# Patient Record
Sex: MALE | Race: WHITE | Employment: FULL TIME | URBAN - NONMETROPOLITAN AREA
[De-identification: names, ages, dates, MRNs, and addresses within clinical notes are randomized per-mention and may not be internally consistent; named-entity substitution may affect disease eponyms.]

---

## 2017-02-05 ENCOUNTER — OFFICE VISIT (OUTPATIENT)
Dept: URGENT CARE | Age: 57
End: 2017-02-05
Payer: COMMERCIAL

## 2017-02-05 VITALS
HEIGHT: 72 IN | TEMPERATURE: 98.5 F | BODY MASS INDEX: 30.61 KG/M2 | WEIGHT: 226 LBS | OXYGEN SATURATION: 95 % | HEART RATE: 72 BPM | RESPIRATION RATE: 20 BRPM | SYSTOLIC BLOOD PRESSURE: 143 MMHG | DIASTOLIC BLOOD PRESSURE: 88 MMHG

## 2017-02-05 DIAGNOSIS — J01.90 ACUTE SINUSITIS, RECURRENCE NOT SPECIFIED, UNSPECIFIED LOCATION: Primary | ICD-10-CM

## 2017-02-05 PROCEDURE — 96372 THER/PROPH/DIAG INJ SC/IM: CPT | Performed by: NURSE PRACTITIONER

## 2017-02-05 PROCEDURE — 99213 OFFICE O/P EST LOW 20 MIN: CPT | Performed by: NURSE PRACTITIONER

## 2017-02-05 RX ORDER — AMOXICILLIN AND CLAVULANATE POTASSIUM 500; 125 MG/1; MG/1
1 TABLET, FILM COATED ORAL 3 TIMES DAILY
Qty: 30 TABLET | Refills: 0 | Status: SHIPPED | OUTPATIENT
Start: 2017-02-05 | End: 2017-02-15

## 2017-02-05 RX ORDER — BENZONATATE 100 MG/1
100 CAPSULE ORAL 3 TIMES DAILY PRN
Qty: 21 CAPSULE | Refills: 0 | Status: SHIPPED | OUTPATIENT
Start: 2017-02-05 | End: 2017-02-12

## 2017-02-05 RX ORDER — DEXAMETHASONE SODIUM PHOSPHATE 10 MG/ML
10 INJECTION INTRAMUSCULAR; INTRAVENOUS ONCE
Status: COMPLETED | OUTPATIENT
Start: 2017-02-05 | End: 2017-02-05

## 2017-02-05 RX ADMIN — DEXAMETHASONE SODIUM PHOSPHATE 10 MG: 10 INJECTION INTRAMUSCULAR; INTRAVENOUS at 12:13

## 2017-02-05 ASSESSMENT — ENCOUNTER SYMPTOMS
WHEEZING: 0
ALLERGIC/IMMUNOLOGIC NEGATIVE: 1
EYES NEGATIVE: 1
COUGH: 1
GASTROINTESTINAL NEGATIVE: 1

## 2017-08-17 ENCOUNTER — HOSPITAL ENCOUNTER (OUTPATIENT)
Dept: NON INVASIVE DIAGNOSTICS | Age: 57
Discharge: HOME | End: 2017-08-17
Payer: COMMERCIAL

## 2017-08-17 LAB
LV EF: 50 %
LVEF MODALITY: NORMAL

## 2017-08-17 PROCEDURE — 93350 STRESS TTE ONLY: CPT

## 2019-07-11 ENCOUNTER — HOSPITAL ENCOUNTER (OUTPATIENT)
Dept: GENERAL RADIOLOGY | Age: 59
Discharge: HOME | End: 2019-07-11
Payer: COMMERCIAL

## 2019-07-11 DIAGNOSIS — R20.0 NUMBNESS AND TINGLING: ICD-10-CM

## 2019-07-11 DIAGNOSIS — R20.2 NUMBNESS AND TINGLING: ICD-10-CM

## 2019-07-11 DIAGNOSIS — M25.511 PAIN OF BOTH SHOULDER JOINTS: ICD-10-CM

## 2019-07-11 DIAGNOSIS — M25.512 PAIN OF BOTH SHOULDER JOINTS: ICD-10-CM

## 2019-07-11 PROCEDURE — 72050 X-RAY EXAM NECK SPINE 4/5VWS: CPT

## 2019-07-11 PROCEDURE — 72040 X-RAY EXAM NECK SPINE 2-3 VW: CPT

## 2019-07-11 PROCEDURE — 73030 X-RAY EXAM OF SHOULDER: CPT

## 2020-07-30 ENCOUNTER — OFFICE VISIT (OUTPATIENT)
Dept: GASTROENTEROLOGY | Facility: CLINIC | Age: 60
End: 2020-07-30

## 2020-07-30 VITALS
OXYGEN SATURATION: 98 % | HEIGHT: 72 IN | SYSTOLIC BLOOD PRESSURE: 124 MMHG | BODY MASS INDEX: 29.8 KG/M2 | TEMPERATURE: 97.8 F | DIASTOLIC BLOOD PRESSURE: 90 MMHG | HEART RATE: 64 BPM | WEIGHT: 220 LBS

## 2020-07-30 DIAGNOSIS — E11.9 TYPE 2 DIABETES MELLITUS WITHOUT COMPLICATION, WITHOUT LONG-TERM CURRENT USE OF INSULIN (HCC): ICD-10-CM

## 2020-07-30 DIAGNOSIS — I10 ESSENTIAL HYPERTENSION: ICD-10-CM

## 2020-07-30 DIAGNOSIS — Z86.010 HISTORY OF ADENOMATOUS POLYP OF COLON: Primary | ICD-10-CM

## 2020-07-30 PROBLEM — Z86.0101 HISTORY OF ADENOMATOUS POLYP OF COLON: Status: ACTIVE | Noted: 2020-07-30

## 2020-07-30 PROCEDURE — S0285 CNSLT BEFORE SCREEN COLONOSC: HCPCS | Performed by: NURSE PRACTITIONER

## 2020-07-30 NOTE — PROGRESS NOTES
[facility] Gastroenterology    Primary Physician Aayush Ewing MD    7/30/2020    Max Oliveira   1960      Chief Complaint   Patient presents with   • Colonoscopy       Subjective     HPI    Max Oliveira is a 60 y.o. male who presents as a referral for preventative maintenance. He has no complaints of nausea or vomiting. No change in bowels. No wt loss. No BRBPR. No melena. No abdominal pain.        Last colonoscopy was 5/2009 noting 2 polyps ( path tubular adenomatous).   The patient does have history of colon polyps. The patient does not have history of colon cancer.   There is not a family history of colon polyps. There is not a family history of colon cancer.     Past Medical History:   Diagnosis Date   • Cervical radiculopathy    • Diabetic neuropathy (CMS/HCC)    • DJD (degenerative joint disease)    • Epidermal cyst    • GERD (gastroesophageal reflux disease)    • History of adenomatous polyp of colon    • Hyperlipidemia    • Hypertension    • Paresthesia    • Vitamin D deficiency        Past Surgical History:   Procedure Laterality Date   • COLONOSCOPY  05/22/2009    2 polyps, adenomatous   • KNEE SURGERY     • MANDIBLE SURGERY      x 2       Outpatient Medications Marked as Taking for the 7/30/20 encounter (Office Visit) with Manuela Abreu APRN   Medication Sig Dispense Refill   • aspirin 81 MG EC tablet Take 81 mg by mouth Daily.     • lisinopril (PRINIVIL,ZESTRIL) 10 MG tablet Take 10 mg by mouth Daily.     • MAGNESIUM PO Take  by mouth.     • meloxicam (MOBIC) 15 MG tablet Take 15 mg by mouth Daily.     • metFORMIN (GLUCOPHAGE) 500 MG tablet Take 500 mg by mouth 2 (Two) Times a Day With Meals.     • metoprolol succinate XL (TOPROL-XL) 50 MG 24 hr tablet Take 50 mg by mouth Daily.     • Misc Natural Products (OSTEO BI-FLEX JOINT SHIELD PO) Take  by mouth.     • Nutritional Supplements (WELLNESS ESSENTIALS PO) Take  by mouth.     • pantoprazole (PROTONIX) 40 MG EC tablet Take 40 mg by  mouth Daily.     • simvastatin (ZOCOR) 40 MG tablet Take 40 mg by mouth Every Night.     • VITAMIN D PO Take  by mouth.         No Known Allergies    Social History     Socioeconomic History   • Marital status:      Spouse name: Not on file   • Number of children: Not on file   • Years of education: Not on file   • Highest education level: Not on file   Tobacco Use   • Smoking status: Former Smoker   • Smokeless tobacco: Never Used   Substance and Sexual Activity   • Alcohol use: Yes     Comment: daily   • Drug use: Not Currently   • Sexual activity: Defer       Family History   Problem Relation Age of Onset   • Colon cancer Neg Hx    • Colon polyps Neg Hx        Review of Systems   Constitutional: Negative for appetite change, chills, fatigue, fever and unexpected weight change.   HENT: Negative for sore throat and trouble swallowing.    Eyes: Negative for visual disturbance.   Respiratory: Negative for shortness of breath and wheezing.    Cardiovascular: Negative for chest pain and palpitations.   Gastrointestinal: Negative for abdominal distention, abdominal pain, anal bleeding, blood in stool, constipation, diarrhea, nausea and vomiting.        As mentioned in hpi   Genitourinary: Negative for difficulty urinating and hematuria.   Musculoskeletal: Negative for arthralgias and back pain.   Skin: Negative for color change and rash.   Neurological: Negative for dizziness, seizures, syncope, light-headedness and headaches.   Hematological: Negative for adenopathy.   Psychiatric/Behavioral: Negative for confusion. The patient is not nervous/anxious.        Objective     Vitals:    07/30/20 0831   BP: 124/90   Pulse: 64   Temp: 97.8 °F (36.6 °C)   SpO2: 98%         07/30/20  0831   Weight: 99.8 kg (220 lb)     Body mass index is 29.84 kg/m².    Physical Exam   Constitutional: He appears well-developed and well-nourished. No distress.   HENT:   Head: Normocephalic and atraumatic.   Eyes: EOM are normal. No  scleral icterus.   Neck: Neck supple. No JVD present.   Cardiovascular: Normal rate, regular rhythm and normal heart sounds.   Pulmonary/Chest: Effort normal and breath sounds normal.   Abdominal: Soft. Bowel sounds are normal. He exhibits no distension. There is no tenderness.   Musculoskeletal: Normal range of motion. He exhibits no deformity.   Neurological: He is alert.   Skin: Skin is warm and dry. No rash noted.   Psychiatric: He has a normal mood and affect. His behavior is normal.   Vitals reviewed.      Imaging Results (Most Recent)     None          Assessment/Plan     Max was seen today for colonoscopy.    Diagnoses and all orders for this visit:    History of adenomatous polyp of colon  -     Case Request; Standing  -     Case Request    Essential hypertension    Type 2 diabetes mellitus without complication, without long-term current use of insulin (CMS/[facility])    Other orders  -     Follow Anesthesia Guidelines / Protocol; Future  -     Obtain Informed Consent; Future  -     polyethylene glycol (Golytely) 236 g solution; Take 4,000 mL by mouth 1 (One) Time for 1 dose. Take as directed per instruction sheet.      Plan for colonoscopy. The patient was advised to take any blood pressure or heart  medications the morning of  procedure if that is when he/she normally takes. The patient was instructed how to adjust diabetes medications the am of procedure.   Hold mobic 5 days prior to procedure.            Body mass index is 29.84 kg/m².    Patient's Body mass index is 29.84 kg/m². BMI is above normal parameters. Recommendations include: recommend weight loss, no f/u .      COLONOSCOPY WITH ANESTHESIA (N/A)  All risks, benefits, alternatives, and indications of colonoscopy procedure have been discussed with the patient. Risks to include perforation of the colon requiring possible surgery or colostomy, risk of bleeding from biopsies or removal of colon tissue, possibility of missing a colon polyp or cancer, or  adverse drug reaction.  Benefits to include the diagnosis and management of disease of the colon and rectum. Alternatives to include barium enema, radiographic evaluation, lab testing or no intervention. Pt verbalizes understanding and agrees.         JESUS Nathan      EMR Dragon/transcription disclaimer:  Much of this encounter note is electronic transcription/translation of spoken language to printed text.  The electronic translation of spoken language may be erroneous, or at times, nonsensical words or phrases may be inadvertently transcribed.  Although I have reviewed the note for such errors, some may still exist.

## 2020-08-19 ENCOUNTER — TRANSCRIBE ORDERS (OUTPATIENT)
Dept: ADMINISTRATIVE | Facility: HOSPITAL | Age: 60
End: 2020-08-19

## 2020-08-19 DIAGNOSIS — Z01.818 PRE-OP TESTING: Primary | ICD-10-CM

## 2020-08-24 ENCOUNTER — LAB (OUTPATIENT)
Dept: LAB | Facility: HOSPITAL | Age: 60
End: 2020-08-24

## 2020-08-24 PROCEDURE — C9803 HOPD COVID-19 SPEC COLLECT: HCPCS | Performed by: INTERNAL MEDICINE

## 2020-08-24 PROCEDURE — U0003 INFECTIOUS AGENT DETECTION BY NUCLEIC ACID (DNA OR RNA); SEVERE ACUTE RESPIRATORY SYNDROME CORONAVIRUS 2 (SARS-COV-2) (CORONAVIRUS DISEASE [COVID-19]), AMPLIFIED PROBE TECHNIQUE, MAKING USE OF HIGH THROUGHPUT TECHNOLOGIES AS DESCRIBED BY CMS-2020-01-R: HCPCS | Performed by: INTERNAL MEDICINE

## 2020-08-25 LAB
COVID LABCORP PRIORITY: NORMAL
SARS-COV-2 RNA RESP QL NAA+PROBE: NOT DETECTED

## 2020-08-27 ENCOUNTER — ANESTHESIA (OUTPATIENT)
Dept: GASTROENTEROLOGY | Facility: HOSPITAL | Age: 60
End: 2020-08-27

## 2020-08-27 ENCOUNTER — ANESTHESIA EVENT (OUTPATIENT)
Dept: GASTROENTEROLOGY | Facility: HOSPITAL | Age: 60
End: 2020-08-27

## 2020-08-27 ENCOUNTER — TELEPHONE (OUTPATIENT)
Dept: GASTROENTEROLOGY | Facility: CLINIC | Age: 60
End: 2020-08-27

## 2020-08-27 ENCOUNTER — HOSPITAL ENCOUNTER (OUTPATIENT)
Facility: HOSPITAL | Age: 60
Setting detail: HOSPITAL OUTPATIENT SURGERY
Discharge: HOME | End: 2020-08-27
Attending: INTERNAL MEDICINE | Admitting: INTERNAL MEDICINE

## 2020-08-27 VITALS
WEIGHT: 216 LBS | TEMPERATURE: 97.3 F | RESPIRATION RATE: 18 BRPM | BODY MASS INDEX: 29.26 KG/M2 | DIASTOLIC BLOOD PRESSURE: 103 MMHG | SYSTOLIC BLOOD PRESSURE: 125 MMHG | OXYGEN SATURATION: 94 % | HEIGHT: 72 IN | HEART RATE: 66 BPM

## 2020-08-27 LAB — GLUCOSE BLDC GLUCOMTR-MCNC: 116 MG/DL (ref 70–130)

## 2020-08-27 PROCEDURE — 82962 GLUCOSE BLOOD TEST: CPT

## 2020-08-27 PROCEDURE — 45385 COLONOSCOPY W/LESION REMOVAL: CPT | Performed by: INTERNAL MEDICINE

## 2020-08-27 PROCEDURE — 25010000002 PROPOFOL 10 MG/ML EMULSION: Performed by: NURSE ANESTHETIST, CERTIFIED REGISTERED

## 2020-08-27 RX ORDER — PROPOFOL 10 MG/ML
VIAL (ML) INTRAVENOUS AS NEEDED
Status: DISCONTINUED | OUTPATIENT
Start: 2020-08-27 | End: 2020-08-27 | Stop reason: SURG

## 2020-08-27 RX ORDER — SODIUM CHLORIDE 9 MG/ML
500 INJECTION, SOLUTION INTRAVENOUS CONTINUOUS PRN
Status: DISCONTINUED | OUTPATIENT
Start: 2020-08-27 | End: 2020-08-27 | Stop reason: HOSPADM

## 2020-08-27 RX ORDER — LIDOCAINE HYDROCHLORIDE 10 MG/ML
0.5 INJECTION, SOLUTION EPIDURAL; INFILTRATION; INTRACAUDAL; PERINEURAL ONCE AS NEEDED
Status: CANCELLED | OUTPATIENT
Start: 2020-08-27

## 2020-08-27 RX ORDER — SODIUM CHLORIDE 0.9 % (FLUSH) 0.9 %
10 SYRINGE (ML) INJECTION AS NEEDED
Status: DISCONTINUED | OUTPATIENT
Start: 2020-08-27 | End: 2020-08-27 | Stop reason: HOSPADM

## 2020-08-27 RX ORDER — ONDANSETRON 2 MG/ML
4 INJECTION INTRAMUSCULAR; INTRAVENOUS ONCE AS NEEDED
Status: DISCONTINUED | OUTPATIENT
Start: 2020-08-27 | End: 2020-08-27 | Stop reason: HOSPADM

## 2020-08-27 RX ADMIN — LIDOCAINE HYDROCHLORIDE 100 MG: 20 INJECTION, SOLUTION INTRAVENOUS at 09:23

## 2020-08-27 RX ADMIN — PROPOFOL 30 MG: 10 INJECTION, EMULSION INTRAVENOUS at 09:27

## 2020-08-27 RX ADMIN — PROPOFOL 30 MG: 10 INJECTION, EMULSION INTRAVENOUS at 09:35

## 2020-08-27 RX ADMIN — PROPOFOL 30 MG: 10 INJECTION, EMULSION INTRAVENOUS at 09:34

## 2020-08-27 RX ADMIN — PROPOFOL 100 MG: 10 INJECTION, EMULSION INTRAVENOUS at 09:23

## 2020-08-27 RX ADMIN — PROPOFOL 30 MG: 10 INJECTION, EMULSION INTRAVENOUS at 09:28

## 2020-08-27 RX ADMIN — PROPOFOL 50 MG: 10 INJECTION, EMULSION INTRAVENOUS at 09:24

## 2020-08-27 RX ADMIN — SODIUM CHLORIDE 500 ML: 9 INJECTION, SOLUTION INTRAVENOUS at 08:00

## 2020-08-27 RX ADMIN — PROPOFOL 30 MG: 10 INJECTION, EMULSION INTRAVENOUS at 09:37

## 2020-08-27 RX ADMIN — PROPOFOL 30 MG: 10 INJECTION, EMULSION INTRAVENOUS at 09:29

## 2020-08-27 RX ADMIN — PROPOFOL 30 MG: 10 INJECTION, EMULSION INTRAVENOUS at 09:32

## 2020-08-27 NOTE — ANESTHESIA PREPROCEDURE EVALUATION
Anesthesia Evaluation     Patient summary reviewed   no history of anesthetic complications:  NPO Solid Status: > 8 hours  NPO Liquid Status: > 2 hours           Airway   Mallampati: I  TM distance: >3 FB  Neck ROM: full  Dental    (+) partials    Pulmonary - negative pulmonary ROS   Cardiovascular   Exercise tolerance: good (4-7 METS)    (+) hypertension, hyperlipidemia,       Neuro/Psych- negative ROS  GI/Hepatic/Renal/Endo    (+)  GERD,  diabetes mellitus,     Musculoskeletal     Abdominal    Substance History      OB/GYN          Other                        Anesthesia Plan    ASA 2     MAC     intravenous induction     Anesthetic plan, all risks, benefits, and alternatives have been provided, discussed and informed consent has been obtained with: patient.

## 2020-08-27 NOTE — ANESTHESIA POSTPROCEDURE EVALUATION
"Patient: Max Oliveira    Procedure Summary     Date:  08/27/20 Room / Location:  [facility] ENDOSCOPY 6 / [facility] END[facility]COPY    Anesthesia Start:  0920 Anesthesia Stop:  0941    Procedure:  COLONOSCOPY WITH ANESTHESIA (N/A ) Diagnosis:       History of adenomatous polyp of colon      (History of adenomatous polyp of colon [Z86.010])    Surgeon:  Mundo Rodrigues MD Provider:  Angie Rm CRNA    Anesthesia Type:  MAC ASA Status:  2          Anesthesia Type: MAC    Vitals  No vitals data found for the desired time range.          Post Anesthesia Care and Evaluation    Patient location during evaluation: PHASE II  Patient participation: complete - patient participated  Level of consciousness: awake and alert  Pain management: adequate  Airway patency: patent  Anesthetic complications: No anesthetic complications    Cardiovascular status: acceptable  Respiratory status: acceptable  Hydration status: acceptable    Comments: Blood pressure 131/87, pulse 77, temperature 97.3 °F (36.3 °C), temperature source Temporal, resp. rate 18, height 182.9 cm (72\"), weight 98 kg (216 lb), SpO2 96 %.    Pt discharged from PACU based on august score >8      "

## 2021-06-22 ENCOUNTER — TRANSCRIBE ORDERS (OUTPATIENT)
Dept: ADMINISTRATIVE | Facility: HOSPITAL | Age: 61
End: 2021-06-22

## 2021-06-22 DIAGNOSIS — Z01.818 PREOPERATIVE TESTING: Primary | ICD-10-CM

## 2021-06-23 ENCOUNTER — APPOINTMENT (OUTPATIENT)
Dept: PREADMISSION TESTING | Facility: HOSPITAL | Age: 61
End: 2021-06-23

## 2021-06-24 ENCOUNTER — PRE-ADMISSION TESTING (OUTPATIENT)
Dept: PREADMISSION TESTING | Facility: HOSPITAL | Age: 61
End: 2021-06-24

## 2021-06-24 VITALS
DIASTOLIC BLOOD PRESSURE: 88 MMHG | OXYGEN SATURATION: 98 % | SYSTOLIC BLOOD PRESSURE: 144 MMHG | RESPIRATION RATE: 16 BRPM | BODY MASS INDEX: 30.99 KG/M2 | WEIGHT: 221.34 LBS | HEIGHT: 71 IN | HEART RATE: 63 BPM

## 2021-06-24 LAB
ANION GAP SERPL CALCULATED.3IONS-SCNC: 10 MMOL/L (ref 5–15)
BUN SERPL-MCNC: 22 MG/DL (ref 6–20)
BUN/CREAT SERPL: 32.4 (ref 7–25)
CALCIUM SPEC-SCNC: 9.8 MG/DL (ref 8.6–10.5)
CHLORIDE SERPL-SCNC: 102 MMOL/L (ref 98–107)
CO2 SERPL-SCNC: 27 MMOL/L (ref 22–29)
CREAT SERPL-MCNC: 0.68 MG/DL (ref 0.76–1.27)
DEPRECATED RDW RBC AUTO: 40.2 FL (ref 37–54)
ERYTHROCYTE [DISTWIDTH] IN BLOOD BY AUTOMATED COUNT: 11.9 % (ref 12.3–15.4)
GFR SERPL CREATININE-BSD FRML MDRD: 119 ML/MIN/1.73
GLUCOSE SERPL-MCNC: 162 MG/DL (ref 65–99)
HCT VFR BLD AUTO: 43.5 % (ref 37.5–51)
HGB BLD-MCNC: 15.6 G/DL (ref 13–17.7)
MCH RBC QN AUTO: 33.3 PG (ref 26.6–33)
MCHC RBC AUTO-ENTMCNC: 35.9 G/DL (ref 31.5–35.7)
MCV RBC AUTO: 92.8 FL (ref 79–97)
PLATELET # BLD AUTO: 221 10*3/MM3 (ref 140–450)
PMV BLD AUTO: 9.9 FL (ref 6–12)
POTASSIUM SERPL-SCNC: 4 MMOL/L (ref 3.5–5.2)
RBC # BLD AUTO: 4.69 10*6/MM3 (ref 4.14–5.8)
SODIUM SERPL-SCNC: 139 MMOL/L (ref 136–145)
WBC # BLD AUTO: 4.74 10*3/MM3 (ref 3.4–10.8)

## 2021-06-24 PROCEDURE — 93010 ELECTROCARDIOGRAM REPORT: CPT | Performed by: INTERNAL MEDICINE

## 2021-06-24 PROCEDURE — 93005 ELECTROCARDIOGRAM TRACING: CPT

## 2021-06-24 PROCEDURE — 80048 BASIC METABOLIC PNL TOTAL CA: CPT

## 2021-06-24 PROCEDURE — 36415 COLL VENOUS BLD VENIPUNCTURE: CPT

## 2021-06-24 PROCEDURE — 85027 COMPLETE CBC AUTOMATED: CPT

## 2021-06-24 RX ORDER — MULTIPLE VITAMINS W/ MINERALS TAB 9MG-400MCG
1 TAB ORAL DAILY
COMMUNITY

## 2021-06-24 NOTE — DISCHARGE INSTRUCTIONS
DAY OF SURGERY INSTRUCTIONS        YOUR SURGEON: ***Dr. Farrell    PROCEDURE: ***left shoulder rotator cuff repair, subacromial decompression, distal clavicle excision, biceps tenodesis/tenotomy     DATE OF SURGERY: ***06/29/2021    ARRIVAL TIME: AS DIRECTED BY OFFICE    YOU MAY TAKE THE FOLLOWING MEDICATION(S) THE MORNING OF SURGERY WITH A SIP OF WATER: ***metoprolol       ALL OTHER HOME MEDICATION CHECK WITH YOUR PHYSICIAN      DO NOT TAKE ANY ERECTILE DYSFUNCTION MEDICATIONS (EX: CIALIS, VIAGRA) 24 HOURS PRIOR TO SURGERY                      MANAGING PAIN AFTER SURGERY    We know you are probably wondering what your pain will be like after surgery.  Following surgery it is unrealistic to expect you will not have pain.   Pain is how our bodies let us know that something is wrong or cautions us to be careful.  That said, our goal is to make your pain tolerable.    Methods we may use to treat your pain include (oral or IV medications, PCAs, epidurals, nerve blocks, etc.)   While some procedures require IV pain medications for a short time after surgery, transitioning to pain medications by mouth allows for better management of pain.   Your nurse will encourage you to take oral pain medications whenever possible.  IV medications work almost immediately, but only last a short while.  Taking medications by mouth allows for a more constant level of medication in your blood stream for a longer period of time.      Once your pain is out of control it is harder to get back under control.  It is important you are aware when your next dose of pain medication is due.  If you are admitted, your nurse may write the time of your next dose on the white board in your room to help you remember.      We are interested in your pain and encourage you to inform us about aggravating factors during your visit.   Many times a simple repositioning every few hours can make a big difference.    If your physician says it is okay, do not let  your pain prevent you from getting out of bed. Be sure to call your nurse for assistance prior to getting up so you do not fall.      Before surgery, please decide your tolerable pain goal.  These faces help describe the pain ratings we use on a 0-10 scale.   Be prepared to tell us your goal and whether or not you take pain or anxiety medications at home.          BEFORE YOU COME TO THE HOSPITAL  (Pre-op instructions)  • Do not eat, drink, smoke or chew gum after midnight the night before surgery.  This also includes no mints.  • Morning of surgery take only the medicines you have been instructed with a sip of water unless otherwise instructed  by your physician.  • Do not shave, wear makeup or dark nail polish.  • Remove all jewelry including rings.  • Leave anything you consider valuable at home.  • Leave your suitcase in the car until after your surgery.  • Bring the following with you if applicable:  o Picture ID and insurance, Medicare or Medicaid cards  o Co-pay/deductible required by insurance (cash, check, credit card)  o Copy of advance directive, living will or power-of- documents if not brought to PAT  o CPAP or BIPAP mask and tubing  o Relaxation aids ( book, magazine), etc.  o Hearing aids                        ON THE DAY OF SURGERY  · On the day of surgery check in at registration located at the main entrance of the hospital.   ? You will be registered and given a beeper with instructions where to wait in the main lobby.  ? When your beeper lights up and vibrates a member of the Outpatient Surgery staff will meet you at the double doors under the stair steps and escort you to your preoperative room.   · You may have cloth compression devices placed on your legs. These help to prevent blood clots and reduce swelling in your legs.  · An IV may be inserted into one of your veins.  · In the operating room, you may be given one or more of the following:  ? A medicine to help you relax  "(sedative).  ? A medicine to numb the area (local anesthetic).  ? A medicine to make you fall asleep (general anesthetic).  ? A medicine that is injected into an area of your body to numb everything below the injection site (regional anesthetic).  · Your surgical site will be marked or identified.  · You may be given an antibiotic through your IV to help prevent infection.  Contact a health care provider if you:  · Develop a fever of more than 100.4°F (38°C) or other feelings of illness during the 48 hours before your surgery.  · Have symptoms that get worse.  Have questions or concerns about your surgery    General Anesthesia/Surgery, Adult  General anesthesia is the use of medicines to make a person \"go to sleep\" (unconscious) for a medical procedure. General anesthesia must be used for certain procedures, and is often recommended for procedures that:  · Last a long time.  · Require you to be still or in an unusual position.  · Are major and can cause blood loss.  The medicines used for general anesthesia are called general anesthetics. As well as making you unconscious for a certain amount of time, these medicines:  · Prevent pain.  · Control your blood pressure.  · Relax your muscles.  Tell a health care provider about:  · Any allergies you have.  · All medicines you are taking, including vitamins, herbs, eye drops, creams, and over-the-counter medicines.  · Any problems you or family members have had with anesthetic medicines.  · Types of anesthetics you have had in the past.  · Any blood disorders you have.  · Any surgeries you have had.  · Any medical conditions you have.  · Any recent upper respiratory, chest, or ear infections.  · Any history of:  ? Heart or lung conditions, such as heart failure, sleep apnea, asthma, or chronic obstructive pulmonary disease (COPD).  ?  service.  ? Depression or anxiety.  · Any tobacco or drug use, including marijuana or alcohol use.  · Whether you are pregnant or " may be pregnant.  What are the risks?  Generally, this is a safe procedure. However, problems may occur, including:  · Allergic reaction.  · Lung and heart problems.  · Inhaling food or liquid from the stomach into the lungs (aspiration).  · Nerve injury.  · Air in the bloodstream, which can lead to stroke.  · Extreme agitation or confusion (delirium) when you wake up from the anesthetic.  · Waking up during your procedure and being unable to move. This is rare.  These problems are more likely to develop if you are having a major surgery or if you have an advanced or serious medical condition. You can prevent some of these complications by answering all of your health care provider's questions thoroughly and by following all instructions before your procedure.  General anesthesia can cause side effects, including:  · Nausea or vomiting.  · A sore throat from the breathing tube.  · Hoarseness.  · Wheezing or coughing.  · Shaking chills.  · Tiredness.  · Body aches.  · Anxiety.  · Sleepiness or drowsiness.  · Confusion or agitation.  RISKS AND COMPLICATIONS OF SURGERY  Your health care provider will discuss possible risks and complications with you before surgery. Common risks and complications include:    · Problems due to the use of anesthetics.  · Blood loss and replacement (does not apply to minor surgical procedures).  · Temporary increase in pain due to surgery.  · Uncorrected pain or problems that the surgery was meant to correct.  · Infection.  · New damage.    What happens before the procedure?    Medicines  Ask your health care provider about:  · Changing or stopping your regular medicines. This is especially important if you are taking diabetes medicines or blood thinners.  · Taking medicines such as aspirin and ibuprofen. These medicines can thin your blood. Do not take these medicines unless your health care provider tells you to take them.  · Taking over-the-counter medicines, vitamins, herbs, and  supplements. Do not take these during the week before your procedure unless your health care provider approves them.  General instructions  · Starting 3-6 weeks before the procedure, do not use any products that contain nicotine or tobacco, such as cigarettes and e-cigarettes. If you need help quitting, ask your health care provider.  · If you brush your teeth on the morning of the procedure, make sure to spit out all of the toothpaste.  · Tell your health care provider if you become ill or develop a cold, cough, or fever.  · If instructed by your health care provider, bring your sleep apnea device with you on the day of your surgery (if applicable).  · Ask your health care provider if you will be going home the same day, the following day, or after a longer hospital stay.  ? Plan to have someone take you home from the hospital or clinic.  ? Plan to have a responsible adult care for you for at least 24 hours after you leave the hospital or clinic. This is important.  What happens during the procedure?  · You will be given anesthetics through both of the following:  ? A mask placed over your nose and mouth.  ? An IV in one of your veins.  · You may receive a medicine to help you relax (sedative).  · After you are unconscious, a breathing tube may be inserted down your throat to help you breathe. This will be removed before you wake up.  · An anesthesia specialist will stay with you throughout your procedure. He or she will:  ? Keep you comfortable and safe by continuing to give you medicines and adjusting the amount of medicine that you get.  ? Monitor your blood pressure, pulse, and oxygen levels to make sure that the anesthetics do not cause any problems.  The procedure may vary among health care providers and hospitals.  What happens after the procedure?  · Your blood pressure, temperature, heart rate, breathing rate, and blood oxygen level will be monitored until the medicines you were given have worn off.  · You  will wake up in a recovery area. You may wake up slowly.  · If you feel anxious or agitated, you may be given medicine to help you calm down.  · If you will be going home the same day, your health care provider may check to make sure you can walk, drink, and urinate.  · Your health care provider will treat any pain or side effects you have before you go home.  · Do not drive for 24 hours if you were given a sedative.  Summary  · General anesthesia is used to keep you still and prevent pain during a procedure.  · It is important to tell your healthcare provider about your medical history and any surgeries you have had, and previous experience with anesthesia.  · Follow your healthcare provider’s instructions about when to stop eating, drinking, or taking certain medicines before your procedure.  · Plan to have someone take you home from the hospital or clinic.  This information is not intended to replace advice given to you by your health care provider. Make sure you discuss any questions you have with your health care provider.  Document Released: 03/26/2009 Document Revised: 08/03/2018 Document Reviewed: 08/03/2018  [employer] Interactive Patient Education © 2019 [employer]       Fall Prevention in Hospitals, Adult  As a hospital patient, your condition and the treatments you receive can increase your risk for falls. Some additional risk factors for falls in a hospital include:  · Being in an unfamiliar environment.  · Being on bed rest.  · Your surgery.  · Taking certain medicines.  · Your tubing requirements, such as intravenous (IV) therapy or catheters.  It is important that you learn how to decrease fall risks while at the hospital. Below are important tips that can help prevent falls.  SAFETY TIPS FOR PREVENTING FALLS  Talk about your risk of falling.  · Ask your health care provider why you are at risk for falling. Is it your medicine, illness, tubing placement, or something else?  · Make a plan with your  health care provider to keep you safe from falls.  · Ask your health care provider or pharmacist about side effects of your medicines. Some medicines can make you dizzy or affect your coordination.  Ask for help.  · Ask for help before getting out of bed. You may need to press your call button.  · Ask for assistance in getting safely to the toilet.  · Ask for a walker or cane to be put at your bedside. Ask that most of the side rails on your bed be placed up before your health care provider leaves the room.  · Ask family or friends to sit with you.  · Ask for things that are out of your reach, such as your glasses, hearing aids, telephone, bedside table, or call button.  Follow these tips to avoid falling:  · Stay lying or seated, rather than standing, while waiting for help.  · Wear rubber-soled slippers or shoes whenever you walk in the hospital.  · Avoid quick, sudden movements.  ¨ Change positions slowly.  ¨ Sit on the side of your bed before standing.  ¨ Stand up slowly and wait before you start to walk.  · Let your health care provider know if there is a spill on the floor.  · Pay careful attention to the medical equipment, electrical cords, and tubes around you.  · When you need help, use your call button by your bed or in the bathroom. Wait for one of your health care providers to help you.  · If you feel dizzy or unsure of your footing, return to bed and wait for assistance.  · Avoid being distracted by the TV, telephone, or another person in your room.  · Do not lean or support yourself on rolling objects, such as IV poles or bedside tables.     This information is not intended to replace advice given to you by your health care provider. Make sure you discuss any questions you have with your health care provider.     Document Released: 12/15/2001 Document Revised: 01/08/2016 Document Reviewed: 08/25/2013  [employer] Interactive Patient Education ©2016 [employer]       [facility] 4%  Patient Instruction Sheet    Chlorhexidine Before Surgery  Chlorhexidine gluconate (CHG) is a germ-killing (antiseptic) solution that is used to clean the skin. It gets rid of the bacteria that normally live on the skin. Cleaning your skin with CHG before surgery helps lower the risk for infection after surgery.    How to use CHG solution  · You will take 2 showers, one shower the night before surgery, the second shower the morning of surgery before coming to the hospital.  · Use CHG only as told by your health care provider, and follow the instructions on the label.  · Use CHG solution while taking a shower. Follow these steps when using CHG solution (unless your health care provider gives you different instructions):  1. Start the shower.  2. Use your normal soap and shampoo to wash your face and hair.  3. Turn off the shower or move out of the shower stream.  4. Pour the CHG onto a clean washcloth. Do not use any type of brush or rough-edged sponge.  5. Starting at your neck, lather your body down to your toes. Make sure you:  6. Pay special attention to the part of your body where you will be having surgery. Scrub this area for at least 1 minute.  7. Use the full amount of CHG as directed. Usually, this is one half bottle for each shower.  8. Do not use CHG on your head or face. If the solution gets into your ears or eyes, rinse them well with water.  9. Avoid your genital area.  10. Avoid any areas of skin that have broken skin, cuts, or scrapes.  11. Scrub your back and under your arms. Make sure to wash skin folds.  12. Let the lather sit on your skin for 1-2 minutes or as long as told by your health care  provider.  13. Thoroughly rinse your entire body in the shower. Make sure that all body creases and crevices are rinsed well.  14. Dry off with a clean towel. Do not put any substances on your body afterward, such as powder, lotion, or perfume.  15. Put on clean clothes or pajamas.  16. If it is the night  before your surgery, sleep in clean sheets.    What are the risks?  Risks of using CHG include:  · A skin reaction.  · Hearing loss, if CHG gets in your ears.  · Eye injury, if CHG gets in your eyes and is not rinsed out.  · The CHG product catching fire.  Make sure that you avoid smoking and flames after applying CHG to your skin.  Do not use CHG:  · If you have a chlorhexidine allergy or have previously reacted to chlorhexidine.  · On babies younger than 2 months of age.      On the day of surgery, when you are taken to your room in Outpatient Surgery you will be given a CHG prepackaged cloth to wipe the site for your surgery.  How to use CHG prepackaged cloths  · Follow the instructions on the label.  · Use the CHG cloth on clean, dry skin. Follow these steps when using a CHG cloth (unless your health care provider gives you different instructions):  1. Using the CHG cloth, vigorously scrub the part of your body where you will be having surgery. Scrub using a back-and-forth motion for 3 minutes. The area on your body should be completely wet with CHG when you are finished scrubbing.  2. Do not rinse. Discard the cloth and let the area air-dry for 1 minute. Do not put any substances on your body afterward, such as powder, lotion, or perfume.  Contact a health care provider if:  · Your skin gets irritated after scrubbing.  · You have questions about using your solution or cloth.  Get help right away if:  · Your eyes become very red or swollen.  · Your eyes itch badly.  · Your skin itches badly and is red or swollen.  · Your hearing changes.  · You have trouble seeing.  · You have swelling or tingling in your mouth or throat.  · You have trouble breathing.  · You swallow any chlorhexidine.  Summary  · Chlorhexidine gluconate (CHG) is a germ-killing (antiseptic) solution that is used to clean the skin. Cleaning your skin with CHG before surgery helps lower the risk for infection after surgery.  · You may be given CHG  to use at home. It may be in a bottle or in a prepackaged cloth to use on your skin. Carefully follow your health care provider's instructions and the instructions on the product label.  · Do not use CHG if you have a chlorhexidine allergy.  · Contact your health care provider if your skin gets irritated after scrubbing.  This information is not intended to replace advice given to you by your health care provider. Make sure you discuss any questions you have with your health care provider.  Document Released: 09/11/2013 Document Revised: 11/15/2018 Document Reviewed: 11/15/2018  [employer] Interactive Patient Education © 2019 [employer]          PATIENT/FAMILY/RESPONSIBLE PARTY VERBALIZES UNDERSTANDING OF ABOVE EDUCATION.  COPY OF PAIN SCALE GIVEN AND REVIEWED WITH VERBALIZED UNDERSTANDING.

## 2021-06-26 LAB
QT INTERVAL: 378 MS
QTC INTERVAL: 396 MS

## 2021-06-28 ENCOUNTER — LAB (OUTPATIENT)
Dept: LAB | Facility: HOSPITAL | Age: 61
End: 2021-06-28

## 2021-06-28 PROBLEM — M75.122 NONTRAUMATIC COMPLETE TEAR OF LEFT ROTATOR CUFF: Status: ACTIVE | Noted: 2021-06-28

## 2021-06-28 LAB — SARS-COV-2 ORF1AB RESP QL NAA+PROBE: NOT DETECTED

## 2021-06-28 PROCEDURE — C9803 HOPD COVID-19 SPEC COLLECT: HCPCS | Performed by: ORTHOPAEDIC SURGERY

## 2021-06-28 PROCEDURE — U0004 COV-19 TEST NON-CDC HGH THRU: HCPCS | Performed by: ORTHOPAEDIC SURGERY

## 2021-06-29 ENCOUNTER — ANESTHESIA EVENT (OUTPATIENT)
Dept: PERIOP | Facility: HOSPITAL | Age: 61
End: 2021-06-29

## 2021-06-29 ENCOUNTER — ANESTHESIA (OUTPATIENT)
Dept: PERIOP | Facility: HOSPITAL | Age: 61
End: 2021-06-29

## 2021-06-29 ENCOUNTER — HOSPITAL ENCOUNTER (OUTPATIENT)
Facility: HOSPITAL | Age: 61
Setting detail: HOSPITAL OUTPATIENT SURGERY
Discharge: HOME | End: 2021-06-29
Attending: ORTHOPAEDIC SURGERY | Admitting: ORTHOPAEDIC SURGERY

## 2021-06-29 VITALS
OXYGEN SATURATION: 98 % | SYSTOLIC BLOOD PRESSURE: 138 MMHG | TEMPERATURE: 97.4 F | DIASTOLIC BLOOD PRESSURE: 91 MMHG | RESPIRATION RATE: 16 BRPM | HEART RATE: 52 BPM

## 2021-06-29 DIAGNOSIS — M75.122 NONTRAUMATIC COMPLETE TEAR OF LEFT ROTATOR CUFF: Primary | ICD-10-CM

## 2021-06-29 LAB
GLUCOSE BLDC GLUCOMTR-MCNC: 117 MG/DL (ref 70–130)
GLUCOSE BLDC GLUCOMTR-MCNC: 131 MG/DL (ref 70–130)

## 2021-06-29 PROCEDURE — 25010000002 ONDANSETRON PER 1 MG: Performed by: NURSE ANESTHETIST, CERTIFIED REGISTERED

## 2021-06-29 PROCEDURE — 82962 GLUCOSE BLOOD TEST: CPT

## 2021-06-29 PROCEDURE — 25010000002 EPINEPHRINE PER 0.1 MG: Performed by: ORTHOPAEDIC SURGERY

## 2021-06-29 PROCEDURE — 25010000002 ROPIVACAINE PER 1 MG: Performed by: ANESTHESIOLOGY

## 2021-06-29 PROCEDURE — C1713 ANCHOR/SCREW BN/BN,TIS/BN: HCPCS | Performed by: ORTHOPAEDIC SURGERY

## 2021-06-29 PROCEDURE — 25010000002 FENTANYL CITRATE (PF) 50 MCG/ML SOLUTION: Performed by: ANESTHESIOLOGY

## 2021-06-29 PROCEDURE — 25010000002 PROPOFOL 10 MG/ML EMULSION: Performed by: NURSE ANESTHETIST, CERTIFIED REGISTERED

## 2021-06-29 PROCEDURE — 25010000002 MIDAZOLAM PER 1 MG: Performed by: ANESTHESIOLOGY

## 2021-06-29 PROCEDURE — C1889 IMPLANT/INSERT DEVICE, NOC: HCPCS | Performed by: ORTHOPAEDIC SURGERY

## 2021-06-29 PROCEDURE — 76942 ECHO GUIDE FOR BIOPSY: CPT | Performed by: ORTHOPAEDIC SURGERY

## 2021-06-29 PROCEDURE — 25010000002 CEFAZOLIN PER 500 MG: Performed by: ORTHOPAEDIC SURGERY

## 2021-06-29 DEVICE — SYS SUT/ANCH BIOCOMP SPEEDBRIDGE SWIVELK 4.75X19.1: Type: IMPLANTABLE DEVICE | Site: SHOULDER | Status: FUNCTIONAL

## 2021-06-29 DEVICE — SCRW BIOCOMP TENODESIS 8X12MM: Type: IMPLANTABLE DEVICE | Site: SHOULDER | Status: FUNCTIONAL

## 2021-06-29 DEVICE — KT BIOTENODESIS W/FW4PK: Type: IMPLANTABLE DEVICE | Site: SHOULDER | Status: FUNCTIONAL

## 2021-06-29 RX ORDER — FLUMAZENIL 0.1 MG/ML
0.2 INJECTION INTRAVENOUS AS NEEDED
Status: DISCONTINUED | OUTPATIENT
Start: 2021-06-29 | End: 2021-06-29 | Stop reason: HOSPADM

## 2021-06-29 RX ORDER — IBUPROFEN 600 MG/1
600 TABLET ORAL ONCE AS NEEDED
Status: DISCONTINUED | OUTPATIENT
Start: 2021-06-29 | End: 2021-06-29 | Stop reason: HOSPADM

## 2021-06-29 RX ORDER — LIDOCAINE HYDROCHLORIDE 10 MG/ML
0.5 INJECTION, SOLUTION EPIDURAL; INFILTRATION; INTRACAUDAL; PERINEURAL ONCE AS NEEDED
Status: DISCONTINUED | OUTPATIENT
Start: 2021-06-29 | End: 2021-06-29 | Stop reason: SDUPTHER

## 2021-06-29 RX ORDER — LIDOCAINE HYDROCHLORIDE 20 MG/ML
INJECTION, SOLUTION EPIDURAL; INFILTRATION; INTRACAUDAL; PERINEURAL AS NEEDED
Status: DISCONTINUED | OUTPATIENT
Start: 2021-06-29 | End: 2021-06-29 | Stop reason: SURG

## 2021-06-29 RX ORDER — MIDAZOLAM HYDROCHLORIDE 1 MG/ML
1 INJECTION INTRAMUSCULAR; INTRAVENOUS
Status: DISCONTINUED | OUTPATIENT
Start: 2021-06-29 | End: 2021-06-29 | Stop reason: HOSPADM

## 2021-06-29 RX ORDER — SODIUM CHLORIDE 0.9 % (FLUSH) 0.9 %
10 SYRINGE (ML) INJECTION AS NEEDED
Status: DISCONTINUED | OUTPATIENT
Start: 2021-06-29 | End: 2021-06-29 | Stop reason: HOSPADM

## 2021-06-29 RX ORDER — BUPIVACAINE HCL/0.9 % NACL/PF 0.1 %
2 PLASTIC BAG, INJECTION (ML) EPIDURAL ONCE
Status: COMPLETED | OUTPATIENT
Start: 2021-06-29 | End: 2021-06-29

## 2021-06-29 RX ORDER — LABETALOL HYDROCHLORIDE 5 MG/ML
5 INJECTION, SOLUTION INTRAVENOUS
Status: DISCONTINUED | OUTPATIENT
Start: 2021-06-29 | End: 2021-06-29 | Stop reason: HOSPADM

## 2021-06-29 RX ORDER — ACETAMINOPHEN 500 MG
1000 TABLET ORAL ONCE
Status: COMPLETED | OUTPATIENT
Start: 2021-06-29 | End: 2021-06-29

## 2021-06-29 RX ORDER — NALOXONE HCL 0.4 MG/ML
0.04 VIAL (ML) INJECTION AS NEEDED
Status: DISCONTINUED | OUTPATIENT
Start: 2021-06-29 | End: 2021-06-29 | Stop reason: HOSPADM

## 2021-06-29 RX ORDER — FENTANYL CITRATE 50 UG/ML
50 INJECTION, SOLUTION INTRAMUSCULAR; INTRAVENOUS ONCE
Status: COMPLETED | OUTPATIENT
Start: 2021-06-29 | End: 2021-06-29

## 2021-06-29 RX ORDER — ONDANSETRON 4 MG/1
4 TABLET, FILM COATED ORAL EVERY 8 HOURS PRN
Qty: 10 TABLET | Refills: 0 | Status: SHIPPED | OUTPATIENT
Start: 2021-06-29 | End: 2021-09-04

## 2021-06-29 RX ORDER — MIDAZOLAM HYDROCHLORIDE 1 MG/ML
2 INJECTION INTRAMUSCULAR; INTRAVENOUS ONCE
Status: COMPLETED | OUTPATIENT
Start: 2021-06-29 | End: 2021-06-29

## 2021-06-29 RX ORDER — ONDANSETRON 2 MG/ML
4 INJECTION INTRAMUSCULAR; INTRAVENOUS AS NEEDED
Status: DISCONTINUED | OUTPATIENT
Start: 2021-06-29 | End: 2021-06-29 | Stop reason: HOSPADM

## 2021-06-29 RX ORDER — SODIUM CHLORIDE 0.9 % (FLUSH) 0.9 %
3-10 SYRINGE (ML) INJECTION AS NEEDED
Status: DISCONTINUED | OUTPATIENT
Start: 2021-06-29 | End: 2021-06-29 | Stop reason: HOSPADM

## 2021-06-29 RX ORDER — ROCURONIUM BROMIDE 10 MG/ML
INJECTION, SOLUTION INTRAVENOUS AS NEEDED
Status: DISCONTINUED | OUTPATIENT
Start: 2021-06-29 | End: 2021-06-29 | Stop reason: SURG

## 2021-06-29 RX ORDER — OXYCODONE AND ACETAMINOPHEN 10; 325 MG/1; MG/1
TABLET ORAL
Qty: 20 TABLET | Refills: 0 | Status: SHIPPED | OUTPATIENT
Start: 2021-06-29 | End: 2021-11-12

## 2021-06-29 RX ORDER — SODIUM CHLORIDE, SODIUM LACTATE, POTASSIUM CHLORIDE, CALCIUM CHLORIDE 600; 310; 30; 20 MG/100ML; MG/100ML; MG/100ML; MG/100ML
100 INJECTION, SOLUTION INTRAVENOUS CONTINUOUS PRN
Status: DISCONTINUED | OUTPATIENT
Start: 2021-06-29 | End: 2021-06-29 | Stop reason: HOSPADM

## 2021-06-29 RX ORDER — HYDROMORPHONE HYDROCHLORIDE 1 MG/ML
0.5 INJECTION, SOLUTION INTRAMUSCULAR; INTRAVENOUS; SUBCUTANEOUS
Status: DISCONTINUED | OUTPATIENT
Start: 2021-06-29 | End: 2021-06-29 | Stop reason: HOSPADM

## 2021-06-29 RX ORDER — SODIUM CHLORIDE, SODIUM LACTATE, POTASSIUM CHLORIDE, CALCIUM CHLORIDE 600; 310; 30; 20 MG/100ML; MG/100ML; MG/100ML; MG/100ML
100 INJECTION, SOLUTION INTRAVENOUS CONTINUOUS
Status: DISCONTINUED | OUTPATIENT
Start: 2021-06-29 | End: 2021-06-29 | Stop reason: HOSPADM

## 2021-06-29 RX ORDER — ONDANSETRON 2 MG/ML
INJECTION INTRAMUSCULAR; INTRAVENOUS AS NEEDED
Status: DISCONTINUED | OUTPATIENT
Start: 2021-06-29 | End: 2021-06-29 | Stop reason: SURG

## 2021-06-29 RX ORDER — SODIUM CHLORIDE 0.9 % (FLUSH) 0.9 %
3 SYRINGE (ML) INJECTION EVERY 12 HOURS SCHEDULED
Status: DISCONTINUED | OUTPATIENT
Start: 2021-06-29 | End: 2021-06-29 | Stop reason: HOSPADM

## 2021-06-29 RX ORDER — LIDOCAINE HYDROCHLORIDE 10 MG/ML
0.5 INJECTION, SOLUTION EPIDURAL; INFILTRATION; INTRACAUDAL; PERINEURAL ONCE AS NEEDED
Status: DISCONTINUED | OUTPATIENT
Start: 2021-06-29 | End: 2021-06-29 | Stop reason: HOSPADM

## 2021-06-29 RX ORDER — ONDANSETRON 2 MG/ML
4 INJECTION INTRAMUSCULAR; INTRAVENOUS ONCE AS NEEDED
Status: DISCONTINUED | OUTPATIENT
Start: 2021-06-29 | End: 2021-06-29 | Stop reason: HOSPADM

## 2021-06-29 RX ORDER — PROPOFOL 10 MG/ML
VIAL (ML) INTRAVENOUS AS NEEDED
Status: DISCONTINUED | OUTPATIENT
Start: 2021-06-29 | End: 2021-06-29 | Stop reason: SURG

## 2021-06-29 RX ORDER — ROPIVACAINE HYDROCHLORIDE 5 MG/ML
INJECTION, SOLUTION EPIDURAL; INFILTRATION; PERINEURAL
Status: COMPLETED | OUTPATIENT
Start: 2021-06-29 | End: 2021-06-29

## 2021-06-29 RX ORDER — OXYCODONE AND ACETAMINOPHEN 10; 325 MG/1; MG/1
1 TABLET ORAL ONCE AS NEEDED
Status: COMPLETED | OUTPATIENT
Start: 2021-06-29 | End: 2021-06-29

## 2021-06-29 RX ORDER — MAGNESIUM HYDROXIDE 1200 MG/15ML
LIQUID ORAL AS NEEDED
Status: DISCONTINUED | OUTPATIENT
Start: 2021-06-29 | End: 2021-06-29 | Stop reason: HOSPADM

## 2021-06-29 RX ORDER — SODIUM CHLORIDE 0.9 % (FLUSH) 0.9 %
10 SYRINGE (ML) INJECTION EVERY 12 HOURS SCHEDULED
Status: DISCONTINUED | OUTPATIENT
Start: 2021-06-29 | End: 2021-06-29 | Stop reason: HOSPADM

## 2021-06-29 RX ORDER — FENTANYL CITRATE 50 UG/ML
25 INJECTION, SOLUTION INTRAMUSCULAR; INTRAVENOUS
Status: DISCONTINUED | OUTPATIENT
Start: 2021-06-29 | End: 2021-06-29 | Stop reason: HOSPADM

## 2021-06-29 RX ADMIN — MIDAZOLAM 2 MG: 1 INJECTION INTRAMUSCULAR; INTRAVENOUS at 08:41

## 2021-06-29 RX ADMIN — ONDANSETRON 4 MG: 2 INJECTION INTRAMUSCULAR; INTRAVENOUS at 10:46

## 2021-06-29 RX ADMIN — OXYCODONE AND ACETAMINOPHEN 1 TABLET: 325; 10 TABLET ORAL at 11:29

## 2021-06-29 RX ADMIN — FENTANYL CITRATE 50 MCG: 50 INJECTION, SOLUTION INTRAMUSCULAR; INTRAVENOUS at 08:41

## 2021-06-29 RX ADMIN — Medication 2 G: at 09:51

## 2021-06-29 RX ADMIN — SODIUM CHLORIDE, POTASSIUM CHLORIDE, SODIUM LACTATE AND CALCIUM CHLORIDE 100 ML/HR: 600; 310; 30; 20 INJECTION, SOLUTION INTRAVENOUS at 07:39

## 2021-06-29 RX ADMIN — SODIUM CHLORIDE, POTASSIUM CHLORIDE, SODIUM LACTATE AND CALCIUM CHLORIDE: 600; 310; 30; 20 INJECTION, SOLUTION INTRAVENOUS at 10:51

## 2021-06-29 RX ADMIN — GLYCOPYRROLATE 0.2 MG: 0.2 INJECTION, SOLUTION INTRAMUSCULAR; INTRAVENOUS at 10:36

## 2021-06-29 RX ADMIN — ACETAMINOPHEN 1000 MG: 500 TABLET, FILM COATED ORAL at 08:39

## 2021-06-29 RX ADMIN — LIDOCAINE HYDROCHLORIDE 50 MG: 20 INJECTION, SOLUTION EPIDURAL; INFILTRATION; INTRACAUDAL; PERINEURAL at 09:47

## 2021-06-29 RX ADMIN — ROCURONIUM BROMIDE 50 MG: 50 INJECTION INTRAVENOUS at 09:47

## 2021-06-29 RX ADMIN — PROPOFOL 200 MG: 10 INJECTION, EMULSION INTRAVENOUS at 09:47

## 2021-06-29 RX ADMIN — ROPIVACAINE HYDROCHLORIDE 20 ML: 5 INJECTION, SOLUTION EPIDURAL; INFILTRATION; PERINEURAL at 08:46

## 2021-06-29 NOTE — ANESTHESIA PROCEDURE NOTES
Airway  Urgency: elective    Date/Time: 6/29/2021 9:48 AM  Airway not difficult    General Information and Staff    Patient location during procedure: OR  CRNA: Dago Kumar CRNA    Indications and Patient Condition  Indications for airway management: airway protection    Preoxygenated: yes  MILS maintained throughout  Mask difficulty assessment: 1 - vent by mask    Final Airway Details  Final airway type: endotracheal airway      Successful airway: ETT  Cuffed: yes   Successful intubation technique: direct laryngoscopy  Endotracheal tube insertion site: oral  Blade: Nur  Blade size: 4  ETT size (mm): 7.5  Cormack-Lehane Classification: grade I - full view of glottis  Placement verified by: chest auscultation and capnometry   Cuff volume (mL): 5  Measured from: lips  ETT/EBT  to lips (cm): 22  Number of attempts at approach: 1  Assessment: lips, teeth, and gum same as pre-op and atraumatic intubation

## 2021-06-29 NOTE — ANESTHESIA PREPROCEDURE EVALUATION
Anesthesia Evaluation     Patient summary reviewed   no history of anesthetic complications:  NPO Solid Status: > 6 hours  NPO Liquid Status: > 6 hours           Airway   Mallampati: I  TM distance: >3 FB  Neck ROM: full  No difficulty expected  Dental    (+) partials    Pulmonary    (+) a smoker Former,   Cardiovascular   Exercise tolerance: excellent (>7 METS)    (+) hypertension, dysrhythmias (many years ago--no intervention ), hyperlipidemia,       Neuro/Psych  (+) numbness,     (-) seizures, CVA  GI/Hepatic/Renal/Endo    (+)  GERD,  diabetes mellitus type 2,   (-) liver disease, no renal disease, no thyroid disorder    Musculoskeletal     Abdominal    Substance History      OB/GYN          Other                        Anesthesia Plan    ASA 2     general with block     intravenous induction     Anesthetic plan, all risks, benefits, and alternatives have been provided, discussed and informed consent has been obtained with: patient.

## 2021-06-29 NOTE — ANESTHESIA PROCEDURE NOTES
Peripheral Block    Pre-sedation assessment completed: 6/29/2021 8:40 AM    Patient reassessed immediately prior to procedure    Patient location during procedure: holding area  Start time: 6/29/2021 8:44 AM  Stop time: 6/29/2021 8:46 AM  Reason for block: procedure for pain, at surgeon's request, post-op pain management and Request by Dr. Farrell   Performed by  Anesthesiologist: Yovany Charles MD  Preanesthetic Checklist  Completed: patient identified, IV checked, site marked, risks and benefits discussed, surgical consent, monitors and equipment checked, pre-op evaluation and timeout performed  Prep:  Pt Position: supine  Sterile barriers:gloves  Prep: ChloraPrep  Patient monitoring: blood pressure monitoring, continuous pulse oximetry and EKG  Procedure  Sedation:yes  Performed under: local infiltration  Guidance:ultrasound guided and Brachial plexus identified and local anesthetic seen surrounding nerves  Images:still images obtained (picture printed and placed in patients chart)    Laterality:left  Block Type:interscalene  Injection Technique:single-shot  Needle Type:echogenic  Needle Gauge:22 G      Medications Used: ropivacaine (NAROPIN) injection 0.5 %, 20 mL  Med admintered at 6/29/2021 8:46 AM      Post Assessment  Injection Assessment: negative aspiration for heme, no paresthesia on injection and incremental injection  Patient Tolerance:comfortable throughout block  Complications:no

## 2021-06-29 NOTE — ANESTHESIA POSTPROCEDURE EVALUATION
Patient: Max Oliveira    Procedure Summary     Date: 06/29/21 Room / Location:  [facility] OR  /  [facility]    Anesthesia Start: 0944 Anesthesia Stop: 1110    Procedures:       LEFT SHOULDER  ROTATOR CUFF REPAIR, SUBACROMIAL DECOMPRESSION, DISTAL CLAVICLE EXCISION, BICEPS TENODESIS / TENOTOMY (Left Shoulder)      DISTAL CLAVICLE EXCISION (Left Shoulder)      BICEPS TENODESIS / TENOTOMY (Left Arm Upper) Diagnosis:       Complete rotator cuff tear or rupture of left shoulder, not specified as traumatic      Biceps tendinitis of left shoulder      (M75.122)    Surgeons: Nathen Farrell MD Provider: Dago Kumar CRNA    Anesthesia Type: general with block ASA Status: 2          Anesthesia Type: general with block    Vitals  Vitals Value Taken Time   /98 06/29/21 1125   Temp 97.4 °F (36.3 °C) 06/29/21 1125   Pulse 51 06/29/21 1132   Resp 18 06/29/21 1125   SpO2 98 % 06/29/21 1132   Vitals shown include unvalidated device data.        Post Anesthesia Care and Evaluation    Patient location during evaluation: PACU  Patient participation: complete - patient participated  Level of consciousness: awake and alert  Pain management: adequate  Airway patency: patent  Anesthetic complications: No anesthetic complications    Cardiovascular status: acceptable  Respiratory status: acceptable  Hydration status: acceptable    Comments: Blood pressure 134/98, pulse 58, temperature 97.4 °F (36.3 °C), resp. rate 18, SpO2 96 %.    Pt discharged from PACU based on august score >8

## 2021-09-04 PROCEDURE — 87070 CULTURE OTHR SPECIMN AEROBIC: CPT | Performed by: INTERNAL MEDICINE

## 2021-09-04 PROCEDURE — 87205 SMEAR GRAM STAIN: CPT | Performed by: INTERNAL MEDICINE

## 2021-09-04 PROCEDURE — 87147 CULTURE TYPE IMMUNOLOGIC: CPT | Performed by: INTERNAL MEDICINE

## 2021-09-04 PROCEDURE — 87186 SC STD MICRODIL/AGAR DIL: CPT | Performed by: INTERNAL MEDICINE

## 2021-11-12 ENCOUNTER — PRE-ADMISSION TESTING (OUTPATIENT)
Dept: PREADMISSION TESTING | Facility: HOSPITAL | Age: 61
End: 2021-11-12

## 2021-11-12 ENCOUNTER — TRANSCRIBE ORDERS (OUTPATIENT)
Dept: LAB | Facility: HOSPITAL | Age: 61
End: 2021-11-12

## 2021-11-12 VITALS
RESPIRATION RATE: 20 BRPM | OXYGEN SATURATION: 97 % | SYSTOLIC BLOOD PRESSURE: 138 MMHG | HEART RATE: 79 BPM | DIASTOLIC BLOOD PRESSURE: 89 MMHG | WEIGHT: 239.86 LBS | BODY MASS INDEX: 33.58 KG/M2 | HEIGHT: 71 IN

## 2021-11-12 DIAGNOSIS — Z11.59 SCREENING FOR VIRAL DISEASE: Primary | ICD-10-CM

## 2021-11-12 PROBLEM — T81.49XA SURGICAL WOUND INFECTION: Status: ACTIVE | Noted: 2021-11-12

## 2021-11-12 LAB
ANION GAP SERPL CALCULATED.3IONS-SCNC: 15 MMOL/L (ref 5–15)
BUN SERPL-MCNC: 23 MG/DL (ref 6–20)
BUN/CREAT SERPL: 26.4 (ref 7–25)
CALCIUM SPEC-SCNC: 9.8 MG/DL (ref 8.6–10.5)
CHLORIDE SERPL-SCNC: 102 MMOL/L (ref 98–107)
CO2 SERPL-SCNC: 22 MMOL/L (ref 22–29)
CREAT SERPL-MCNC: 0.87 MG/DL (ref 0.76–1.27)
DEPRECATED RDW RBC AUTO: 41.7 FL (ref 37–54)
ERYTHROCYTE [DISTWIDTH] IN BLOOD BY AUTOMATED COUNT: 12.8 % (ref 12.3–15.4)
GFR SERPL CREATININE-BSD FRML MDRD: 90 ML/MIN/1.73
GLUCOSE SERPL-MCNC: 116 MG/DL (ref 65–99)
HCT VFR BLD AUTO: 45.8 % (ref 37.5–51)
HGB BLD-MCNC: 16.3 G/DL (ref 13–17.7)
MCH RBC QN AUTO: 31.5 PG (ref 26.6–33)
MCHC RBC AUTO-ENTMCNC: 35.6 G/DL (ref 31.5–35.7)
MCV RBC AUTO: 88.4 FL (ref 79–97)
PLATELET # BLD AUTO: 234 10*3/MM3 (ref 140–450)
PMV BLD AUTO: 9.8 FL (ref 6–12)
POTASSIUM SERPL-SCNC: 4.3 MMOL/L (ref 3.5–5.2)
RBC # BLD AUTO: 5.18 10*6/MM3 (ref 4.14–5.8)
SODIUM SERPL-SCNC: 139 MMOL/L (ref 136–145)
WBC # BLD AUTO: 7.04 10*3/MM3 (ref 3.4–10.8)

## 2021-11-12 PROCEDURE — 85027 COMPLETE CBC AUTOMATED: CPT

## 2021-11-12 PROCEDURE — 36415 COLL VENOUS BLD VENIPUNCTURE: CPT

## 2021-11-12 PROCEDURE — 80048 BASIC METABOLIC PNL TOTAL CA: CPT

## 2021-11-12 NOTE — DISCHARGE INSTRUCTIONS
DAY OF SURGERY INSTRUCTIONS          ARRIVAL TIME: AS DIRECTED BY OFFICE    YOU MAY TAKE THE FOLLOWING MEDICATION(S) THE MORNING OF SURGERY WITH A SIP OF WATER: metoprolol       ALL OTHER HOME MEDICATION CHECK WITH YOUR PHYSICIAN      DO NOT TAKE ANY ERECTILE DYSFUNCTION MEDICATIONS (EX: CIALIS, VIAGRA) 24 HOURS PRIOR TO SURGERY    Do not take within 24 hours of anesthesia, per anesthesia: Lisinopril                       MANAGING PAIN AFTER SURGERY    We know you are probably wondering what your pain will be like after surgery.  Following surgery it is unrealistic to expect you will not have pain.   Pain is how our bodies let us know that something is wrong or cautions us to be careful.  That said, our goal is to make your pain tolerable.    Methods we may use to treat your pain include (oral or IV medications, PCAs, epidurals, nerve blocks, etc.)   While some procedures require IV pain medications for a short time after surgery, transitioning to pain medications by mouth allows for better management of pain.   Your nurse will encourage you to take oral pain medications whenever possible.  IV medications work almost immediately, but only last a short while.  Taking medications by mouth allows for a more constant level of medication in your blood stream for a longer period of time.      Once your pain is out of control it is harder to get back under control.  It is important you are aware when your next dose of pain medication is due.  If you are admitted, your nurse may write the time of your next dose on the white board in your room to help you remember.      We are interested in your pain and encourage you to inform us about aggravating factors during your visit.   Many times a simple repositioning every few hours can make a big difference.    If your physician says it is okay, do not let your pain prevent you from getting out of bed. Be sure to call your nurse for assistance prior to getting up so you do not  fall.      Before surgery, please decide your tolerable pain goal.  These faces help describe the pain ratings we use on a 0-10 scale.   Be prepared to tell us your goal and whether or not you take pain or anxiety medications at home.          BEFORE YOU COME TO THE HOSPITAL  (Pre-op instructions)  • Do not eat, drink, smoke or chew gum after midnight the night before surgery.  This also includes no mints.  • Morning of surgery take only the medicines you have been instructed with a sip of water unless otherwise instructed  by your physician.  • Do not shave, wear makeup or dark nail polish.  • Remove all jewelry including rings.  • Leave anything you consider valuable at home.  • Leave your suitcase in the car until after your surgery.  • Bring the following with you if applicable:  o Picture ID and insurance, Medicare or Medicaid cards  o Co-pay/deductible required by insurance (cash, check, credit card)  o Copy of advance directive, living will or power-of- documents if not brought to pre-work  o CPAP or BIPAP mask and tubing  o Relaxation aids ( book, magazine), etc.  o Hearing aids                        ON THE DAY OF SURGERY  · On the day of surgery check in at registration located at the main entrance of the hospital. Only one family member or friend are allowed per patient.  ? You will be registered and given a beeper with instructions where to wait in the main lobby.  ? When your beeper lights up and vibrates a member of the Outpatient Surgery staff will meet you at the double doors under the stair steps and escort you to your preoperative room.   · You may have cloth compression devices placed on your legs. These help to prevent blood clots and reduce swelling in your legs.  · An IV may be inserted into one of your veins.  · In the operating room, you may be given one or more of the following:  ? A medicine to help you relax (sedative).  ? A medicine to numb the area (local anesthetic).  ? A  "medicine to make you fall asleep (general anesthetic).  ? A medicine that is injected into an area of your body to numb everything below the injection site (regional anesthetic).  · Your surgical site will be marked or identified.  · You may be given an antibiotic through your IV to help prevent infection.  Contact a health care provider if you:  · Develop a fever of more than 100.4°F (38°C) or other feelings of illness during the 48 hours before your surgery.  · Have symptoms that get worse.  Have questions or concerns about your surgery    General Anesthesia/Surgery, Adult  General anesthesia is the use of medicines to make a person \"go to sleep\" (unconscious) for a medical procedure. General anesthesia must be used for certain procedures, and is often recommended for procedures that:  · Last a long time.  · Require you to be still or in an unusual position.  · Are major and can cause blood loss.  The medicines used for general anesthesia are called general anesthetics. As well as making you unconscious for a certain amount of time, these medicines:  · Prevent pain.  · Control your blood pressure.  · Relax your muscles.  Tell a health care provider about:  · Any allergies you have.  · All medicines you are taking, including vitamins, herbs, eye drops, creams, and over-the-counter medicines.  · Any problems you or family members have had with anesthetic medicines.  · Types of anesthetics you have had in the past.  · Any blood disorders you have.  · Any surgeries you have had.  · Any medical conditions you have.  · Any recent upper respiratory, chest, or ear infections.  · Any history of:  ? Heart or lung conditions, such as heart failure, sleep apnea, asthma, or chronic obstructive pulmonary disease (COPD).  ?  service.  ? Depression or anxiety.  · Any tobacco or drug use, including marijuana or alcohol use.  · Whether you are pregnant or may be pregnant.  What are the risks?  Generally, this is a safe " procedure. However, problems may occur, including:  · Allergic reaction.  · Lung and heart problems.  · Inhaling food or liquid from the stomach into the lungs (aspiration).  · Nerve injury.  · Air in the bloodstream, which can lead to stroke.  · Extreme agitation or confusion (delirium) when you wake up from the anesthetic.  · Waking up during your procedure and being unable to move. This is rare.  These problems are more likely to develop if you are having a major surgery or if you have an advanced or serious medical condition. You can prevent some of these complications by answering all of your health care provider's questions thoroughly and by following all instructions before your procedure.  General anesthesia can cause side effects, including:  · Nausea or vomiting.  · A sore throat from the breathing tube.  · Hoarseness.  · Wheezing or coughing.  · Shaking chills.  · Tiredness.  · Body aches.  · Anxiety.  · Sleepiness or drowsiness.  · Confusion or agitation.  RISKS AND COMPLICATIONS OF SURGERY  Your health care provider will discuss possible risks and complications with you before surgery. Common risks and complications include:    · Problems due to the use of anesthetics.  · Blood loss and replacement (does not apply to minor surgical procedures).  · Temporary increase in pain due to surgery.  · Uncorrected pain or problems that the surgery was meant to correct.  · Infection.  · New damage.    What happens before the procedure?    Medicines  Ask your health care provider about:  · Changing or stopping your regular medicines. This is especially important if you are taking diabetes medicines or blood thinners.  · Taking medicines such as aspirin and ibuprofen. These medicines can thin your blood. Do not take these medicines unless your health care provider tells you to take them.  · Taking over-the-counter medicines, vitamins, herbs, and supplements. Do not take these during the week before your procedure  unless your health care provider approves them.  General instructions  · Starting 3-6 weeks before the procedure, do not use any products that contain nicotine or tobacco, such as cigarettes and e-cigarettes. If you need help quitting, ask your health care provider.  · If you brush your teeth on the morning of the procedure, make sure to spit out all of the toothpaste.  · Tell your health care provider if you become ill or develop a cold, cough, or fever.  · If instructed by your health care provider, bring your sleep apnea device with you on the day of your surgery (if applicable).  · Ask your health care provider if you will be going home the same day, the following day, or after a longer hospital stay.  ? Plan to have someone take you home from the hospital or clinic.  ? Plan to have a responsible adult care for you for at least 24 hours after you leave the hospital or clinic. This is important.  What happens during the procedure?  · You will be given anesthetics through both of the following:  ? A mask placed over your nose and mouth.  ? An IV in one of your veins.  · You may receive a medicine to help you relax (sedative).  · After you are unconscious, a breathing tube may be inserted down your throat to help you breathe. This will be removed before you wake up.  · An anesthesia specialist will stay with you throughout your procedure. He or she will:  ? Keep you comfortable and safe by continuing to give you medicines and adjusting the amount of medicine that you get.  ? Monitor your blood pressure, pulse, and oxygen levels to make sure that the anesthetics do not cause any problems.  The procedure may vary among health care providers and hospitals.  What happens after the procedure?  · Your blood pressure, temperature, heart rate, breathing rate, and blood oxygen level will be monitored until the medicines you were given have worn off.  · You will wake up in a recovery area. You may wake up slowly.  · If you  feel anxious or agitated, you may be given medicine to help you calm down.  · If you will be going home the same day, your health care provider may check to make sure you can walk, drink, and urinate.  · Your health care provider will treat any pain or side effects you have before you go home.  · Do not drive for 24 hours if you were given a sedative.  Summary  · General anesthesia is used to keep you still and prevent pain during a procedure.  · It is important to tell your healthcare provider about your medical history and any surgeries you have had, and previous experience with anesthesia.  · Follow your healthcare provider’s instructions about when to stop eating, drinking, or taking certain medicines before your procedure.  · Plan to have someone take you home from the hospital or clinic.  This information is not intended to replace advice given to you by your health care provider. Make sure you discuss any questions you have with your health care provider.  Document Released: 03/26/2009 Document Revised: 08/03/2018 Document Reviewed: 08/03/2018  [employer] Interactive Patient Education © 2019 [employer]       Fall Prevention in Hospitals, Adult  As a hospital patient, your condition and the treatments you receive can increase your risk for falls. Some additional risk factors for falls in a hospital include:  · Being in an unfamiliar environment.  · Being on bed rest.  · Your surgery.  · Taking certain medicines.  · Your tubing requirements, such as intravenous (IV) therapy or catheters.  It is important that you learn how to decrease fall risks while at the hospital. Below are important tips that can help prevent falls.  SAFETY TIPS FOR PREVENTING FALLS  Talk about your risk of falling.  · Ask your health care provider why you are at risk for falling. Is it your medicine, illness, tubing placement, or something else?  · Make a plan with your health care provider to keep you safe from falls.  · Ask your health  care provider or pharmacist about side effects of your medicines. Some medicines can make you dizzy or affect your coordination.  Ask for help.  · Ask for help before getting out of bed. You may need to press your call button.  · Ask for assistance in getting safely to the toilet.  · Ask for a walker or cane to be put at your bedside. Ask that most of the side rails on your bed be placed up before your health care provider leaves the room.  · Ask family or friends to sit with you.  · Ask for things that are out of your reach, such as your glasses, hearing aids, telephone, bedside table, or call button.  Follow these tips to avoid falling:  · Stay lying or seated, rather than standing, while waiting for help.  · Wear rubber-soled slippers or shoes whenever you walk in the hospital.  · Avoid quick, sudden movements.  ¨ Change positions slowly.  ¨ Sit on the side of your bed before standing.  ¨ Stand up slowly and wait before you start to walk.  · Let your health care provider know if there is a spill on the floor.  · Pay careful attention to the medical equipment, electrical cords, and tubes around you.  · When you need help, use your call button by your bed or in the bathroom. Wait for one of your health care providers to help you.  · If you feel dizzy or unsure of your footing, return to bed and wait for assistance.  · Avoid being distracted by the TV, telephone, or another person in your room.  · Do not lean or support yourself on rolling objects, such as IV poles or bedside tables.     This information is not intended to replace advice given to you by your health care provider. Make sure you discuss any questions you have with your health care provider.     Document Released: 12/15/2001 Document Revised: 01/08/2016 Document Reviewed: 08/25/2013  [employer] Interactive Patient Education ©2016 [employer]       [facility] 4% Patient Instruction Sheet    Chlorhexidine Before Surgery  Chlorhexidine  gluconate (CHG) is a germ-killing (antiseptic) solution that is used to clean the skin. It gets rid of the bacteria that normally live on the skin. Cleaning your skin with CHG before surgery helps lower the risk for infection after surgery.    How to use CHG solution  · You will take 2 showers, one shower the night before surgery, the second shower the morning of surgery before coming to the hospital.  · Use CHG only as told by your health care provider, and follow the instructions on the label.  · Use CHG solution while taking a shower. Follow these steps when using CHG solution (unless your health care provider gives you different instructions):  1. Start the shower.  2. Use your normal soap and shampoo to wash your face and hair.  3. Turn off the shower or move out of the shower stream.  4. Pour the CHG onto a clean washcloth. Do not use any type of brush or rough-edged sponge.  5. Starting at your neck, lather your body down to your toes. Make sure you:  6. Pay special attention to the part of your body where you will be having surgery. Scrub this area for at least 1 minute.  7. Use the full amount of CHG as directed. Usually, this is one half bottle for each shower.  8. Do not use CHG on your head or face. If the solution gets into your ears or eyes, rinse them well with water.  9. Avoid your genital area.  10. Avoid any areas of skin that have broken skin, cuts, or scrapes.  11. Scrub your back and under your arms. Make sure to wash skin folds.  12. Let the lather sit on your skin for 1-2 minutes or as long as told by your health care  provider.  13. Thoroughly rinse your entire body in the shower. Make sure that all body creases and crevices are rinsed well.  14. Dry off with a clean towel. Do not put any substances on your body afterward, such as powder, lotion, or perfume.  15. Put on clean clothes or pajamas.  16. If it is the night before your surgery, sleep in clean sheets.    What are the risks?  Risks  of using CHG include:  · A skin reaction.  · Hearing loss, if CHG gets in your ears.  · Eye injury, if CHG gets in your eyes and is not rinsed out.  · The CHG product catching fire.  Make sure that you avoid smoking and flames after applying CHG to your skin.  Do not use CHG:  · If you have a chlorhexidine allergy or have previously reacted to chlorhexidine.  · On babies younger than 2 months of age.      On the day of surgery, when you are taken to your room in Outpatient Surgery you will be given a CHG prepackaged cloth to wipe the site for your surgery.  How to use CHG prepackaged cloths  · Follow the instructions on the label.  · Use the CHG cloth on clean, dry skin. Follow these steps when using a CHG cloth (unless your health care provider gives you different instructions):  1. Using the CHG cloth, vigorously scrub the part of your body where you will be having surgery. Scrub using a back-and-forth motion for 3 minutes. The area on your body should be completely wet with CHG when you are finished scrubbing.  2. Do not rinse. Discard the cloth and let the area air-dry for 1 minute. Do not put any substances on your body afterward, such as powder, lotion, or perfume.  Contact a health care provider if:  · Your skin gets irritated after scrubbing.  · You have questions about using your solution or cloth.  Get help right away if:  · Your eyes become very red or swollen.  · Your eyes itch badly.  · Your skin itches badly and is red or swollen.  · Your hearing changes.  · You have trouble seeing.  · You have swelling or tingling in your mouth or throat.  · You have trouble breathing.  · You swallow any chlorhexidine.  Summary  · Chlorhexidine gluconate (CHG) is a germ-killing (antiseptic) solution that is used to clean the skin. Cleaning your skin with CHG before surgery helps lower the risk for infection after surgery.  · You may be given CHG to use at home. It may be in a bottle or in a prepackaged cloth to use on  your skin. Carefully follow your health care provider's instructions and the instructions on the product label.  · Do not use CHG if you have a chlorhexidine allergy.  · Contact your health care provider if your skin gets irritated after scrubbing.  This information is not intended to replace advice given to you by your health care provider. Make sure you discuss any questions you have with your health care provider.  Document Released: 09/11/2013 Document Revised: 11/15/2018 Document Reviewed: 11/15/2018  [employer] Interactive Patient Education © 2019 [employer]          PATIENT/FAMILY/RESPONSIBLE PARTY VERBALIZES UNDERSTANDING OF ABOVE EDUCATION.  COPY OF PAIN SCALE GIVEN AND REVIEWED WITH VERBALIZED UNDERSTANDING.

## 2021-11-13 ENCOUNTER — LAB (OUTPATIENT)
Dept: LAB | Facility: HOSPITAL | Age: 61
End: 2021-11-13

## 2021-11-13 LAB — SARS-COV-2 ORF1AB RESP QL NAA+PROBE: NOT DETECTED

## 2021-11-13 PROCEDURE — U0004 COV-19 TEST NON-CDC HGH THRU: HCPCS | Performed by: ORTHOPAEDIC SURGERY

## 2021-11-13 PROCEDURE — C9803 HOPD COVID-19 SPEC COLLECT: HCPCS | Performed by: ORTHOPAEDIC SURGERY

## 2021-11-13 NOTE — DISCHARGE INSTRUCTIONS
UPPER EXTREMITY POST-OP INSTRUCTIONS - DR. WILLIAMSON    IMPORTANT PHONE NUMBERS:  • For emergencies, please call [phone]  • You may reach Dr. Williamson and clinical staff at [phone] M-[phone] 8:00 am-5:00 pm  • After 5pm or on the weekends, please call [phone]  • Call immediately if you have any of the following symptoms:     Elevated temperature above 101.5 degrees for more than 48 hours after surgery     Persistent drainage from wound     Severe pain around surgical site    Sling use: The sling is provided for your comfort and to ensure proper healing of your repair following surgery. Please place the abduction pillow with the curved side against your side and the sling on the side of the pillow. Your surgery requires that you wear the sling if noted below.  ____ For comfort. Remove sling 24 hours and begin range of motion exercises  ____ At all times except bathing, dressing, and therapy. Also wear the sling during sleep.  __x__ No sling required    Bathing:  ___No bandages, no restrictions!!  ___You may remove you dressing and shower on the 3rd day after surgery (Ex. Tues surgery, shower on Friday)  ** if you are told to it is ok to remove your dressing and shower, DO NOT SOAK your incisions in a tub.  _x__Keep dressing clean, dry, and intact. DO NOT place foreign objects into your splint.      Dressings: Keep dressing/splint intact unless instructed otherwise below. SOME DRAINAGE IS NORMAL!    • DO NOT touch or apply ointment to the incision.    • DO NOT remove the steri-strips over the incisions (if you have steri-strips). They will         generally fall off on their own or can be removed 1 weeksafter surgery.    • If you have yellow gauze and it comes off, do not worry about it. Leave them off.   • Signs of infection that warrant a phone call to our clinical line:     o Excessive drainage or redness     o Red streaking coming away from the incision  o Increased pain  o Increased temperature  above 101 degrees      Physical Therapy:        *  Your physical therapy status will be discussed with you postoperatively and at your first post-op appointment. Some injuries will not require physical therapy.      *  If you have a shoulder manipulation, please schedule therapy for the next day      Medications: You will be discharged with the appropriate medications following your surgery. Fill these at the pharmacy and take them as directed on the label. Not all of the medications below may be prescribed. Occasionally, other medications may be prescribed with specific instructions.    Percocet/Lortab (oxycodone/hydrocodone with tylenol) - Pain Medication, will cause drowsiness, possibly itchiness (this is NOT an allergy - use benadryl or an over the counter allergy medication such as Claritin or Zyrtec)     o Take 1-2 tablets every 4-6 hours. DO NOT EXCEED 4,000mg of Tylenol in 24 hours.  **DO NOT MIX WITH ALCOHOL, DRIVE WHILE TAKING, OR TAKE with extra TYLENOL**    Colace (Docusate) - stool softener, used for constipation. Take this only if you feel constipated.      Zofran (Ondansetron) or Phenergan - Anti-nausea medication, will cause drowsiness      *Starting January 2021, all narcotic medication must be prescribed electronically to your pharmacy.  Be sure to notify nursing of your preferred pharmacy.  If you are running low on pain medications, please notify us if you need a refill 24-48 hours prior to when you run out, so we can make arrangements to refill the prescription for you if we determine is necessary    YOUR NEXT PAIN MEDICATION IS DUE AT______________         General Anesthesia, Adult, Care After  This sheet gives you information about how to care for yourself after your procedure. Your health care provider may also give you more specific instructions. If you have problems or questions, contact your health care provider.  What can I expect after the procedure?  After the procedure, the following  side effects are common:  · Pain or discomfort at the IV site.  · Nausea.  · Vomiting.  · Sore throat.  · Trouble concentrating.  · Feeling cold or chills.  · Weak or tired.  · Sleepiness and fatigue.  · Soreness and body aches. These side effects can affect parts of the body that were not involved in surgery.  Follow these instructions at home:   For at least 24 hours after the procedure:  1. Have a responsible adult stay with you. It is important to have someone help care for you until you are awake and alert.  2. Rest as needed.  3. Do not:  ? Participate in activities in which you could fall or become injured.  ? Drive.  ? Use heavy machinery.  ? Drink alcohol.  ? Take sleeping pills or medicines that cause drowsiness.  ? Make important decisions or sign legal documents.  ? Take care of children on your own.  Eating and drinking  · Follow any instructions from your health care provider about eating or drinking restrictions.  · When you feel hungry, start by eating small amounts of foods that are soft and easy to digest (bland), such as toast. Gradually return to your regular diet.  · Drink enough fluid to keep your urine pale yellow.  · If you vomit, rehydrate by drinking water, juice, or clear broth.  General instructions  1. If you have sleep apnea, surgery and certain medicines can increase your risk for breathing problems. Follow instructions from your health care provider about wearing your sleep device:  ? Anytime you are sleeping, including during daytime naps.  ? While taking prescription pain medicines, sleeping medicines, or medicines that make you drowsy.  2. Return to your normal activities as told by your health care provider. Ask your health care provider what activities are safe for you.  3. Take over-the-counter and prescription medicines only as told by your health care provider.  4. If you smoke, do not smoke without supervision.  5. Keep all follow-up visits as told by your health care  provider. This is important.  Contact a health care provider if:  · You have nausea or vomiting that does not get better with medicine.  · You cannot eat or drink without vomiting.  · You have pain that does not get better with medicine.  · You are unable to pass urine.  · You develop a skin rash.  · You have a fever.  · You have redness around your IV site that gets worse.  Get help right away if:  · You have difficulty breathing.  · You have chest pain.  · You have blood in your urine or stool, or you vomit blood.  Summary  · After the procedure, it is common to have a sore throat or nausea. It is also common to feel tired.  · Have a responsible adult stay with you for the first 24 hours after general anesthesia. It is important to have someone help care for you until you are awake and alert.  · When you feel hungry, start by eating small amounts of foods that are soft and easy to digest (bland), such as toast. Gradually return to your regular diet.  · Drink enough fluid to keep your urine pale yellow.  · Return to your normal activities as told by your health care provider. Ask your health care provider what activities are safe for you.  This information is not intended to replace advice given to you by your health care provider. Make sure you discuss any questions you have with your health care provider.  Document Revised: 12/21/2018 Document Reviewed: 08/03/2018    CALL YOUR PHYSICIAN IF YOU EXPERIENCE  INCREASED PAIN NOT HELPED BY YOUR PAIN MEDICATION.      .                                              Fall Prevention in the Home      Falls can cause injuries. They can happen to people of all ages. There are many things you can do to make your home safe and to help prevent falls.    WHAT CAN I DO ON THE OUTSIDE OF MY HOME?  · Regularly fix the edges of walkways and driveways and fix any cracks.  · Remove anything that might make you trip as you walk through a door, such as a raised step or threshold.  · Trim  any bushes or trees on the path to your home.  · Use bright outdoor lighting.  · Clear any walking paths of anything that might make someone trip, such as rocks or tools.  · Regularly check to see if handrails are loose or broken. Make sure that both sides of any steps have handrails.  · Any raised decks and porches should have guardrails on the edges.  · Have any leaves, snow, or ice cleared regularly.  · Use sand or salt on walking paths during winter.  · Clean up any spills in your garage right away. This includes oil or grease spills.  WHAT CAN I DO IN THE BATHROOM?    · Use night lights.  · Install grab bars by the toilet and in the tub and shower. Do not use towel bars as grab bars.  · Use non-skid mats or decals in the tub or shower.  · If you need to sit down in the shower, use a plastic, non-slip stool.  · Keep the floor dry. Clean up any water that spills on the floor as soon as it happens.  · Remove soap buildup in the tub or shower regularly.  · Attach bath mats securely with double-sided non-slip rug tape.  · Do not have throw rugs and other things on the floor that can make you trip.  WHAT CAN I DO IN THE BEDROOM?  · Use night lights.  · Make sure that you have a light by your bed that is easy to reach.  · Do not use any sheets or blankets that are too big for your bed. They should not hang down onto the floor.  · Have a firm chair that has side arms. You can use this for support while you get dressed.  · Do not have throw rugs and other things on the floor that can make you trip.  WHAT CAN I DO IN THE KITCHEN?  · Clean up any spills right away.  · Avoid walking on wet floors.  · Keep items that you use a lot in easy-to-reach places.  · If you need to reach something above you, use a strong step stool that has a grab bar.  · Keep electrical cords out of the way.  · Do not use floor polish or wax that makes floors slippery. If you must use wax, use non-skid floor wax.  · Do not have throw rugs and other  things on the floor that can make you trip.  WHAT CAN I DO WITH MY STAIRS?  · Do not leave any items on the stairs.  · Make sure that there are handrails on both sides of the stairs and use them. Fix handrails that are broken or loose. Make sure that handrails are as long as the stairways.  · Check any carpeting to make sure that it is firmly attached to the stairs. Fix any carpet that is loose or worn.  · Avoid having throw rugs at the top or bottom of the stairs. If you do have throw rugs, attach them to the floor with carpet tape.  · Make sure that you have a light switch at the top of the stairs and the bottom of the stairs. If you do not have them, ask someone to add them for you.  WHAT ELSE CAN I DO TO HELP PREVENT FALLS?  · Wear shoes that:  ¨ Do not have high heels.  ¨ Have rubber bottoms.  ¨ Are comfortable and fit you well.  ¨ Are closed at the toe. Do not wear sandals.  · If you use a stepladder:  ¨ Make sure that it is fully opened. Do not climb a closed stepladder.  ¨ Make sure that both sides of the stepladder are locked into place.  ¨ Ask someone to hold it for you, if possible.  · Clearly neeru and make sure that you can see:  ¨ Any grab bars or handrails.  ¨ First and last steps.  ¨ Where the edge of each step is.  · Use tools that help you move around (mobility aids) if they are needed. These include:  ¨ Canes.  ¨ Walkers.  ¨ Scooters.  ¨ Crutches.  · Turn on the lights when you go into a dark area. Replace any light bulbs as soon as they burn out.  · Set up your furniture so you have a clear path. Avoid moving your furniture around.  · If any of your floors are uneven, fix them.  · If there are any pets around you, be aware of where they are.  · Review your medicines with your doctor. Some medicines can make you feel dizzy. This can increase your chance of falling.  Ask your doctor what other things that you can do to help prevent falls.     This information is not intended to replace advice given  to you by your health care provider. Make sure you discuss any questions you have with your health care provider.     Document Released: 10/14/2010 Document Revised: 05/03/2016 Document Reviewed: 01/22/2016  [employer] Interactive Patient Education ©2016 [employer]     PATIENT/FAMILY/RESPONSIBLE PARTY VERBALIZES UNDERSTANDING OF ABOVE EDUCATION.  COPY OF PAIN SCALE GIVEN AND REVIEWED WITH VERBALIZED UNDERSTANDING.

## 2021-11-13 NOTE — OP NOTE
Patient Name: Richard  MRN: [id number]  : 1960      DATE of SURGERY: 2021    SURGEON: Nathen Farrell MD    ASSISTANT: NONE    PREOPERATIVE DIAGNOSIS:  1) Surgical wound dehiscence, possible infection, left shoulder  2) Status post left shoulder arthroscopic rotator cuff repair, bicep tenodesis (21)    POSTOPERATIVE DIAGNOSIS:   1) Surgical wound dehiscence, possible infection, left shoulder  2) Status post left shoulder arthroscopic rotator cuff repair, bicep tenodesis (21)    PROCEDURE PERFORMED: Irrigation and excisional excisional debridement of skin, subcutaneous tissue, and muscle, Left shoulder     IMPLANTS: none    ANESTHESIA USED: General endotracheal anesthesia, interscalene block    OPERATIVE INDICATIONS: 61 y.o. male who underwent the above procedure who began developing drainage of his axillary incision at the site of his biceps tenodesis.  Despite antibiotics and dressing changes, the wound continued to have drainage.  I recommended a formal I&D in the OR for debridement, cultures, possible closure of the wound.  He is diabetic causing an increased risk of infection and poor wound healing.  Risks included, but were not limited to, that of anesthesia, bleeding, infection, pain, damage to local structures, postoperative dislocation, need for further surgery, instability, stiffness, need for further surgery.    ESTIMATED BLOOD LOSS: 150 mL    DRAINS: none     COMPLICATIONS: none    SPECIMENS: none    PROCEDURE in DETAIL:  The patient was seen in the preoperative holding room, once again the informed consent was reviewed with the patient and signed.  The site of surgery was marked with the patient's agreement.  After being transported to the operating room, a timeout was performed identifying the correct patient as well as the operative site.  The patient was positioned in the supine position, all bony prominences were protected and a sterile prep and drape was  performed.    There was a 1 cm open wound in the mid-portion of the axillary incision.  The entire previous incision was opened and explored.  There was minimal purulence and it appeared superficial.  An excisional debridement of skin, subcutaneous tissue, and muscle was performed with the used of scissors.  2 liters of normal saline was used to irrigate the wound.      The incision was thoroughly irrigated, followed by closure in layers with PDS and nylon sutures.  A sterile dressing and sling were placed.  Counts were correct.    The patient was awakened by anesthesia, transported to the recovery room in stable condition.    POSTOPERATIVE PLAN:  1) Discharge home once pain is controlled  2) Clinical check in 1 week     Electronically signed by Nathen Farrell MD on 11/16/2021 at 20:51 CST

## 2021-11-16 ENCOUNTER — ANESTHESIA EVENT (OUTPATIENT)
Dept: PERIOP | Facility: HOSPITAL | Age: 61
End: 2021-11-16

## 2021-11-16 ENCOUNTER — ANESTHESIA (OUTPATIENT)
Dept: PERIOP | Facility: HOSPITAL | Age: 61
End: 2021-11-16

## 2021-11-16 ENCOUNTER — HOSPITAL ENCOUNTER (OUTPATIENT)
Facility: HOSPITAL | Age: 61
Setting detail: HOSPITAL OUTPATIENT SURGERY
Discharge: HOME | End: 2021-11-16
Attending: ORTHOPAEDIC SURGERY | Admitting: ORTHOPAEDIC SURGERY

## 2021-11-16 VITALS
TEMPERATURE: 97.6 F | RESPIRATION RATE: 16 BRPM | HEART RATE: 60 BPM | DIASTOLIC BLOOD PRESSURE: 98 MMHG | OXYGEN SATURATION: 96 % | SYSTOLIC BLOOD PRESSURE: 149 MMHG

## 2021-11-16 DIAGNOSIS — T81.49XA SURGICAL WOUND INFECTION: Primary | ICD-10-CM

## 2021-11-16 LAB
GLUCOSE BLDC GLUCOMTR-MCNC: 107 MG/DL (ref 70–130)
GLUCOSE BLDC GLUCOMTR-MCNC: 117 MG/DL (ref 70–130)

## 2021-11-16 PROCEDURE — 25010000002 MIDAZOLAM PER 1 MG: Performed by: ANESTHESIOLOGY

## 2021-11-16 PROCEDURE — 25010000002 CEFAZOLIN PER 500 MG: Performed by: ORTHOPAEDIC SURGERY

## 2021-11-16 PROCEDURE — 25010000002 PROPOFOL 10 MG/ML EMULSION: Performed by: NURSE ANESTHETIST, CERTIFIED REGISTERED

## 2021-11-16 PROCEDURE — 25010000002 ONDANSETRON PER 1 MG: Performed by: NURSE ANESTHETIST, CERTIFIED REGISTERED

## 2021-11-16 PROCEDURE — 82962 GLUCOSE BLOOD TEST: CPT

## 2021-11-16 PROCEDURE — 25010000002 FENTANYL CITRATE (PF) 100 MCG/2ML SOLUTION: Performed by: NURSE ANESTHETIST, CERTIFIED REGISTERED

## 2021-11-16 PROCEDURE — 82962 GLUCOSE BLOOD TEST: CPT | Performed by: FAMILY MEDICINE

## 2021-11-16 RX ORDER — MIDAZOLAM HYDROCHLORIDE 1 MG/ML
1 INJECTION INTRAMUSCULAR; INTRAVENOUS
Status: COMPLETED | OUTPATIENT
Start: 2021-11-16 | End: 2021-11-16

## 2021-11-16 RX ORDER — SODIUM CHLORIDE, SODIUM LACTATE, POTASSIUM CHLORIDE, CALCIUM CHLORIDE 600; 310; 30; 20 MG/100ML; MG/100ML; MG/100ML; MG/100ML
100 INJECTION, SOLUTION INTRAVENOUS CONTINUOUS PRN
Status: DISCONTINUED | OUTPATIENT
Start: 2021-11-16 | End: 2021-11-16 | Stop reason: HOSPADM

## 2021-11-16 RX ORDER — CEPHALEXIN 500 MG/1
500 CAPSULE ORAL 3 TIMES DAILY
Qty: 21 CAPSULE | Refills: 0 | Status: SHIPPED | OUTPATIENT
Start: 2021-11-16 | End: 2022-01-14

## 2021-11-16 RX ORDER — IBUPROFEN 600 MG/1
600 TABLET ORAL ONCE AS NEEDED
Status: DISCONTINUED | OUTPATIENT
Start: 2021-11-16 | End: 2021-11-16 | Stop reason: HOSPADM

## 2021-11-16 RX ORDER — NEOSTIGMINE METHYLSULFATE 5 MG/5 ML
SYRINGE (ML) INTRAVENOUS AS NEEDED
Status: DISCONTINUED | OUTPATIENT
Start: 2021-11-16 | End: 2021-11-16 | Stop reason: SURG

## 2021-11-16 RX ORDER — SODIUM CHLORIDE 0.9 % (FLUSH) 0.9 %
10 SYRINGE (ML) INJECTION AS NEEDED
Status: DISCONTINUED | OUTPATIENT
Start: 2021-11-16 | End: 2021-11-16 | Stop reason: HOSPADM

## 2021-11-16 RX ORDER — ROCURONIUM BROMIDE 10 MG/ML
INJECTION, SOLUTION INTRAVENOUS AS NEEDED
Status: DISCONTINUED | OUTPATIENT
Start: 2021-11-16 | End: 2021-11-16 | Stop reason: SURG

## 2021-11-16 RX ORDER — NALOXONE HCL 0.4 MG/ML
0.4 VIAL (ML) INJECTION AS NEEDED
Status: DISCONTINUED | OUTPATIENT
Start: 2021-11-16 | End: 2021-11-16 | Stop reason: HOSPADM

## 2021-11-16 RX ORDER — BUPIVACAINE HCL/0.9 % NACL/PF 0.1 %
2 PLASTIC BAG, INJECTION (ML) EPIDURAL ONCE
Status: COMPLETED | OUTPATIENT
Start: 2021-11-16 | End: 2021-11-16

## 2021-11-16 RX ORDER — FLUMAZENIL 0.1 MG/ML
0.2 INJECTION INTRAVENOUS AS NEEDED
Status: DISCONTINUED | OUTPATIENT
Start: 2021-11-16 | End: 2021-11-16 | Stop reason: HOSPADM

## 2021-11-16 RX ORDER — ONDANSETRON 2 MG/ML
4 INJECTION INTRAMUSCULAR; INTRAVENOUS ONCE AS NEEDED
Status: DISCONTINUED | OUTPATIENT
Start: 2021-11-16 | End: 2021-11-16 | Stop reason: HOSPADM

## 2021-11-16 RX ORDER — HYDROCODONE BITARTRATE AND ACETAMINOPHEN 5; 325 MG/1; MG/1
1 TABLET ORAL EVERY 6 HOURS PRN
Qty: 15 TABLET | Refills: 0 | Status: SHIPPED | OUTPATIENT
Start: 2021-11-16 | End: 2022-01-14

## 2021-11-16 RX ORDER — MAGNESIUM HYDROXIDE 1200 MG/15ML
LIQUID ORAL AS NEEDED
Status: DISCONTINUED | OUTPATIENT
Start: 2021-11-16 | End: 2021-11-16 | Stop reason: HOSPADM

## 2021-11-16 RX ORDER — LABETALOL HYDROCHLORIDE 5 MG/ML
5 INJECTION, SOLUTION INTRAVENOUS
Status: DISCONTINUED | OUTPATIENT
Start: 2021-11-16 | End: 2021-11-16 | Stop reason: HOSPADM

## 2021-11-16 RX ORDER — SODIUM CHLORIDE 0.9 % (FLUSH) 0.9 %
3 SYRINGE (ML) INJECTION EVERY 12 HOURS SCHEDULED
Status: DISCONTINUED | OUTPATIENT
Start: 2021-11-16 | End: 2021-11-16 | Stop reason: HOSPADM

## 2021-11-16 RX ORDER — ONDANSETRON 2 MG/ML
INJECTION INTRAMUSCULAR; INTRAVENOUS AS NEEDED
Status: DISCONTINUED | OUTPATIENT
Start: 2021-11-16 | End: 2021-11-16 | Stop reason: SURG

## 2021-11-16 RX ORDER — LABETALOL HYDROCHLORIDE 5 MG/ML
INJECTION, SOLUTION INTRAVENOUS AS NEEDED
Status: DISCONTINUED | OUTPATIENT
Start: 2021-11-16 | End: 2021-11-16 | Stop reason: SURG

## 2021-11-16 RX ORDER — OXYCODONE AND ACETAMINOPHEN 7.5; 325 MG/1; MG/1
2 TABLET ORAL EVERY 4 HOURS PRN
Status: DISCONTINUED | OUTPATIENT
Start: 2021-11-16 | End: 2021-11-16 | Stop reason: HOSPADM

## 2021-11-16 RX ORDER — FENTANYL CITRATE 50 UG/ML
25 INJECTION, SOLUTION INTRAMUSCULAR; INTRAVENOUS
Status: DISCONTINUED | OUTPATIENT
Start: 2021-11-16 | End: 2021-11-16 | Stop reason: HOSPADM

## 2021-11-16 RX ORDER — OXYCODONE AND ACETAMINOPHEN 10; 325 MG/1; MG/1
1 TABLET ORAL ONCE AS NEEDED
Status: COMPLETED | OUTPATIENT
Start: 2021-11-16 | End: 2021-11-16

## 2021-11-16 RX ORDER — PROPOFOL 10 MG/ML
VIAL (ML) INTRAVENOUS AS NEEDED
Status: DISCONTINUED | OUTPATIENT
Start: 2021-11-16 | End: 2021-11-16 | Stop reason: SURG

## 2021-11-16 RX ORDER — SODIUM CHLORIDE 0.9 % (FLUSH) 0.9 %
3-10 SYRINGE (ML) INJECTION AS NEEDED
Status: DISCONTINUED | OUTPATIENT
Start: 2021-11-16 | End: 2021-11-16 | Stop reason: HOSPADM

## 2021-11-16 RX ORDER — SODIUM CHLORIDE, SODIUM LACTATE, POTASSIUM CHLORIDE, CALCIUM CHLORIDE 600; 310; 30; 20 MG/100ML; MG/100ML; MG/100ML; MG/100ML
100 INJECTION, SOLUTION INTRAVENOUS CONTINUOUS
Status: DISCONTINUED | OUTPATIENT
Start: 2021-11-16 | End: 2021-11-16 | Stop reason: HOSPADM

## 2021-11-16 RX ORDER — LIDOCAINE HYDROCHLORIDE 10 MG/ML
0.5 INJECTION, SOLUTION EPIDURAL; INFILTRATION; INTRACAUDAL; PERINEURAL ONCE AS NEEDED
Status: DISCONTINUED | OUTPATIENT
Start: 2021-11-16 | End: 2021-11-16 | Stop reason: HOSPADM

## 2021-11-16 RX ORDER — GINSENG 100 MG
CAPSULE ORAL AS NEEDED
Status: DISCONTINUED | OUTPATIENT
Start: 2021-11-16 | End: 2021-11-16 | Stop reason: HOSPADM

## 2021-11-16 RX ORDER — ACETAMINOPHEN 500 MG
1000 TABLET ORAL ONCE
Status: COMPLETED | OUTPATIENT
Start: 2021-11-16 | End: 2021-11-16

## 2021-11-16 RX ORDER — FENTANYL CITRATE 50 UG/ML
INJECTION, SOLUTION INTRAMUSCULAR; INTRAVENOUS AS NEEDED
Status: DISCONTINUED | OUTPATIENT
Start: 2021-11-16 | End: 2021-11-16 | Stop reason: SURG

## 2021-11-16 RX ORDER — SODIUM CHLORIDE 0.9 % (FLUSH) 0.9 %
10 SYRINGE (ML) INJECTION EVERY 12 HOURS SCHEDULED
Status: DISCONTINUED | OUTPATIENT
Start: 2021-11-16 | End: 2021-11-16 | Stop reason: HOSPADM

## 2021-11-16 RX ADMIN — OXYCODONE AND ACETAMINOPHEN 1 TABLET: 325; 10 TABLET ORAL at 20:58

## 2021-11-16 RX ADMIN — GLYCOPYRROLATE 0.2 MG: 0.2 INJECTION, SOLUTION INTRAMUSCULAR; INTRAVENOUS at 20:34

## 2021-11-16 RX ADMIN — SODIUM CHLORIDE, POTASSIUM CHLORIDE, SODIUM LACTATE AND CALCIUM CHLORIDE 100 ML/HR: 600; 310; 30; 20 INJECTION, SOLUTION INTRAVENOUS at 15:31

## 2021-11-16 RX ADMIN — ROCURONIUM BROMIDE 25 MG: 10 INJECTION INTRAVENOUS at 20:17

## 2021-11-16 RX ADMIN — MIDAZOLAM 1 MG: 1 INJECTION INTRAMUSCULAR; INTRAVENOUS at 18:15

## 2021-11-16 RX ADMIN — LABETALOL HYDROCHLORIDE 15 MG: 5 INJECTION, SOLUTION INTRAVENOUS at 20:36

## 2021-11-16 RX ADMIN — MIDAZOLAM 1 MG: 1 INJECTION INTRAMUSCULAR; INTRAVENOUS at 17:30

## 2021-11-16 RX ADMIN — ACETAMINOPHEN 1000 MG: 500 TABLET, FILM COATED ORAL at 17:30

## 2021-11-16 RX ADMIN — Medication 2 G: at 20:20

## 2021-11-16 RX ADMIN — Medication 2 MG: at 20:34

## 2021-11-16 RX ADMIN — FENTANYL CITRATE 100 MCG: 50 INJECTION, SOLUTION INTRAMUSCULAR; INTRAVENOUS at 20:17

## 2021-11-16 RX ADMIN — ONDANSETRON 4 MG: 2 INJECTION INTRAMUSCULAR; INTRAVENOUS at 20:34

## 2021-11-16 RX ADMIN — PROPOFOL 200 MG: 10 INJECTION, EMULSION INTRAVENOUS at 20:17

## 2021-11-16 NOTE — H&P
Pt Name: Max Oliveira  MRN: [id number]  YOB: 1960  Date of evaluation: 11/16/2021    H&P including current review of systems was updated in the paper chart and/or the document previously scanned into the record.  There have been no significant changes or new problems since the original evaluation.  The patient's problems continue and indications for contemplated procedure have not changed.    Electronically signed by Nathen Farrell MD on 11/16/2021 at 15:14 CST

## 2021-11-16 NOTE — ANESTHESIA PREPROCEDURE EVALUATION
Anesthesia Evaluation     Patient summary reviewed   no history of anesthetic complications:               Airway   Mallampati: I  TM distance: >3 FB  Neck ROM: full  No difficulty expected  Dental    (+) partials    Pulmonary    (+) a smoker Former,   Cardiovascular   Exercise tolerance: excellent (>7 METS)    (+) hypertension, dysrhythmias (many years ago--no intervention ), hyperlipidemia,       Neuro/Psych  (+) numbness,     (-) seizures, CVA  GI/Hepatic/Renal/Endo    (+)  GERD,  diabetes mellitus type 2,   (-) liver disease, no renal disease, no thyroid disorder    Musculoskeletal     Abdominal    Substance History      OB/GYN          Other   arthritis,                      Anesthesia Plan    ASA 2     general   (Pt familiar to me. CRNA to confirm NPO status and answer any questions. )  intravenous induction     Anesthetic plan, all risks, benefits, and alternatives have been provided, discussed and informed consent has been obtained with: patient.

## 2021-11-17 NOTE — ANESTHESIA POSTPROCEDURE EVALUATION
Patient: Max Oliveira    Procedure Summary     Date: 11/16/21 Room / Location:  [facility] OR 09 /  [facility]    Anesthesia Start: 2012 Anesthesia Stop: 2048    Procedure: INCISION AND DRAINAGE LEFT SHOULDER SURGICAL WOUND (Left Arm Upper) Diagnosis:       Surgical wound infection      (T81.49XA)    Surgeons: Nathen Farrell MD Provider: Dago Preciado CRNA    Anesthesia Type: general ASA Status: 2          Anesthesia Type: general    Vitals  Vitals Value Taken Time   /98 11/16/21 2101   Temp 97.6 °F (36.4 °C) 11/16/21 2100   Pulse 66 11/16/21 2103   Resp 14 11/16/21 2100   SpO2 93 % 11/16/21 2103   Vitals shown include unvalidated device data.        Post Anesthesia Care and Evaluation    Patient location during evaluation: PACU  Patient participation: complete - patient participated  Level of consciousness: awake and alert  Pain management: adequate  Airway patency: patent  Anesthetic complications: No anesthetic complications    Cardiovascular status: acceptable  Respiratory status: acceptable  Hydration status: acceptable    Comments: Blood pressure 149/98, pulse 60, temperature 97.6 °F (36.4 °C), temperature source Temporal, resp. rate 16, SpO2 96 %.    Pt discharged from PACU based on august score >8

## 2021-11-17 NOTE — ANESTHESIA PROCEDURE NOTES
Airway  Date/Time: 11/16/2021 8:17 PM  Airway not difficult    General Information and Staff    Patient location during procedure: OR  CRNA: Dago Preciado CRNA    Indications and Patient Condition  Indications for airway management: airway protection    Preoxygenated: yes  Mask difficulty assessment: 0 - not attempted    Final Airway Details  Final airway type: endotracheal airway      Successful airway: ETT  Cuffed: yes   Successful intubation technique: direct laryngoscopy  Endotracheal tube insertion site: oral  Blade: Kitty  Blade size: 3  ETT size (mm): 7.0  Cormack-Lehane Classification: grade I - full view of glottis  Placement verified by: chest auscultation and capnometry   Cuff volume (mL): 6  Measured from: lips  ETT/EBT  to lips (cm): 22  Number of attempts at approach: 1  Assessment: lips, teeth, and gum same as pre-op and atraumatic intubation    Additional Comments  ATRAUMATIC INTUBATION

## 2021-11-17 NOTE — BRIEF OP NOTE
INCISION AND DRAINAGE SHOULDER  Progress Note    Max Roxanne  11/16/2021    Pre-op Diagnosis:   T81.49XA       Post-Op Diagnosis Codes:     * Surgical wound infection [T81.49XA]    Procedure/CPT® Codes:  NE DEBRIDEMENT, SKIN, SUB-Q TISSUE,MUSCLE,=<20 SQ CM [96197]      Procedure(s):  INCISION AND DRAINAGE LEFT SHOULDER SURGICAL WOUND    Surgeon(s):  Nathen Farrell MD    Anesthesia: General with Block    Staff:   Circulator: Kalyn Jones RN  Scrub Person: Daniel Sharma  Assistant: Jill Davies  Assistant: Jill Davies      Estimated Blood Loss: minimal    Urine Voided: * No values recorded between 11/16/2021  8:09 PM and 11/16/2021  8:41 PM *    Specimens:                None          Drains: * No LDAs found *    Findings: see op note    Complications: none        Nathen Farrell MD     Date: 11/16/2021  Time: 20:48 CST

## 2022-01-13 ENCOUNTER — TRANSCRIBE ORDERS (OUTPATIENT)
Dept: LAB | Facility: HOSPITAL | Age: 62
End: 2022-01-13

## 2022-01-13 DIAGNOSIS — Z11.59 SCREENING FOR VIRAL DISEASE: Primary | ICD-10-CM

## 2022-01-14 ENCOUNTER — PRE-ADMISSION TESTING (OUTPATIENT)
Dept: PREADMISSION TESTING | Facility: HOSPITAL | Age: 62
End: 2022-01-14

## 2022-01-14 VITALS
HEART RATE: 76 BPM | BODY MASS INDEX: 31.02 KG/M2 | WEIGHT: 221.56 LBS | HEIGHT: 71 IN | RESPIRATION RATE: 16 BRPM | SYSTOLIC BLOOD PRESSURE: 137 MMHG | DIASTOLIC BLOOD PRESSURE: 95 MMHG | OXYGEN SATURATION: 99 %

## 2022-01-14 LAB
ANION GAP SERPL CALCULATED.3IONS-SCNC: 11 MMOL/L (ref 5–15)
BUN SERPL-MCNC: 18 MG/DL (ref 6–20)
BUN/CREAT SERPL: 25.7 (ref 7–25)
CALCIUM SPEC-SCNC: 9.5 MG/DL (ref 8.6–10.5)
CHLORIDE SERPL-SCNC: 101 MMOL/L (ref 98–107)
CO2 SERPL-SCNC: 25 MMOL/L (ref 22–29)
CREAT SERPL-MCNC: 0.7 MG/DL (ref 0.76–1.27)
DEPRECATED RDW RBC AUTO: 43.1 FL (ref 37–54)
ERYTHROCYTE [DISTWIDTH] IN BLOOD BY AUTOMATED COUNT: 12.7 % (ref 12.3–15.4)
GFR SERPL CREATININE-BSD FRML MDRD: 115 ML/MIN/1.73
GLUCOSE SERPL-MCNC: 108 MG/DL (ref 65–99)
HCT VFR BLD AUTO: 49.5 % (ref 37.5–51)
HGB BLD-MCNC: 17.4 G/DL (ref 13–17.7)
MCH RBC QN AUTO: 32.5 PG (ref 26.6–33)
MCHC RBC AUTO-ENTMCNC: 35.2 G/DL (ref 31.5–35.7)
MCV RBC AUTO: 92.5 FL (ref 79–97)
PLATELET # BLD AUTO: 242 10*3/MM3 (ref 140–450)
PMV BLD AUTO: 9.8 FL (ref 6–12)
POTASSIUM SERPL-SCNC: 4.5 MMOL/L (ref 3.5–5.2)
RBC # BLD AUTO: 5.35 10*6/MM3 (ref 4.14–5.8)
SODIUM SERPL-SCNC: 137 MMOL/L (ref 136–145)
WBC NRBC COR # BLD: 6.21 10*3/MM3 (ref 3.4–10.8)

## 2022-01-14 PROCEDURE — 93010 ELECTROCARDIOGRAM REPORT: CPT | Performed by: INTERNAL MEDICINE

## 2022-01-14 PROCEDURE — 80048 BASIC METABOLIC PNL TOTAL CA: CPT

## 2022-01-14 PROCEDURE — 36415 COLL VENOUS BLD VENIPUNCTURE: CPT

## 2022-01-14 PROCEDURE — 85027 COMPLETE CBC AUTOMATED: CPT

## 2022-01-14 PROCEDURE — 93005 ELECTROCARDIOGRAM TRACING: CPT

## 2022-01-14 RX ORDER — MAGNESIUM OXIDE 400 MG/1
400 TABLET ORAL DAILY
COMMUNITY

## 2022-01-14 RX ORDER — DAPAGLIFLOZIN 10 MG/1
10 TABLET, FILM COATED ORAL DAILY
COMMUNITY

## 2022-01-14 NOTE — DISCHARGE INSTRUCTIONS
DAY OF SURGERY INSTRUCTIONS          ARRIVAL TIME: AS DIRECTED BY OFFICE    YOU MAY TAKE THE FOLLOWING MEDICATION(S) THE MORNING OF SURGERY WITH A SIP OF WATER: METOPROLOL      ALL OTHER HOME MEDICATION CHECK WITH YOUR PHYSICIAN (ask your health care provider about changing or stopping your regular medicines, especially if you are taking diabetes medicines or blood thinners)      DO NOT TAKE ANY ERECTILE DYSFUNCTION MEDICATIONS (EX: CIALIS, VIAGRA) 24 HOURS PRIOR TO SURGERY                      MANAGING PAIN AFTER SURGERY    We know you are probably wondering what your pain will be like after surgery.  Following surgery it is unrealistic to expect you will not have pain.   Pain is how our bodies let us know that something is wrong or cautions us to be careful.  That said, our goal is to make your pain tolerable.    Methods we may use to treat your pain include (oral or IV medications, PCAs, epidurals, nerve blocks, etc.)   While some procedures require IV pain medications for a short time after surgery, transitioning to pain medications by mouth allows for better management of pain.   Your nurse will encourage you to take oral pain medications whenever possible.  IV medications work almost immediately, but only last a short while.  Taking medications by mouth allows for a more constant level of medication in your blood stream for a longer period of time.      Once your pain is out of control it is harder to get back under control.  It is important you are aware when your next dose of pain medication is due.  If you are admitted, your nurse may write the time of your next dose on the white board in your room to help you remember.      We are interested in your pain and encourage you to inform us about aggravating factors during your visit.   Many times a simple repositioning every few hours can make a big difference.    If your physician says it is okay, do not let your pain prevent you from getting out of bed. Be  sure to call your nurse for assistance prior to getting up so you do not fall.      Before surgery, please decide your tolerable pain goal.  These faces help describe the pain ratings we use on a 0-10 scale.   Be prepared to tell us your goal and whether or not you take pain or anxiety medications at home.          BEFORE YOU COME TO THE HOSPITAL  (Pre-op instructions)  • Do not eat, drink, smoke or chew gum after midnight the night before surgery.  This also includes no mints.  • Morning of surgery take only the medicines you have been instructed with a sip of water unless otherwise instructed  by your physician.  • Do not shave, wear makeup or dark nail polish.  • Remove all jewelry including rings.  • Leave anything you consider valuable at home.  • Leave your suitcase in the car until after your surgery.  • Bring the following with you if applicable:  o Picture ID and insurance, Medicare or Medicaid cards  o Co-pay/deductible required by insurance (cash, check, credit card)  o Copy of advance directive, living will or power-of- documents if not brought to pre-work  o CPAP or BIPAP mask and tubing  o Relaxation aids ( book, magazine), etc.  o Hearing aids                        ON THE DAY OF SURGERY  · On the day of surgery check in at registration located at the main entrance of the hospital. Only one family member or friend are allowed per patient.  ? You will be registered and given a beeper with instructions where to wait in the main lobby.  ? When your beeper lights up and vibrates a member of the Outpatient Surgery staff will meet you at the double doors under the stair steps and escort you to your preoperative room.   · You may have cloth compression devices placed on your legs. These help to prevent blood clots and reduce swelling in your legs.  · An IV may be inserted into one of your veins.  · In the operating room, you may be given one or more of the following:  ? A medicine to help you relax  "(sedative).  ? A medicine to numb the area (local anesthetic).  ? A medicine to make you fall asleep (general anesthetic).  ? A medicine that is injected into an area of your body to numb everything below the injection site (regional anesthetic).  · Your surgical site will be marked or identified.  · You may be given an antibiotic through your IV to help prevent infection.  Contact a health care provider if you:  · Develop a fever of more than 100.4°F (38°C) or other feelings of illness during the 48 hours before your surgery.  · Have symptoms that get worse.  Have questions or concerns about your surgery    General Anesthesia/Surgery, Adult  General anesthesia is the use of medicines to make a person \"go to sleep\" (unconscious) for a medical procedure. General anesthesia must be used for certain procedures, and is often recommended for procedures that:  · Last a long time.  · Require you to be still or in an unusual position.  · Are major and can cause blood loss.  The medicines used for general anesthesia are called general anesthetics. As well as making you unconscious for a certain amount of time, these medicines:  · Prevent pain.  · Control your blood pressure.  · Relax your muscles.  Tell a health care provider about:  · Any allergies you have.  · All medicines you are taking, including vitamins, herbs, eye drops, creams, and over-the-counter medicines.  · Any problems you or family members have had with anesthetic medicines.  · Types of anesthetics you have had in the past.  · Any blood disorders you have.  · Any surgeries you have had.  · Any medical conditions you have.  · Any recent upper respiratory, chest, or ear infections.  · Any history of:  ? Heart or lung conditions, such as heart failure, sleep apnea, asthma, or chronic obstructive pulmonary disease (COPD).  ?  service.  ? Depression or anxiety.  · Any tobacco or drug use, including marijuana or alcohol use.  · Whether you are pregnant or " may be pregnant.  What are the risks?  Generally, this is a safe procedure. However, problems may occur, including:  · Allergic reaction.  · Lung and heart problems.  · Inhaling food or liquid from the stomach into the lungs (aspiration).  · Nerve injury.  · Air in the bloodstream, which can lead to stroke.  · Extreme agitation or confusion (delirium) when you wake up from the anesthetic.  · Waking up during your procedure and being unable to move. This is rare.  These problems are more likely to develop if you are having a major surgery or if you have an advanced or serious medical condition. You can prevent some of these complications by answering all of your health care provider's questions thoroughly and by following all instructions before your procedure.  General anesthesia can cause side effects, including:  · Nausea or vomiting.  · A sore throat from the breathing tube.  · Hoarseness.  · Wheezing or coughing.  · Shaking chills.  · Tiredness.  · Body aches.  · Anxiety.  · Sleepiness or drowsiness.  · Confusion or agitation.  RISKS AND COMPLICATIONS OF SURGERY  Your health care provider will discuss possible risks and complications with you before surgery. Common risks and complications include:    · Problems due to the use of anesthetics.  · Blood loss and replacement (does not apply to minor surgical procedures).  · Temporary increase in pain due to surgery.  · Uncorrected pain or problems that the surgery was meant to correct.  · Infection.  · New damage.    What happens before the procedure?    Medicines  Ask your health care provider about:  · Changing or stopping your regular medicines. This is especially important if you are taking diabetes medicines or blood thinners.  · Taking medicines such as aspirin and ibuprofen. These medicines can thin your blood. Do not take these medicines unless your health care provider tells you to take them.  · Taking over-the-counter medicines, vitamins, herbs, and  supplements. Do not take these during the week before your procedure unless your health care provider approves them.  General instructions  · Starting 3-6 weeks before the procedure, do not use any products that contain nicotine or tobacco, such as cigarettes and e-cigarettes. If you need help quitting, ask your health care provider.  · If you brush your teeth on the morning of the procedure, make sure to spit out all of the toothpaste.  · Tell your health care provider if you become ill or develop a cold, cough, or fever.  · If instructed by your health care provider, bring your sleep apnea device with you on the day of your surgery (if applicable).  · Ask your health care provider if you will be going home the same day, the following day, or after a longer hospital stay.  ? Plan to have someone take you home from the hospital or clinic.  ? Plan to have a responsible adult care for you for at least 24 hours after you leave the hospital or clinic. This is important.  What happens during the procedure?  · You will be given anesthetics through both of the following:  ? A mask placed over your nose and mouth.  ? An IV in one of your veins.  · You may receive a medicine to help you relax (sedative).  · After you are unconscious, a breathing tube may be inserted down your throat to help you breathe. This will be removed before you wake up.  · An anesthesia specialist will stay with you throughout your procedure. He or she will:  ? Keep you comfortable and safe by continuing to give you medicines and adjusting the amount of medicine that you get.  ? Monitor your blood pressure, pulse, and oxygen levels to make sure that the anesthetics do not cause any problems.  The procedure may vary among health care providers and hospitals.  What happens after the procedure?  · Your blood pressure, temperature, heart rate, breathing rate, and blood oxygen level will be monitored until the medicines you were given have worn off.  · You  will wake up in a recovery area. You may wake up slowly.  · If you feel anxious or agitated, you may be given medicine to help you calm down.  · If you will be going home the same day, your health care provider may check to make sure you can walk, drink, and urinate.  · Your health care provider will treat any pain or side effects you have before you go home.  · Do not drive for 24 hours if you were given a sedative.  Summary  · General anesthesia is used to keep you still and prevent pain during a procedure.  · It is important to tell your healthcare provider about your medical history and any surgeries you have had, and previous experience with anesthesia.  · Follow your healthcare provider’s instructions about when to stop eating, drinking, or taking certain medicines before your procedure.  · Plan to have someone take you home from the hospital or clinic.  This information is not intended to replace advice given to you by your health care provider. Make sure you discuss any questions you have with your health care provider.  Document Released: 03/26/2009 Document Revised: 08/03/2018 Document Reviewed: 08/03/2018  [employer] Interactive Patient Education © 2019 [employer]       Fall Prevention in Hospitals, Adult  As a hospital patient, your condition and the treatments you receive can increase your risk for falls. Some additional risk factors for falls in a hospital include:  · Being in an unfamiliar environment.  · Being on bed rest.  · Your surgery.  · Taking certain medicines.  · Your tubing requirements, such as intravenous (IV) therapy or catheters.  It is important that you learn how to decrease fall risks while at the hospital. Below are important tips that can help prevent falls.  SAFETY TIPS FOR PREVENTING FALLS  Talk about your risk of falling.  · Ask your health care provider why you are at risk for falling. Is it your medicine, illness, tubing placement, or something else?  · Make a plan with your  health care provider to keep you safe from falls.  · Ask your health care provider or pharmacist about side effects of your medicines. Some medicines can make you dizzy or affect your coordination.  Ask for help.  · Ask for help before getting out of bed. You may need to press your call button.  · Ask for assistance in getting safely to the toilet.  · Ask for a walker or cane to be put at your bedside. Ask that most of the side rails on your bed be placed up before your health care provider leaves the room.  · Ask family or friends to sit with you.  · Ask for things that are out of your reach, such as your glasses, hearing aids, telephone, bedside table, or call button.  Follow these tips to avoid falling:  · Stay lying or seated, rather than standing, while waiting for help.  · Wear rubber-soled slippers or shoes whenever you walk in the hospital.  · Avoid quick, sudden movements.  ¨ Change positions slowly.  ¨ Sit on the side of your bed before standing.  ¨ Stand up slowly and wait before you start to walk.  · Let your health care provider know if there is a spill on the floor.  · Pay careful attention to the medical equipment, electrical cords, and tubes around you.  · When you need help, use your call button by your bed or in the bathroom. Wait for one of your health care providers to help you.  · If you feel dizzy or unsure of your footing, return to bed and wait for assistance.  · Avoid being distracted by the TV, telephone, or another person in your room.  · Do not lean or support yourself on rolling objects, such as IV poles or bedside tables.     This information is not intended to replace advice given to you by your health care provider. Make sure you discuss any questions you have with your health care provider.     Document Released: 12/15/2001 Document Revised: 01/08/2016 Document Reviewed: 08/25/2013  [employer] Interactive Patient Education ©2016 [employer]       [facility]%  Patient Instruction Sheet    Chlorhexidine Before Surgery  Chlorhexidine gluconate (CHG) is a germ-killing (antiseptic) solution that is used to clean the skin. It gets rid of the bacteria that normally live on the skin. Cleaning your skin with CHG before surgery helps lower the risk for infection after surgery.    How to use CHG solution  · You will take 2 showers, one shower the night before surgery, the second shower the morning of surgery before coming to the hospital.  · Use CHG only as told by your health care provider, and follow the instructions on the label.  · Use CHG solution while taking a shower. Follow these steps when using CHG solution (unless your health care provider gives you different instructions):  1. Start the shower.  2. Use your normal soap and shampoo to wash your face and hair.  3. Turn off the shower or move out of the shower stream.  4. Pour the CHG onto a clean washcloth. Do not use any type of brush or rough-edged sponge.  5. Starting at your neck, lather your body down to your toes. Make sure you:  6. Pay special attention to the part of your body where you will be having surgery. Scrub this area for at least 1 minute.  7. Use the full amount of CHG as directed. Usually, this is one half bottle for each shower.  8. Do not use CHG on your head or face. If the solution gets into your ears or eyes, rinse them well with water.  9. Avoid your genital area.  10. Avoid any areas of skin that have broken skin, cuts, or scrapes.  11. Scrub your back and under your arms. Make sure to wash skin folds.  12. Let the lather sit on your skin for 1-2 minutes or as long as told by your health care  provider.  13. Thoroughly rinse your entire body in the shower. Make sure that all body creases and crevices are rinsed well.  14. Dry off with a clean towel. Do not put any substances on your body afterward, such as powder, lotion, or perfume.  15. Put on clean clothes or pajamas.  16. If it is the night  before your surgery, sleep in clean sheets.    What are the risks?  Risks of using CHG include:  · A skin reaction.  · Hearing loss, if CHG gets in your ears.  · Eye injury, if CHG gets in your eyes and is not rinsed out.  · The CHG product catching fire.  Make sure that you avoid smoking and flames after applying CHG to your skin.  Do not use CHG:  · If you have a chlorhexidine allergy or have previously reacted to chlorhexidine.  · On babies younger than 2 months of age.      On the day of surgery, when you are taken to your room in Outpatient Surgery you will be given a CHG prepackaged cloth to wipe the site for your surgery.  How to use CHG prepackaged cloths  · Follow the instructions on the label.  · Use the CHG cloth on clean, dry skin. Follow these steps when using a CHG cloth (unless your health care provider gives you different instructions):  1. Using the CHG cloth, vigorously scrub the part of your body where you will be having surgery. Scrub using a back-and-forth motion for 3 minutes. The area on your body should be completely wet with CHG when you are finished scrubbing.  2. Do not rinse. Discard the cloth and let the area air-dry for 1 minute. Do not put any substances on your body afterward, such as powder, lotion, or perfume.  Contact a health care provider if:  · Your skin gets irritated after scrubbing.  · You have questions about using your solution or cloth.  Get help right away if:  · Your eyes become very red or swollen.  · Your eyes itch badly.  · Your skin itches badly and is red or swollen.  · Your hearing changes.  · You have trouble seeing.  · You have swelling or tingling in your mouth or throat.  · You have trouble breathing.  · You swallow any chlorhexidine.  Summary  · Chlorhexidine gluconate (CHG) is a germ-killing (antiseptic) solution that is used to clean the skin. Cleaning your skin with CHG before surgery helps lower the risk for infection after surgery.  · You may be given CHG  to use at home. It may be in a bottle or in a prepackaged cloth to use on your skin. Carefully follow your health care provider's instructions and the instructions on the product label.  · Do not use CHG if you have a chlorhexidine allergy.  · Contact your health care provider if your skin gets irritated after scrubbing.  This information is not intended to replace advice given to you by your health care provider. Make sure you discuss any questions you have with your health care provider.  Document Released: 09/11/2013 Document Revised: 11/15/2018 Document Reviewed: 11/15/2018  [employer] Interactive Patient Education © 2019 [employer]          PATIENT/FAMILY/RESPONSIBLE PARTY VERBALIZES UNDERSTANDING OF ABOVE EDUCATION.  COPY OF PAIN SCALE GIVEN AND REVIEWED WITH VERBALIZED UNDERSTANDING.

## 2022-01-15 ENCOUNTER — LAB (OUTPATIENT)
Dept: LAB | Facility: HOSPITAL | Age: 62
End: 2022-01-15

## 2022-01-15 LAB
QT INTERVAL: 376 MS
QTC INTERVAL: 394 MS
SARS-COV-2 ORF1AB RESP QL NAA+PROBE: NOT DETECTED

## 2022-01-15 PROCEDURE — U0004 COV-19 TEST NON-CDC HGH THRU: HCPCS | Performed by: ORTHOPAEDIC SURGERY

## 2022-01-15 PROCEDURE — C9803 HOPD COVID-19 SPEC COLLECT: HCPCS | Performed by: ORTHOPAEDIC SURGERY

## 2022-01-16 NOTE — DISCHARGE INSTRUCTIONS
UPPER EXTREMITY POST-OP INSTRUCTIONS - DR. WILLIAMSON    IMPORTANT PHONE NUMBERS:  • For emergencies, please call [phone]  • You may reach Dr. Williamson and clinical staff at [phone]- M-F 8:00 am-5:00 pm  • After 5pm or on the weekends, please call [phone]  • Call immediately if you have any of the following symptoms:     Elevated temperature above 101.5 degrees for more than 48 hours after surgery     Persistent drainage from wound     Severe pain around surgical site    Sling use: The sling is provided for your comfort and to ensure proper healing of your repair following surgery. Please place the abduction pillow with the curved side against your side and the sling on the side of the pillow. Your surgery requires that you wear the sling if noted below.  __x__ For comfort.  ____ At all times except bathing, dressing, and therapy. Also wear the sling during sleep.  ____ No sling required    Bathing:  ___No bandages, no restrictions!!  _x__You may remove you dressing and shower on the 3rd day after surgery (Ex. Tu surgery, shower on Friday)  ** if you are told to it is ok to remove your dressing and shower, DO NOT SOAK your incisions in a tub.  ___Keep splint clean, dry, and intact. DO NOT place foreign objects into your splint.      Dressings: Keep dressing/splint intact unless instructed otherwise below. SOME DRAINAGE IS NORMAL!    • DO NOT touch or apply ointment to the incision.    • DO NOT remove the steri-strips over the incisions (if you have steri-strips). They will         generally fall off on their own or can be removed 1 weeksafter surgery.    • If you have yellow gauze and it comes off, do not worry about it. Leave them off.   • Signs of infection that warrant a phone call to our clinical line:     o Excessive drainage or redness     o Red streaking coming away from the incision  o Increased pain  o Increased temperature above 101 degrees      Physical Therapy:        *  Your  physical therapy status will be discussed with you postoperatively and at your first post-op appointment. Some injuries will not require physical therapy.      *  If you have a shoulder manipulation, please schedule therapy for the next day      Medications: You will be discharged with the appropriate medications following your surgery. Fill these at the pharmacy and take them as directed on the label. Not all of the medications below may be prescribed. Occasionally, other medications may be prescribed with specific instructions.    Percocet/Lortab (oxycodone/hydrocodone with tylenol) - Pain Medication, will cause drowsiness, possibly itchiness (this is NOT an allergy - use benadryl or an over the counter allergy medication such as Claritin or Zyrtec)     o Take 1-2 tablets every 4-6 hours. DO NOT EXCEED 4,000mg of Tylenol in 24 hours.  **DO NOT MIX WITH ALCOHOL, DRIVE WHILE TAKING, OR TAKE with extra TYLENOL**    Colace (Docusate) - stool softener, used for constipation. Take this only if you feel constipated.      Zofran (Ondansetron) or Phenergan - Anti-nausea medication, will cause drowsiness      *Starting January 2021, all narcotic medication must be prescribed electronically to your pharmacy.  Be sure to notify nursing of your preferred pharmacy.  If you are running low on pain medications, please notify us if you need a refill 24-48 hours prior to when you run out, so we can make arrangements to refill the prescription for you if we determine is necessary

## 2022-01-16 NOTE — OP NOTE
Patient Name: Richard  : 1960  MRN: [id number]      DATE of SURGERY: 2022    SURGEON: Nathen Farrell MD    ASSISTANT: Bruce Chambers PA-C, was used as an assistant during this procedure and was utilized during patient positioning, wound exposure, wound closure, and postoperative dressing application.    PREOPERATIVE DIAGNOSIS:  1) Surgical wound infection, left shoulder  2) Status post left shoulder irrigation and excisional debridement (21)  2) Status post left shoulder arthroscopic rotator cuff repair, bicep tenodesis (21)     POSTOPERATIVE DIAGNOSIS:   1) Surgical wound infection, left shoulder  2) Status post left shoulder irrigation and excisional debridement (21)  2) Status post left shoulder arthroscopic rotator cuff repair, bicep tenodesis (21)     PROCEDURE PERFORMED:   1) Removal of hardware, left shoulder (deep implant)  2) Irrigation and excisional excisional debridement of skin, subcutaneous tissue, and muscle, Left shoulder      IMPLANTS: none     ANESTHESIA USED: General endotracheal anesthesia, interscalene block     OPERATIVE INDICATIONS: 61 y.o. male who underwent the above procedure who began developing drainage of his axillary incision at the site of his biceps tenodesis.  Despite antibiotics and dressing changes, the wound continued to have drainage.  I recommended a formal I&D in the OR for debridement, cultures, possible closure of the wound.  This was performed on 21 and he was initially doing well until drainage began again.  An MRI showed a possible infection of the tenodesis site in the proximal humerus.  It was felt that his tenodesis screw should be removed in addition to a more extensive debridement following by IV antibiotics pending a positive culture.  He is diabetic causing an increased risk of infection and poor wound healing.  Risks included, but were not limited to, that of anesthesia, bleeding, infection, pain, damage to local  structures, postoperative dislocation, need for further surgery, instability, stiffness, need for further surgery.     ESTIMATED BLOOD LOSS: 150 mL     DRAINS: none     COMPLICATIONS: none     SPECIMENS: soft tissue superficial and deep, E-Swab from the intramedullary canal, bone and necrotic material intramedullary canal     PROCEDURE in DETAIL:  The patient was seen in the preoperative holding room, once again the informed consent was reviewed with the patient and signed.  The site of surgery was marked with the patient's agreement.  After being transported to the operating room, a timeout was performed identifying the correct patient as well as the operative site.  The patient was positioned in the supine position, all bony prominences were protected and a sterile prep and drape was performed.     There was a <1 cm open wound in the mid-portion of the axillary incision.  The entire previous incision was opened, extended, and explored.  There was minimal purulence but there was a sinus tract down to the previously placed screw as seen on the MRI.  Multiple specimens were obtained.  An excisional debridement of skin, subcutaneous tissue, and muscle was performed with the used of scissors and a rongeur.  The tenodesis screw in addition to multiple sutures were located and removed easily.  6 liters of normal saline was used to irrigate the wound.       The incision was thoroughly irrigated, followed by closure in layers with PDS and nylon sutures.  A sterile dressing and sling were placed.  Counts were correct.    My assistant was used to maintain retraction, close the incision, and place the postop dressing.     The patient was awakened by anesthesia, transported to the recovery room in stable condition.     POSTOPERATIVE PLAN:  1) Admit, IV antibiotics, ID consult, f/u cultures  2) Clinical check in 1 week      Electronically signed by Nathen Farrell MD on 1/18/2022 at 09:13 CST

## 2022-01-18 ENCOUNTER — HOSPITAL ENCOUNTER (INPATIENT)
Facility: HOSPITAL | Age: 62
LOS: 4 days | Discharge: HOME HEALTH | End: 2022-01-22
Attending: ORTHOPAEDIC SURGERY | Admitting: ORTHOPAEDIC SURGERY

## 2022-01-18 ENCOUNTER — ANESTHESIA EVENT (OUTPATIENT)
Dept: PERIOP | Facility: HOSPITAL | Age: 62
End: 2022-01-18

## 2022-01-18 ENCOUNTER — ANESTHESIA (OUTPATIENT)
Dept: PERIOP | Facility: HOSPITAL | Age: 62
End: 2022-01-18

## 2022-01-18 DIAGNOSIS — M00.9 INFECTION OF SHOULDER: ICD-10-CM

## 2022-01-18 LAB
GLUCOSE BLDC GLUCOMTR-MCNC: 103 MG/DL (ref 70–130)
GLUCOSE BLDC GLUCOMTR-MCNC: 106 MG/DL (ref 70–130)
GLUCOSE BLDC GLUCOMTR-MCNC: 119 MG/DL (ref 70–130)
GLUCOSE BLDC GLUCOMTR-MCNC: 98 MG/DL (ref 70–130)

## 2022-01-18 PROCEDURE — 0RPK0J6 REMOVAL OF SYNTHETIC SUBSTITUTE FROM LEFT SHOULDER JOINT, HUMERAL SURFACE, OPEN APPROACH: ICD-10-PCS | Performed by: ORTHOPAEDIC SURGERY

## 2022-01-18 PROCEDURE — 94799 UNLISTED PULMONARY SVC/PX: CPT

## 2022-01-18 PROCEDURE — 87147 CULTURE TYPE IMMUNOLOGIC: CPT | Performed by: ORTHOPAEDIC SURGERY

## 2022-01-18 PROCEDURE — 87070 CULTURE OTHR SPECIMN AEROBIC: CPT | Performed by: ORTHOPAEDIC SURGERY

## 2022-01-18 PROCEDURE — 25010000002 CEFAZOLIN PER 500 MG

## 2022-01-18 PROCEDURE — 87186 SC STD MICRODIL/AGAR DIL: CPT | Performed by: ORTHOPAEDIC SURGERY

## 2022-01-18 PROCEDURE — 76942 ECHO GUIDE FOR BIOPSY: CPT | Performed by: ORTHOPAEDIC SURGERY

## 2022-01-18 PROCEDURE — 87176 TISSUE HOMOGENIZATION CULTR: CPT | Performed by: ORTHOPAEDIC SURGERY

## 2022-01-18 PROCEDURE — 25010000002 PROPOFOL 10 MG/ML EMULSION: Performed by: NURSE ANESTHETIST, CERTIFIED REGISTERED

## 2022-01-18 PROCEDURE — 0KB60ZZ EXCISION OF LEFT SHOULDER MUSCLE, OPEN APPROACH: ICD-10-PCS | Performed by: ORTHOPAEDIC SURGERY

## 2022-01-18 PROCEDURE — 82962 GLUCOSE BLOOD TEST: CPT

## 2022-01-18 PROCEDURE — 25010000002 MIDAZOLAM PER 1 MG: Performed by: ANESTHESIOLOGY

## 2022-01-18 PROCEDURE — 25010000002 FENTANYL CITRATE (PF) 50 MCG/ML SOLUTION: Performed by: ANESTHESIOLOGY

## 2022-01-18 PROCEDURE — 87205 SMEAR GRAM STAIN: CPT | Performed by: ORTHOPAEDIC SURGERY

## 2022-01-18 PROCEDURE — 25010000002 VANCOMYCIN 10 G RECONSTITUTED SOLUTION: Performed by: ORTHOPAEDIC SURGERY

## 2022-01-18 PROCEDURE — 25010000002 ROPIVACAINE PER 1 MG: Performed by: ANESTHESIOLOGY

## 2022-01-18 RX ORDER — HYDROCODONE BITARTRATE AND ACETAMINOPHEN 7.5; 325 MG/1; MG/1
1 TABLET ORAL EVERY 4 HOURS PRN
Status: DISCONTINUED | OUTPATIENT
Start: 2022-01-18 | End: 2022-01-22 | Stop reason: HOSPADM

## 2022-01-18 RX ORDER — MIDAZOLAM HYDROCHLORIDE 1 MG/ML
2 INJECTION INTRAMUSCULAR; INTRAVENOUS ONCE
Status: COMPLETED | OUTPATIENT
Start: 2022-01-18 | End: 2022-01-18

## 2022-01-18 RX ORDER — HYDROMORPHONE HYDROCHLORIDE 1 MG/ML
0.5 INJECTION, SOLUTION INTRAMUSCULAR; INTRAVENOUS; SUBCUTANEOUS
Status: DISCONTINUED | OUTPATIENT
Start: 2022-01-18 | End: 2022-01-22 | Stop reason: HOSPADM

## 2022-01-18 RX ORDER — FAMOTIDINE 20 MG/1
40 TABLET, FILM COATED ORAL DAILY
Status: DISCONTINUED | OUTPATIENT
Start: 2022-01-18 | End: 2022-01-22 | Stop reason: HOSPADM

## 2022-01-18 RX ORDER — SODIUM CHLORIDE 0.9 % (FLUSH) 0.9 %
3-10 SYRINGE (ML) INJECTION AS NEEDED
Status: DISCONTINUED | OUTPATIENT
Start: 2022-01-18 | End: 2022-01-18 | Stop reason: HOSPADM

## 2022-01-18 RX ORDER — OXYCODONE AND ACETAMINOPHEN 10; 325 MG/1; MG/1
1 TABLET ORAL ONCE AS NEEDED
Status: COMPLETED | OUTPATIENT
Start: 2022-01-18 | End: 2022-01-18

## 2022-01-18 RX ORDER — ACETAMINOPHEN 325 MG/1
650 TABLET ORAL EVERY 4 HOURS PRN
Status: DISCONTINUED | OUTPATIENT
Start: 2022-01-18 | End: 2022-01-22 | Stop reason: HOSPADM

## 2022-01-18 RX ORDER — MAGNESIUM HYDROXIDE 1200 MG/15ML
LIQUID ORAL AS NEEDED
Status: DISCONTINUED | OUTPATIENT
Start: 2022-01-18 | End: 2022-01-18 | Stop reason: HOSPADM

## 2022-01-18 RX ORDER — DEXTROSE MONOHYDRATE 25 G/50ML
25 INJECTION, SOLUTION INTRAVENOUS
Status: DISCONTINUED | OUTPATIENT
Start: 2022-01-18 | End: 2022-01-22 | Stop reason: HOSPADM

## 2022-01-18 RX ORDER — LISINOPRIL 10 MG/1
10 TABLET ORAL DAILY
Status: DISCONTINUED | OUTPATIENT
Start: 2022-01-18 | End: 2022-01-22 | Stop reason: HOSPADM

## 2022-01-18 RX ORDER — SCOLOPAMINE TRANSDERMAL SYSTEM 1 MG/1
1 PATCH, EXTENDED RELEASE TRANSDERMAL CONTINUOUS
Status: DISCONTINUED | OUTPATIENT
Start: 2022-01-18 | End: 2022-01-18 | Stop reason: HOSPADM

## 2022-01-18 RX ORDER — BUPIVACAINE HCL/0.9 % NACL/PF 0.1 %
2 PLASTIC BAG, INJECTION (ML) EPIDURAL ONCE
Status: COMPLETED | OUTPATIENT
Start: 2022-01-18 | End: 2022-01-18

## 2022-01-18 RX ORDER — LIDOCAINE HYDROCHLORIDE 10 MG/ML
0.5 INJECTION, SOLUTION EPIDURAL; INFILTRATION; INTRACAUDAL; PERINEURAL ONCE AS NEEDED
Status: DISCONTINUED | OUTPATIENT
Start: 2022-01-18 | End: 2022-01-18 | Stop reason: HOSPADM

## 2022-01-18 RX ORDER — ONDANSETRON 4 MG/1
4 TABLET, FILM COATED ORAL EVERY 6 HOURS PRN
Status: DISCONTINUED | OUTPATIENT
Start: 2022-01-18 | End: 2022-01-22 | Stop reason: HOSPADM

## 2022-01-18 RX ORDER — NALOXONE HCL 0.4 MG/ML
0.4 VIAL (ML) INJECTION AS NEEDED
Status: DISCONTINUED | OUTPATIENT
Start: 2022-01-18 | End: 2022-01-18 | Stop reason: HOSPADM

## 2022-01-18 RX ORDER — ROPIVACAINE HYDROCHLORIDE 5 MG/ML
INJECTION, SOLUTION EPIDURAL; INFILTRATION; PERINEURAL
Status: COMPLETED | OUTPATIENT
Start: 2022-01-18 | End: 2022-01-18

## 2022-01-18 RX ORDER — NALOXONE HCL 0.4 MG/ML
0.1 VIAL (ML) INJECTION
Status: DISCONTINUED | OUTPATIENT
Start: 2022-01-18 | End: 2022-01-18 | Stop reason: SDUPTHER

## 2022-01-18 RX ORDER — SODIUM CHLORIDE, SODIUM LACTATE, POTASSIUM CHLORIDE, CALCIUM CHLORIDE 600; 310; 30; 20 MG/100ML; MG/100ML; MG/100ML; MG/100ML
100 INJECTION, SOLUTION INTRAVENOUS CONTINUOUS PRN
Status: DISCONTINUED | OUTPATIENT
Start: 2022-01-18 | End: 2022-01-22 | Stop reason: HOSPADM

## 2022-01-18 RX ORDER — ATORVASTATIN CALCIUM 10 MG/1
20 TABLET, FILM COATED ORAL DAILY
Status: DISCONTINUED | OUTPATIENT
Start: 2022-01-18 | End: 2022-01-22 | Stop reason: HOSPADM

## 2022-01-18 RX ORDER — ASPIRIN 81 MG/1
81 TABLET ORAL DAILY
COMMUNITY

## 2022-01-18 RX ORDER — DOCUSATE SODIUM 100 MG/1
100 CAPSULE, LIQUID FILLED ORAL 2 TIMES DAILY PRN
Status: DISCONTINUED | OUTPATIENT
Start: 2022-01-18 | End: 2022-01-22 | Stop reason: HOSPADM

## 2022-01-18 RX ORDER — SODIUM CHLORIDE 0.9 % (FLUSH) 0.9 %
10 SYRINGE (ML) INJECTION EVERY 12 HOURS SCHEDULED
Status: DISCONTINUED | OUTPATIENT
Start: 2022-01-18 | End: 2022-01-18 | Stop reason: HOSPADM

## 2022-01-18 RX ORDER — ACETAMINOPHEN 500 MG
1000 TABLET ORAL ONCE
Status: COMPLETED | OUTPATIENT
Start: 2022-01-18 | End: 2022-01-18

## 2022-01-18 RX ORDER — PROMETHAZINE HYDROCHLORIDE 25 MG/1
12.5 TABLET ORAL EVERY 6 HOURS PRN
Status: DISCONTINUED | OUTPATIENT
Start: 2022-01-18 | End: 2022-01-22 | Stop reason: HOSPADM

## 2022-01-18 RX ORDER — SODIUM CHLORIDE 0.9 % (FLUSH) 0.9 %
1-10 SYRINGE (ML) INJECTION AS NEEDED
Status: DISCONTINUED | OUTPATIENT
Start: 2022-01-18 | End: 2022-01-22 | Stop reason: HOSPADM

## 2022-01-18 RX ORDER — NICOTINE POLACRILEX 4 MG
15 LOZENGE BUCCAL
Status: DISCONTINUED | OUTPATIENT
Start: 2022-01-18 | End: 2022-01-22 | Stop reason: HOSPADM

## 2022-01-18 RX ORDER — ONDANSETRON 2 MG/ML
4 INJECTION INTRAMUSCULAR; INTRAVENOUS EVERY 6 HOURS PRN
Status: DISCONTINUED | OUTPATIENT
Start: 2022-01-18 | End: 2022-01-22 | Stop reason: HOSPADM

## 2022-01-18 RX ORDER — FENTANYL CITRATE 50 UG/ML
25 INJECTION, SOLUTION INTRAMUSCULAR; INTRAVENOUS
Status: DISCONTINUED | OUTPATIENT
Start: 2022-01-18 | End: 2022-01-18 | Stop reason: HOSPADM

## 2022-01-18 RX ORDER — LIDOCAINE HYDROCHLORIDE 20 MG/ML
INJECTION, SOLUTION EPIDURAL; INFILTRATION; INTRACAUDAL; PERINEURAL AS NEEDED
Status: DISCONTINUED | OUTPATIENT
Start: 2022-01-18 | End: 2022-01-18 | Stop reason: SURG

## 2022-01-18 RX ORDER — NEOSTIGMINE METHYLSULFATE 5 MG/5 ML
SYRINGE (ML) INTRAVENOUS AS NEEDED
Status: DISCONTINUED | OUTPATIENT
Start: 2022-01-18 | End: 2022-01-18 | Stop reason: SURG

## 2022-01-18 RX ORDER — PROPOFOL 10 MG/ML
VIAL (ML) INTRAVENOUS AS NEEDED
Status: DISCONTINUED | OUTPATIENT
Start: 2022-01-18 | End: 2022-01-18 | Stop reason: SURG

## 2022-01-18 RX ORDER — IBUPROFEN 600 MG/1
600 TABLET ORAL ONCE AS NEEDED
Status: DISCONTINUED | OUTPATIENT
Start: 2022-01-18 | End: 2022-01-18 | Stop reason: HOSPADM

## 2022-01-18 RX ORDER — FENTANYL CITRATE 50 UG/ML
50 INJECTION, SOLUTION INTRAMUSCULAR; INTRAVENOUS ONCE
Status: COMPLETED | OUTPATIENT
Start: 2022-01-18 | End: 2022-01-18

## 2022-01-18 RX ORDER — SODIUM CHLORIDE 0.9 % (FLUSH) 0.9 %
10 SYRINGE (ML) INJECTION AS NEEDED
Status: DISCONTINUED | OUTPATIENT
Start: 2022-01-18 | End: 2022-01-18 | Stop reason: HOSPADM

## 2022-01-18 RX ORDER — DIAZEPAM 5 MG/1
5 TABLET ORAL EVERY 6 HOURS PRN
Status: DISCONTINUED | OUTPATIENT
Start: 2022-01-18 | End: 2022-01-22 | Stop reason: HOSPADM

## 2022-01-18 RX ORDER — ONDANSETRON 2 MG/ML
4 INJECTION INTRAMUSCULAR; INTRAVENOUS ONCE AS NEEDED
Status: DISCONTINUED | OUTPATIENT
Start: 2022-01-18 | End: 2022-01-18 | Stop reason: HOSPADM

## 2022-01-18 RX ORDER — NALOXONE HCL 0.4 MG/ML
0.4 VIAL (ML) INJECTION
Status: DISCONTINUED | OUTPATIENT
Start: 2022-01-18 | End: 2022-01-22 | Stop reason: HOSPADM

## 2022-01-18 RX ORDER — SODIUM CHLORIDE 0.9 % (FLUSH) 0.9 %
10 SYRINGE (ML) INJECTION EVERY 12 HOURS SCHEDULED
Status: DISCONTINUED | OUTPATIENT
Start: 2022-01-18 | End: 2022-01-22 | Stop reason: HOSPADM

## 2022-01-18 RX ORDER — METOPROLOL SUCCINATE 50 MG/1
50 TABLET, EXTENDED RELEASE ORAL DAILY
Status: DISCONTINUED | OUTPATIENT
Start: 2022-01-19 | End: 2022-01-22 | Stop reason: HOSPADM

## 2022-01-18 RX ORDER — HYDROCODONE BITARTRATE AND ACETAMINOPHEN 7.5; 325 MG/1; MG/1
2 TABLET ORAL EVERY 4 HOURS PRN
Status: DISCONTINUED | OUTPATIENT
Start: 2022-01-18 | End: 2022-01-22 | Stop reason: HOSPADM

## 2022-01-18 RX ORDER — LIDOCAINE HYDROCHLORIDE 40 MG/ML
SOLUTION TOPICAL AS NEEDED
Status: DISCONTINUED | OUTPATIENT
Start: 2022-01-18 | End: 2022-01-18 | Stop reason: SURG

## 2022-01-18 RX ORDER — SODIUM CHLORIDE, SODIUM LACTATE, POTASSIUM CHLORIDE, CALCIUM CHLORIDE 600; 310; 30; 20 MG/100ML; MG/100ML; MG/100ML; MG/100ML
100 INJECTION, SOLUTION INTRAVENOUS CONTINUOUS
Status: DISCONTINUED | OUTPATIENT
Start: 2022-01-18 | End: 2022-01-22 | Stop reason: HOSPADM

## 2022-01-18 RX ORDER — FLUMAZENIL 0.1 MG/ML
0.2 INJECTION INTRAVENOUS AS NEEDED
Status: DISCONTINUED | OUTPATIENT
Start: 2022-01-18 | End: 2022-01-18 | Stop reason: HOSPADM

## 2022-01-18 RX ORDER — ROCURONIUM BROMIDE 10 MG/ML
INJECTION, SOLUTION INTRAVENOUS AS NEEDED
Status: DISCONTINUED | OUTPATIENT
Start: 2022-01-18 | End: 2022-01-18 | Stop reason: SURG

## 2022-01-18 RX ORDER — LABETALOL HYDROCHLORIDE 5 MG/ML
5 INJECTION, SOLUTION INTRAVENOUS
Status: DISCONTINUED | OUTPATIENT
Start: 2022-01-18 | End: 2022-01-18 | Stop reason: HOSPADM

## 2022-01-18 RX ORDER — MELOXICAM 7.5 MG/1
15 TABLET ORAL DAILY
Status: DISCONTINUED | OUTPATIENT
Start: 2022-01-18 | End: 2022-01-22 | Stop reason: HOSPADM

## 2022-01-18 RX ORDER — OXYCODONE AND ACETAMINOPHEN 7.5; 325 MG/1; MG/1
2 TABLET ORAL EVERY 4 HOURS PRN
Status: DISCONTINUED | OUTPATIENT
Start: 2022-01-18 | End: 2022-01-18 | Stop reason: HOSPADM

## 2022-01-18 RX ORDER — MIDAZOLAM HYDROCHLORIDE 1 MG/ML
1 INJECTION INTRAMUSCULAR; INTRAVENOUS
Status: DISCONTINUED | OUTPATIENT
Start: 2022-01-18 | End: 2022-01-18 | Stop reason: HOSPADM

## 2022-01-18 RX ORDER — SODIUM CHLORIDE 0.9 % (FLUSH) 0.9 %
3 SYRINGE (ML) INJECTION EVERY 12 HOURS SCHEDULED
Status: DISCONTINUED | OUTPATIENT
Start: 2022-01-18 | End: 2022-01-18 | Stop reason: HOSPADM

## 2022-01-18 RX ORDER — PROMETHAZINE HYDROCHLORIDE 12.5 MG/1
12.5 SUPPOSITORY RECTAL EVERY 6 HOURS PRN
Status: DISCONTINUED | OUTPATIENT
Start: 2022-01-18 | End: 2022-01-22 | Stop reason: HOSPADM

## 2022-01-18 RX ORDER — ACETAMINOPHEN 650 MG/1
650 SUPPOSITORY RECTAL EVERY 4 HOURS PRN
Status: DISCONTINUED | OUTPATIENT
Start: 2022-01-18 | End: 2022-01-22 | Stop reason: HOSPADM

## 2022-01-18 RX ADMIN — Medication 4 MG: at 09:09

## 2022-01-18 RX ADMIN — ACETAMINOPHEN 1000 MG: 500 TABLET ORAL at 07:59

## 2022-01-18 RX ADMIN — Medication 2 G: at 08:53

## 2022-01-18 RX ADMIN — GLYCOPYRROLATE 0.6 MG: 0.2 INJECTION, SOLUTION INTRAMUSCULAR; INTRAVENOUS at 09:09

## 2022-01-18 RX ADMIN — VANCOMYCIN HYDROCHLORIDE 1500 MG: 10 INJECTION, POWDER, LYOPHILIZED, FOR SOLUTION INTRAVENOUS at 18:23

## 2022-01-18 RX ADMIN — MIDAZOLAM 2 MG: 1 INJECTION INTRAMUSCULAR; INTRAVENOUS at 08:01

## 2022-01-18 RX ADMIN — LIDOCAINE HYDROCHLORIDE 100 MG: 20 INJECTION, SOLUTION EPIDURAL; INFILTRATION; INTRACAUDAL; PERINEURAL at 08:23

## 2022-01-18 RX ADMIN — FENTANYL CITRATE 50 MCG: 50 INJECTION, SOLUTION INTRAMUSCULAR; INTRAVENOUS at 08:01

## 2022-01-18 RX ADMIN — SODIUM CHLORIDE, POTASSIUM CHLORIDE, SODIUM LACTATE AND CALCIUM CHLORIDE 100 ML/HR: 600; 310; 30; 20 INJECTION, SOLUTION INTRAVENOUS at 07:42

## 2022-01-18 RX ADMIN — SODIUM CHLORIDE, POTASSIUM CHLORIDE, SODIUM LACTATE AND CALCIUM CHLORIDE 100 ML/HR: 600; 310; 30; 20 INJECTION, SOLUTION INTRAVENOUS at 15:06

## 2022-01-18 RX ADMIN — PROPOFOL 200 MG: 10 INJECTION, EMULSION INTRAVENOUS at 08:23

## 2022-01-18 RX ADMIN — HYDROCODONE BITARTRATE AND ACETAMINOPHEN 1 TABLET: 7.5; 325 TABLET ORAL at 17:32

## 2022-01-18 RX ADMIN — ATORVASTATIN CALCIUM 20 MG: 10 TABLET, FILM COATED ORAL at 16:00

## 2022-01-18 RX ADMIN — MELOXICAM 15 MG: 7.5 TABLET ORAL at 15:59

## 2022-01-18 RX ADMIN — FAMOTIDINE 40 MG: 20 TABLET, FILM COATED ORAL at 16:00

## 2022-01-18 RX ADMIN — ROCURONIUM BROMIDE 50 MG: 10 INJECTION INTRAVENOUS at 08:23

## 2022-01-18 RX ADMIN — SCOPALAMINE 1 PATCH: 1 PATCH, EXTENDED RELEASE TRANSDERMAL at 08:00

## 2022-01-18 RX ADMIN — LIDOCAINE HYDROCHLORIDE 1 EACH: 40 SOLUTION TOPICAL at 08:25

## 2022-01-18 RX ADMIN — METFORMIN HYDROCHLORIDE 500 MG: 500 TABLET ORAL at 17:26

## 2022-01-18 RX ADMIN — SODIUM CHLORIDE, POTASSIUM CHLORIDE, SODIUM LACTATE AND CALCIUM CHLORIDE 100 ML/HR: 600; 310; 30; 20 INJECTION, SOLUTION INTRAVENOUS at 14:08

## 2022-01-18 RX ADMIN — OXYCODONE AND ACETAMINOPHEN 1 TABLET: 325; 10 TABLET ORAL at 09:39

## 2022-01-18 RX ADMIN — ROPIVACAINE HYDROCHLORIDE 20 ML: 5 INJECTION, SOLUTION EPIDURAL; INFILTRATION; PERINEURAL at 08:03

## 2022-01-18 RX ADMIN — EMPAGLIFLOZIN 25 MG: 25 TABLET, FILM COATED ORAL at 16:00

## 2022-01-18 RX ADMIN — LISINOPRIL 10 MG: 10 TABLET ORAL at 15:59

## 2022-01-18 NOTE — ANESTHESIA POSTPROCEDURE EVALUATION
Patient: Max Oliveira    Procedure Summary     Date: 01/18/22 Room / Location:  [facility] OR 09 /  [facility]    Anesthesia Start: 0820 Anesthesia Stop: 0923    Procedures:       LEFT SHOULDER SURGICAL WOUND INCISION AND DRAINAGE, HARDWARE REMOVAL (Left Arm Upper)      HARDWARE REMOVAL (Left Shoulder) Diagnosis:       Surgical wound infection      (T81.149xA)    Surgeons: Nathen Farrell MD Provider: Paulina Palma CRNA    Anesthesia Type: general with block ASA Status: 2          Anesthesia Type: general with block    Vitals  Vitals Value Taken Time   /79 01/18/22 1400   Temp 96.9 °F (36.1 °C) 01/18/22 0918   Pulse 80 01/18/22 1435   Resp 17 01/18/22 1400   SpO2 96 % 01/18/22 1435   Vitals shown include unvalidated device data.        Post Anesthesia Care and Evaluation    Patient location during evaluation: PACU  Patient participation: complete - patient participated  Level of consciousness: awake and alert  Pain management: adequate  Airway patency: patent  Anesthetic complications: No anesthetic complications    Cardiovascular status: acceptable  Respiratory status: acceptable  Hydration status: acceptable    Comments: Blood pressure 128/91, pulse 70, temperature 98.4 °F (36.9 °C), temperature source Oral, resp. rate 16, SpO2 95 %.    Pt discharged from PACU based on august score >8

## 2022-01-18 NOTE — H&P
Pt Name: Max Oliveira  MRN: [id number]  YOB: 1960  Date of evaluation: 1/18/2022    H&P including current review of systems was updated in the paper chart and/or the document previously scanned into the record.  There have been no significant changes or new problems since the original evaluation.  The patient's problems continue and indications for contemplated procedure have not changed.    Electronically signed by Nathen Farrell MD on 1/18/2022 at 07:55 CST

## 2022-01-18 NOTE — PROGRESS NOTES
"Pharmacy Dosing Service  Pharmacokinetics  Vancomycin Initial Evaluation  Assessment/Action/Plan:  Active Hospital Problems    Diagnosis  POA    Surgical wound infection [T81.49XA]  Yes     Left shoulder surgical wound incision and drainage, removal of hardware 01/18/22     Pharmacy consulted by Nathen Farrell MD today to dose vancomycin for 4 days for \"bone/joint infection\"    Loading dose: 1500 mg (~ 15 mg/kg)  Initiating subsequent Order: Vancomycin 1250 mg IVPB every 12 hours  Current end date:Final dose 1/22/22 at 04:30  Levels: deferred at this time, will assess timing for levels tomorrow based on administration times      Vancomycin dosage initiated based on population pharmacokinetic parameters. Pharmacy will continue to follow daily and adjust dose accordingly.     Subjective:  Max Oliveira is a 61 y.o. male with a Vancomycin \"Pharmacy to Dose\" consult for the treatment of bone/joint infection , day 1 of 4 of treatment.    AUC Model Data:  Loading dose: 1500 mg Now  Regimen: 1250 mg IV every 12 hours.  Start time: 04:30 on 01/19/2022  Exposure target: AUC24 (range)400-600 mg/L.hr   AUC24,ss: 456 mg/L.hr  PAUC*: 64 %  Ctrough,ss: 13.9 mg/L  Pconc*: 21 %  Tox.: 9 %      Objective:  Ht:  180 cm; Wt:  101 kg  Estimated Creatinine Clearance: 137.3 mL/min (A) (by C-G formula based on SCr of 0.7 mg/dL (L)).   Creatinine   Date Value Ref Range Status   01/14/2022 0.70 (L) 0.76 - 1.27 mg/dL Final   11/12/2021 0.87 0.76 - 1.27 mg/dL Final   06/24/2021 0.68 (L) 0.76 - 1.27 mg/dL Final      Lab Results   Component Value Date    WBC 6.21 01/14/2022    WBC 7.04 11/12/2021    WBC 4.74 06/24/2021      Baseline culture results:  Microbiology Results (last 10 days)       Procedure Component Value - Date/Time    Wound Culture - Wound, Shoulder, Left [[id number]] Collected: 01/18/22 0859    Lab Status: Preliminary result Specimen: Wound from Shoulder, Left Updated: 01/18/22 1258     Gram Stain Few (2+) WBCs seen      " No organisms seen    Tissue / Bone Culture - Tissue, Shoulder, Left [[id number]] Collected: 01/18/22 0849    Lab Status: Preliminary result Specimen: Tissue from Shoulder, Left Updated: 01/18/22 1253     Gram Stain Moderate (3+) WBCs seen      No organisms seen    Tissue / Bone Culture - Bone, Shoulder, Left [[id number]] Collected: 01/18/22 0849    Lab Status: Preliminary result Specimen: Bone from Shoulder, Left Updated: 01/18/22 1259     Gram Stain Moderate (3+) WBCs seen      No organisms seen    Tissue / Bone Culture - Tissue, Shoulder, Left [[id number]] Collected: 01/18/22 0843    Lab Status: Preliminary result Specimen: Tissue from Shoulder, Left Updated: 01/18/22 1257     Gram Stain Moderate (3+) WBCs seen      No organisms seen    COVID PRE-OP / PRE-PROCEDURE SCREENING ORDER (NO ISOLATION) - Swab, Nasal Cavity [[id number]]  (Normal) Collected: 01/15/22 0709    Lab Status: Final result Specimen: Swab from Nasal Cavity Updated: 01/15/22 1540    Narrative:      The following orders were created for panel order COVID PRE-OP / PRE-PROCEDURE SCREENING ORDER (NO ISOLATION) - Swab, Nasal Cavity.  Procedure                               Abnormality         Status                     ---------                               -----------         ------                     COVID-19,APTIMA PANTHER,...[[id number]]  Normal              Final result                 Please view results for these tests on the individual orders.    COVID-19,APTIMA PANTHER,[facility] IN-HOUSE,NP/OP/NASAL SWAB IN UTM/VTM/SALINE/LIQUID AMIES TRANSPORT MEDIA/NP WASH OR ASPIRATE, 24 HR TAT - Swab, Nasal Cavity [[id number]]  (Normal) Collected: 01/15/22 0709    Lab Status: Final result Specimen: Swab from Nasal Cavity Updated: 01/15/22 1540     COVID19 Not Detected    Narrative:      Fact sheet for providers: https://www.fda.gov/media/940995/download     Fact sheet for patients: https://www.fda.gov/media/563038/download    Test performed by RT PCR.             Mau Wilson, PharmD  01/18/22 15:51 CST

## 2022-01-18 NOTE — PLAN OF CARE
Goal Outcome Evaluation:  Plan of Care Reviewed With: patient        Progress: improving  Outcome Summary: Pt A&Ox4. Up ad-ailyn. L arm in sling. Dsg CDI. SCDs for VTEs. VSS. No c/o of pain. Sugar monitored. Spouse at BS. Call light in reach. Will cont to monitor.

## 2022-01-18 NOTE — ANESTHESIA PREPROCEDURE EVALUATION
Anesthesia Evaluation     Patient summary reviewed   no history of anesthetic complications:               Airway   Mallampati: I  TM distance: >3 FB  Neck ROM: full  No difficulty expected  Dental    (+) partials    Pulmonary    (+) a smoker Former,   Cardiovascular   Exercise tolerance: excellent (>7 METS)    (+) hypertension, dysrhythmias (many years ago--no intervention ), hyperlipidemia,       Neuro/Psych  (+) numbness,     (-) seizures, CVA  GI/Hepatic/Renal/Endo    (+)  GERD,  diabetes mellitus type 2,   (-) liver disease, no renal disease, no thyroid disorder    Musculoskeletal     Abdominal    Substance History      OB/GYN          Other   arthritis,                        Anesthesia Plan    ASA 2     general with block   (Pt familiar to me. Will offer nerve block -discussed with pt and Dr. Farrell )  intravenous induction     Anesthetic plan, all risks, benefits, and alternatives have been provided, discussed and informed consent has been obtained with: patient.

## 2022-01-18 NOTE — ANESTHESIA PROCEDURE NOTES
Airway  Urgency: elective    Date/Time: 1/18/2022 8:25 AM  Airway not difficult    General Information and Staff    Patient location during procedure: OR  CRNA: Paulina Palma CRNA    Indications and Patient Condition  Indications for airway management: airway protection    Preoxygenated: yes  Mask difficulty assessment: 1 - vent by mask    Final Airway Details  Final airway type: endotracheal airway      Successful airway: ETT  Cuffed: yes   Successful intubation technique: direct laryngoscopy  Facilitating devices/methods: intubating stylet  Endotracheal tube insertion site: oral  Blade: Alonso  Blade size: 2  ETT size (mm): 7.5  Cormack-Lehane Classification: grade IIa - partial view of glottis  Placement verified by: chest auscultation and capnometry   Cuff volume (mL): 8  Measured from: lips  ETT/EBT  to lips (cm): 22  Number of attempts at approach: 1  Assessment: lips, teeth, and gum same as pre-op and atraumatic intubation

## 2022-01-18 NOTE — BRIEF OP NOTE
INCISION AND DRAINAGE SHOULDER, SHOULDER HARDWARE REMOVAL  Progress Note    Max Oliveira  1/18/2022    Pre-op Diagnosis:   T81.149xA       Post-Op Diagnosis Codes:     * Surgical wound infection [T81.49XA]    Procedure/CPT® Codes:  IA REMOVAL DEEP IMPLANT [17688]  IA INCIS/DRAIN SHLDR ABSC/HEMA,DEEP [66107]      Procedure(s):  LEFT SHOULDER SURGICAL WOUND INCISION AND DRAINAGE, HARDWARE REMOVAL  HARDWARE REMOVAL    Surgeon(s):  Nathen Farrell MD    Anesthesia: General with Block    Staff:   Circulator: Maged Peralta RN  Scrub Person: Debi Cruz; Daniel Sharma  Assistant: Bruce Chambers PA-C  Assistant: Bruce Chambers PA-C      Estimated Blood Loss: minimal    Urine Voided: * No values recorded between 1/18/2022  8:19 AM and 1/18/2022  9:10 AM *    Specimens:                Specimens     ID Source Type Tests Collected By Collected At Frozen?    1 Shoulder, Left Tissue · TISSUE / BONE CULTURE   Nathen Farrell MD 1/18/22 0843     Description: Left Shoulder Superficial    2 Shoulder, Left Tissue · TISSUE / BONE CULTURE   Nathen Farrell MD 1/18/22 0849     Description: Left Shoulder Deep    3 Shoulder, Left Bone · TISSUE / BONE CULTURE   Nathen Farrell MD 1/18/22 0849     Description: Left Shoulder Bone Culture    4 Shoulder, Left Wound · WOUND CULTURE   Nathen Farrell MD 1/18/22 0859     Description: Left Shoulder Culture                Drains: * No LDAs found *    Findings: see op note     Complications: none    Assistant: Bruce Chambers PA-C  was responsible for performing the following activities: Retraction and their skilled assistance was necessary for the success of this case.    Nathen Farrell MD     Date: 1/18/2022  Time: 09:10 CST

## 2022-01-18 NOTE — ANESTHESIA PROCEDURE NOTES
Peripheral Block    Pre-sedation assessment completed: 1/18/2022 8:00 AM    Patient reassessed immediately prior to procedure    Patient location during procedure: holding area  Start time: 1/18/2022 8:02 AM  Stop time: 1/18/2022 8:03 AM  Reason for block: procedure for pain, at surgeon's request, post-op pain management and Request by Dr. Farrell   Performed by  Anesthesiologist: Yovany Charles MD  Preanesthetic Checklist  Completed: patient identified, IV checked, site marked, risks and benefits discussed, surgical consent, monitors and equipment checked, pre-op evaluation and timeout performed  Prep:  Pt Position: supine  Sterile barriers:gloves, mask, cap and washed/disinfected hands  Prep: ChloraPrep  Patient monitoring: blood pressure monitoring, continuous pulse oximetry and EKG  Procedure    Sedation: yes  Performed under: MAC  Guidance:ultrasound guided and Brachial plexus identified and local anesthetic seen surrounding nerves    ULTRASOUND INTERPRETATION.  Using ultrasound guidance a 20 G gauge needle was placed in close proximity to the brachial plexus nerve, at which point, under ultrasound guidance anesthetic was injected in the area of the nerve and spread of the anesthesia was seen on ultrasound in close proximity thereto.  There were no abnormalities seen on ultrasound; a digital image was taken; and the patient tolerated the procedure with no complications. Images:still images obtained (picture printed and placed in patients chart)    Laterality:left  Block Type:interscalene  Injection Technique:single-shot  Needle Type:echogenic  Needle Gauge:22 G  Resistance on Injection: none    Medications Used: ropivacaine (NAROPIN) injection 0.5 %, 20 mL  Med administered at 1/18/2022 8:03 AM      Post Assessment  Injection Assessment: negative aspiration for heme, no paresthesia on injection and incremental injection  Patient Tolerance:comfortable throughout block  Complications:no

## 2022-01-19 LAB
ANION GAP SERPL CALCULATED.3IONS-SCNC: 8 MMOL/L (ref 5–15)
BUN SERPL-MCNC: 14 MG/DL (ref 6–20)
BUN/CREAT SERPL: 18.9 (ref 7–25)
CALCIUM SPEC-SCNC: 8.6 MG/DL (ref 8.6–10.5)
CHLORIDE SERPL-SCNC: 103 MMOL/L (ref 98–107)
CO2 SERPL-SCNC: 26 MMOL/L (ref 22–29)
CREAT SERPL-MCNC: 0.74 MG/DL (ref 0.76–1.27)
GFR SERPL CREATININE-BSD FRML MDRD: 108 ML/MIN/1.73
GLUCOSE BLDC GLUCOMTR-MCNC: 81 MG/DL (ref 70–130)
GLUCOSE BLDC GLUCOMTR-MCNC: 83 MG/DL (ref 70–130)
GLUCOSE BLDC GLUCOMTR-MCNC: 85 MG/DL (ref 70–130)
GLUCOSE BLDC GLUCOMTR-MCNC: 86 MG/DL (ref 70–130)
GLUCOSE SERPL-MCNC: 91 MG/DL (ref 65–99)
HCT VFR BLD AUTO: 43.2 % (ref 37.5–51)
HGB BLD-MCNC: 14.7 G/DL (ref 13–17.7)
POTASSIUM SERPL-SCNC: 4.3 MMOL/L (ref 3.5–5.2)
SODIUM SERPL-SCNC: 137 MMOL/L (ref 136–145)

## 2022-01-19 PROCEDURE — 80048 BASIC METABOLIC PNL TOTAL CA: CPT | Performed by: ORTHOPAEDIC SURGERY

## 2022-01-19 PROCEDURE — 25010000002 VANCOMYCIN 10 G RECONSTITUTED SOLUTION: Performed by: ORTHOPAEDIC SURGERY

## 2022-01-19 PROCEDURE — 82962 GLUCOSE BLOOD TEST: CPT

## 2022-01-19 PROCEDURE — 25010000002 ENOXAPARIN PER 10 MG: Performed by: ORTHOPAEDIC SURGERY

## 2022-01-19 PROCEDURE — 85018 HEMOGLOBIN: CPT | Performed by: ORTHOPAEDIC SURGERY

## 2022-01-19 PROCEDURE — 85014 HEMATOCRIT: CPT | Performed by: ORTHOPAEDIC SURGERY

## 2022-01-19 PROCEDURE — 25010000002 CEFAZOLIN PER 500 MG: Performed by: INTERNAL MEDICINE

## 2022-01-19 RX ORDER — BUPIVACAINE HCL/0.9 % NACL/PF 0.1 %
2 PLASTIC BAG, INJECTION (ML) EPIDURAL EVERY 8 HOURS
Status: DISCONTINUED | OUTPATIENT
Start: 2022-01-19 | End: 2022-01-22 | Stop reason: HOSPADM

## 2022-01-19 RX ADMIN — HYDROCODONE BITARTRATE AND ACETAMINOPHEN 2 TABLET: 7.5; 325 TABLET ORAL at 02:44

## 2022-01-19 RX ADMIN — ENOXAPARIN SODIUM 40 MG: 40 INJECTION SUBCUTANEOUS at 08:00

## 2022-01-19 RX ADMIN — SODIUM CHLORIDE, PRESERVATIVE FREE 10 ML: 5 INJECTION INTRAVENOUS at 08:00

## 2022-01-19 RX ADMIN — CEFAZOLIN SODIUM 2 G: 10 INJECTION, POWDER, FOR SOLUTION INTRAVENOUS at 11:56

## 2022-01-19 RX ADMIN — CEFAZOLIN SODIUM 2 G: 10 INJECTION, POWDER, FOR SOLUTION INTRAVENOUS at 17:37

## 2022-01-19 RX ADMIN — METFORMIN HYDROCHLORIDE 500 MG: 500 TABLET ORAL at 17:00

## 2022-01-19 RX ADMIN — VANCOMYCIN HYDROCHLORIDE 1250 MG: 10 INJECTION, POWDER, LYOPHILIZED, FOR SOLUTION INTRAVENOUS at 04:03

## 2022-01-19 RX ADMIN — FAMOTIDINE 40 MG: 20 TABLET, FILM COATED ORAL at 08:00

## 2022-01-19 RX ADMIN — EMPAGLIFLOZIN 25 MG: 25 TABLET, FILM COATED ORAL at 08:00

## 2022-01-19 RX ADMIN — MELOXICAM 15 MG: 7.5 TABLET ORAL at 08:00

## 2022-01-19 RX ADMIN — METFORMIN HYDROCHLORIDE 500 MG: 500 TABLET ORAL at 07:57

## 2022-01-19 RX ADMIN — ATORVASTATIN CALCIUM 20 MG: 10 TABLET, FILM COATED ORAL at 08:00

## 2022-01-19 RX ADMIN — VANCOMYCIN HYDROCHLORIDE 1250 MG: 10 INJECTION, POWDER, LYOPHILIZED, FOR SOLUTION INTRAVENOUS at 15:39

## 2022-01-19 NOTE — PAYOR COMM NOTE
"FROM: YOLY GARCIA  PHONE: [phone]  FAX: [phone]    PENDING: [id number]    Vladimir Donnelly (61 y.o. Male)             Date of Birth Social Security Number Address Home Phone MRN    1960  [address]  [facility] [id number] [phone] [id number]    Worship Marital Status             Judaism        Admission Date Admission Type Admitting Provider Attending Provider Department, Room/Bed    1/18/22 Elective Nathen Farrell MD Romine, Spencer Elton, MD [facility] 3A, 327/1    Discharge Date Discharge Disposition Discharge Destination                         Attending Provider: Nathen Farrell MD    Allergies: No Known Allergies    Isolation: None   Infection: None   Code Status: CPR   Advance Care Planning Activity    Ht: 180 cm (70.87\")   Wt: 101 kg (221 lb 9 oz)    Admission Cmt: None   Principal Problem: None                Active Insurance as of 1/18/2022     Primary Coverage     Payor Plan Insurance Group Employer/Plan Group    [employer]HE[employer] BLUE CROSS AN[employer]M BLUE 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 SHIELD PPO [id number]     Payor Plan Address Payor Plan Phone Number Payor Plan Fax Number Effective Dates    [address] [phone]  1/1/2021 - None Entered    [facility] [id number]       Subscriber Name Subscriber Birth Date Member ID       VLADIMIR DONNELLY 1960 [id number]           Secondary Coverage     Payor Plan Insurance Group Employer/Plan Group    [facility]      Payor Plan Address Payor Plan Phone Number Payor Plan Fax Number Effective Dates    [address] [phone]  7/30/2020 - None Entered    [facility] [id number]       Subscriber Name Subscriber Birth Date Member ID       VLADIMIR DONNELLY 1960 [id number]                 Emergency Contacts      (Rel.) Home Phone Work Phone Mobile Phone    Lisbeth Donnelly (Spouse) [phone] -- --               History & Physical      Nathen Farrell MD at 01/18/22 0755        Pt Name: Vladimir Donnelly  MRN: " [id number]  YOB: 1960  Date of evaluation: 1/18/2022    H&P including current review of systems was updated in the paper chart and/or the document previously scanned into the record.  There have been no significant changes or new problems since the original evaluation.  The patient's problems continue and indications for contemplated procedure have not changed.    Electronically signed by Nathen Farrell MD on 1/18/2022 at 07:55 CST      Electronically signed by Nathen Farrell MD at 01/18/22 0756     H&P signed by New Onbase, [employer] at 01/13/22 1259      Scan on 1/18/2022 by New Onbase, [employer]: H&P, ROMELIAWK, 01/12/2022          Electronically signed by New Onbase, [employer] at 01/13/22 1259       Vital Signs (last day)     Date/Time Temp Temp src Pulse Resp BP Patient Position SpO2    01/19/22 0741 98.6 (37) Oral 65 16 93/54 Lying 96    01/19/22 0429 97.6 (36.4) Oral 71 16 103/66 Lying 95    01/19/22 0011 98.2 (36.8) Oral 69 16 108/54 Lying 96    01/18/22 2023 98.6 (37) Oral 76 16 108/67 Lying 96    01/18/22 1602 98.2 (36.8) Oral 76 16 109/73 Lying 97    01/18/22 1532 -- -- 84 16 120/75 Lying 95    01/18/22 1502 98.4 (36.9) Oral 70 16 128/91 Lying 95    01/18/22 1500 -- -- -- -- -- -- 96    01/18/22 1400 -- -- 73 17 108/79 -- 95    01/18/22 1345 -- -- 65 16 109/75 -- 93    01/18/22 1315 -- -- 82 19 130/76 -- 95    01/18/22 1300 -- -- 95 22 -- -- 96    01/18/22 1245 -- -- 89 17 119/74 -- 96    01/18/22 1230 -- -- 81 20 117/69 -- 96    01/18/22 1215 -- -- 78 15 117/69 -- 94    01/18/22 1200 -- -- 72 22 112/79 -- 93    01/18/22 1145 -- -- 69 17 126/85 -- 97    01/18/22 1130 -- -- 67 19 119/85 -- 96    01/18/22 1115 -- -- 68 16 131/87 -- 98    01/18/22 1100 -- -- 78 15 131/88 -- 97    01/18/22 1045 -- -- 72 14 135/94 -- 97    01/18/22 1030 -- -- 67 13 132/91 -- 96    01/18/22 1015 -- -- 65 12 138/90 -- 98    01/18/22 1000 -- -- 60 20 135/88 -- 97    01/18/22 0945 -- -- 73 16 139/94 -- 98     01/18/22 0935 -- -- 75 20 139/94 -- 100    01/18/22 0930 -- -- 75 19 134/94 -- 100    01/18/22 0925 -- -- 78 15 138/101 -- 99    01/18/22 0918 96.9 (36.1) Temporal 14 13 141/100 Lying 100    01/18/22 0806 -- -- 66 -- 108/80 -- 93    01/18/22 0803 -- -- 74 -- -- -- 95    01/18/22 0801 -- -- -- -- 124/89 -- --    01/18/22 0800 -- -- 75 -- -- -- 96    01/18/22 0757 -- -- 76 -- -- -- 98    01/18/22 0756 -- -- -- -- 128/88 -- --    01/18/22 0731 97.3 (36.3) Temporal 80 16 129/96 Sitting 98            Facility-Administered Medications as of 1/19/2022   Medication Dose Route Frequency Provider Last Rate Last Admin   • acetaminophen (TYLENOL) tablet 650 mg  650 mg Oral Q4H PRN Nathen Farrell MD        Or   • acetaminophen (TYLENOL) suppository 650 mg  650 mg Rectal Q4H PRN Nathen Farrell MD       • [COMPLETED] acetaminophen (TYLENOL) tablet 1,000 mg  1,000 mg Oral Once Yovany Charles MD   1,000 mg at 01/18/22 0759   • atorvastatin (LIPITOR) tablet 20 mg  20 mg Oral Daily Nathen Farrell MD   20 mg at 01/19/22 0800   • [COMPLETED] ceFAZolin in 0.9% normal saline (ANCEF) IVPB solution 2 g  2 g Intravenous Once Nathen Farrell MD   2 g at 01/18/22 0853   • ceFAZolin in 0.9% normal saline (ANCEF) IVPB solution 2 g  2 g Intravenous Q8H Shawnee Toth MD       • dextrose (D50W) (25 g/50 mL) IV injection 25 g  25 g Intravenous Q15 Min PRN Nathen Farrell MD       • dextrose (GLUTOSE) oral gel 15 g  15 g Oral Q15 Min PRN Nathen Farrell MD       • diazePAM (VALIUM) tablet 5 mg  5 mg Oral Q6H PRN Nathen Farrell MD       • docusate sodium (COLACE) capsule 100 mg  100 mg Oral BID PRN Nathen Farrell MD       • empagliflozin (JARDIANCE) tablet 25 mg  25 mg Oral Daily Nathen Farrell MD   25 mg at 01/19/22 0800   • enoxaparin (LOVENOX) syringe 40 mg  40 mg Subcutaneous Daily Nathen Farrell MD   40 mg at 01/19/22 0800   • famotidine (PEPCID) tablet 40 mg   40 mg Oral Daily Nathen Farrell MD   40 mg at 01/19/22 0800   • [COMPLETED] fentaNYL citrate (PF) (SUBLIMAZE) injection 50 mcg  50 mcg Intravenous Once Yovany Charles MD   50 mcg at 01/18/22 0801   • glucagon (human recombinant) (GLUCAGEN DIAGNOSTIC) injection 1 mg  1 mg Subcutaneous Q15 Min PRN Nathen Farrell MD       • HYDROcodone-acetaminophen (NORCO) 7.5-325 MG per tablet 1 tablet  1 tablet Oral Q4H PRN Nathen Farrell MD   1 tablet at 01/18/22 1732   • HYDROcodone-acetaminophen (NORCO) 7.5-325 MG per tablet 2 tablet  2 tablet Oral Q4H PRN Nathen Farrell MD   2 tablet at 01/19/22 0244   • HYDROmorphone (DILAUDID) injection 0.5 mg  0.5 mg Intravenous Q2H PRN Nathen Farrell MD       • insulin lispro (humaLOG) injection 0-9 Units  0-9 Units Subcutaneous TID AC Nathen Farrell MD       • lactated ringers infusion  100 mL/hr Intravenous Continuous PRN Nathen Farrell  mL/hr at 01/18/22 0820 Restarted at 01/18/22 0916   • lactated ringers infusion  100 mL/hr Intravenous Continuous Nathen Farrell  mL/hr at 01/18/22 1408 100 mL/hr at 01/18/22 1408   • lactated ringers infusion  100 mL/hr Intravenous Continuous Nathen Farrell  mL/hr at 01/18/22 1506 100 mL/hr at 01/18/22 1506   • lisinopril (PRINIVIL,ZESTRIL) tablet 10 mg  10 mg Oral Daily Nathen Farrell MD   10 mg at 01/18/22 1559   • magnesium hydroxide (MILK OF MAGNESIA) suspension 10 mL  10 mL Oral Daily PRN Nathen Farrell MD       • meloxicam (MOBIC) tablet 15 mg  15 mg Oral Daily Nathen Farrell MD   15 mg at 01/19/22 0800   • metFORMIN (GLUCOPHAGE) tablet 500 mg  500 mg Oral BID With Meals Nathen Farrell MD   500 mg at 01/19/22 0757   • metoprolol succinate XL (TOPROL-XL) 24 hr tablet 50 mg  50 mg Oral Daily Nathen Farrell MD       • [COMPLETED] midazolam (VERSED) injection 2 mg  2 mg Intravenous Once Yovany Charles MD   2 mg at 01/18/22  0801   • morphine injection 4 mg  4 mg Intravenous Q2H PRN Nathen Farrell MD        And   • naloxone (NARCAN) injection 0.4 mg  0.4 mg Intravenous Q5 Min PRN Nathen Farrell MD       • ondansetron (ZOFRAN) tablet 4 mg  4 mg Oral Q6H PRN Nathen Farrell MD        Or   • ondansetron (ZOFRAN) injection 4 mg  4 mg Intravenous Q6H PRN Nathen Farrell MD       • [COMPLETED] oxyCODONE-acetaminophen (PERCOCET)  MG per tablet 1 tablet  1 tablet Oral Once PRN Yovany Charles MD   1 tablet at 01/18/22 0939   • promethazine (PHENERGAN) tablet 12.5 mg  12.5 mg Oral Q6H PRN Nathen Farrell MD        Or   • promethazine (PHENERGAN) suppository 12.5 mg  12.5 mg Rectal Q6H PRN Nathen Farrell MD       • sodium chloride 0.9 % flush 1-10 mL  1-10 mL Intravenous PRN Nathen Farrell MD       • sodium chloride 0.9 % flush 10 mL  10 mL Intravenous Q12H Nathen Farrell MD   10 mL at 01/19/22 0800   • vancomycin 1250 mg/250 mL 0.9% NS IVPB ([facility])  1,250 mg Intravenous Q12H Nathen Farrell MD   1,250 mg at 01/19/22 0403   • [COMPLETED] vancomycin 1500 mg/500 mL 0.9% NS IVPB ([facility])  15 mg/kg Intravenous Once Nathen Farrell MD   1,500 mg at 01/18/22 1823     Lab Results (last 48 hours)     Procedure Component Value Units Date/Time    POC Glucose Once [[id number]]  (Normal) Collected: 01/19/22 0728    Specimen: Blood Updated: 01/19/22 0749     Glucose 85 mg/dL      Comment: : [id number] Juan Anguianogarrick ID: [id number]       Basic Metabolic Panel [[id number]]  (Abnormal) Collected: 01/19/22 0622    Specimen: Blood Updated: 01/19/22 0718     Glucose 91 mg/dL      BUN 14 mg/dL      Creatinine 0.74 mg/dL      Sodium 137 mmol/L      Potassium 4.3 mmol/L      Comment: Slight hemolysis detected by analyzer. Results may be affected.        Chloride 103 mmol/L      CO2 26.0 mmol/L      Calcium 8.6 mg/dL      eGFR Non African Amer 108 mL/min/1.73      BUN/Creatinine Ratio  18.9     Anion Gap 8.0 mmol/L     Narrative:      GFR Normal >60  Chronic Kidney Disease <60  Kidney Failure <15      Hemoglobin & Hematocrit, Blood [[id number]]  (Normal) Collected: 01/19/22 0622    Specimen: Blood Updated: 01/19/22 0659     Hemoglobin 14.7 g/dL      Hematocrit 43.2 %     POC Glucose Once [[id number]]  (Normal) Collected: 01/18/22 2044    Specimen: Blood Updated: 01/18/22 2055     Glucose 98 mg/dL      Comment: : [id number] Kyra CharlesMeter ID: [id number]       POC Glucose Once [[id number]]  (Normal) Collected: 01/18/22 1725    Specimen: Blood Updated: 01/18/22 1736     Glucose 106 mg/dL      Comment: : [id number] Juan Harrison ID: [id number]       Tissue / Bone Culture - Bone, Shoulder, Left [[id number]] Collected: 01/18/22 0849    Specimen: Bone from Shoulder, Left Updated: 01/18/22 1259     Gram Stain Moderate (3+) WBCs seen      No organisms seen    Wound Culture - Wound, Shoulder, Left [[id number]] Collected: 01/18/22 0859    Specimen: Wound from Shoulder, Left Updated: 01/18/22 1258     Gram Stain Few (2+) WBCs seen      No organisms seen    Tissue / Bone Culture - Tissue, Shoulder, Left [[id number]] Collected: 01/18/22 0843    Specimen: Tissue from Shoulder, Left Updated: 01/18/22 1257     Gram Stain Moderate (3+) WBCs seen      No organisms seen    Tissue / Bone Culture - Tissue, Shoulder, Left [[id number]] Collected: 01/18/22 0849    Specimen: Tissue from Shoulder, Left Updated: 01/18/22 1253     Gram Stain Moderate (3+) WBCs seen      No organisms seen    POC Glucose Once [[id number]]  (Normal) Collected: 01/18/22 0922    Specimen: Blood Updated: 01/18/22 0934     Glucose 119 mg/dL      Comment: : [id number] Abimbola Melchor  SMeter ID: [id number]       POC Glucose Once [[id number]]  (Normal) Collected: 01/18/22 0738    Specimen: Blood Updated: 01/18/22 0751     Glucose 103 mg/dL      Comment: : [id number] Fabian OvallesMetkilo ID: [id number]             Imaging Results (Last 48  Hours)     ** No results found for the last 48 hours. **        ECG/EMG Results (last 48 hours)     ** No results found for the last 48 hours. **          Orders (last 48 hrs)      Start     Ordered    01/20/22 0330  Vancomycin, Trough  Timed         01/19/22 0708    01/19/22 1600  Vital Signs  Every 8 Hours         01/18/22 1454    01/19/22 1600  Neurovascular Checks  Every 8 Hours         01/18/22 1454    01/19/22 1000  ceFAZolin in 0.9% normal saline (ANCEF) IVPB solution 2 g  Every 8 Hours         01/19/22 0825    01/19/22 0900  metoprolol succinate XL (TOPROL-XL) 24 hr tablet 50 mg  Daily         01/18/22 1454    01/19/22 0900  enoxaparin (LOVENOX) syringe 40 mg  Daily         01/18/22 1454    01/19/22 0750  POC Glucose Once  PROCEDURE ONCE         01/19/22 0728    01/19/22 0600  Basic Metabolic Panel  Morning Draw         01/18/22 1454    01/19/22 0600  Hemoglobin & Hematocrit, Blood  Morning Draw         01/18/22 1454    01/19/22 0430  vancomycin 1250 mg/250 mL 0.9% NS IVPB ([facility])  Every 12 Hours         01/18/22 1550    01/18/22 2100  sodium chloride 0.9 % flush 10 mL  Every 12 Hours Scheduled         01/18/22 1454    01/18/22 2056  POC Glucose Once  PROCEDURE ONCE         01/18/22 2044    01/18/22 1800  metFORMIN (GLUCOPHAGE) tablet 500 mg  2 Times Daily With Meals         01/18/22 1454    01/18/22 1737  POC Glucose Once  PROCEDURE ONCE         01/18/22 1725    01/18/22 1730  insulin lispro (humaLOG) injection 0-9 Units  3 Times Daily Before Meals         01/18/22 1454    01/18/22 1700  POC Glucose TID AC  3 Times Daily Before Meals       01/18/22 1454    01/18/22 1630  vancomycin 1500 mg/500 mL 0.9% NS IVPB ([facility])  Once         01/18/22 1549    01/18/22 1600  empagliflozin (JARDIANCE) tablet 25 mg  Daily         01/18/22 1454    01/18/22 1600  lisinopril (PRINIVIL,ZESTRIL) tablet 10 mg  Daily         01/18/22 1454    01/18/22 1600  meloxicam (MOBIC) tablet 15 mg  Daily         01/18/22 1454    01/18/22 1600   atorvastatin (LIPITOR) tablet 20 mg  Daily         01/18/22 1454    01/18/22 1600  Turn, Cough & Deep Breathe Every 2 Hours Until Ambulating  Every 2 Hours       01/18/22 1454    01/18/22 1600  Incentive Spirometry  Every 2 Hours While Awake      Comments: Until lungs are clear and no productive cough.    01/18/22 1454    01/18/22 1600  famotidine (PEPCID) tablet 40 mg  Daily         01/18/22 1454    01/18/22 1545  lactated ringers infusion  Continuous         01/18/22 1454    01/18/22 1455  Do NOT Hold Basal or Correction Scale Insulin When Patient is NPO, Hold Scheduled Mealtime (Bolus) Insulin if NPO  Continuous         01/18/22 1454    01/18/22 1455  Follow [facility] Hypoglycemia Standing Orders For Blood Glucose Less Than 70 mg/dL  Until Discontinued        Comments: ALERT PATIENT - NOT NPO & CAN SAFELY SWALLOW  Administer 4 oz Fruit Juice OR 4 oz Regular Soda OR 8 oz Milk OR 15-30 grams (1 tube) of Glucose Gel.  Recheck Blood Glucose Approximately 15 Minutes After Ingestion, Repeat Treatment & Continue to Recheck Blood Sugar Approximately Every 15 Minutes Until Blood Glucose is 70 or Higher.  Once Blood Glucose is 70 or Higher & if It Will Be More Than 60 Minutes Until Next Meal, Provide Appropriate Snack (Including Carbohydrate Food) Based on Meal Plan Order. Give Meal Tray As Soon As Possible.    PATIENT HAS IV ACCESS - UNRESPONSIVE, NPO OR UNABLE TO SAFELY SWALLOW  Administer 25g (50ml) D50W IV Push.  Recheck Blood Glucose Approximately 15 Minutes After Administration, if Blood Glucose Remains Less Than 70, Repeat Treatment   Recheck Blood Glucose Approximately 15 Minutes After 2nd Administration, if Blood Glucose Remains Less Than 70 After 2nd Dose of D50W, Contact Provider for Further Treatment Orders & Consider Adding IVF With D5W for Maintenance    PATIENT WITHOUT IV ACCESS - UNRESPONSIVE, NPO OR UNABLE TO SAFELY SWALLOW  Administer 1mg Glucagon SQ & Establish IV Access.  Turn Patient on Side - Nausea /  Vomiting May Occur.  Recheck Blood Glucose Approximately 15 Minutes After Administration.  If Blood Glucose Remains Less Than 70, Administer 25g D50W IV Push (50ml).  Recheck Blood Glucose Approximately 15 Minutes After Administration of D50W, if Blood Glucose Remains Less Than 70, Contact Provider for Further Treatment Orders & Consider Adding IVF With D5 for Maintenance    Document Event & Patient Response to Interventions in EMR, Document Medications on MAR  Notify Provider if Hypoglycemia Treatment Needed    01/18/22 1454    01/18/22 1455  Intake and Output  Every Shift       01/18/22 1454    01/18/22 1455  Notify Provider  Until Discontinued         01/18/22 1454    01/18/22 1455  Weight Bearing - As Tolerated  Continuous         01/18/22 1454    01/18/22 1455  Up in Chair x 1 Day of Surgery  Once         01/18/22 1454    01/18/22 1455  Pillows May Be Used to Elevate Operative Leg  Continuous         01/18/22 1454    01/18/22 1455  Saline Lock IV With Adequate PO Intake  Continuous         01/18/22 1454    01/18/22 1455  Straight Cath for Urinary Retention  Per Order Details        Comments: Every 6 Hours PRN    01/18/22 1454    01/18/22 1455  Oxygen Therapy-  Continuous         01/18/22 1454    01/18/22 1455  PT Consult: Eval & Treat  Once         01/18/22 1454    01/18/22 1455  OT Consult: Eval & Treat  Once         01/18/22 1454    01/18/22 1455  Insert Peripheral IV  Once         01/18/22 1454    01/18/22 1455  Saline Lock & Maintain IV Access  Continuous         01/18/22 1454    01/18/22 1455  Code Status and Medical Interventions:  Continuous         01/18/22 1454    01/18/22 1455  Place Sequential Compression Device  Once         01/18/22 1454    01/18/22 1455  Maintain Sequential Compression Device  Continuous         01/18/22 1454    01/18/22 1455  Inpatient Infectious Diseases Consult  Once        Specialty:  Infectious Diseases  Provider:  Shawnee Toth MD    01/18/22 1454    01/18/22 1455   "Antibiotic Previously Ordered  Once         01/18/22 1454    01/18/22 1454  morphine injection 4 mg  Every 2 Hours PRN        \"And\" Linked Group Details    01/18/22 1454    01/18/22 1454  naloxone (NARCAN) injection 0.4 mg  Every 5 Minutes PRN        \"And\" Linked Group Details    01/18/22 1454    01/18/22 1454  HYDROmorphone (DILAUDID) injection 0.5 mg  Every 2 Hours PRN        \"And\" Linked Group Details    01/18/22 1454    01/18/22 1454  naloxone (NARCAN) injection 0.1 mg  Every 5 Minutes PRN,   Status:  Discontinued        \"And\" Linked Group Details    01/18/22 1454    01/18/22 1454  ondansetron (ZOFRAN) tablet 4 mg  Every 6 Hours PRN        \"Or\" Linked Group Details    01/18/22 1454    01/18/22 1454  ondansetron (ZOFRAN) injection 4 mg  Every 6 Hours PRN        \"Or\" Linked Group Details    01/18/22 1454    01/18/22 1454  promethazine (PHENERGAN) tablet 12.5 mg  Every 6 Hours PRN        \"Or\" Linked Group Details    01/18/22 1454    01/18/22 1454  promethazine (PHENERGAN) suppository 12.5 mg  Every 6 Hours PRN        \"Or\" Linked Group Details    01/18/22 1454    01/18/22 1454  acetaminophen (TYLENOL) tablet 650 mg  Every 4 Hours PRN        \"Or\" Linked Group Details    01/18/22 1454    01/18/22 1454  acetaminophen (TYLENOL) suppository 650 mg  Every 4 Hours PRN        \"Or\" Linked Group Details    01/18/22 1454    01/18/22 1454  sodium chloride 0.9 % flush 1-10 mL  As Needed         01/18/22 1454    01/18/22 1454  Pharmacy to dose vancomycin  Continuous PRN,   Status:  Discontinued         01/18/22 1454    01/18/22 1454  dextrose (GLUTOSE) oral gel 15 g  Every 15 Minutes PRN         01/18/22 1454    01/18/22 1454  dextrose (D50W) (25 g/50 mL) IV injection 25 g  Every 15 Minutes PRN         01/18/22 1454    01/18/22 1454  glucagon (human recombinant) (GLUCAGEN DIAGNOSTIC) injection 1 mg  Every 15 Minutes PRN         01/18/22 1454    01/18/22 1454  HYDROcodone-acetaminophen (NORCO) 7.5-325 MG per tablet 1 tablet  Every " 4 Hours PRN         01/18/22 1454    01/18/22 1454  HYDROcodone-acetaminophen (NORCO) 7.5-325 MG per tablet 2 tablet  Every 4 Hours PRN         01/18/22 1454    01/18/22 1454  docusate sodium (COLACE) capsule 100 mg  2 Times Daily PRN         01/18/22 1454    01/18/22 1454  magnesium hydroxide (MILK OF MAGNESIA) suspension 10 mL  Daily PRN         01/18/22 1454    01/18/22 1454  diazePAM (VALIUM) tablet 5 mg  Every 6 Hours PRN         01/18/22 1454    01/18/22 1007  Advance Diet as Tolerated  Until Discontinued         01/18/22 1006    01/18/22 1007  Diet Regular  Diet Effective Now         01/18/22 1006    01/18/22 0935  POC Glucose Once  PROCEDURE ONCE         01/18/22 0922 01/18/22 0923  Vital Signs Every 5 Minutes for 15 Minutes, Every 15 Minutes Thereafter.  Continuous,   Status:  Canceled         01/18/22 0923    01/18/22 0923  Call Anesthesiologist For Additional IV Fluid Bolus For Hypotension / Tachycardia  Continuous,   Status:  Canceled         01/18/22 0923    01/18/22 0923  Notify Anesthesia of Any Acute Changes in Patient Condition  Until Discontinued,   Status:  Canceled         01/18/22 0923    01/18/22 0923  Notify Anesthesia for Unrelieved Pain  Until Discontinued,   Status:  Canceled         01/18/22 0923 01/18/22 0923  POC Glucose STAT  STAT,   Status:  Canceled        Comments: Post op Glucose Check on All Diabetic Patients, Notify Anesthesia if Blood Sugar is Less Than 80 mg/dL or Greater Than 250mg/dL      01/18/22 0923    01/18/22 0923  Once Discharge Criteria to Floor Met, Follow Surgeon Orders  Continuous,   Status:  Canceled         01/18/22 0923 01/18/22 0923  Discharge Patient From PACU When Discharge Criteria Met  Continuous,   Status:  Canceled         01/18/22 0923 01/18/22 0922  Apply Warming [place]  As Needed,   Status:  Canceled      Comments: For a recorded temp of  <36.9 C, Recovery Only    01/18/22 0923 01/18/22 0922  ibuprofen (ADVIL,MOTRIN) tablet 600 mg  Once  As Needed,   Status:  Discontinued         01/18/22 0923 01/18/22 0922  oxyCODONE-acetaminophen (PERCOCET)  MG per tablet 1 tablet  Once As Needed         01/18/22 0923 01/18/22 0922  oxyCODONE-acetaminophen (PERCOCET) 7.5-325 MG per tablet 2 tablet  Every 4 Hours PRN,   Status:  Discontinued         01/18/22 0923 01/18/22 0922  fentaNYL citrate (PF) (SUBLIMAZE) injection 25 mcg  Every 5 Minutes PRN,   Status:  Discontinued         01/18/22 0923 01/18/22 0922  labetalol (NORMODYNE,TRANDATE) injection 5 mg  Every 5 Minutes PRN,   Status:  Discontinued         01/18/22 0923 01/18/22 0922  atropine sulfate injection 0.5 mg  Once As Needed,   Status:  Discontinued         01/18/22 0923 01/18/22 0922  naloxone (NARCAN) injection 0.4 mg  As Needed,   Status:  Discontinued         01/18/22 0923 01/18/22 0922  flumazenil (ROMAZICON) injection 0.2 mg  As Needed,   Status:  Discontinued         01/18/22 0923 01/18/22 0922  ondansetron (ZOFRAN) injection 4 mg  Once As Needed,   Status:  Discontinued         01/18/22 0923 01/18/22 0900  Wound Culture - Wound, Shoulder, Left  RELEASE UPON ORDERING         01/18/22 0900    01/18/22 0900  sodium chloride 0.9 % flush 10 mL  Every 12 Hours Scheduled,   Status:  Discontinued         01/18/22 0732    01/18/22 0900  sodium chloride 0.9 % flush 3 mL  Every 12 Hours Scheduled,   Status:  Discontinued         01/18/22 0754    01/18/22 0859  sodium chloride (NS) irrigation solution  As Needed,   Status:  Discontinued         01/18/22 0859    01/18/22 0849  Tissue / Bone Culture - Tissue, Shoulder, Left  RELEASE UPON ORDERING         01/18/22 0849    01/18/22 0849  Tissue / Bone Culture - Bone, Shoulder, Left  RELEASE UPON ORDERING         01/18/22 0849    01/18/22 0844  Tissue / Bone Culture - Tissue, Shoulder, Left  RELEASE UPON ORDERING         01/18/22 0844    01/18/22 0756  lactated ringers infusion  Continuous         01/18/22 0754    01/18/22 0756   acetaminophen (TYLENOL) tablet 1,000 mg  Once         01/18/22 0754    01/18/22 0756  midazolam (VERSED) injection 2 mg  Once         01/18/22 0754    01/18/22 0756  fentaNYL citrate (PF) (SUBLIMAZE) injection 50 mcg  Once         01/18/22 0754    01/18/22 0756  scopolamine patch 1 mg/72 hr  Continuous,   Status:  Discontinued         01/18/22 0754 01/18/22 0756  Inpatient Admission  Once         01/18/22 0755    01/18/22 0755  Oxygen Therapy- Nasal Cannula; Titrate for SPO2: equal to or greater than, 90%  Continuous,   Status:  Canceled         01/18/22 0754 01/18/22 0755  Pulse Oximetry, Continuous  Continuous,   Status:  Canceled         01/18/22 0754 01/18/22 0755  Insert Peripheral IV  Once,   Status:  Canceled         01/18/22 0754 01/18/22 0755  Saline Lock & Maintain IV Access  Continuous,   Status:  Canceled         01/18/22 0754 01/18/22 0754  Vital Signs - Per Anesthesia Protocol  As Needed,   Status:  Canceled       01/18/22 0754 01/18/22 0754  sodium chloride 0.9 % flush 3-10 mL  As Needed,   Status:  Discontinued         01/18/22 0754 01/18/22 0754  lidocaine PF 1% (XYLOCAINE) injection 0.5 mL  Once As Needed,   Status:  Discontinued         01/18/22 0754 01/18/22 0754  midazolam (VERSED) injection 1 mg  Every 10 Minutes PRN,   Status:  Discontinued         01/18/22 0754 01/18/22 0752  POC Glucose Once  PROCEDURE ONCE         01/18/22 0738    01/18/22 0743  Initiate Anesthesia Protocol  Until Discontinued,   Status:  Canceled         01/18/22 0742    01/18/22 0734  ceFAZolin in 0.9% normal saline (ANCEF) IVPB solution 2 g  Once         01/18/22 0732    01/18/22 0733  Insert Peripheral IV  Once,   Status:  Canceled         01/18/22 0732    01/18/22 0733  Saline Lock & Maintain IV Access  Continuous,   Status:  Canceled         01/18/22 0732    01/18/22 0733  Initiate Anesthesia Protocol  Until Discontinued,   Status:  Canceled         01/18/22 0732    01/18/22 0733  POC  Glucose STAT  STAT,   Status:  Canceled         01/18/22 0732    01/18/22 0733  Notify Anesthesia if Blood Sugar Greater Than 180  Once,   Status:  Canceled         01/18/22 0732    01/18/22 0732  sodium chloride 0.9 % flush 10 mL  As Needed,   Status:  Discontinued         01/18/22 0732    01/18/22 0732  lidocaine PF 1% (XYLOCAINE) injection 0.5 mL  Once As Needed,   Status:  Discontinued         01/18/22 0732    01/18/22 0732  lactated ringers infusion  Continuous PRN         01/18/22 0732    Unscheduled  Ice to Incision for 48 Hours  As Needed       01/18/22 1454    Unscheduled  Apply Ice to Incision PRN for Edema, After Activity or Exercise, and to Lessen Discomfort  As Needed       01/18/22 1454    --  aspirin 81 MG EC tablet  Daily         01/18/22 1652                 Operative/Procedure Notes (last 48 hours)      Nathen Farrell MD at 01/18/22 0910          INCISION AND DRAINAGE SHOULDER, SHOULDER HARDWARE REMOVAL  Progress Note    Max Oliveira  1/18/2022    Pre-op Diagnosis:   T81.149xA       Post-Op Diagnosis Codes:     * Surgical wound infection [T81.49XA]    Procedure/CPT® Codes:  MD REMOVAL DEEP IMPLANT [62660]  MD INCIS/DRAIN SHLDR ABSC/HEMA,DEEP [30333]      Procedure(s):  LEFT SHOULDER SURGICAL WOUND INCISION AND DRAINAGE, HARDWARE REMOVAL  HARDWARE REMOVAL    Surgeon(s):  Nathen Farrell MD    Anesthesia: General with Block    Staff:   Circulator: Maged Peralta RN  Scrub Person: Debi Cruz Charles J  Assistant: Bruce Chambers PA-C  Assistant: Bruce Chambers PA-C      Estimated Blood Loss: minimal    Urine Voided: * No values recorded between 1/18/2022  8:19 AM and 1/18/2022  9:10 AM *    Specimens:                Specimens     ID Source Type Tests Collected By Collected At Frozen?    1 Shoulder, Left Tissue · TISSUE / BONE CULTURE   Nathen Farrell MD 1/18/22 0863     Description: Left Shoulder Superficial    2 Shoulder, Left Tissue · TISSUE / BONE  CULTURE   Nathen Farrell MD 22 0849     Description: Left Shoulder Deep    3 Shoulder, Left Bone · TISSUE / BONE CULTURE   Nathen Farrell MD 22 0849     Description: Left Shoulder Bone Culture    4 Shoulder, Left Wound · WOUND CULTURE   Nathen Farrell MD 22 0859     Description: Left Shoulder Culture                Drains: * No LDAs found *    Findings: see op note     Complications: none    Assistant: Bruce Chambers PA-C  was responsible for performing the following activities: Retraction and their skilled assistance was necessary for the success of this case.    Nathen Farrell MD     Date: 2022  Time: 09:10 CST        Electronically signed by Nathen Farrell MD at 22     Nathen Farrell MD at 22        Patient Name: Richard  : 1960  MRN: [id number]      DATE of SURGERY: 2022    SURGEON: Nathen Farrell MD    ASSISTANT: Bruce Chambers PA-C, was used as an assistant during this procedure and was utilized during patient positioning, wound exposure, wound closure, and postoperative dressing application.    PREOPERATIVE DIAGNOSIS:  1) Surgical wound infection, left shoulder  2) Status post left shoulder irrigation and excisional debridement (21)  2) Status post left shoulder arthroscopic rotator cuff repair, bicep tenodesis (21)     POSTOPERATIVE DIAGNOSIS:   1) Surgical wound infection, left shoulder  2) Status post left shoulder irrigation and excisional debridement (21)  2) Status post left shoulder arthroscopic rotator cuff repair, bicep tenodesis (21)     PROCEDURE PERFORMED:   1) Removal of hardware, left shoulder (deep implant)  2) Irrigation and excisional excisional debridement of skin, subcutaneous tissue, and muscle, Left shoulder      IMPLANTS: none     ANESTHESIA USED: General endotracheal anesthesia, interscalene block     OPERATIVE INDICATIONS: 61 y.o. male who underwent the  above procedure who began developing drainage of his axillary incision at the site of his biceps tenodesis.  Despite antibiotics and dressing changes, the wound continued to have drainage.  I recommended a formal I&D in the OR for debridement, cultures, possible closure of the wound.  This was performed on 11-16-21 and he was initially doing well until drainage began again.  An MRI showed a possible infection of the tenodesis site in the proximal humerus.  It was felt that his tenodesis screw should be removed in addition to a more extensive debridement following by IV antibiotics pending a positive culture.  He is diabetic causing an increased risk of infection and poor wound healing.  Risks included, but were not limited to, that of anesthesia, bleeding, infection, pain, damage to local structures, postoperative dislocation, need for further surgery, instability, stiffness, need for further surgery.     ESTIMATED BLOOD LOSS: 150 mL     DRAINS: none     COMPLICATIONS: none     SPECIMENS: soft tissue superficial and deep, E-Swab from the intramedullary canal, bone and necrotic material intramedullary canal     PROCEDURE in DETAIL:  The patient was seen in the preoperative holding room, once again the informed consent was reviewed with the patient and signed.  The site of surgery was marked with the patient's agreement.  After being transported to the operating room, a timeout was performed identifying the correct patient as well as the operative site.  The patient was positioned in the supine position, all bony prominences were protected and a sterile prep and drape was performed.     There was a <1 cm open wound in the mid-portion of the axillary incision.  The entire previous incision was opened, extended, and explored.  There was minimal purulence but there was a sinus tract down to the previously placed screw as seen on the MRI.  Multiple specimens were obtained.  An excisional debridement of skin, subcutaneous  "tissue, and muscle was performed with the used of scissors and a rongeur.  The tenodesis screw in addition to multiple sutures were located and removed easily.  6 liters of normal saline was used to irrigate the wound.       The incision was thoroughly irrigated, followed by closure in layers with PDS and nylon sutures.  A sterile dressing and sling were placed.  Counts were correct.    My assistant was used to maintain retraction, close the incision, and place the postop dressing.     The patient was awakened by anesthesia, transported to the recovery room in stable condition.     POSTOPERATIVE PLAN:  1) Admit, IV antibiotics, ID consult, f/u cultures  2) Clinical check in 1 week      Electronically signed by Nathen Farrell MD on 1/18/2022 at 09:13 CST        Electronically signed by Nathen Farrell MD at 01/18/22 1822          Physician Progress Notes (last 48 hours)      Bruce Chambers PA-C at 01/19/22 0741            Orthopedic Surgery Progress Note    Max Oliveira  1/19/2022      Subjective:     Systemic or Specific Complaints: doing well.     Objective:     Patient Vitals for the past 24 hrs:   BP Temp Temp src Pulse Resp SpO2 Height   01/19/22 0429 103/66 97.6 °F (36.4 °C) Oral 71 16 95 % --   01/19/22 0413 -- -- -- -- -- -- 180 cm (70.87\")   01/19/22 0011 108/54 98.2 °F (36.8 °C) Oral 69 16 96 % --   01/18/22 2023 108/67 98.6 °F (37 °C) Oral 76 16 96 % --   01/18/22 1602 109/73 98.2 °F (36.8 °C) Oral 76 16 97 % --   01/18/22 1532 120/75 -- -- 84 16 95 % --   01/18/22 1502 128/91 98.4 °F (36.9 °C) Oral 70 16 95 % --   01/18/22 1500 -- -- -- -- -- 96 % --   01/18/22 1400 108/79 -- -- 73 17 95 % --   01/18/22 1345 109/75 -- -- 65 16 93 % --   01/18/22 1315 130/76 -- -- 82 19 95 % --   01/18/22 1300 -- -- -- 95 22 96 % --   01/18/22 1245 119/74 -- -- 89 17 96 % --   01/18/22 1230 117/69 -- -- 81 20 96 % --   01/18/22 1215 117/69 -- -- 78 15 94 % --   01/18/22 1200 112/79 -- -- 72 22 93 % -- "   01/18/22 1145 126/85 -- -- 69 17 97 % --   01/18/22 1130 119/85 -- -- 67 19 96 % --   01/18/22 1115 131/87 -- -- 68 16 98 % --   01/18/22 1100 131/88 -- -- 78 15 97 % --   01/18/22 1045 135/94 -- -- 72 14 97 % --   01/18/22 1030 132/91 -- -- 67 13 96 % --   01/18/22 1015 138/90 -- -- 65 12 98 % --   01/18/22 1000 135/88 -- -- 60 20 97 % --   01/18/22 0945 139/94 -- -- 73 16 98 % --   01/18/22 0935 139/94 -- -- 75 20 100 % --   01/18/22 0930 134/94 -- -- 75 19 100 % --   01/18/22 0925 (!) 138/101 -- -- 78 15 99 % --   01/18/22 0918 141/100 96.9 °F (36.1 °C) Temporal (!) 14 13 100 % --   01/18/22 0806 108/80 -- -- 66 -- 93 % --   01/18/22 0803 -- -- -- 74 -- 95 % --   01/18/22 0801 124/89 -- -- -- -- -- --   01/18/22 0800 -- -- -- 75 -- 96 % --   01/18/22 0757 -- -- -- 76 -- 98 % --   01/18/22 0756 128/88 -- -- -- -- -- --       left upper  General: alert, appears stated age and cooperative   Wound: covered             Dressing: Clean, dry, intact   Extremity: Distal NVI           DVT Exam: No evidence of DVT                   Data Review:  Lab Results (last 24 hours)     Procedure Component Value Units Date/Time    Basic Metabolic Panel [[id number]]  (Abnormal) Collected: 01/19/22 0622    Specimen: Blood Updated: 01/19/22 0718     Glucose 91 mg/dL      BUN 14 mg/dL      Creatinine 0.74 mg/dL      Sodium 137 mmol/L      Potassium 4.3 mmol/L      Comment: Slight hemolysis detected by analyzer. Results may be affected.        Chloride 103 mmol/L      CO2 26.0 mmol/L      Calcium 8.6 mg/dL      eGFR Non African Amer 108 mL/min/1.73      BUN/Creatinine Ratio 18.9     Anion Gap 8.0 mmol/L     Narrative:      GFR Normal >60  Chronic Kidney Disease <60  Kidney Failure <15      Hemoglobin & Hematocrit, Blood [[id number]]  (Normal) Collected: 01/19/22 0622    Specimen: Blood Updated: 01/19/22 0659     Hemoglobin 14.7 g/dL      Hematocrit 43.2 %     POC Glucose Once [[id number]]  (Normal) Collected: 01/18/22 2044    Specimen:  Blood Updated: 01/18/22 2055     Glucose 98 mg/dL      Comment: : [id number] Kyra SanchezMeter ID: [id number]       POC Glucose Once [[id number]]  (Normal) Collected: 01/18/22 1725    Specimen: Blood Updated: 01/18/22 1736     Glucose 106 mg/dL      Comment: : [id number] Juan Harrison ID: [id number]       Tissue / Bone Culture - Bone, Shoulder, Left [[id number]] Collected: 01/18/22 0849    Specimen: Bone from Shoulder, Left Updated: 01/18/22 1259     Gram Stain Moderate (3+) WBCs seen      No organisms seen    Wound Culture - Wound, Shoulder, Left [[id number]] Collected: 01/18/22 0859    Specimen: Wound from Shoulder, Left Updated: 01/18/22 1258     Gram Stain Few (2+) WBCs seen      No organisms seen    Tissue / Bone Culture - Tissue, Shoulder, Left [[id number]] Collected: 01/18/22 0843    Specimen: Tissue from Shoulder, Left Updated: 01/18/22 1257     Gram Stain Moderate (3+) WBCs seen      No organisms seen    Tissue / Bone Culture - Tissue, Shoulder, Left [[id number]] Collected: 01/18/22 0849    Specimen: Tissue from Shoulder, Left Updated: 01/18/22 1253     Gram Stain Moderate (3+) WBCs seen      No organisms seen    POC Glucose Once [[id number]]  (Normal) Collected: 01/18/22 0922    Specimen: Blood Updated: 01/18/22 0934     Glucose 119 mg/dL      Comment: : [id number] Lax Jill  SMeter ID: [id number]       POC Glucose Once [[id number]]  (Normal) Collected: 01/18/22 0738    Specimen: Blood Updated: 01/18/22 0751     Glucose 103 mg/dL      Comment: : [id number] Fabian OvallesMeter ID: [id number]           Imaging Results (Last 24 Hours)     ** No results found for the last 24 hours. **          Assessment:   1 Day Post-Op    1) Removal of hardware, left shoulder (deep implant)  2) Irrigation and excisional excisional debridement of skin, subcutaneous tissue, and muscle, Left shoulder     Plan:      1:  DVT prophylaxis, ICE, elevate  2:  Pain control, IV abx, ID Consult.   3:  Physical  therapy/Occupational therapy  4:  Anticipate discharge, 1-3 days  5:  Sling, Minimal weight bearing.       Bruce Chambers PA-C        Electronically signed by Bruce Chambers PA-C at 01/19/22 0782       Consult Notes (last 48 hours)  Notes from 01/17/22 0844 through 01/19/22 0844   No notes of this type exist for this encounter.

## 2022-01-19 NOTE — CONSULTS
INFECTIOUS DISEASES CONSULT NOTE    Patient:  Max Oliveira 61 y.o. male  ROOM # 327/1  YOB: 1960  MRN: [id number]  [facility]:  [id number]  Admit date: 1/18/2022   Admitting Physician: Nathen Farrell MD  Primary Care Physician: Aayush Ewing MD  REFERRING PROVIDER: Nathen Farrell, *      REASON FOR CONSULTATION : Left shoulder infection      HISTORY OF PRESENT ILLNESS: Patient is a 61-year-old gentleman who underwent initial left shoulder arthroscopic rotator cuff repair on June 29, 2021.  He developed infection and underwent removal of left shoulder deep implant, irrigation and excisional debridement of skin, subcu tissue and muscle.    Patient had previously began developing some drainage from his axillary incision at the site of his biceps tendinosis.  He noticed this around Labor Day.  His wife describes that the area became somewhat swollen and very hard to touch.  Then all of a sudden opened up and drained significantly.  He received antibiotics and wound care with dressing changes and ultimately underwent a formal OR debridement November 16, 2021.    He recalls being on Bactrim, Keflex and most recently clindamycin.    I did locate wound cultures positive for methicillin susceptible Staph aureus September 4, 2021.  I did not locate any cultures from November surgery.    Hemoglobin A1c from January 13, 2022 was 5.7%    Past Medical History:   Diagnosis Date   • Arthritis    • Cervical radiculopathy    • Diabetic neuropathy (HCC)    • Epidermal cyst    • GERD (gastroesophageal reflux disease)    • History of adenomatous polyp of colon    • Hyperlipidemia    • Hypertension    • Paresthesia    • PONV (postoperative nausea and vomiting)    • Vitamin D deficiency      Past Surgical History:   Procedure Laterality Date   • BICEPS TENDONESIS SUBPECTORALIS REPAIR Left 6/29/2021    Procedure: BICEPS TENODESIS / TENOTOMY;  Surgeon: Nathen Farrell MD;  Location: [facility] OR;  Service:  Orthopedics;  Laterality: Left;   • CARDIAC CATHETERIZATION      no stents    • COLONOSCOPY  05/22/2009    2 polyps, adenomatous   • COLONOSCOPY N/A 8/27/2020    Procedure: COLONOSCOPY WITH ANESTHESIA;  Surgeon: Mundo Rodrigues MD;  Location: [facility] ENDOSCOPY;  Service: Gastroenterology;  Laterality: N/A;  preop; hx of polyps  postop; poylps   PCP Aayush Ewing    • EYE SURGERY      trk   • HARDWARE REMOVAL Left 1/18/2022    Procedure: HARDWARE REMOVAL;  Surgeon: Nathen Farrell MD;  Location:  [facility] OR;  Service: Orthopedics;  Laterality: Left;   • INCISION AND DRAINAGE SHOULDER Left 11/16/2021    Procedure: INCISION AND DRAINAGE LEFT SHOULDER SURGICAL WOUND;  Surgeon: Nathen Farrell MD;  Location:  [facility] OR;  Service: Orthopedics;  Laterality: Left;   • INCISION AND DRAINAGE SHOULDER Left 1/18/2022    Procedure: LEFT SHOULDER SURGICAL WOUND INCISION AND DRAINAGE, HARDWARE REMOVAL;  Surgeon: Nathen Farrell MD;  Location:  [facility] OR;  Service: Orthopedics;  Laterality: Left;   • KNEE SURGERY     • MANDIBLE SURGERY      x 2   • RESECTION DISTAL CLAVICLE Left 6/29/2021    Procedure: DISTAL CLAVICLE EXCISION;  Surgeon: Nathen Farrell MD;  Location:  [facility] OR;  Service: Orthopedics;  Laterality: Left;   • SHOULDER ARTHROSCOPY W/ ROTATOR CUFF REPAIR Left 6/29/2021    Procedure: LEFT SHOULDER  ROTATOR CUFF REPAIR, SUBACROMIAL DECOMPRESSION, DISTAL CLAVICLE EXCISION, BICEPS TENODESIS / TENOTOMY;  Surgeon: Nathen Farrell MD;  Location: [facility] OR;  Service: Orthopedics;  Laterality: Left;     Family History   Problem Relation Age of Onset   • Colon cancer Neg Hx    • Colon polyps Neg Hx      Social History     Socioeconomic History   • Marital status:    Tobacco Use   • Smoking status: Former Smoker   • Smokeless tobacco: Never Used   • Tobacco comment: quit 30 years ago    Vaping Use   • Vaping Use: Never used   Substance and Sexual Activity   • Alcohol use: Not Currently      "Comment: now rarely    • Drug use: Not Currently   • Sexual activity: Defer       Current Scheduled Medications:   atorvastatin, 20 mg, Oral, Daily  empagliflozin, 25 mg, Oral, Daily  enoxaparin, 40 mg, Subcutaneous, Daily  famotidine, 40 mg, Oral, Daily  insulin lispro, 0-9 Units, Subcutaneous, TID AC  lisinopril, 10 mg, Oral, Daily  meloxicam, 15 mg, Oral, Daily  metFORMIN, 500 mg, Oral, BID With Meals  metoprolol succinate XL, 50 mg, Oral, Daily  sodium chloride, 10 mL, Intravenous, Q12H  vancomycin, 1,250 mg, Intravenous, Q12H      Current PRN Medications:  •  acetaminophen **OR** acetaminophen  •  dextrose  •  dextrose  •  diazePAM  •  docusate sodium  •  glucagon (human recombinant)  •  HYDROcodone-acetaminophen  •  HYDROcodone-acetaminophen  •  HYDROmorphone **AND** [DISCONTINUED] naloxone  •  lactated ringers  •  magnesium hydroxide  •  Morphine **AND** naloxone  •  ondansetron **OR** ondansetron  •  promethazine **OR** promethazine  •  sodium chloride     No Known Allergies    Review of Systems   Constitutional: Negative for fever.   HENT: Negative for congestion.    Eyes: Negative for photophobia.   Respiratory: Negative for cough.    Cardiovascular: Negative for chest pain.   Gastrointestinal: Negative for vomiting.   Genitourinary: Negative for dysuria.   Musculoskeletal: Positive for joint swelling.   Skin: Positive for color change and wound.   Psychiatric/Behavioral: Negative for confusion.         Vital Signs:  BP 93/54 (BP Location: Right arm, Patient Position: Lying)   Pulse 65   Temp 98.6 °F (37 °C) (Oral)   Resp 16   Ht 180 cm (70.87\")   SpO2 96%   BMI 31.02 kg/m²   Temp (24hrs), Av.1 °F (36.7 °C), Min:96.9 °F (36.1 °C), Max:98.6 °F (37 °C)      Physical Exam    General: Patient is nontoxic-appearing lying in bed in no acute distress.  His wife is at bedside as is his nurse  HEENT: Sclera anicteric and noninjected.  Patient placed facemask over nose and mouth at my " request.  Respiratory: Effort even and unlabored, he was not conversationally dyspneic  Left shoulder: Dressing in place and is clean dry and intact  Neuro: Alert and oriented, speech clear  Psych: Pleasant cooperative     Results Review:    I reviewed the patient's new clinical results.    Lab Results:  CBC:   Lab Results   Lab 01/14/22  0825 01/19/22 0622   WBC 6.21  --    HEMOGLOBIN 17.4 14.7   HEMATOCRIT 49.5 43.2   PLATELETS 242  --        CMP:   Lab Results   Lab 01/14/22 0825 01/19/22 0622   SODIUM 137 137   POTASSIUM 4.5 4.3   CHLORIDE 101 103   CO2 25.0 26.0   BUN 18 14   CREATININE 0.70* 0.74*   CALCIUM 9.5 8.6   GLUCOSE 108* 91       Lab Results (last 72 hours)     Procedure Component Value Units Date/Time    Basic Metabolic Panel [[id number]]  (Abnormal) Collected: 01/19/22 0622    Specimen: Blood Updated: 01/19/22 0718     Glucose 91 mg/dL      BUN 14 mg/dL      Creatinine 0.74 mg/dL      Sodium 137 mmol/L      Potassium 4.3 mmol/L      Comment: Slight hemolysis detected by analyzer. Results may be affected.        Chloride 103 mmol/L      CO2 26.0 mmol/L      Calcium 8.6 mg/dL      eGFR Non African Amer 108 mL/min/1.73      BUN/Creatinine Ratio 18.9     Anion Gap 8.0 mmol/L     Narrative:      GFR Normal >60  Chronic Kidney Disease <60  Kidney Failure <15      Hemoglobin & Hematocrit, Blood [[id number]]  (Normal) Collected: 01/19/22 0622    Specimen: Blood Updated: 01/19/22 0659     Hemoglobin 14.7 g/dL      Hematocrit 43.2 %     POC Glucose Once [[id number]]  (Normal) Collected: 01/18/22 2044    Specimen: Blood Updated: 01/18/22 2055     Glucose 98 mg/dL      Comment: : [id number] Kyra SanchezMeter ID: [id number]       POC Glucose Once [[id number]]  (Normal) Collected: 01/18/22 1725    Specimen: Blood Updated: 01/18/22 1736     Glucose 106 mg/dL      Comment: : [id number] Juan Harrison ID: [id number]       Tissue / Bone Culture - Bone, Shoulder, Left [[id number]] Collected: 01/18/22  0849    Specimen: Bone from Shoulder, Left Updated: 01/18/22 1259     Gram Stain Moderate (3+) WBCs seen      No organisms seen    Wound Culture - Wound, Shoulder, Left [[id number]] Collected: 01/18/22 0859    Specimen: Wound from Shoulder, Left Updated: 01/18/22 1258     Gram Stain Few (2+) WBCs seen      No organisms seen    Tissue / Bone Culture - Tissue, Shoulder, Left [[id number]] Collected: 01/18/22 0843    Specimen: Tissue from Shoulder, Left Updated: 01/18/22 1257     Gram Stain Moderate (3+) WBCs seen      No organisms seen    Tissue / Bone Culture - Tissue, Shoulder, Left [[id number]] Collected: 01/18/22 0849    Specimen: Tissue from Shoulder, Left Updated: 01/18/22 1253     Gram Stain Moderate (3+) WBCs seen      No organisms seen    POC Glucose Once [[id number]]  (Normal) Collected: 01/18/22 0922    Specimen: Blood Updated: 01/18/22 0934     Glucose 119 mg/dL      Comment: : [id number] Abimbola Jill  SMeter ID: [id number]       POC Glucose Once [[id number]]  (Normal) Collected: 01/18/22 0738    Specimen: Blood Updated: 01/18/22 0751     Glucose 103 mg/dL      Comment: : [id number] Fabian ChinanyMeter ID: [id number]             Estimated Creatinine Clearance: 129.8 mL/min (A) (by C-G formula based on SCr of 0.74 mg/dL (L)).    Culture Results:    Microbiology Results (last 10 days)     Procedure Component Value - Date/Time    Wound Culture - Wound, Shoulder, Left [[id number]] Collected: 01/18/22 0859    Lab Status: Preliminary result Specimen: Wound from Shoulder, Left Updated: 01/18/22 1258     Gram Stain Few (2+) WBCs seen      No organisms seen    Tissue / Bone Culture - Tissue, Shoulder, Left [[id number]] Collected: 01/18/22 0849    Lab Status: Preliminary result Specimen: Tissue from Shoulder, Left Updated: 01/18/22 1253     Gram Stain Moderate (3+) WBCs seen      No organisms seen    Tissue / Bone Culture - Bone, Shoulder, Left [[id number]] Collected: 01/18/22 0849    Lab Status: Preliminary result  Specimen: Bone from Shoulder, Left Updated: 01/18/22 1259     Gram Stain Moderate (3+) WBCs seen      No organisms seen    Tissue / Bone Culture - Tissue, Shoulder, Left [[id number]] Collected: 01/18/22 0843    Lab Status: Preliminary result Specimen: Tissue from Shoulder, Left Updated: 01/18/22 1257     Gram Stain Moderate (3+) WBCs seen      No organisms seen    COVID PRE-OP / PRE-PROCEDURE SCREENING ORDER (NO ISOLATION) - Swab, Nasal Cavity [[id number]]  (Normal) Collected: 01/15/22 0709    Lab Status: Final result Specimen: Swab from Nasal Cavity Updated: 01/15/22 1540    Narrative:      The following orders were created for panel order COVID PRE-OP / PRE-PROCEDURE SCREENING ORDER (NO ISOLATION) - Swab, Nasal Cavity.  Procedure                               Abnormality         Status                     ---------                               -----------         ------                     COVID-19,APTIMA PANTHER,...[[id number]]  Normal              Final result                 Please view results for these tests on the individual orders.    COVID-19,APTIMA PANTHER,[facility] IN-HOUSE,NP/OP/NASAL SWAB IN UTM/VTM/SALINE/LIQUID AMIES TRANSPORT MEDIA/NP WASH OR ASPIRATE, 24 HR TAT - Swab, Nasal Cavity [[id number]]  (Normal) Collected: 01/15/22 0709    Lab Status: Final result Specimen: Swab from Nasal Cavity Updated: 01/15/22 1540     COVID19 Not Detected    Narrative:      Fact sheet for providers: https://www.fda.gov/media/481973/download     Fact sheet for patients: https://www.fda.gov/media/659333/download    Test performed by RT PCR.        2 Result Notes    Wound Culture   Lab   Scant growth (1+) Staphylococcus aureus Abnormal   [facility]      Scant growth (1+) Normal Skin Thuy  [facility]               Gram Stain   Lab   Few (2+) WBCs seen [facility]      No organisms seen [facility]                Susceptibility     Staphylococcus aureus     [facility]     Clindamycin 0.25 ug/ml Susceptible     Erythromycin <=0.25 ug/ml  Susceptible     Inducible Clindamycin Resistance NEG ug/ml Negative     Oxacillin 0.5 ug/ml Susceptible     Penicillin G >=0.5 ug/ml Resistant     Rifampin <=0.5 ug/ml Susceptible     Tetracycline <=1 ug/ml Susceptible     Trimethoprim + Sulfamethoxazole <=10 ug/ml Susceptible     Vancomycin 1 ug/ml Susceptible               Linear View       Susceptibility Comments    Staphylococcus aureus   This isolate does not demonstrate inducible clindamycin resistance in vitro.          Specimen Collected: 09/04/21 14:02 Last Resulted: 09/07/21 06:52                    Radiology:   Imaging Results (Last 72 Hours)     ** No results found for the last 72 hours. **            Assessment/Plan       Surgical wound infection      IMPRESSION:  1. Infected left shoulder status post hardware removal of deep implant and patient who underwent rotator cuff biceps tendinosis repair June 2021  2. Infection with methicillin susceptible Staph aureus based on wound culture September 2021  3. Diabetes mellitus with hemoglobin A1c 5.7% in January 2022      RECOMMENDATION:   · Start cefazolin 2 g IV every 8 hours-presumed this infection will also be methicillin susceptible Staph aureus as was isolated in September however certainly need to await final cultures  · Continue vancomycin for now pending cultures  · Discussion with patient and wife regarding likelihood of several weeks of therapy, 4 to 6 weeks given concern for hardware associated infection and possible bone involvement.  They were not surprised by this plan.  We discussed PICC line placement, risk and benefits, home antibiotics, IV push antibiotics versus continuous infusion, etc.        Shawnee Toth MD  01/19/22  07:45 CST

## 2022-01-19 NOTE — PLAN OF CARE
Goal Outcome Evaluation:  Plan of Care Reviewed With: patient        Progress: improving  Outcome Summary: Alert and oriented x4.  Up ad ailyn, ambulating well.  Dressing on left shoulder is CDI.  C/o of pain treated with oral pain medication.  Iv abx given.  Bedtime glucose was 98.  Call light in reach, safety maintained.

## 2022-01-19 NOTE — PROGRESS NOTES
"  Orthopedic Surgery Progress Note    Max Oliveira  1/19/2022      Subjective:     Systemic or Specific Complaints: doing well.     Objective:     Patient Vitals for the past 24 hrs:   BP Temp Temp src Pulse Resp SpO2 Height   01/19/22 0429 103/66 97.6 °F (36.4 °C) Oral 71 16 95 % --   01/19/22 0413 -- -- -- -- -- -- 180 cm (70.87\")   01/19/22 0011 108/54 98.2 °F (36.8 °C) Oral 69 16 96 % --   01/18/22 2023 108/67 98.6 °F (37 °C) Oral 76 16 96 % --   01/18/22 1602 109/73 98.2 °F (36.8 °C) Oral 76 16 97 % --   01/18/22 1532 120/75 -- -- 84 16 95 % --   01/18/22 1502 128/91 98.4 °F (36.9 °C) Oral 70 16 95 % --   01/18/22 1500 -- -- -- -- -- 96 % --   01/18/22 1400 108/79 -- -- 73 17 95 % --   01/18/22 1345 109/75 -- -- 65 16 93 % --   01/18/22 1315 130/76 -- -- 82 19 95 % --   01/18/22 1300 -- -- -- 95 22 96 % --   01/18/22 1245 119/74 -- -- 89 17 96 % --   01/18/22 1230 117/69 -- -- 81 20 96 % --   01/18/22 1215 117/69 -- -- 78 15 94 % --   01/18/22 1200 112/79 -- -- 72 22 93 % --   01/18/22 1145 126/85 -- -- 69 17 97 % --   01/18/22 1130 119/85 -- -- 67 19 96 % --   01/18/22 1115 131/87 -- -- 68 16 98 % --   01/18/22 1100 131/88 -- -- 78 15 97 % --   01/18/22 1045 135/94 -- -- 72 14 97 % --   01/18/22 1030 132/91 -- -- 67 13 96 % --   01/18/22 1015 138/90 -- -- 65 12 98 % --   01/18/22 1000 135/88 -- -- 60 20 97 % --   01/18/22 0945 139/94 -- -- 73 16 98 % --   01/18/22 0935 139/94 -- -- 75 20 100 % --   01/18/22 0930 134/94 -- -- 75 19 100 % --   01/18/22 0925 (!) 138/101 -- -- 78 15 99 % --   01/18/22 0918 141/100 96.9 °F (36.1 °C) Temporal (!) 14 13 100 % --   01/18/22 0806 108/80 -- -- 66 -- 93 % --   01/18/22 0803 -- -- -- 74 -- 95 % --   01/18/22 0801 124/89 -- -- -- -- -- --   01/18/22 0800 -- -- -- 75 -- 96 % --   01/18/22 0757 -- -- -- 76 -- 98 % --   01/18/22 0756 128/88 -- -- -- -- -- --       left upper  General: alert, appears stated age and cooperative   Wound: covered             Dressing: Clean, dry, " intact   Extremity: Distal NVI           DVT Exam: No evidence of DVT                   Data Review:  Lab Results (last 24 hours)     Procedure Component Value Units Date/Time    Basic Metabolic Panel [[id number]]  (Abnormal) Collected: 01/19/22 0622    Specimen: Blood Updated: 01/19/22 0718     Glucose 91 mg/dL      BUN 14 mg/dL      Creatinine 0.74 mg/dL      Sodium 137 mmol/L      Potassium 4.3 mmol/L      Comment: Slight hemolysis detected by analyzer. Results may be affected.        Chloride 103 mmol/L      CO2 26.0 mmol/L      Calcium 8.6 mg/dL      eGFR Non African Amer 108 mL/min/1.73      BUN/Creatinine Ratio 18.9     Anion Gap 8.0 mmol/L     Narrative:      GFR Normal >60  Chronic Kidney Disease <60  Kidney Failure <15      Hemoglobin & Hematocrit, Blood [[id number]]  (Normal) Collected: 01/19/22 0622    Specimen: Blood Updated: 01/19/22 0659     Hemoglobin 14.7 g/dL      Hematocrit 43.2 %     POC Glucose Once [[id number]]  (Normal) Collected: 01/18/22 2044    Specimen: Blood Updated: 01/18/22 2055     Glucose 98 mg/dL      Comment: : [id number] Kyra CharlesMeter ID: [id number]       POC Glucose Once [[id number]]  (Normal) Collected: 01/18/22 1725    Specimen: Blood Updated: 01/18/22 1736     Glucose 106 mg/dL      Comment: : [id number] Juan SosanneMeter ID: [id number]       Tissue / Bone Culture - Bone, Shoulder, Left [[id number]] Collected: 01/18/22 0849    Specimen: Bone from Shoulder, Left Updated: 01/18/22 1259     Gram Stain Moderate (3+) WBCs seen      No organisms seen    Wound Culture - Wound, Shoulder, Left [[id number]] Collected: 01/18/22 0859    Specimen: Wound from Shoulder, Left Updated: 01/18/22 1258     Gram Stain Few (2+) WBCs seen      No organisms seen    Tissue / Bone Culture - Tissue, Shoulder, Left [[id number]] Collected: 01/18/22 0843    Specimen: Tissue from Shoulder, Left Updated: 01/18/22 1257     Gram Stain Moderate (3+) WBCs seen      No organisms seen    Tissue / Bone  Culture - Tissue, Shoulder, Left [[id number]] Collected: 01/18/22 0849    Specimen: Tissue from Shoulder, Left Updated: 01/18/22 1253     Gram Stain Moderate (3+) WBCs seen      No organisms seen    POC Glucose Once [[id number]]  (Normal) Collected: 01/18/22 0922    Specimen: Blood Updated: 01/18/22 0934     Glucose 119 mg/dL      Comment: : [id number] Abimbola Melchor  SMeter ID: [id number]       POC Glucose Once [[id number]]  (Normal) Collected: 01/18/22 0738    Specimen: Blood Updated: 01/18/22 0751     Glucose 103 mg/dL      Comment: : [id number] Fabian OvallesMeter ID: [id number]           Imaging Results (Last 24 Hours)     ** No results found for the last 24 hours. **          Assessment:   1 Day Post-Op    1) Removal of hardware, left shoulder (deep implant)  2) Irrigation and excisional excisional debridement of skin, subcutaneous tissue, and muscle, Left shoulder     Plan:      1:  DVT prophylaxis, ICE, elevate  2:  Pain control, IV abx, ID Consult.   3:  Physical therapy/Occupational therapy  4:  Anticipate discharge, 1-3 days  5:  Sling, Minimal weight bearing.       Bruce Chambers PA-C

## 2022-01-19 NOTE — PLAN OF CARE
Goal Outcome Evaluation:           Progress: improving  Outcome Summary: OT screen completed via chart review and discussion with pt.  Pt is independently functioning and ambulating in room.  Pt reports he was told this morning that he can functionally use LUE in any way except for raising it overhead.  Pt has no need for skilled OT or PT at this time.  OT will sign off with recommendation for pt to discharge home.

## 2022-01-19 NOTE — PLAN OF CARE
Goal Outcome Evaluation:  Plan of Care Reviewed With: patient        Progress: no change  Outcome Summary: Pt A&Ox4. Up ad-ailyn. Dsg to L shoulder SDI. PPP. Denies n/t. No c/o of pain. Sling off when in bed. Sugar monitored. Call light in reach. Safety maintained. Will cont to monitor.

## 2022-01-20 LAB
GLUCOSE BLDC GLUCOMTR-MCNC: 106 MG/DL (ref 70–130)
GLUCOSE BLDC GLUCOMTR-MCNC: 106 MG/DL (ref 70–130)
GLUCOSE BLDC GLUCOMTR-MCNC: 130 MG/DL (ref 70–130)
VANCOMYCIN TROUGH SERPL-MCNC: 8.2 MCG/ML (ref 5–20)

## 2022-01-20 PROCEDURE — 82962 GLUCOSE BLOOD TEST: CPT

## 2022-01-20 PROCEDURE — 80202 ASSAY OF VANCOMYCIN: CPT | Performed by: INTERNAL MEDICINE

## 2022-01-20 PROCEDURE — C1751 CATH, INF, PER/CENT/MIDLINE: HCPCS

## 2022-01-20 PROCEDURE — 25010000002 VANCOMYCIN 10 G RECONSTITUTED SOLUTION: Performed by: INTERNAL MEDICINE

## 2022-01-20 PROCEDURE — 25010000002 CEFAZOLIN PER 500 MG: Performed by: INTERNAL MEDICINE

## 2022-01-20 PROCEDURE — 25010000002 ENOXAPARIN PER 10 MG: Performed by: ORTHOPAEDIC SURGERY

## 2022-01-20 PROCEDURE — 25010000002 VANCOMYCIN 10 G RECONSTITUTED SOLUTION: Performed by: ORTHOPAEDIC SURGERY

## 2022-01-20 RX ORDER — LIDOCAINE HYDROCHLORIDE 10 MG/ML
1 INJECTION, SOLUTION EPIDURAL; INFILTRATION; INTRACAUDAL; PERINEURAL ONCE
Status: COMPLETED | OUTPATIENT
Start: 2022-01-20 | End: 2022-01-20

## 2022-01-20 RX ADMIN — SODIUM CHLORIDE, PRESERVATIVE FREE 10 ML: 5 INJECTION INTRAVENOUS at 09:03

## 2022-01-20 RX ADMIN — CEFAZOLIN SODIUM 2 G: 10 INJECTION, POWDER, FOR SOLUTION INTRAVENOUS at 02:42

## 2022-01-20 RX ADMIN — METFORMIN HYDROCHLORIDE 500 MG: 500 TABLET ORAL at 08:43

## 2022-01-20 RX ADMIN — ATORVASTATIN CALCIUM 20 MG: 10 TABLET, FILM COATED ORAL at 08:42

## 2022-01-20 RX ADMIN — METOPROLOL SUCCINATE 50 MG: 50 TABLET, FILM COATED, EXTENDED RELEASE ORAL at 08:43

## 2022-01-20 RX ADMIN — VANCOMYCIN HYDROCHLORIDE 1500 MG: 10 INJECTION, POWDER, LYOPHILIZED, FOR SOLUTION INTRAVENOUS at 16:30

## 2022-01-20 RX ADMIN — LISINOPRIL 10 MG: 10 TABLET ORAL at 08:42

## 2022-01-20 RX ADMIN — METFORMIN HYDROCHLORIDE 500 MG: 500 TABLET ORAL at 17:29

## 2022-01-20 RX ADMIN — CEFAZOLIN SODIUM 2 G: 10 INJECTION, POWDER, FOR SOLUTION INTRAVENOUS at 21:38

## 2022-01-20 RX ADMIN — SODIUM CHLORIDE, PRESERVATIVE FREE 10 ML: 5 INJECTION INTRAVENOUS at 21:38

## 2022-01-20 RX ADMIN — MELOXICAM 15 MG: 7.5 TABLET ORAL at 08:42

## 2022-01-20 RX ADMIN — LIDOCAINE HYDROCHLORIDE ANHYDROUS 1 ML: 10 INJECTION, SOLUTION INFILTRATION at 18:16

## 2022-01-20 RX ADMIN — EMPAGLIFLOZIN 25 MG: 25 TABLET, FILM COATED ORAL at 08:42

## 2022-01-20 RX ADMIN — CEFAZOLIN SODIUM 2 G: 10 INJECTION, POWDER, FOR SOLUTION INTRAVENOUS at 08:43

## 2022-01-20 RX ADMIN — ENOXAPARIN SODIUM 40 MG: 40 INJECTION SUBCUTANEOUS at 08:43

## 2022-01-20 RX ADMIN — FAMOTIDINE 40 MG: 20 TABLET, FILM COATED ORAL at 08:42

## 2022-01-20 RX ADMIN — VANCOMYCIN HYDROCHLORIDE 1250 MG: 10 INJECTION, POWDER, LYOPHILIZED, FOR SOLUTION INTRAVENOUS at 05:10

## 2022-01-20 NOTE — PLAN OF CARE
Goal Outcome Evaluation:  Plan of Care Reviewed With: patient        Progress: no change  Outcome Summary: A & O, VSS, dressing to L shoulder dry and intact, denies pain, sling in use when up, up ad ailyn to BR, voiding, IV ABX given, safety maintained

## 2022-01-20 NOTE — PROGRESS NOTES
Orthopedic Surgery Progress Note    Max Oliveira  1/20/2022      Subjective:     Systemic or Specific Complaints: doing well. Ready to go home.     Objective:     Patient Vitals for the past 24 hrs:   BP Temp Temp src Pulse Resp SpO2   01/20/22 1120 134/90 98.1 °F (36.7 °C) Oral 68 16 94 %   01/20/22 0700 134/86 99.1 °F (37.3 °C) Oral 77 18 93 %   01/20/22 0320 120/88 98.5 °F (36.9 °C) Oral 89 18 94 %   01/19/22 2340 122/81 98.6 °F (37 °C) Oral 77 18 94 %   01/19/22 2135 117/76 98.4 °F (36.9 °C) Oral 70 18 95 %       left upper  General: alert, appears stated age and cooperative   Wound: covered             Dressing: Clean, dry, intact   Extremity: Distal NVI           DVT Exam: No evidence of DVT                   Data Review:  Lab Results (last 24 hours)     Procedure Component Value Units Date/Time    POC Glucose Once [[id number]]  (Normal) Collected: 01/20/22 1116    Specimen: Blood Updated: 01/20/22 1128     Glucose 106 mg/dL      Comment: : [id number] Jey GarciaynMeter ID: [id number]       Tissue / Bone Culture - Tissue, Shoulder, Left [[id number]] Collected: 01/18/22 0843    Specimen: Tissue from Shoulder, Left Updated: 01/20/22 1052     Tissue Culture No growth at 2 days     Gram Stain Moderate (3+) WBCs seen      No organisms seen    Wound Culture - Wound, Shoulder, Left [[id number]] Collected: 01/18/22 0859    Specimen: Wound from Shoulder, Left Updated: 01/20/22 1052     Wound Culture No growth at 2 days     Gram Stain Few (2+) WBCs seen      No organisms seen    Vancomycin, Trough [[id number]]  (Normal) Collected: 01/20/22 0304    Specimen: Blood Updated: 01/20/22 0348     Vancomycin Trough 8.20 mcg/mL     POC Glucose Once [[id number]]  (Normal) Collected: 01/19/22 2254    Specimen: Blood Updated: 01/19/22 2324     Glucose 83 mg/dL      Comment: : [id number] Baldomero Kumar (Drury) AnnMeter ID: [id number]       POC Glucose Once [[id number]]  (Normal) Collected: 01/19/22 1659    Specimen: Blood Updated:  01/19/22 1711     Glucose 81 mg/dL      Comment: : [id number] Juan Anguianoter ID: [id number]       POC Glucose Once [[id number]]  (Normal) Collected: 01/19/22 1141    Specimen: Blood Updated: 01/19/22 1151     Glucose 86 mg/dL      Comment: : [id number] Juan Anguianoter ID: [id number]           Imaging Results (Last 24 Hours)     ** No results found for the last 24 hours. **          Assessment:   2 Days Post-Op    1) Removal of hardware, left shoulder (deep implant)  2) Irrigation and excisional excisional debridement of skin, subcutaneous tissue, and muscle, Left shoulder     Plan:      1:  DVT prophylaxis, ICE, elevate  2:  Pain control, IV abx, ID Consult.   3:  Physical therapy/Occupational therapy  4:  Anticipate discharge 1-2 days  5:  Sling, Minimal weight bearing.   6:  Order for PICC line today, hope to D/C 1-2 days.   7:  Awaiting Final Cultures.    Bruce Chambers PA-C

## 2022-01-20 NOTE — PROGRESS NOTES
"INFECTIOUS DISEASES PROGRESS NOTE    Patient:  Max Oliveira  YOB: 1960  MRN: [id number]   Admit date: 2022   Admitting Physician: Nathen Farrell MD  Primary Care Physician: Aayush Ewing MD    Chief Complaint: Left shoulder pain        Interval History: Patient reports he is overall improved.  He is hoping for discharge tomorrow if antibiotics can be arranged.    PICC line has been ordered.   has been in contact with [facility] to assess for coverage through his insurance.        Allergies: No Known Allergies    Current Scheduled Medications:   atorvastatin, 20 mg, Oral, Daily  ceFAZolin, 2 g, Intravenous, Q8H  empagliflozin, 25 mg, Oral, Daily  enoxaparin, 40 mg, Subcutaneous, Daily  famotidine, 40 mg, Oral, Daily  insulin lispro, 0-9 Units, Subcutaneous, TID AC  lisinopril, 10 mg, Oral, Daily  meloxicam, 15 mg, Oral, Daily  metFORMIN, 500 mg, Oral, BID With Meals  metoprolol succinate XL, 50 mg, Oral, Daily  sodium chloride, 10 mL, Intravenous, Q12H  vancomycin, 1,500 mg, Intravenous, Q12H      Current PRN Medications:  •  acetaminophen **OR** acetaminophen  •  dextrose  •  dextrose  •  diazePAM  •  docusate sodium  •  glucagon (human recombinant)  •  HYDROcodone-acetaminophen  •  HYDROcodone-acetaminophen  •  HYDROmorphone **AND** [DISCONTINUED] naloxone  •  lactated ringers  •  magnesium hydroxide  •  Morphine **AND** naloxone  •  ondansetron **OR** ondansetron  •  promethazine **OR** promethazine  •  sodium chloride    Review of Systems   Constitutional: Negative for fever.   Skin: Positive for wound.       Objective     Vital Signs:  Temp (24hrs), Av.5 °F (36.9 °C), Min:98 °F (36.7 °C), Max:99.1 °F (37.3 °C)      /87 (BP Location: Right arm, Patient Position: Lying)   Pulse 66   Temp 98 °F (36.7 °C) (Oral)   Resp 16   Ht 180 cm (70.87\")   SpO2 96%   BMI 31.02 kg/m²         Physical Exam   General: The patient is nontoxic-appearing sitting up at " bedside in no acute distress  HEENT: Sclera anicteric and noninjected.  Facemask in place over nose and mouth  Respiratory: Effort even and unlabored  Left shoulder: Dressing in place and is clean dry and intact  Neuro: Alert and oriented, speech clear  Psych: Pleasant cooperative  Line/IV site: Peripheral IV forearm right, condition patent and no redness    Results Review:    I reviewed the patient's new clinical results.    Lab Results:  CBC:   Lab Results   Lab 01/14/22 0825 01/19/22 0622   WBC 6.21  --    HEMOGLOBIN 17.4 14.7   HEMATOCRIT 49.5 43.2   PLATELETS 242  --          CMP:   Lab Results   Lab 01/14/22 0825 01/19/22 0622   SODIUM 137 137   POTASSIUM 4.5 4.3   CHLORIDE 101 103   CO2 25.0 26.0   BUN 18 14   CREATININE 0.70* 0.74*   CALCIUM 9.5 8.6   GLUCOSE 108* 91       Estimated Creatinine Clearance: 129.8 mL/min (A) (by C-G formula based on SCr of 0.74 mg/dL (L)).    Culture Results:    Microbiology Results (last 10 days)     Procedure Component Value - Date/Time    Wound Culture - Wound, Shoulder, Left [[id number]] Collected: 01/18/22 0859    Lab Status: Preliminary result Specimen: Wound from Shoulder, Left Updated: 01/20/22 1052     Wound Culture No growth at 2 days     Gram Stain Few (2+) WBCs seen      No organisms seen    Tissue / Bone Culture - Tissue, Shoulder, Left [[id number]]  (Abnormal) Collected: 01/18/22 0849    Lab Status: Preliminary result Specimen: Tissue from Shoulder, Left Updated: 01/20/22 1307     Tissue Culture Rare Staphylococcus aureus     Gram Stain Moderate (3+) WBCs seen      No organisms seen    Narrative:            Tissue / Bone Culture - Bone, Shoulder, Left [[id number]]  (Abnormal) Collected: 01/18/22 0849    Lab Status: Preliminary result Specimen: Bone from Shoulder, Left Updated: 01/20/22 1307     Tissue Culture Scant growth (1+) Staphylococcus aureus     Gram Stain Moderate (3+) WBCs seen      No organisms seen    Narrative:            Tissue / Bone Culture -  Tissue, Shoulder, Left [[id number]] Collected: 01/18/22 0843    Lab Status: Preliminary result Specimen: Tissue from Shoulder, Left Updated: 01/20/22 1052     Tissue Culture No growth at 2 days     Gram Stain Moderate (3+) WBCs seen      No organisms seen    COVID PRE-OP / PRE-PROCEDURE SCREENING ORDER (NO ISOLATION) - Swab, Nasal Cavity [[id number]]  (Normal) Collected: 01/15/22 0709    Lab Status: Final result Specimen: Swab from Nasal Cavity Updated: 01/15/22 1540    Narrative:      The following orders were created for panel order COVID PRE-OP / PRE-PROCEDURE SCREENING ORDER (NO ISOLATION) - Swab, Nasal Cavity.  Procedure                               Abnormality         Status                     ---------                               -----------         ------                     COVID-19,APTIMA PANTHER,...[[id number]]  Normal              Final result                 Please view results for these tests on the individual orders.    COVID-19,APTIMA PANTHER,PAD IN-HOUSE,NP/OP/NASAL SWAB IN UTM/VTM/SALINE/LIQUID AMIES TRANSPORT MEDIA/NP WASH OR ASPIRATE, 24 HR TAT - Swab, Nasal Cavity [[id number]]  (Normal) Collected: 01/15/22 0709    Lab Status: Final result Specimen: Swab from Nasal Cavity Updated: 01/15/22 1540     COVID19 Not Detected    Narrative:      Fact sheet for providers: https://www.fda.gov/media/772390/download     Fact sheet for patients: https://www.fda.gov/media/513186/download    Test performed by RT PCR.               Radiology:   Imaging Results (Last 72 Hours)     ** No results found for the last 72 hours. **          Assessment/Plan     Active Hospital Problems    Diagnosis    • Surgical wound infection        IMPRESSION:  1. Infected left shoulder status post hardware removal of deep implant and patient who underwent rotator cuff biceps tendinosis repair June 2021  2. Infection with methicillin susceptible Staph aureus based on wound culture September 2021  3. Diabetes mellitus with hemoglobin  A1c 5.7% in January 2022        RECOMMENDATION:   · Continue cefazolin 2 g IV every 8 hours  · Continue vancomycin-hope to be able to discontinue this tomorrow when susceptibilities available  · Check new baseline sed rate and CRP in a.m.  · Final orders tomorrow for home IV antibiotics once susceptibilities available        Reviewed PICC line again with patient.  Reviewed options of cefazolin every 8 hours IV push versus nafcillin as a continuous infusion to be changed every 24 hours.  Also reviewed home health which would include weekly lab work and PICC line dressing changes at home.    Shawnee Toth MD  01/20/22  17:17 CST

## 2022-01-20 NOTE — CASE MANAGEMENT/SOCIAL WORK
Continued Stay Note   Lisa     Patient Name: Max Oliveira  MRN: [id number]  Today's Date: 1/20/2022    Admit Date: 1/18/2022     Discharge Plan     Row Name 01/20/22 1130       Plan    Plan Comments Will have [employer] check coverage for home IV  Cefazolin 2g every 8 hours for 6-8 week per ID. Notified Loli with [employer], await coverage info.    Row Name 01/20/22 8250       Plan    Plan Comments Sent message to ID to confirm orders for home IV abx. Will start working on home IV after orders confirmed.               Discharge Codes    No documentation.                     EMI Malik

## 2022-01-20 NOTE — PLAN OF CARE
Problem: Adult Inpatient Plan of Care  Goal: Plan of Care Review  Outcome: Ongoing, Progressing  Flowsheets (Taken 1/20/2022 1404)  Outcome Summary: A&Ox4. VSS. dressing to left shoulder CDI. no c/o pain. up ad aliyn. IV abx continue. d/c in 1-2 days per MD note- IV abx therapy at home- SW is in the process of discussing coverage via [employer]. continue to monitor.  Goal: Patient-Specific Goal (Individualized)  Outcome: Ongoing, Progressing  Goal: Absence of Hospital-Acquired Illness or Injury  Outcome: Ongoing, Progressing  Intervention: Identify and Manage Fall Risk  Recent Flowsheet Documentation  Taken 1/20/2022 1352 by Denisse Feldman RN  Safety Promotion/Fall Prevention: safety round/check completed  Taken 1/20/2022 1300 by Denisse Feldman RN  Safety Promotion/Fall Prevention: safety round/check completed  Taken 1/20/2022 1200 by Denisse Feldman RN  Safety Promotion/Fall Prevention: safety round/check completed  Taken 1/20/2022 1100 by Denisse Feldman RN  Safety Promotion/Fall Prevention: safety round/check completed  Taken 1/20/2022 1000 by Denisse Feldman RN  Safety Promotion/Fall Prevention: safety round/check completed  Taken 1/20/2022 0900 by Denisse Feldman RN  Safety Promotion/Fall Prevention: safety round/check completed  Taken 1/20/2022 0800 by Denisse Feldman RN  Safety Promotion/Fall Prevention: safety round/check completed  Goal: Optimal Comfort and Wellbeing  Outcome: Ongoing, Progressing  Goal: Readiness for Transition of Care  Outcome: Ongoing, Progressing     Problem: Fall Injury Risk  Goal: Absence of Fall and Fall-Related Injury  Outcome: Ongoing, Progressing  Intervention: Promote Injury-Free Environment  Recent Flowsheet Documentation  Taken 1/20/2022 1352 by Denisse Feldman RN  Safety Promotion/Fall Prevention: safety round/check completed  Taken 1/20/2022 1300 by Denisse Feldman RN  Safety Promotion/Fall Prevention: safety round/check completed  Taken 1/20/2022  1200 by Denisse Feldman RN  Safety Promotion/Fall Prevention: safety round/check completed  Taken 1/20/2022 1100 by Denisse Feldman RN  Safety Promotion/Fall Prevention: safety round/check completed  Taken 1/20/2022 1000 by Denisse Feldman RN  Safety Promotion/Fall Prevention: safety round/check completed  Taken 1/20/2022 0900 by Denisse Feldman RN  Safety Promotion/Fall Prevention: safety round/check completed  Taken 1/20/2022 0800 by Denisse Feldman RN  Safety Promotion/Fall Prevention: safety round/check completed  Goal: Absence of Fall and Fall-Related Injury  Outcome: Ongoing, Progressing  Intervention: Promote Injury-Free Environment  Recent Flowsheet Documentation  Taken 1/20/2022 1352 by Denisse Feldman RN  Safety Promotion/Fall Prevention: safety round/check completed  Taken 1/20/2022 1300 by Denisse Feldman RN  Safety Promotion/Fall Prevention: safety round/check completed  Taken 1/20/2022 1200 by Denisse Feldman RN  Safety Promotion/Fall Prevention: safety round/check completed  Taken 1/20/2022 1100 by Denisse Feldman RN  Safety Promotion/Fall Prevention: safety round/check completed  Taken 1/20/2022 1000 by Denisse Feldman RN  Safety Promotion/Fall Prevention: safety round/check completed  Taken 1/20/2022 0900 by Denisse Feldman RN  Safety Promotion/Fall Prevention: safety round/check completed  Taken 1/20/2022 0800 by Denisse Feldman RN  Safety Promotion/Fall Prevention: safety round/check completed     Problem: Diabetes Comorbidity  Goal: Blood Glucose Level Within Desired Range  Outcome: Ongoing, Progressing     Problem: Hypertension Comorbidity  Goal: Blood Pressure in Desired Range  Outcome: Ongoing, Progressing     Problem: Obstructive Sleep Apnea Risk or Actual (Comorbidity Management)  Goal: Unobstructed Breathing During Sleep  Outcome: Ongoing, Progressing     Problem: Pain Chronic (Persistent) (Comorbidity Management)  Goal: Acceptable Pain Control and  Functional Ability  Outcome: Ongoing, Progressing   Goal Outcome Evaluation:              Outcome Summary: A&Ox4. VSS. dressing to left shoulder CDI. no c/o pain. up ad ailyn. IV abx continue. d/c in 1-2 days per MD note- IV abx therapy at home- SW is in the process of discussing coverage via [employer]. continue to monitor.

## 2022-01-20 NOTE — CASE MANAGEMENT/SOCIAL WORK
Continued Stay Note  DOMI Gonzalez     Patient Name: Max Oliveira  MRN: [id number]  Today's Date: 1/20/2022    Admit Date: 1/18/2022     Discharge Plan     Row Name 01/20/22 1232       Plan    Plan Comments Sent message to ID to confirm orders for home IV abx. Will start working on home IV after orders confirmed.               Discharge Codes    No documentation.                     EMI Malik

## 2022-01-20 NOTE — PROGRESS NOTES
"Pharmacy Dosing Service  Pharmacokinetics  Vancomycin Follow-up Evaluation    Assessment/Action/Plan:      Calculated AUC based on trough drawn this morning subtherapeutic at 380. Renal function stable. Vancomycin dose adjusted to 1500mg IV q12. Predicted steady state concentrations on new regimen below. Pharmacy will continue to follow daily and adjust dose accordingly.    AUC Model Data:  Regimen: 1500 mg IV every 12 hours.  Exposure target: AUC24 (range)400-600 mg/L.hr   AUC24,ss: 456 mg/L.hr  PAUC*: 74 %  Ctrough,ss: 12.6 mg/L  Pconc*: 6 %  Tox.: 8 %     Subjective:  Max Oliveira is a 61 y.o. male currently on Vancomycin 1250 mg IV every 12 hours for the treatment of bone/joint infection, day 3 of therapy. Current end date: 1/22/22    Objective:  Ht: 180 cm (70.87\"); Wt:    Estimated Creatinine Clearance: 129.8 mL/min (A) (by C-G formula based on SCr of 0.74 mg/dL (L)).   Creatinine   Date Value Ref Range Status   01/19/2022 0.74 (L) 0.76 - 1.27 mg/dL Final   01/14/2022 0.70 (L) 0.76 - 1.27 mg/dL Final   11/12/2021 0.87 0.76 - 1.27 mg/dL Final      Lab Results   Component Value Date    WBC 6.21 01/14/2022    WBC 7.04 11/12/2021         Lab Results   Component Value Date    VANCOTROUGH 8.20 01/20/2022         Culture Results:  Microbiology Results (last 10 days)       Procedure Component Value - Date/Time    Wound Culture - Wound, Shoulder, Left [[id number]] Collected: 01/18/22 0859    Lab Status: Preliminary result Specimen: Wound from Shoulder, Left Updated: 01/20/22 1052     Wound Culture No growth at 2 days     Gram Stain Few (2+) WBCs seen      No organisms seen    Tissue / Bone Culture - Tissue, Shoulder, Left [[id number]] Collected: 01/18/22 0849    Lab Status: Preliminary result Specimen: Tissue from Shoulder, Left Updated: 01/19/22 1102     Gram Stain Moderate (3+) WBCs seen      No organisms seen    Narrative:      Organism under investigation.      Tissue / Bone Culture - Bone, Shoulder, Left " [[id number]] Collected: 01/18/22 0849    Lab Status: Preliminary result Specimen: Bone from Shoulder, Left Updated: 01/19/22 1110     Gram Stain Moderate (3+) WBCs seen      No organisms seen    Narrative:      Organism under investigation.      Tissue / Bone Culture - Tissue, Shoulder, Left [[id number]] Collected: 01/18/22 0843    Lab Status: Preliminary result Specimen: Tissue from Shoulder, Left Updated: 01/20/22 1052     Tissue Culture No growth at 2 days     Gram Stain Moderate (3+) WBCs seen      No organisms seen    COVID PRE-OP / PRE-PROCEDURE SCREENING ORDER (NO ISOLATION) - Swab, Nasal Cavity [[id number]]  (Normal) Collected: 01/15/22 0709    Lab Status: Final result Specimen: Swab from Nasal Cavity Updated: 01/15/22 1540    Narrative:      The following orders were created for panel order COVID PRE-OP / PRE-PROCEDURE SCREENING ORDER (NO ISOLATION) - Swab, Nasal Cavity.  Procedure                               Abnormality         Status                     ---------                               -----------         ------                     COVID-19,APTIMA PANTHER,...[[id number]]  Normal              Final result                 Please view results for these tests on the individual orders.    COVID-19,APTIMA PANTHER,PAD IN-HOUSE,NP/OP/NASAL SWAB IN UTM/VTM/SALINE/LIQUID AMIES TRANSPORT MEDIA/NP WASH OR ASPIRATE, 24 HR TAT - Swab, Nasal Cavity [[id number]]  (Normal) Collected: 01/15/22 0709    Lab Status: Final result Specimen: Swab from Nasal Cavity Updated: 01/15/22 1540     COVID19 Not Detected    Narrative:      Fact sheet for providers: https://www.fda.gov/media/692675/download     Fact sheet for patients: https://www.fda.gov/media/462863/download    Test performed by RT PCR.            JORDAN Cole PharmD   01/20/22 10:58 CST

## 2022-01-21 LAB
ALBUMIN SERPL-MCNC: 4.3 G/DL (ref 3.5–5.2)
ALBUMIN/GLOB SERPL: 2.2 G/DL
ALP SERPL-CCNC: 40 U/L (ref 39–117)
ALT SERPL W P-5'-P-CCNC: 15 U/L (ref 1–41)
ANION GAP SERPL CALCULATED.3IONS-SCNC: 11 MMOL/L (ref 5–15)
AST SERPL-CCNC: 16 U/L (ref 1–40)
BACTERIA SPEC AEROBE CULT: ABNORMAL
BACTERIA SPEC AEROBE CULT: ABNORMAL
BACTERIA SPEC AEROBE CULT: NORMAL
BACTERIA SPEC AEROBE CULT: NORMAL
BASOPHILS # BLD AUTO: 0.05 10*3/MM3 (ref 0–0.2)
BASOPHILS NFR BLD AUTO: 0.7 % (ref 0–1.5)
BILIRUB SERPL-MCNC: 0.6 MG/DL (ref 0–1.2)
BUN SERPL-MCNC: 11 MG/DL (ref 6–20)
BUN/CREAT SERPL: 16.2 (ref 7–25)
CALCIUM SPEC-SCNC: 9 MG/DL (ref 8.6–10.5)
CHLORIDE SERPL-SCNC: 100 MMOL/L (ref 98–107)
CO2 SERPL-SCNC: 26 MMOL/L (ref 22–29)
CREAT SERPL-MCNC: 0.68 MG/DL (ref 0.76–1.27)
CRP SERPL-MCNC: 0.57 MG/DL (ref 0–0.5)
DEPRECATED RDW RBC AUTO: 42.9 FL (ref 37–54)
EOSINOPHIL # BLD AUTO: 0.1 10*3/MM3 (ref 0–0.4)
EOSINOPHIL NFR BLD AUTO: 1.4 % (ref 0.3–6.2)
ERYTHROCYTE [DISTWIDTH] IN BLOOD BY AUTOMATED COUNT: 12.3 % (ref 12.3–15.4)
ERYTHROCYTE [SEDIMENTATION RATE] IN BLOOD: 15 MM/HR (ref 0–20)
GFR SERPL CREATININE-BSD FRML MDRD: 119 ML/MIN/1.73
GLOBULIN UR ELPH-MCNC: 2 GM/DL
GLUCOSE BLDC GLUCOMTR-MCNC: 91 MG/DL (ref 70–130)
GLUCOSE BLDC GLUCOMTR-MCNC: 95 MG/DL (ref 70–130)
GLUCOSE BLDC GLUCOMTR-MCNC: 95 MG/DL (ref 70–130)
GLUCOSE BLDC GLUCOMTR-MCNC: 98 MG/DL (ref 70–130)
GLUCOSE SERPL-MCNC: 144 MG/DL (ref 65–99)
GRAM STN SPEC: ABNORMAL
GRAM STN SPEC: NORMAL
HCT VFR BLD AUTO: 44 % (ref 37.5–51)
HGB BLD-MCNC: 15 G/DL (ref 13–17.7)
IMM GRANULOCYTES # BLD AUTO: 0.02 10*3/MM3 (ref 0–0.05)
IMM GRANULOCYTES NFR BLD AUTO: 0.3 % (ref 0–0.5)
LYMPHOCYTES # BLD AUTO: 1.36 10*3/MM3 (ref 0.7–3.1)
LYMPHOCYTES NFR BLD AUTO: 19.3 % (ref 19.6–45.3)
MCH RBC QN AUTO: 32.1 PG (ref 26.6–33)
MCHC RBC AUTO-ENTMCNC: 34.1 G/DL (ref 31.5–35.7)
MCV RBC AUTO: 94.2 FL (ref 79–97)
MONOCYTES # BLD AUTO: 0.7 10*3/MM3 (ref 0.1–0.9)
MONOCYTES NFR BLD AUTO: 9.9 % (ref 5–12)
NEUTROPHILS NFR BLD AUTO: 4.83 10*3/MM3 (ref 1.7–7)
NEUTROPHILS NFR BLD AUTO: 68.4 % (ref 42.7–76)
NRBC BLD AUTO-RTO: 0 /100 WBC (ref 0–0.2)
PLATELET # BLD AUTO: 223 10*3/MM3 (ref 140–450)
PMV BLD AUTO: 9.8 FL (ref 6–12)
POTASSIUM SERPL-SCNC: 4.3 MMOL/L (ref 3.5–5.2)
PROT SERPL-MCNC: 6.3 G/DL (ref 6–8.5)
RBC # BLD AUTO: 4.67 10*6/MM3 (ref 4.14–5.8)
SODIUM SERPL-SCNC: 137 MMOL/L (ref 136–145)
WBC NRBC COR # BLD: 7.06 10*3/MM3 (ref 3.4–10.8)

## 2022-01-21 PROCEDURE — 80053 COMPREHEN METABOLIC PANEL: CPT | Performed by: INTERNAL MEDICINE

## 2022-01-21 PROCEDURE — 25010000002 ENOXAPARIN PER 10 MG: Performed by: ORTHOPAEDIC SURGERY

## 2022-01-21 PROCEDURE — 85652 RBC SED RATE AUTOMATED: CPT | Performed by: INTERNAL MEDICINE

## 2022-01-21 PROCEDURE — 86140 C-REACTIVE PROTEIN: CPT | Performed by: INTERNAL MEDICINE

## 2022-01-21 PROCEDURE — 85025 COMPLETE CBC W/AUTO DIFF WBC: CPT | Performed by: INTERNAL MEDICINE

## 2022-01-21 PROCEDURE — 82962 GLUCOSE BLOOD TEST: CPT

## 2022-01-21 PROCEDURE — 25010000002 CEFAZOLIN PER 500 MG: Performed by: INTERNAL MEDICINE

## 2022-01-21 PROCEDURE — 25010000002 VANCOMYCIN 10 G RECONSTITUTED SOLUTION: Performed by: INTERNAL MEDICINE

## 2022-01-21 RX ADMIN — FAMOTIDINE 40 MG: 20 TABLET, FILM COATED ORAL at 08:40

## 2022-01-21 RX ADMIN — ENOXAPARIN SODIUM 40 MG: 40 INJECTION SUBCUTANEOUS at 08:39

## 2022-01-21 RX ADMIN — CEFAZOLIN SODIUM 2 G: 10 INJECTION, POWDER, FOR SOLUTION INTRAVENOUS at 20:45

## 2022-01-21 RX ADMIN — METFORMIN HYDROCHLORIDE 500 MG: 500 TABLET ORAL at 18:05

## 2022-01-21 RX ADMIN — CEFAZOLIN SODIUM 2 G: 10 INJECTION, POWDER, FOR SOLUTION INTRAVENOUS at 02:39

## 2022-01-21 RX ADMIN — SODIUM CHLORIDE, PRESERVATIVE FREE 10 ML: 5 INJECTION INTRAVENOUS at 20:45

## 2022-01-21 RX ADMIN — EMPAGLIFLOZIN 25 MG: 25 TABLET, FILM COATED ORAL at 08:40

## 2022-01-21 RX ADMIN — ATORVASTATIN CALCIUM 20 MG: 10 TABLET, FILM COATED ORAL at 08:39

## 2022-01-21 RX ADMIN — METOPROLOL SUCCINATE 50 MG: 50 TABLET, FILM COATED, EXTENDED RELEASE ORAL at 08:39

## 2022-01-21 RX ADMIN — MELOXICAM 15 MG: 7.5 TABLET ORAL at 08:40

## 2022-01-21 RX ADMIN — SODIUM CHLORIDE, PRESERVATIVE FREE 10 ML: 5 INJECTION INTRAVENOUS at 08:40

## 2022-01-21 RX ADMIN — METFORMIN HYDROCHLORIDE 500 MG: 500 TABLET ORAL at 08:39

## 2022-01-21 RX ADMIN — LISINOPRIL 10 MG: 10 TABLET ORAL at 08:40

## 2022-01-21 RX ADMIN — MAGNESIUM HYDROXIDE 10 ML: 2400 SUSPENSION ORAL at 08:39

## 2022-01-21 RX ADMIN — CEFAZOLIN SODIUM 2 G: 10 INJECTION, POWDER, FOR SOLUTION INTRAVENOUS at 10:52

## 2022-01-21 RX ADMIN — VANCOMYCIN HYDROCHLORIDE 1500 MG: 10 INJECTION, POWDER, LYOPHILIZED, FOR SOLUTION INTRAVENOUS at 06:03

## 2022-01-21 NOTE — CASE MANAGEMENT/SOCIAL WORK
Continued Stay Note  DOMI Gonzalez     Patient Name: Max Oliveira  MRN: [id number]  Today's Date: 1/21/2022    Admit Date: 1/18/2022     Discharge Plan     Row Name 01/21/22 1226       Plan    Plan Comments Loli with [facility] is waiting to speak to pt about costs until final cultures come back and definite IV abx orders are placed. Loli states the cost for IV Cefazolin 2g every 8 hours is $134.99. Will follow for final orders and arrangements.               Discharge Codes    No documentation.                     EMI Malik

## 2022-01-21 NOTE — PROGRESS NOTES
"INFECTIOUS DISEASES PROGRESS NOTE    Patient:  Max Oliveira  YOB: 1960  MRN: [id number]   Admit date: 2022   Admitting Physician: Nathen Farrell MD  Primary Care Physician: Aayush Ewing MD    Chief Complaint: Left shoulder infection      Interval History: Patient unfortunately, unable to go home tonight as [facility] cannot deliver the antibiotic from [place] until around 10 PM.  Home health cannot go out that late admit the patient for teaching.    Patient will get his dosing through the night and tomorrow morning and discharge home in the morning.    Vancomycin discontinued earlier this morning when I reviewed patient susceptibilities.    Allergies: No Known Allergies    Current Scheduled Medications:   atorvastatin, 20 mg, Oral, Daily  ceFAZolin, 2 g, Intravenous, Q8H  empagliflozin, 25 mg, Oral, Daily  enoxaparin, 40 mg, Subcutaneous, Daily  famotidine, 40 mg, Oral, Daily  insulin lispro, 0-9 Units, Subcutaneous, TID AC  lisinopril, 10 mg, Oral, Daily  meloxicam, 15 mg, Oral, Daily  metFORMIN, 500 mg, Oral, BID With Meals  metoprolol succinate XL, 50 mg, Oral, Daily  sodium chloride, 10 mL, Intravenous, Q12H      Current PRN Medications:  •  acetaminophen **OR** acetaminophen  •  dextrose  •  dextrose  •  diazePAM  •  docusate sodium  •  glucagon (human recombinant)  •  HYDROcodone-acetaminophen  •  HYDROcodone-acetaminophen  •  HYDROmorphone **AND** [DISCONTINUED] naloxone  •  lactated ringers  •  magnesium hydroxide  •  Morphine **AND** naloxone  •  ondansetron **OR** ondansetron  •  promethazine **OR** promethazine  •  sodium chloride    Review of Systems   Constitutional: Negative for fever.   Skin: Positive for wound.       Objective     Vital Signs:  Temp (24hrs), Av.9 °F (36.6 °C), Min:97.6 °F (36.4 °C), Max:98.2 °F (36.8 °C)      /66 (BP Location: Left leg, Patient Position: Lying)   Pulse 67   Temp 98.2 °F (36.8 °C) (Oral)   Resp 18   Ht 180 cm (70.87\")  "  SpO2 94%   BMI 31.02 kg/m²         Physical Exam   General: The patient is nontoxic-appearing sitting up in bed in no acute distress  HEENT: Sclera anicteric and noninjected.  He reached for mask in place it over his nose and mouth   Respiratory: Effort even and unlabored  Left shoulder: Dressing in place and is clean dry and intact  Neuro: Alert and oriented, speech clear  Psych: Pleasant cooperative  Line/IV site: PICC line right upper extremity dressing clean dry and intact  Results Review:    I reviewed the patient's new clinical results.    Lab Results:  CBC:   Lab Results   Lab 01/19/22 0622 01/21/22 0833   WBC  --  7.06   HEMOGLOBIN 14.7 15.0   HEMATOCRIT 43.2 44.0   PLATELETS  --  223         CMP:   Lab Results   Lab 01/19/22 0622 01/21/22 0833   SODIUM 137 137   POTASSIUM 4.3 4.3   CHLORIDE 103 100   CO2 26.0 26.0   BUN 14 11   CREATININE 0.74* 0.68*   CALCIUM 8.6 9.0   BILIRUBIN  --  0.6   ALK PHOS  --  40   ALT (SGPT)  --  15   AST (SGOT)  --  16   GLUCOSE 91 144*       Estimated Creatinine Clearance: 141.3 mL/min (A) (by C-G formula based on SCr of 0.68 mg/dL (L)).    Results for VLADIMIR DONNELLY (MRN [id number]) as of 1/21/2022 15:48   Ref. Range 1/21/2022 08:33   C-Reactive Protein Latest Ref Range: 0.00 - 0.50 mg/dL 0.57 (H)     Results for VLADIMIR DONNELLY (MRN [id number]) as of 1/21/2022 15:48   Ref. Range 1/21/2022 08:33   Sed Rate Latest Ref Range: 0 - 20 mm/hr 15         Culture Results:    Microbiology Results (last 10 days)     Procedure Component Value - Date/Time    Wound Culture - Wound, Shoulder, Left [[id number]] Collected: 01/18/22 0859    Lab Status: Final result Specimen: Wound from Shoulder, Left Updated: 01/21/22 1202     Wound Culture No growth at 3 days     Gram Stain Few (2+) WBCs seen      No organisms seen    Tissue / Bone Culture - Tissue, Shoulder, Left [[id number]]  (Abnormal) Collected: 01/18/22 0849    Lab Status: Final result Specimen: Tissue from Shoulder, Left Updated:  01/21/22 1156     Tissue Culture Rare Staphylococcus aureus     Gram Stain Moderate (3+) WBCs seen      No organisms seen    Narrative:      Refer to previous tissue culture collected on 1/18/2022 0949 for MICs.         Tissue / Bone Culture - Bone, Shoulder, Left [[id number]]  (Abnormal)  (Susceptibility) Collected: 01/18/22 0849    Lab Status: Final result Specimen: Bone from Shoulder, Left Updated: 01/21/22 1155     Tissue Culture Scant growth (1+) Staphylococcus aureus     Gram Stain Moderate (3+) WBCs seen      No organisms seen    Narrative:            Susceptibility      Staphylococcus aureus     ALLISON     Clindamycin Susceptible     Erythromycin Susceptible     Inducible Clindamycin Resistance Negative     Oxacillin Susceptible     Rifampin Susceptible     Tetracycline Susceptible     Trimethoprim + Sulfamethoxazole Susceptible     Vancomycin Susceptible                     Susceptibility Comments     Staphylococcus aureus    This isolate does not demonstrate inducible clindamycin resistance in vitro.               Tissue / Bone Culture - Tissue, Shoulder, Left [[id number]] Collected: 01/18/22 0843    Lab Status: Final result Specimen: Tissue from Shoulder, Left Updated: 01/21/22 1202     Tissue Culture No growth at 3 days     Gram Stain Moderate (3+) WBCs seen      No organisms seen    COVID PRE-OP / PRE-PROCEDURE SCREENING ORDER (NO ISOLATION) - Swab, Nasal Cavity [[id number]]  (Normal) Collected: 01/15/22 0709    Lab Status: Final result Specimen: Swab from Nasal Cavity Updated: 01/15/22 1540    Narrative:      The following orders were created for panel order COVID PRE-OP / PRE-PROCEDURE SCREENING ORDER (NO ISOLATION) - Swab, Nasal Cavity.  Procedure                               Abnormality         Status                     ---------                               -----------         ------                     COVID-19,APTIMA PANTHER,...[[id number]]  Normal              Final result                 Please  view results for these tests on the individual orders.    COVID-19,APTIMA PANTHER,PAD IN-HOUSE,NP/OP/NASAL SWAB IN UTM/VTM/SALINE/LIQUID AMIES TRANSPORT MEDIA/NP WASH OR ASPIRATE, 24 HR TAT - Swab, Nasal Cavity [[id number]]  (Normal) Collected: 01/15/22 0709    Lab Status: Final result Specimen: Swab from Nasal Cavity Updated: 01/15/22 1540     COVID19 Not Detected    Narrative:      Fact sheet for providers: https://www.fda.gov/media/004005/download     Fact sheet for patients: https://www.fda.gov/media/335335/download    Test performed by RT PCR.               Radiology:   Imaging Results (Last 72 Hours)     ** No results found for the last 72 hours. **          Assessment/Plan     Active Hospital Problems    Diagnosis    • Surgical wound infection        IMPRESSION:  1. Infected left shoulder status post hardware removal of deep implant and patient who underwent rotator cuff biceps tendinosis repair June 2021  2. Infection with methicillin susceptible Staph aureus based on wound culture September 2021  3. Diabetes mellitus with hemoglobin A1c 5.7% in January 2022        RECOMMENDATION:     Continue cefazolin  Vancomycin discontinued earlier today    Below copy and paste from inpatient consult to case management services earlier today    [facility] for skilled nursing to teach IV antibiotic infusion of Cefazolin 2gm IV q5kzico for 6 weeks. Draw CBC, CMP, CRP, q Monday and fax to [phone].      Shawnee Toth MD  01/21/22  15:47 CST

## 2022-01-21 NOTE — PAYOR COMM NOTE
"1/20 CLINICAL UPDATE  UR  [phone]    [id number]    Vladimir Donnelly (61 y.o. Male)             Date of Birth Social Security Number Address Home Phone MRN    1960  [address]  [place] [address] [phone] [id number]    [facility] Mar[employer] Status             Hindu        Admission Date Admission Type Admitting Provider Attending Provider Department, Room/Bed    1/18/22 Elective Nathen Farrell MD Romine, Spencer Elton, MD [facility] 3A, 327/1    Discharge Date Discharge Disposition Discharge Destination                         Attending Provider: Nathen Farrell MD    Allergies: No Known Allergies    Isolation: None   Infection: None   Code Status: CPR   Advance Care Planning Activity    Ht: 180 cm (70.87\")   Wt: 101 kg (221 lb 9 oz)    Admission Cmt: None   Principal Problem: None                Active Insurance as of 1/18/2022     Primary Coverage     Payor Plan Insurance Group Employer/Plan Group    [employer] BLUE CROSS AN[employer]M BLUE CROSS BLUE SHIELD PPO [id number]     Payor Plan Address Payor Plan Phone Number Payor Plan Fax Number Effective Dates    [address] [phone]  1/1/2021 - None Entered    [facility] [id number]       Subscriber Name Subscriber Birth Date Member ID       VLADIMIR DONNELLY 1960 [id number]           Secondary Coverage     Payor Plan Insurance Group Employer/Plan Group    [facility]      Payor Plan Address Payor Plan Phone Number Payor Plan Fax Number Effective Dates    [address] [phone]  7/30/2020 - None Entered    [facility] [id number]       Subscriber Name Subscriber Birth Date Member ID       VLADIMIR DONNELLY 1960 [id number]                 Emergency Contacts      (Rel.) Home Phone Work Phone Mobile Phone    Lisbeth Donnelly (Spouse) [phone] -- --              Current Facility-Administered Medications   Medication Dose Route Frequency Provider Last Rate Last Admin   • acetaminophen (TYLENOL) " tablet 650 mg  650 mg Oral Q4H PRN Nathen Farrell MD        Or   • acetaminophen (TYLENOL) suppository 650 mg  650 mg Rectal Q4H PRN Nathen Farrell MD       • atorvastatin (LIPITOR) tablet 20 mg  20 mg Oral Daily Nathen Farrell MD   20 mg at 01/20/22 0842   • ceFAZolin in 0.9% normal saline (ANCEF) IVPB solution 2 g  2 g Intravenous Q8H Shawnee Toth  mL/hr at 01/21/22 0239 2 g at 01/21/22 0239   • dextrose (D50W) (25 g/50 mL) IV injection 25 g  25 g Intravenous Q15 Min PRN Nathen Farrell MD       • dextrose (GLUTOSE) oral gel 15 g  15 g Oral Q15 Min PRN Nathen Farrell MD       • diazePAM (VALIUM) tablet 5 mg  5 mg Oral Q6H PRN Nathen Farrell MD       • docusate sodium (COLACE) capsule 100 mg  100 mg Oral BID PRN Nathen Farrell MD       • empagliflozin (JARDIANCE) tablet 25 mg  25 mg Oral Daily Nathen Farrell MD   25 mg at 01/20/22 0842   • enoxaparin (LOVENOX) syringe 40 mg  40 mg Subcutaneous Daily Nathen Farrell MD   40 mg at 01/20/22 0843   • famotidine (PEPCID) tablet 40 mg  40 mg Oral Daily Nathen Farrell MD   40 mg at 01/20/22 0842   • glucagon (human recombinant) (GLUCAGEN DIAGNOSTIC) injection 1 mg  1 mg Subcutaneous Q15 Min PRN Nathen Farrell MD       • HYDROcodone-acetaminophen (NORCO) 7.5-325 MG per tablet 1 tablet  1 tablet Oral Q4H PRN Nathen Farrell MD   1 tablet at 01/18/22 1732   • HYDROcodone-acetaminophen (NORCO) 7.5-325 MG per tablet 2 tablet  2 tablet Oral Q4H PRN Nathen Farrell MD   2 tablet at 01/19/22 0244   • HYDROmorphone (DILAUDID) injection 0.5 mg  0.5 mg Intravenous Q2H PRN Nathen Farrell MD       • insulin lispro (humaLOG) injection 0-9 Units  0-9 Units Subcutaneous TID AC Nathen Farrell MD       • lactated ringers infusion  100 mL/hr Intravenous Continuous PRN Nathen Farrell  mL/hr at 01/18/22 0820 Restarted at 01/18/22 0916   • lactated  ringers infusion  100 mL/hr Intravenous Continuous Nathen Farrell  mL/hr at 01/18/22 1408 100 mL/hr at 01/18/22 1408   • lactated ringers infusion  100 mL/hr Intravenous Continuous Nathen Farrell  mL/hr at 01/18/22 1506 100 mL/hr at 01/18/22 1506   • lisinopril (PRINIVIL,ZESTRIL) tablet 10 mg  10 mg Oral Daily Nathen Farrell MD   10 mg at 01/20/22 0842   • magnesium hydroxide (MILK OF MAGNESIA) suspension 10 mL  10 mL Oral Daily PRN Nathen Farrell MD       • meloxicam (MOBIC) tablet 15 mg  15 mg Oral Daily Nathen Farrell MD   15 mg at 01/20/22 0842   • metFORMIN (GLUCOPHAGE) tablet 500 mg  500 mg Oral BID With Meals Nathen Farrell MD   500 mg at 01/20/22 1729   • metoprolol succinate XL (TOPROL-XL) 24 hr tablet 50 mg  50 mg Oral Daily Nathen Farrell MD   50 mg at 01/20/22 0843   • morphine injection 4 mg  4 mg Intravenous Q2H PRN Nathen Farrell MD        And   • naloxone (NARCAN) injection 0.4 mg  0.4 mg Intravenous Q5 Min PRN Nathen Farrell MD       • ondansetron (ZOFRAN) tablet 4 mg  4 mg Oral Q6H PRN Nathen Farrell MD        Or   • ondansetron (ZOFRAN) injection 4 mg  4 mg Intravenous Q6H PRN Nathen Farrell MD       • promethazine (PHENERGAN) tablet 12.5 mg  12.5 mg Oral Q6H PRN Nathen Farrell MD        Or   • promethazine (PHENERGAN) suppository 12.5 mg  12.5 mg Rectal Q6H PRN Nathen Farrell MD       • sodium chloride 0.9 % flush 1-10 mL  1-10 mL Intravenous PRN Nathen Farrell MD       • sodium chloride 0.9 % flush 10 mL  10 mL Intravenous Q12H Nathen Farrell MD   10 mL at 01/20/22 2138   • vancomycin 1500 mg/500 mL 0.9% NS IVPB ([facility])  1,500 mg Intravenous Q12H Shawnee Toth MD   1,500 mg at 01/21/22 0603     Orders (last 24 hrs)      Start     Ordered    01/21/22 0600  CBC & Differential  Morning Draw         01/20/22 2032 01/21/22 0600  Comprehensive Metabolic Panel   Morning Draw         01/20/22 2032 01/21/22 0600  C-reactive Protein  Morning Draw         01/20/22 2032 01/21/22 0600  Sedimentation Rate  Morning Draw         01/20/22 2032 01/21/22 0600  CBC Auto Differential  PROCEDURE ONCE         01/20/22 2202 01/20/22 2012  POC Glucose Once  PROCEDURE ONCE         01/20/22 1942    01/20/22 1915  lidocaine PF 1% (XYLOCAINE) injection 1 mL  Once         01/20/22 1815 01/20/22 1721  PICC Consult  Once        Provider:  (Not yet assigned)    01/20/22 1720 01/20/22 1700  vancomycin 1500 mg/500 mL 0.9% NS IVPB ([facility])  Every 12 Hours         01/20/22 1057    01/20/22 1649  POC Glucose Once  PROCEDURE ONCE         01/20/22 1628    01/20/22 1129  POC Glucose Once  PROCEDURE ONCE         01/20/22 1116    01/19/22 1000  ceFAZolin in 0.9% normal saline (ANCEF) IVPB solution 2 g  Every 8 Hours         01/19/22 0825    01/19/22 0900  metoprolol succinate XL (TOPROL-XL) 24 hr tablet 50 mg  Daily         01/18/22 1454    01/19/22 0900  enoxaparin (LOVENOX) syringe 40 mg  Daily         01/18/22 1454    01/19/22 0430  vancomycin 1250 mg/250 mL 0.9% NS IVPB ([facility])  Every 12 Hours,   Status:  Discontinued         01/18/22 1550    01/18/22 2100  sodium chloride 0.9 % flush 10 mL  Every 12 Hours Scheduled         01/18/22 1454    01/18/22 1800  metFORMIN (GLUCOPHAGE) tablet 500 mg  2 Times Daily With Meals         01/18/22 1454    01/18/22 1730  insulin lispro (humaLOG) injection 0-9 Units  3 Times Daily Before Meals         01/18/22 1454    01/18/22 1700  POC Glucose TID AC  3 Times Daily Before Meals       01/18/22 1454    01/18/22 1600  empagliflozin (JARDIANCE) tablet 25 mg  Daily         01/18/22 1454    01/18/22 1600  lisinopril (PRINIVIL,ZESTRIL) tablet 10 mg  Daily         01/18/22 1454    01/18/22 1600  meloxicam (MOBIC) tablet 15 mg  Daily         01/18/22 1454 01/18/22 1600  atorvastatin (LIPITOR) tablet 20 mg  Daily         01/18/22 1454 01/18/22 1600  Incentive  "Spirometry  Every 2 Hours While Awake      Comments: Until lungs are clear and no productive cough.    01/18/22 1454    01/18/22 1600  famotidine (PEPCID) tablet 40 mg  Daily         01/18/22 1454    01/18/22 1545  lactated ringers infusion  Continuous         01/18/22 1454    01/18/22 1455  Intake and Output  Every Shift       01/18/22 1454    01/18/22 1454  morphine injection 4 mg  Every 2 Hours PRN        \"And\" Linked Group Details    01/18/22 1454    01/18/22 1454  naloxone (NARCAN) injection 0.4 mg  Every 5 Minutes PRN        \"And\" Linked Group Details    01/18/22 1454    01/18/22 1454  HYDROmorphone (DILAUDID) injection 0.5 mg  Every 2 Hours PRN        \"And\" Linked Group Details    01/18/22 1454    01/18/22 1454  ondansetron (ZOFRAN) tablet 4 mg  Every 6 Hours PRN        \"Or\" Linked Group Details    01/18/22 1454    01/18/22 1454  ondansetron (ZOFRAN) injection 4 mg  Every 6 Hours PRN        \"Or\" Linked Group Details    01/18/22 1454    01/18/22 1454  promethazine (PHENERGAN) tablet 12.5 mg  Every 6 Hours PRN        \"Or\" Linked Group Details    01/18/22 1454    01/18/22 1454  promethazine (PHENERGAN) suppository 12.5 mg  Every 6 Hours PRN        \"Or\" Linked Group Details    01/18/22 1454    01/18/22 1454  acetaminophen (TYLENOL) tablet 650 mg  Every 4 Hours PRN        \"Or\" Linked Group Details    01/18/22 1454    01/18/22 1454  acetaminophen (TYLENOL) suppository 650 mg  Every 4 Hours PRN        \"Or\" Linked Group Details    01/18/22 1454    01/18/22 1454  sodium chloride 0.9 % flush 1-10 mL  As Needed         01/18/22 1454    01/18/22 1454  dextrose (GLUTOSE) oral gel 15 g  Every 15 Minutes PRN         01/18/22 1454    01/18/22 1454  dextrose (D50W) (25 g/50 mL) IV injection 25 g  Every 15 Minutes PRN         01/18/22 1454    01/18/22 1454  glucagon (human recombinant) (GLUCAGEN DIAGNOSTIC) injection 1 mg  Every 15 Minutes PRN         01/18/22 1454    01/18/22 1454  HYDROcodone-acetaminophen (NORCO) 7.5-325 MG " per tablet 1 tablet  Every 4 Hours PRN         01/18/22 1454    01/18/22 1454  HYDROcodone-acetaminophen (NORCO) 7.5-325 MG per tablet 2 tablet  Every 4 Hours PRN         01/18/22 1454    01/18/22 1454  docusate sodium (COLACE) capsule 100 mg  2 Times Daily PRN         01/18/22 1454    01/18/22 1454  magnesium hydroxide (MILK OF MAGNESIA) suspension 10 mL  Daily PRN         01/18/22 1454    01/18/22 1454  diazePAM (VALIUM) tablet 5 mg  Every 6 Hours PRN         01/18/22 1454    01/18/22 0756  lactated ringers infusion  Continuous         01/18/22 0754    01/18/22 0732  lactated ringers infusion  Continuous PRN         01/18/22 0732    Unscheduled  Ice to Incision for 48 Hours  As Needed       01/18/22 1454    Unscheduled  Apply Ice to Incision PRN for Edema, After Activity or Exercise, and to Lessen Discomfort  As Needed       01/18/22 1454    --  aspirin 81 MG EC tablet  Daily         01/18/22 1652    Pending  PICC Consult  Once        Provider:  (Not yet assigned)    Pending                   Physician Progress Notes (last 24 hours)      Shawnee Toth MD at 01/20/22 1717          INFECTIOUS DISEASES PROGRESS NOTE    Patient:  Max Oliveira  YOB: 1960  MRN: [id number]   Admit date: 1/18/2022   Admitting Physician: Nathen Farrell MD  Primary Care Physician: Aayush Ewing MD    Chief Complaint: Left shoulder pain        Interval History: Patient reports he is overall improved.  He is hoping for discharge tomorrow if antibiotics can be arranged.    PICC line has been ordered.   has been in [facility] with [facility] to assess for coverage through his insurance.        Allergies: No Known Allergies    Current Scheduled Medications:   atorvastatin, 20 mg, Oral, Daily  ceFAZolin, 2 g, Intravenous, Q8H  empagliflozin, 25 mg, Oral, Daily  enoxaparin, 40 mg, Subcutaneous, Daily  famotidine, 40 mg, Oral, Daily  insulin lispro, 0-9 Units, Subcutaneous, TID AC  lisinopril, 10 mg,  "Oral, Daily  meloxicam, 15 mg, Oral, Daily  metFORMIN, 500 mg, Oral, BID With Meals  metoprolol succinate XL, 50 mg, Oral, Daily  sodium chloride, 10 mL, Intravenous, Q12H  vancomycin, 1,500 mg, Intravenous, Q12H      Current PRN Medications:  •  acetaminophen **OR** acetaminophen  •  dextrose  •  dextrose  •  diazePAM  •  docusate sodium  •  glucagon (human recombinant)  •  HYDROcodone-acetaminophen  •  HYDROcodone-acetaminophen  •  HYDROmorphone **AND** [DISCONTINUED] naloxone  •  lactated ringers  •  magnesium hydroxide  •  Morphine **AND** naloxone  •  ondansetron **OR** ondansetron  •  promethazine **OR** promethazine  •  sodium chloride    Review of Systems   Constitutional: Negative for fever.   Skin: Positive for wound.       Objective     Vital Signs:  Temp (24hrs), Av.5 °F (36.9 °C), Min:98 °F (36.7 °C), Max:99.1 °F (37.3 °C)      /87 (BP Location: Right arm, Patient Position: Lying)   Pulse 66   Temp 98 °F (36.7 °C) (Oral)   Resp 16   Ht 180 cm (70.87\")   SpO2 96%   BMI 31.02 kg/m²         Physical Exam   General: The patient is nontoxic-appearing sitting up at bedside in no acute distress  HEENT: Sclera anicteric and noninjected.  Facemask in place over nose and mouth  Respiratory: Effort even and unlabored  Left shoulder: Dressing in place and is clean dry and intact  Neuro: Alert and oriented, speech clear  Psych: Pleasant cooperative  Line/IV site: Peripheral IV forearm right, condition patent and no redness    Results Review:    I reviewed the patient's new clinical results.    Lab Results:  CBC:   Lab Results   Lab 22  0825 22  06   WBC 6.21  --    HEMOGLOBIN 17.4 14.7   HEMATOCRIT 49.5 43.2   PLATELETS 242  --          CMP:   Lab Results   Lab 22  0825 22  06   SODIUM 137 137   POTASSIUM 4.5 4.3   CHLORIDE 101 103   CO2 25.0 26.0   BUN 18 14   CREATININE 0.70* 0.74*   CALCIUM 9.5 8.6   GLUCOSE 108* 91       Estimated Creatinine Clearance: 129.8 mL/min (A) " (by C-G formula based on SCr of 0.74 mg/dL (L)).    Culture Results:    Microbiology Results (last 10 days)     Procedure Component Value - Date/Time    Wound Culture - Wound, Shoulder, Left [[id number]] Collected: 01/18/22 0859    Lab Status: Preliminary result Specimen: Wound from Shoulder, Left Updated: 01/20/22 1052     Wound Culture No growth at 2 days     Gram Stain Few (2+) WBCs seen      No organisms seen    Tissue / Bone Culture - Tissue, Shoulder, Left [[id number]]  (Abnormal) Collected: 01/18/22 0849    Lab Status: Preliminary result Specimen: Tissue from Shoulder, Left Updated: 01/20/22 1307     Tissue Culture Rare Staphylococcus aureus     Gram Stain Moderate (3+) WBCs seen      No organisms seen    Narrative:            Tissue / Bone Culture - Bone, Shoulder, Left [[id number]]  (Abnormal) Collected: 01/18/22 0849    Lab Status: Preliminary result Specimen: Bone from Shoulder, Left Updated: 01/20/22 1307     Tissue Culture Scant growth (1+) Staphylococcus aureus     Gram Stain Moderate (3+) WBCs seen      No organisms seen    Narrative:            Tissue / Bone Culture - Tissue, Shoulder, Left [[id number]] Collected: 01/18/22 0843    Lab Status: Preliminary result Specimen: Tissue from Shoulder, Left Updated: 01/20/22 1052     Tissue Culture No growth at 2 days     Gram Stain Moderate (3+) WBCs seen      No organisms seen    COVID PRE-OP / PRE-PROCEDURE SCREENING ORDER (NO ISOLATION) - Swab, Nasal Cavity [[id number]]  (Normal) Collected: 01/15/22 0709    Lab Status: Final result Specimen: Swab from Nasal Cavity Updated: 01/15/22 6959    Narrative:      The following orders were created for panel order COVID PRE-OP / PRE-PROCEDURE SCREENING ORDER (NO ISOLATION) - Swab, Nasal Cavity.  Procedure                               Abnormality         Status                     ---------                               -----------         ------                     COVID-19,APTIMA PANTHER,...[[id number]]  Normal               Final result                 Please view results for these tests on the individual orders.    COVID-19,APTIMA PANTHER,PAD IN-HOUSE,NP/OP/NASAL SWAB IN UTM/VTM/SALINE/LIQUID AMIES TRANSPORT MEDIA/NP WASH OR ASPIRATE, 24 HR TAT - Swab, Nasal Cavity [[id number]]  (Normal) Collected: 01/15/22 0709    Lab Status: Final result Specimen: Swab from Nasal Cavity Updated: 01/15/22 1540     COVID19 Not Detected    Narrative:      Fact sheet for providers: https://www.fda.gov/media/699464/download     Fact sheet for patients: https://www.fda.gov/media/829001/download    Test performed by RT PCR.               Radiology:   Imaging Results (Last 72 Hours)     ** No results found for the last 72 hours. **          Assessment/Plan     Active Hospital Problems    Diagnosis    • Surgical wound infection        IMPRESSION:  1. Infected left shoulder status post hardware removal of deep implant and patient who underwent rotator cuff biceps tendinosis repair June 2021  2. Infection with methicillin susceptible Staph aureus based on wound culture September 2021  3. Diabetes mellitus with hemoglobin A1c 5.7% in January 2022        RECOMMENDATION:   · Continue cefazolin 2 g IV every 8 hours  · Continue vancomycin-hope to be able to discontinue this tomorrow when susceptibilities available  · Check new baseline sed rate and CRP in a.m.  · Final orders tomorrow for home IV antibiotics once susceptibilities available        Reviewed PICC line again with patient.  Reviewed options of cefazolin every 8 hours IV push versus nafcillin as a continuous infusion to be changed every 24 hours.  Also reviewed home health which would include weekly lab work and PICC line dressing changes at home.    Shawnee Toth MD  01/20/22  17:17 CST        Electronically signed by Shawnee Toth MD at 01/20/22 2032     Bruce Chambers PA-C at 01/20/22 1133            Orthopedic Surgery Progress Note    Max Oliveira  1/20/2022      Subjective:      Systemic or Specific Complaints: doing well. Ready to go home.     Objective:     Patient Vitals for the past 24 hrs:   BP Temp Temp src Pulse Resp SpO2   01/20/22 1120 134/90 98.1 °F (36.7 °C) Oral 68 16 94 %   01/20/22 0700 134/86 99.1 °F (37.3 °C) Oral 77 18 93 %   01/20/22 0320 120/88 98.5 °F (36.9 °C) Oral 89 18 94 %   01/19/22 2340 122/81 98.6 °F (37 °C) Oral 77 18 94 %   01/19/22 2135 117/76 98.4 °F (36.9 °C) Oral 70 18 95 %       left upper  General: alert, appears stated age and cooperative   Wound: covered             Dressing: Clean, dry, intact   Extremity: Distal NVI           DVT Exam: No evidence of DVT                   Data Review:  Lab Results (last 24 hours)     Procedure Component Value Units Date/Time    POC Glucose Once [[id number]]  (Normal) Collected: 01/20/22 1116    Specimen: Blood Updated: 01/20/22 1128     Glucose 106 mg/dL      Comment: : [id number] Jey GarciaynMeter ID: [id number]       Tissue / Bone Culture - Tissue, Shoulder, Left [[id number]] Collected: 01/18/22 0843    Specimen: Tissue from Shoulder, Left Updated: 01/20/22 1052     Tissue Culture No growth at 2 days     Gram Stain Moderate (3+) WBCs seen      No organisms seen    Wound Culture - Wound, Shoulder, Left [[id number]] Collected: 01/18/22 0859    Specimen: Wound from Shoulder, Left Updated: 01/20/22 1052     Wound Culture No growth at 2 days     Gram Stain Few (2+) WBCs seen      No organisms seen    Vancomycin, Trough [[id number]]  (Normal) Collected: 01/20/22 0304    Specimen: Blood Updated: 01/20/22 0348     Vancomycin Trough 8.20 mcg/mL     POC Glucose Once [[id number]]  (Normal) Collected: 01/19/22 2254    Specimen: Blood Updated: 01/19/22 2324     Glucose 83 mg/dL      Comment: : [id number] Baldomero Kumar (Drury) AnnMeter ID: [id number]       POC Glucose Once [[id number]]  (Normal) Collected: 01/19/22 1659    Specimen: Blood Updated: 01/19/22 1711     Glucose 81 mg/dL      Comment: : [id number] Juan  Hunter ID: [id number]       POC Glucose Once [[id number]]  (Normal) Collected: 01/19/22 1141    Specimen: Blood Updated: 01/19/22 1151     Glucose 86 mg/dL      Comment: : [id number] Juan FreynancytheresaJake ID: [id number]           Imaging Results (Last 24 Hours)     ** No results found for the last 24 hours. **          Assessment:   2 Days Post-Op    1) Removal of hardware, left shoulder (deep implant)  2) Irrigation and excisional excisional debridement of skin, subcutaneous tissue, and muscle, Left shoulder     Plan:      1:  DVT prophylaxis, ICE, elevate  2:  Pain control, IV abx, ID Consult.   3:  Physical therapy/Occupational therapy  4:  Anticipate discharge 1-2 days  5:  Sling, Minimal weight bearing.   6:  Order for PICC line today, hope to D/C 1-2 days.   7:  Awaiting Final Cultures.    Bruce Chambers PA-C        Electronically signed by Bruce Chambers PA-C at 01/20/22 1141       Consult Notes (last 24 hours)  Notes from 01/20/22 0703 through 01/21/22 0703   No notes of this type exist for this encounter.

## 2022-01-21 NOTE — CASE MANAGEMENT/SOCIAL WORK
Continued Stay Note   Lisa     Patient Name: Max Oliveira  MRN: [id number]  Today's Date: 1/21/2022    Admit Date: 1/18/2022     Discharge Plan     Row Name 01/21/22 1530       Plan    Final Discharge Disposition Code 06 - home with home health care    Final Note Final IV abx orders received and Loli ([facility]) notified. Pt agrees to cost of IV abx and they will be delivered to pt home late tonight. [facility]  notified of pt dc and they will admit pt tomorrow between 12-2pm for dose. Pt will dc in AM after early IV dose.               Discharge Codes    No documentation.               Expected Discharge Date and Time     Expected Discharge Date Expected Discharge Time    Jan 21, 2022             EMI Malik

## 2022-01-21 NOTE — CONSULTS
Patient or patient's representative gives informed consent after an explanation of the risks (air embolism; catheter occlusion; phlebitis; catheter infection; bloodstream infection; vein thrombosis; hematoma/bleeding at the insertion site; slight discomfort; accidental puncture of an artery, nerve, or tendon; dislodging of device), benefits (longer dwell time, outpatient treatment, medications that cannot run peripherally), and alternatives (short peripheral catheter, centrally inserted central line, or permanent catheter) of PICC placement. Time provided to ask and answer questions.    Pt had 4 [place] single lumen PICC placed in right basilic vein. Pt tolerated procedure well. 41 cm of catheter internal and 1 cm external. Tip confirmation by 3cg. All lumens flush and draw well. Sterile dressing applied.

## 2022-01-21 NOTE — DISCHARGE SUMMARY
NAME: Max Oliveira  : 1960  MRN: [id number]      Admission Date: 2022    Discharge Date: 2022    Final Diagnoses: Surgical wound infection [T81.49XA]    Procedures: 1) Removal of hardware, left shoulder (deep implant)  2) Irrigation and excisional excisional debridement of skin, subcutaneous tissue, and muscle, Left shoulder     Consultations: ID    Reason for Admission: 61 y.o. male who underwent the above procedure who began developing drainage of his axillary incision at the site of his biceps tenodesis.  Despite antibiotics and dressing changes, the wound continued to have drainage.  I recommended a formal I&D in the OR for debridement, cultures, possible closure of the wound.  This was performed on 21 and he was initially doing well until drainage began again.  An MRI showed a possible infection of the tenodesis site in the proximal humerus.  It was felt that his tenodesis screw should be removed in addition to a more extensive debridement following by IV antibiotics pending a positive culture.  He is diabetic causing an increased risk of infection and poor wound healing.     Hospital Course:  The patient was admitted with the above named diagnosis, surgery was performed and tolerated well.  At the time of discharge, the patient was afebrile, vitals stable, pain was controlled with oral medication,  he was tolerating a by mouth diet, and voiding appropriately. Physical therapy and occupational therapy were consulted. Given these findings he was deemed suitable to be discharged.    Disposition: Home    Activity: Sling    Wound Instructions: see postop instructions    Diet: regular    Resume home meds:   Prior to Admission medications    Medication Sig Start Date End Date Taking? Authorizing Provider   aspirin 81 MG EC tablet Take 81 mg by mouth Daily.   Yes Aniceto Bennett MD   Dapagliflozin Propanediol (Farxiga) 10 MG tablet Take 10 mg by mouth Daily.   Yes Aniceto Bennett MD    magnesium oxide (MAG-OX) 400 MG tablet Take 400 mg by mouth Daily.   Yes Aniceto Bennett MD   metFORMIN (GLUCOPHAGE) 500 MG tablet Take 500 mg by mouth 2 (Two) Times a Day With Meals.   Yes Aniceto Bennett MD   metoprolol succinate XL (TOPROL-XL) 50 MG 24 hr tablet Take 50 mg by mouth Daily.   Yes Aniceto Bennett MD   multivitamin with minerals tablet tablet Take 1 tablet by mouth Daily.   Yes Aniceto Bennett MD   pantoprazole (PROTONIX) 40 MG EC tablet Take 40 mg by mouth Daily.   Yes Emergency, Nurse Epic, RN   simvastatin (ZOCOR) 40 MG tablet Take 40 mg by mouth Every Night.   Yes Aniceto Bennett MD   VITAMIN D PO Take 2,000 Units by mouth Daily.   Yes Aniceto Bennett MD   lisinopril (PRINIVIL,ZESTRIL) 10 MG tablet Take 10 mg by mouth Daily.    Aniceto Bennett MD   meloxicam (MOBIC) 15 MG tablet Take 15 mg by mouth Daily.    Aniceto Bennett MD       Prescriptions for:  No pain meds needed.     Return to Clinic: 1 week    Xrays: no

## 2022-01-22 VITALS
DIASTOLIC BLOOD PRESSURE: 83 MMHG | BODY MASS INDEX: 31.02 KG/M2 | RESPIRATION RATE: 18 BRPM | HEART RATE: 68 BPM | OXYGEN SATURATION: 97 % | HEIGHT: 71 IN | SYSTOLIC BLOOD PRESSURE: 126 MMHG | TEMPERATURE: 98 F

## 2022-01-22 NOTE — PLAN OF CARE
Goal Outcome Evaluation:              Outcome Summary: Pt AxOx4, Dressing to Lt shoulder CDI.  Pt reported numbness in left forearm.  Pt did not request pain medication this shift.  Up ad ailyn.  BM today.  Pt to d/c home w/ HH after 1/22/22 morning dose of abx.  Safety maintained.

## 2022-01-22 NOTE — PLAN OF CARE
Goal Outcome Evaluation:  Plan of Care Reviewed With: patient        Progress: improving  Outcome Summary: A & O, VSS, denies pain, dressing to L shoulder CDI, up ad ailyn in room, plan home this am with PICC and home health for IV ABX at home

## 2022-01-22 NOTE — PAYOR COMM NOTE
"DC HOME 22  [id number]    Vladimir Donnelly (61 y.o. Male)             Date of Birth Social Security Number Address Home Phone MRN    1960  [address]  [place] [address] [phone] [id number]    Christianity Marital Status             [facility]        Admission Date Admission Type Admitting Provider Attending Provider Department, Room/Bed    22 Elective Nathen Farrell MD  [facility] 3A, 327/    Discharge Date Discharge Disposition Discharge Destination          2022 Home or Self Care              Attending Provider: (none)   Allergies: No Known Allergies    Isolation: None   Infection: None   Code Status: Prior   Advance Care Planning Activity    Ht: 180 cm (70.87\")   Wt: 101 kg (221 lb 9 oz)    Admission Cmt: None   Principal Problem: None                Active Insurance as of 2022     Primary Coverage     Payor Plan Insurance Group Employer/Plan Group    [employer] BLUE [employer]OSS [employer] BLUE [employer]OSS BLUE [employer] PPO [id number]     Payor Plan Address Payor Plan Phone Number Payor Plan Fax Number Effective Dates    [address] [phone]  2021 - None Entered    [facility] [id number]       Subscriber Name Subscriber Birth Date Member ID       VLADIMIR DONNELLY 1960 [id number]           Secondary Coverage     Payor Plan Insurance Group Employer/Plan Group    [facility]      Payor Plan Address Payor Plan Phone Number Payor Plan Fax Number Effective Dates    [address] [phone]  2020 - None Entered    [facility] [id number]       Subscriber Name Subscriber Birth Date Member ID       VLADIMIR DONNELLY 1960 [id number]                 Emergency Contacts      (Rel.) Home Phone Work Phone Mobile Phone    Lisbeth Donnelly (Spouse) [phone] -- --               Discharge Summary      Bruce Chambers PA-C at 22 1409          NAME: Vladimir Donnelly  : 1960  MRN: [id number]      Admission Date: 2022    Discharge Date: " 1/21/2022    Final Diagnoses: Surgical wound infection [T81.49XA]    Procedures: 1) Removal of hardware, left shoulder (deep implant)  2) Irrigation and excisional excisional debridement of skin, subcutaneous tissue, and muscle, Left shoulder     Consultations: ID    Reason for Admission: 61 y.o. male who underwent the above procedure who began developing drainage of his axillary incision at the site of his biceps tenodesis.  Despite antibiotics and dressing changes, the wound continued to have drainage.  I recommended a formal I&D in the OR for debridement, cultures, possible closure of the wound.  This was performed on 11-16-21 and he was initially doing well until drainage began again.  An MRI showed a possible infection of the tenodesis site in the proximal humerus.  It was felt that his tenodesis screw should be removed in addition to a more extensive debridement following by IV antibiotics pending a positive culture.  He is diabetic causing an increased risk of infection and poor wound healing.     Hospital Course:  The patient was admitted with the above named diagnosis, surgery was performed and tolerated well.  At the time of discharge, the patient was afebrile, vitals stable, pain was controlled with oral medication,  he was tolerating a by mouth diet, and voiding appropriately. Physical therapy and occupational therapy were consulted. Given these findings he was deemed suitable to be discharged.    Disposition: Home    Activity: Sling    Wound Instructions: see postop instructions    Diet: regular    Resume home meds:   Prior to Admission medications    Medication Sig Start Date End Date Taking? Authorizing Provider   aspirin 81 MG EC tablet Take 81 mg by mouth Daily.   Yes Aniceto Bennett MD   Dapagliflozin Propanediol (Farxiga) 10 MG tablet Take 10 mg by mouth Daily.   Yes Aniceto Bennett MD   magnesium oxide (MAG-OX) 400 MG tablet Take 400 mg by mouth Daily.   Yes Aniceto Bennett MD    metFORMIN (GLUCOPHAGE) 500 MG tablet Take 500 mg by mouth 2 (Two) Times a Day With Meals.   Yes Aniceto Bennett MD   metoprolol succinate XL (TOPROL-XL) 50 MG 24 hr tablet Take 50 mg by mouth Daily.   Yes Aniceto Bennett MD   multivitamin with minerals tablet tablet Take 1 tablet by mouth Daily.   Yes Aniceto Bennett MD   pantoprazole (PROTONIX) 40 MG EC tablet Take 40 mg by mouth Daily.   Yes Emergency, Nurse Epic, RN   simvastatin (ZOCOR) 40 MG tablet Take 40 mg by mouth Every Night.   Yes Aniceto Bennett MD   VITAMIN D PO Take 2,000 Units by mouth Daily.   Yes Aniceto Bennett MD   lisinopril (PRINIVIL,ZESTRIL) 10 MG tablet Take 10 mg by mouth Daily.    Aniceto Bennett MD   meloxicam (MOBIC) 15 MG tablet Take 15 mg by mouth Daily.    Aniceto Bennett MD       Prescriptions for:  No pain meds needed.     Return to Clinic: 1 week    Xrays: no              Electronically signed by Bruce Chambers PA-C at 01/21/22 1348

## 2022-01-23 ENCOUNTER — READMISSION MANAGEMENT (OUTPATIENT)
Dept: CALL CENTER | Facility: HOSPITAL | Age: 62
End: 2022-01-23

## 2022-01-23 NOTE — OUTREACH NOTE
Prep Survey      Responses   [facility] facility patient discharged from? [facility]   Is LACE score < 7 ? No   Emergency Room discharge w/ pulse ox? No   Eligibility Readm Mgmt   Discharge diagnosis Surgical wound infection, Removal of hardware to left shoulder, I&D lt shoulder   Does the patient have one of the following disease processes/diagnoses(primary or secondary)? General Surgery   Does the patient have Home health ordered? Yes   What is the Home health agency?  [facility]    Is there a DME ordered? Yes   What DME was ordered? IV abx- [facility]   Prep survey completed? Yes          Cheryl Alonso RN

## 2022-01-24 LAB
ALBUMIN SERPL-MCNC: 4.4 G/DL (ref 3.5–5.2)
ALP BLD-CCNC: 43 U/L (ref 40–130)
ALT SERPL-CCNC: 32 U/L (ref 5–41)
ANION GAP SERPL CALCULATED.3IONS-SCNC: 12 MMOL/L (ref 7–19)
AST SERPL-CCNC: 45 U/L (ref 5–40)
BASOPHILS ABSOLUTE: 0.1 K/UL (ref 0–0.2)
BASOPHILS RELATIVE PERCENT: 1.1 % (ref 0–1)
BILIRUB SERPL-MCNC: 0.5 MG/DL (ref 0.2–1.2)
BUN BLDV-MCNC: 16 MG/DL (ref 6–20)
C-REACTIVE PROTEIN: <0.3 MG/DL (ref 0–0.5)
CALCIUM SERPL-MCNC: 9.3 MG/DL (ref 8.6–10)
CHLORIDE BLD-SCNC: 103 MMOL/L (ref 98–111)
CO2: 23 MMOL/L (ref 22–29)
CREAT SERPL-MCNC: 0.7 MG/DL (ref 0.5–1.2)
EOSINOPHILS ABSOLUTE: 0.1 K/UL (ref 0–0.6)
EOSINOPHILS RELATIVE PERCENT: 1.1 % (ref 0–5)
GFR AFRICAN AMERICAN: >59
GFR NON-AFRICAN AMERICAN: >60
GLUCOSE BLD-MCNC: 95 MG/DL (ref 74–109)
HCT VFR BLD CALC: 46.7 % (ref 42–52)
HEMOGLOBIN: 16.2 G/DL (ref 14–18)
IMMATURE GRANULOCYTES #: 0 K/UL
LYMPHOCYTES ABSOLUTE: 1.4 K/UL (ref 1.1–4.5)
LYMPHOCYTES RELATIVE PERCENT: 19.2 % (ref 20–40)
MCH RBC QN AUTO: 32.4 PG (ref 27–31)
MCHC RBC AUTO-ENTMCNC: 34.7 G/DL (ref 33–37)
MCV RBC AUTO: 93.4 FL (ref 80–94)
MONOCYTES ABSOLUTE: 0.7 K/UL (ref 0–0.9)
MONOCYTES RELATIVE PERCENT: 9.9 % (ref 0–10)
NEUTROPHILS ABSOLUTE: 5.1 K/UL (ref 1.5–7.5)
NEUTROPHILS RELATIVE PERCENT: 68.6 % (ref 50–65)
PDW BLD-RTO: 12.1 % (ref 11.5–14.5)
PLATELET # BLD: 242 K/UL (ref 130–400)
PMV BLD AUTO: 10.1 FL (ref 9.4–12.4)
POTASSIUM SERPL-SCNC: 4.2 MMOL/L (ref 3.5–5)
RBC # BLD: 5 M/UL (ref 4.7–6.1)
SODIUM BLD-SCNC: 138 MMOL/L (ref 136–145)
TOTAL PROTEIN: 6.9 G/DL (ref 6.6–8.7)
WBC # BLD: 7.4 K/UL (ref 4.8–10.8)

## 2022-01-26 ENCOUNTER — READMISSION MANAGEMENT (OUTPATIENT)
Dept: CALL CENTER | Facility: HOSPITAL | Age: 62
End: 2022-01-26

## 2022-01-26 NOTE — OUTREACH NOTE
General Surgery Week 1 Survey      Responses   [facility] patient discharged from? [facility]   Does the patient have one of the following disease processes/diagnoses(primary or secondary)? General Surgery   Week 1 attempt successful? Yes   Call start time 1320   Call end time 1322   Discharge diagnosis Surgical wound infection, Removal of hardware to left shoulder, I&D lt shoulder   Medication alerts for this patient Pt is receiving ABX at home   Meds reviewed with patient/caregiver? Yes   Is the patient having any side effects they believe may be caused by any medication additions or changes? No   Does the patient have all medications related to this admission filled (includes all antibiotics, pain medications, etc.) Yes   Is the patient taking all medications as directed (includes completed medication regime)? Yes   Does the patient have a follow up appointment scheduled with their surgeon? Yes  [1/28/22]   Has the patient kept scheduled appointments due by today? Yes   Comments Appt with ID is on 2/15/22,  PCP appt is on 1/26/22   What is the Home health agency?  [facility]     Has home health visited the patient within 72 hours of discharge? Yes   What DME was ordered? IV abx- [facility]   Has all DME been delivered? Yes   Psychosocial issues? No   Did the patient receive a copy of their discharge instructions? Yes   Nursing interventions Reviewed instructions with patient   What is the patient's perception of their health status since discharge? Improving   Nursing interventions Nurse provided patient education   Is the patient /caregiver able to teach back basic post-op care? Keep incision areas clean,dry and protected,  Drive as instructed by MD in discharge instructions,  No tub bath, swimming, or hot tub until instructed by MD   Is the patient/caregiver able to teach back signs and symptoms of incisional infection? Fever   Is the patient/caregiver able to teach back steps to recovery at home? Rest and  rebuild strength, gradually increase activity,  Set small, achievable goals for return to baseline health,  Eat a well-balance diet   If the patient is a current smoker, are they able to teach back resources for cessation? Not a smoker   Is the patient/caregiver able to teach back the hierarchy of who to call/visit for symptoms/problems? PCP, Specialist, Home health nurse, Urgent Care, ED, 911 Yes   Week 1 call completed? Yes          Reyna Fischer RN

## 2022-01-31 LAB
ALBUMIN SERPL-MCNC: 4.7 G/DL (ref 3.5–5.2)
ALP BLD-CCNC: 42 U/L (ref 40–130)
ALT SERPL-CCNC: 15 U/L (ref 5–41)
ANION GAP SERPL CALCULATED.3IONS-SCNC: 13 MMOL/L (ref 7–19)
AST SERPL-CCNC: 23 U/L (ref 5–40)
BASOPHILS ABSOLUTE: 0.1 K/UL (ref 0–0.2)
BASOPHILS RELATIVE PERCENT: 1.1 % (ref 0–1)
BILIRUB SERPL-MCNC: 0.3 MG/DL (ref 0.2–1.2)
BUN BLDV-MCNC: 21 MG/DL (ref 6–20)
C-REACTIVE PROTEIN: <0.3 MG/DL (ref 0–0.5)
CALCIUM SERPL-MCNC: 9.8 MG/DL (ref 8.6–10)
CHLORIDE BLD-SCNC: 106 MMOL/L (ref 98–111)
CO2: 23 MMOL/L (ref 22–29)
CREAT SERPL-MCNC: 0.7 MG/DL (ref 0.5–1.2)
EOSINOPHILS ABSOLUTE: 0.1 K/UL (ref 0–0.6)
EOSINOPHILS RELATIVE PERCENT: 1.1 % (ref 0–5)
GFR AFRICAN AMERICAN: >59
GFR NON-AFRICAN AMERICAN: >60
GLUCOSE BLD-MCNC: 88 MG/DL (ref 74–109)
HCT VFR BLD CALC: 51.2 % (ref 42–52)
HEMOGLOBIN: 17.1 G/DL (ref 14–18)
IMMATURE GRANULOCYTES #: 0 K/UL
LYMPHOCYTES ABSOLUTE: 2.1 K/UL (ref 1.1–4.5)
LYMPHOCYTES RELATIVE PERCENT: 28.6 % (ref 20–40)
MCH RBC QN AUTO: 31.8 PG (ref 27–31)
MCHC RBC AUTO-ENTMCNC: 33.4 G/DL (ref 33–37)
MCV RBC AUTO: 95.2 FL (ref 80–94)
MONOCYTES ABSOLUTE: 0.7 K/UL (ref 0–0.9)
MONOCYTES RELATIVE PERCENT: 9.8 % (ref 0–10)
NEUTROPHILS ABSOLUTE: 4.4 K/UL (ref 1.5–7.5)
NEUTROPHILS RELATIVE PERCENT: 59.1 % (ref 50–65)
PDW BLD-RTO: 12.6 % (ref 11.5–14.5)
PLATELET # BLD: 254 K/UL (ref 130–400)
PMV BLD AUTO: 9.8 FL (ref 9.4–12.4)
POTASSIUM SERPL-SCNC: 4.5 MMOL/L (ref 3.5–5)
RBC # BLD: 5.38 M/UL (ref 4.7–6.1)
SODIUM BLD-SCNC: 142 MMOL/L (ref 136–145)
TOTAL PROTEIN: 7.1 G/DL (ref 6.6–8.7)
WBC # BLD: 7.4 K/UL (ref 4.8–10.8)

## 2022-02-03 ENCOUNTER — READMISSION MANAGEMENT (OUTPATIENT)
Dept: CALL CENTER | Facility: HOSPITAL | Age: 62
End: 2022-02-03

## 2022-02-03 NOTE — OUTREACH NOTE
"General Surgery Week 2 Survey      Responses   [facility] patient discharged from? [facility]   Does the patient have one of the following disease processes/diagnoses(primary or secondary)? General Surgery   Week 2 attempt successful? Yes   Call start time 0951   Call end time 0952   Discharge diagnosis Surgical wound infection, Removal of hardware to left shoulder, I&D lt shoulder   Meds reviewed with patient/caregiver? Yes   Is the patient taking all medications as directed (includes completed medication regime)? Yes   Has the patient kept scheduled appointments due by today? Yes   What is the patient's perception of their health status since discharge? Improving   Is the patient/caregiver able to teach back signs and symptoms of incisional infection? Fever   Is the patient/caregiver able to teach back steps to recovery at home? Rest and rebuild strength, gradually increase activity,  Eat a well-balance diet   Week 2 call completed? Yes   Revoked No further contact(revokes)-requires comment   Is the patient interested in additional calls from an ambulatory ?  NOTE:  applies to high risk patients requiring additional follow-up. No   Graduated/Revoked comments Pt states, \"I think I have all the support I need. I appreciate you all calling to check on me though.\"          Reyna Fischer RN  "

## 2022-02-07 LAB
ALBUMIN SERPL-MCNC: 5.1 G/DL (ref 3.5–5.2)
ALP BLD-CCNC: 46 U/L (ref 40–130)
ALT SERPL-CCNC: 14 U/L (ref 5–41)
ANION GAP SERPL CALCULATED.3IONS-SCNC: 13 MMOL/L (ref 7–19)
AST SERPL-CCNC: 23 U/L (ref 5–40)
BASOPHILS ABSOLUTE: 0.1 K/UL (ref 0–0.2)
BASOPHILS RELATIVE PERCENT: 1.5 % (ref 0–1)
BILIRUB SERPL-MCNC: 0.3 MG/DL (ref 0.2–1.2)
BUN BLDV-MCNC: 24 MG/DL (ref 6–20)
C-REACTIVE PROTEIN: <0.3 MG/DL (ref 0–0.5)
CALCIUM SERPL-MCNC: 10.2 MG/DL (ref 8.6–10)
CHLORIDE BLD-SCNC: 102 MMOL/L (ref 98–111)
CO2: 24 MMOL/L (ref 22–29)
CREAT SERPL-MCNC: 0.9 MG/DL (ref 0.5–1.2)
EOSINOPHILS ABSOLUTE: 0.1 K/UL (ref 0–0.6)
EOSINOPHILS RELATIVE PERCENT: 1.3 % (ref 0–5)
GFR AFRICAN AMERICAN: >59
GFR NON-AFRICAN AMERICAN: >60
GLUCOSE BLD-MCNC: 96 MG/DL (ref 74–109)
HCT VFR BLD CALC: 52.1 % (ref 42–52)
HEMOGLOBIN: 17.7 G/DL (ref 14–18)
IMMATURE GRANULOCYTES #: 0 K/UL
LYMPHOCYTES ABSOLUTE: 1.7 K/UL (ref 1.1–4.5)
LYMPHOCYTES RELATIVE PERCENT: 27.8 % (ref 20–40)
MCH RBC QN AUTO: 32.4 PG (ref 27–31)
MCHC RBC AUTO-ENTMCNC: 34 G/DL (ref 33–37)
MCV RBC AUTO: 95.4 FL (ref 80–94)
MONOCYTES ABSOLUTE: 0.7 K/UL (ref 0–0.9)
MONOCYTES RELATIVE PERCENT: 12.1 % (ref 0–10)
NEUTROPHILS ABSOLUTE: 3.4 K/UL (ref 1.5–7.5)
NEUTROPHILS RELATIVE PERCENT: 57 % (ref 50–65)
PDW BLD-RTO: 12.6 % (ref 11.5–14.5)
PLATELET # BLD: 241 K/UL (ref 130–400)
PMV BLD AUTO: 9.9 FL (ref 9.4–12.4)
POTASSIUM SERPL-SCNC: 4.8 MMOL/L (ref 3.5–5)
RBC # BLD: 5.46 M/UL (ref 4.7–6.1)
SODIUM BLD-SCNC: 139 MMOL/L (ref 136–145)
TOTAL PROTEIN: 7.5 G/DL (ref 6.6–8.7)
WBC # BLD: 5.9 K/UL (ref 4.8–10.8)

## 2022-02-14 LAB
ALBUMIN SERPL-MCNC: 4.5 G/DL (ref 3.5–5.2)
ALP BLD-CCNC: 41 U/L (ref 40–130)
ALT SERPL-CCNC: 11 U/L (ref 5–41)
ANION GAP SERPL CALCULATED.3IONS-SCNC: 17 MMOL/L (ref 7–19)
AST SERPL-CCNC: 20 U/L (ref 5–40)
BASOPHILS ABSOLUTE: 0.1 K/UL (ref 0–0.2)
BASOPHILS RELATIVE PERCENT: 1.2 % (ref 0–1)
BILIRUB SERPL-MCNC: 0.4 MG/DL (ref 0.2–1.2)
BUN BLDV-MCNC: 25 MG/DL (ref 6–20)
C-REACTIVE PROTEIN: <0.3 MG/DL (ref 0–0.5)
CALCIUM SERPL-MCNC: 9.4 MG/DL (ref 8.6–10)
CHLORIDE BLD-SCNC: 102 MMOL/L (ref 98–111)
CO2: 22 MMOL/L (ref 22–29)
CREAT SERPL-MCNC: 0.7 MG/DL (ref 0.5–1.2)
EOSINOPHILS ABSOLUTE: 0.1 K/UL (ref 0–0.6)
EOSINOPHILS RELATIVE PERCENT: 1.6 % (ref 0–5)
GFR AFRICAN AMERICAN: >59
GFR NON-AFRICAN AMERICAN: >60
GLUCOSE BLD-MCNC: 103 MG/DL (ref 74–109)
HCT VFR BLD CALC: 50.7 % (ref 42–52)
HEMOGLOBIN: 16.7 G/DL (ref 14–18)
IMMATURE GRANULOCYTES #: 0 K/UL
LYMPHOCYTES ABSOLUTE: 1.4 K/UL (ref 1.1–4.5)
LYMPHOCYTES RELATIVE PERCENT: 25 % (ref 20–40)
MCH RBC QN AUTO: 31.9 PG (ref 27–31)
MCHC RBC AUTO-ENTMCNC: 32.9 G/DL (ref 33–37)
MCV RBC AUTO: 96.8 FL (ref 80–94)
MONOCYTES ABSOLUTE: 0.8 K/UL (ref 0–0.9)
MONOCYTES RELATIVE PERCENT: 13.5 % (ref 0–10)
NEUTROPHILS ABSOLUTE: 3.3 K/UL (ref 1.5–7.5)
NEUTROPHILS RELATIVE PERCENT: 58.5 % (ref 50–65)
PDW BLD-RTO: 12.2 % (ref 11.5–14.5)
PLATELET # BLD: 174 K/UL (ref 130–400)
PMV BLD AUTO: 10.2 FL (ref 9.4–12.4)
POTASSIUM SERPL-SCNC: 4.5 MMOL/L (ref 3.5–5)
RBC # BLD: 5.24 M/UL (ref 4.7–6.1)
SODIUM BLD-SCNC: 141 MMOL/L (ref 136–145)
TOTAL PROTEIN: 7 G/DL (ref 6.6–8.7)
WBC # BLD: 5.7 K/UL (ref 4.8–10.8)

## 2022-02-21 LAB
ALBUMIN SERPL-MCNC: 4.7 G/DL (ref 3.5–5.2)
ALP BLD-CCNC: 40 U/L (ref 40–130)
ALT SERPL-CCNC: 10 U/L (ref 5–41)
ANION GAP SERPL CALCULATED.3IONS-SCNC: 18 MMOL/L (ref 7–19)
AST SERPL-CCNC: 21 U/L (ref 5–40)
BASOPHILS ABSOLUTE: 0.1 K/UL (ref 0–0.2)
BASOPHILS RELATIVE PERCENT: 1.4 % (ref 0–1)
BILIRUB SERPL-MCNC: 0.4 MG/DL (ref 0.2–1.2)
BUN BLDV-MCNC: 21 MG/DL (ref 6–20)
C-REACTIVE PROTEIN: <0.3 MG/DL (ref 0–0.5)
CALCIUM SERPL-MCNC: 9.4 MG/DL (ref 8.6–10)
CHLORIDE BLD-SCNC: 102 MMOL/L (ref 98–111)
CO2: 19 MMOL/L (ref 22–29)
CREAT SERPL-MCNC: 0.7 MG/DL (ref 0.5–1.2)
EOSINOPHILS ABSOLUTE: 0.1 K/UL (ref 0–0.6)
EOSINOPHILS RELATIVE PERCENT: 1.2 % (ref 0–5)
GFR AFRICAN AMERICAN: >59
GFR NON-AFRICAN AMERICAN: >60
GLUCOSE BLD-MCNC: 225 MG/DL (ref 74–109)
HCT VFR BLD CALC: 50 % (ref 42–52)
HEMOGLOBIN: 16.8 G/DL (ref 14–18)
IMMATURE GRANULOCYTES #: 0 K/UL
LYMPHOCYTES ABSOLUTE: 1.2 K/UL (ref 1.1–4.5)
LYMPHOCYTES RELATIVE PERCENT: 24.4 % (ref 20–40)
MCH RBC QN AUTO: 31.7 PG (ref 27–31)
MCHC RBC AUTO-ENTMCNC: 33.6 G/DL (ref 33–37)
MCV RBC AUTO: 94.3 FL (ref 80–94)
MONOCYTES ABSOLUTE: 0.4 K/UL (ref 0–0.9)
MONOCYTES RELATIVE PERCENT: 8.8 % (ref 0–10)
NEUTROPHILS ABSOLUTE: 3.1 K/UL (ref 1.5–7.5)
NEUTROPHILS RELATIVE PERCENT: 64 % (ref 50–65)
PDW BLD-RTO: 12.2 % (ref 11.5–14.5)
PLATELET # BLD: 164 K/UL (ref 130–400)
PMV BLD AUTO: 10.4 FL (ref 9.4–12.4)
POTASSIUM SERPL-SCNC: 4 MMOL/L (ref 3.5–5)
RBC # BLD: 5.3 M/UL (ref 4.7–6.1)
SODIUM BLD-SCNC: 139 MMOL/L (ref 136–145)
TOTAL PROTEIN: 7 G/DL (ref 6.6–8.7)
WBC # BLD: 4.9 K/UL (ref 4.8–10.8)

## 2022-02-28 LAB
ALBUMIN SERPL-MCNC: 4.9 G/DL (ref 3.5–5.2)
ALP BLD-CCNC: 41 U/L (ref 40–130)
ALT SERPL-CCNC: 7 U/L (ref 5–41)
ANION GAP SERPL CALCULATED.3IONS-SCNC: 13 MMOL/L (ref 7–19)
AST SERPL-CCNC: 20 U/L (ref 5–40)
BASOPHILS ABSOLUTE: 0.1 K/UL (ref 0–0.2)
BASOPHILS RELATIVE PERCENT: 1 % (ref 0–1)
BILIRUB SERPL-MCNC: 0.3 MG/DL (ref 0.2–1.2)
BUN BLDV-MCNC: 19 MG/DL (ref 6–20)
C-REACTIVE PROTEIN: <0.3 MG/DL (ref 0–0.5)
CALCIUM SERPL-MCNC: 9.4 MG/DL (ref 8.6–10)
CHLORIDE BLD-SCNC: 101 MMOL/L (ref 98–111)
CO2: 24 MMOL/L (ref 22–29)
CREAT SERPL-MCNC: 0.8 MG/DL (ref 0.5–1.2)
EOSINOPHILS ABSOLUTE: 0.1 K/UL (ref 0–0.6)
EOSINOPHILS RELATIVE PERCENT: 1 % (ref 0–5)
GFR AFRICAN AMERICAN: >59
GFR NON-AFRICAN AMERICAN: >60
GLUCOSE BLD-MCNC: 108 MG/DL (ref 74–109)
HCT VFR BLD CALC: 50.2 % (ref 42–52)
HEMOGLOBIN: 17.2 G/DL (ref 14–18)
IMMATURE GRANULOCYTES #: 0 K/UL
LYMPHOCYTES ABSOLUTE: 1.5 K/UL (ref 1.1–4.5)
LYMPHOCYTES RELATIVE PERCENT: 24.3 % (ref 20–40)
MCH RBC QN AUTO: 32.3 PG (ref 27–31)
MCHC RBC AUTO-ENTMCNC: 34.3 G/DL (ref 33–37)
MCV RBC AUTO: 94.4 FL (ref 80–94)
MONOCYTES ABSOLUTE: 0.6 K/UL (ref 0–0.9)
MONOCYTES RELATIVE PERCENT: 9.4 % (ref 0–10)
NEUTROPHILS ABSOLUTE: 4 K/UL (ref 1.5–7.5)
NEUTROPHILS RELATIVE PERCENT: 64.1 % (ref 50–65)
PDW BLD-RTO: 12.2 % (ref 11.5–14.5)
PLATELET # BLD: 198 K/UL (ref 130–400)
PMV BLD AUTO: 10.2 FL (ref 9.4–12.4)
POTASSIUM SERPL-SCNC: 4.1 MMOL/L (ref 3.5–5)
RBC # BLD: 5.32 M/UL (ref 4.7–6.1)
SODIUM BLD-SCNC: 138 MMOL/L (ref 136–145)
TOTAL PROTEIN: 6.9 G/DL (ref 6.6–8.7)
WBC # BLD: 6.2 K/UL (ref 4.8–10.8)

## 2024-04-22 ENCOUNTER — TRANSCRIBE ORDERS (OUTPATIENT)
Dept: ADMINISTRATIVE | Facility: HOSPITAL | Age: 64
End: 2024-04-22
Payer: OTHER GOVERNMENT

## 2024-04-22 DIAGNOSIS — R94.31 ABNORMAL ELECTROCARDIOGRAM: Primary | ICD-10-CM

## 2024-05-13 ENCOUNTER — HOSPITAL ENCOUNTER (OUTPATIENT)
Dept: CARDIOLOGY | Facility: HOSPITAL | Age: 64
Discharge: HOME | End: 2024-05-13
Payer: COMMERCIAL

## 2024-05-13 VITALS — HEART RATE: 65 BPM | DIASTOLIC BLOOD PRESSURE: 84 MMHG | SYSTOLIC BLOOD PRESSURE: 140 MMHG

## 2024-05-13 DIAGNOSIS — R94.31 ABNORMAL ELECTROCARDIOGRAM: ICD-10-CM

## 2024-05-13 PROCEDURE — 25510000001 PERFLUTREN 6.52 MG/ML SUSPENSION: Performed by: INTERNAL MEDICINE

## 2024-05-13 PROCEDURE — 93017 CV STRESS TEST TRACING ONLY: CPT

## 2024-05-13 PROCEDURE — 93350 STRESS TTE ONLY: CPT

## 2024-05-13 RX ADMIN — PERFLUTREN 8.48 MG: 6.52 INJECTION, SUSPENSION INTRAVENOUS at 09:52

## 2024-05-20 LAB
BH CV STRESS BP STAGE 1: NORMAL
BH CV STRESS BP STAGE 2: NORMAL
BH CV STRESS BP STAGE 3: NORMAL
BH CV STRESS BP STAGE 4: NORMAL
BH CV STRESS DURATION MIN STAGE 1: 3
BH CV STRESS DURATION MIN STAGE 2: 3
BH CV STRESS DURATION MIN STAGE 3: 3
BH CV STRESS DURATION MIN STAGE 4: 3
BH CV STRESS DURATION SEC STAGE 1: 0
BH CV STRESS DURATION SEC STAGE 2: 0
BH CV STRESS DURATION SEC STAGE 3: 0
BH CV STRESS DURATION SEC STAGE 4: 0
BH CV STRESS GRADE STAGE 1: 10
BH CV STRESS GRADE STAGE 2: 12
BH CV STRESS GRADE STAGE 3: 14
BH CV STRESS GRADE STAGE 4: 16
BH CV STRESS HR STAGE 1: 102
BH CV STRESS HR STAGE 2: 107
BH CV STRESS HR STAGE 3: 116
BH CV STRESS HR STAGE 4: 134
BH CV STRESS METS STAGE 1: 5
BH CV STRESS METS STAGE 2: 7.5
BH CV STRESS METS STAGE 3: 10
BH CV STRESS METS STAGE 4: 13.5
BH CV STRESS PROTOCOL 1: NORMAL
BH CV STRESS RECOVERY HR: 1 BPM
BH CV STRESS SPEED STAGE 1: 1.7
BH CV STRESS SPEED STAGE 2: 2.5
BH CV STRESS SPEED STAGE 3: 3.4
BH CV STRESS SPEED STAGE 4: 4.2
BH CV STRESS STAGE 1: 1
BH CV STRESS STAGE 2: 2
BH CV STRESS STAGE 3: 3
BH CV STRESS STAGE 4: 4
MAXIMAL PREDICTED HEART RATE: 158 BPM
PERCENT MAX PREDICTED HR: 84.81 %
STRESS BASELINE BP: NORMAL MMHG
STRESS BASELINE HR: 65 BPM
STRESS PERCENT HR: 100 %
STRESS POST ESTIMATED WORKLOAD: 13.5 METS
STRESS POST EXERCISE DUR MIN: 12 MIN
STRESS POST EXERCISE DUR SEC: 0 SEC
STRESS POST O2 SAT PEAK: 82 %
STRESS POST PEAK BP: NORMAL MMHG
STRESS POST PEAK HR: 134 BPM
STRESS TARGET HR: 134 BPM

## 2024-06-12 ENCOUNTER — OFFICE VISIT (OUTPATIENT)
Dept: CARDIOLOGY | Facility: CLINIC | Age: 64
End: 2024-06-12
Payer: COMMERCIAL

## 2024-06-12 VITALS
HEIGHT: 72 IN | DIASTOLIC BLOOD PRESSURE: 80 MMHG | BODY MASS INDEX: 29.26 KG/M2 | OXYGEN SATURATION: 98 % | SYSTOLIC BLOOD PRESSURE: 138 MMHG | WEIGHT: 216 LBS | HEART RATE: 78 BPM

## 2024-06-12 DIAGNOSIS — I10 PRIMARY HYPERTENSION: ICD-10-CM

## 2024-06-12 DIAGNOSIS — R94.39 ABNORMAL STRESS TEST: Primary | ICD-10-CM

## 2024-06-12 DIAGNOSIS — E11.9 TYPE 2 DIABETES MELLITUS WITHOUT COMPLICATION, WITHOUT LONG-TERM CURRENT USE OF INSULIN: ICD-10-CM

## 2024-06-12 DIAGNOSIS — E78.2 MIXED HYPERLIPIDEMIA: ICD-10-CM

## 2024-06-12 RX ORDER — ISOSORBIDE MONONITRATE 30 MG/1
30 TABLET, EXTENDED RELEASE ORAL EVERY MORNING
Qty: 30 TABLET | Refills: 11 | Status: SHIPPED | OUTPATIENT
Start: 2024-06-12

## 2024-06-13 PROBLEM — E78.2 MIXED HYPERLIPIDEMIA: Status: ACTIVE | Noted: 2024-06-13

## 2024-06-13 PROBLEM — E11.9 TYPE 2 DIABETES MELLITUS WITHOUT COMPLICATION, WITHOUT LONG-TERM CURRENT USE OF INSULIN: Status: ACTIVE | Noted: 2024-06-13

## 2024-06-13 PROBLEM — I10 PRIMARY HYPERTENSION: Status: ACTIVE | Noted: 2024-06-13

## 2024-06-13 NOTE — PROGRESS NOTES
P - CARDIOLOGY  New Patient Initial Outpatient [facility]    Primary Care Physician: Aayush Ewing MD    Subjective     Chief Complaint   Patient presents with    ABNORMAL TESTING      NEW PT         History of Present Illness  History of Present Illness  The patient is a 64-year-old male who presents for evaluation of shortness of breath.    The patient's primary care physician, Dr. Sanchez, reported experiencing dyspnea and a sensation of heaviness in his chest. An EKG was performed in the office, revealing some abnormalities. Subsequently, a stress test was conducted, revealing some cardiac abnormalities. The patient's dyspnea does not necessitate rest, but he can occasionally perceive his heartbeat in his head. He is uncertain if these symptoms are related to his cardiac condition. His medical history includes a heart catheterization performed approximately 20 years ago during his time in [employer]. He was diagnosed with metabolic syndrome and initiated on all medications. The patient experienced chest pressure during a treadmill stress test. His symptoms have been present for approximately 6 months.   He has a family history of heart disease. His father had 2 or 3 bypass surgeries. He had his first heart attack at 38. He lived to be . He has 4 sisters who have had heart issues.    Review of Systems   Constitutional: Negative for diaphoresis, fever and malaise/fatigue.   HENT:  Negative for congestion.    Eyes:  Negative for vision loss in left eye and vision loss in right eye.   Cardiovascular:  Positive for chest pain, dyspnea on exertion, irregular heartbeat and palpitations. Negative for claudication, leg swelling, orthopnea and syncope.   Respiratory:  Positive for shortness of breath. Negative for cough and wheezing.    Hematologic/Lymphatic: Negative for adenopathy.   Skin:  Negative for rash.   Musculoskeletal:  Negative for joint pain and joint swelling.   Gastrointestinal:  Negative for  abdominal pain, diarrhea, nausea and vomiting.   Neurological:  Negative for excessive daytime sleepiness, dizziness, focal weakness, light-headedness, numbness and weakness.   Psychiatric/Behavioral:  Negative for depression. The patient does not have insomnia.         Otherwise complete ROS reviewed and negative except as mentioned in the HPI.      Past Medical History:   Past Medical History:   Diagnosis Date    Arthritis     Cervical radiculopathy     Diabetic neuropathy     Epidermal cyst     GERD (gastroesophageal reflux disease)     History of adenomatous polyp of colon     Hyperlipidemia     Hypertension     Paresthesia     PONV (postoperative nausea and vomiting)     Vitamin D deficiency        Past Surgical History:  Past Surgical History:   Procedure Laterality Date    BICEPS TENDONESIS SUBPECTORALIS REPAIR Left 06/29/2021    Procedure: BICEPS TENODESIS / TENOTOMY;  Surgeon: Nathen Farrell MD;  Location[facility] OR;  Service: Orthopedics;  Laterality: Left;    CARDIAC CATHETERIZATION      no stents     COLONOSCOPY  05/22/2009    2 polyps, adenomatous    COLONOSCOPY N/A 08/27/2020    Procedure: COLONOSCOPY WITH ANESTHESIA;  Surgeon: Mundo Rodrigues MD;  Location: [facility] ENDOSCOPY;  Service: Gastroenterology;  Laterality: N/A;  preop; hx of polyps  postop; poylps   PCP Aayush Ewing     EYE SURGERY      trk    HARDWARE REMOVAL Left 01/18/2022    Procedure: HARDWARE REMOVAL;  Surgeon: Nathen Farrell MD;  Location: [facility] OR;  Service: Orthopedics;  Laterality: Left;    INCISION AND DRAINAGE SHOULDER Left 11/16/2021    Procedure: INCISION AND DRAINAGE LEFT SHOULDER SURGICAL WOUND;  Surgeon: Nathen Farrell MD;  Location:  [facility] OR;  Service: Orthopedics;  Laterality: Left;    INCISION AND DRAINAGE SHOULDER Left 01/18/2022    Procedure: LEFT SHOULDER SURGICAL WOUND INCISION AND DRAINAGE, HARDWARE REMOVAL;  Surgeon: Nathen Farrell MD;  Location: [facility] OR;  Service: Orthopedics;   Laterality: Left;    KNEE SURGERY      MANDIBLE SURGERY      x 2    RESECTION DISTAL CLAVICLE Left 06/29/2021    Procedure: DISTAL CLAVICLE EXCISION;  Surgeon: Nathen Farrell MD;  Location:  [facility] OR;  Service: Orthopedics;  Laterality: Left;    SHOULDER ARTHROSCOPY W/ ROTATOR CUFF REPAIR Left 06/29/2021    Procedure: LEFT SHOULDER  ROTATOR CUFF REPAIR, SUBACROMIAL DECOMPRESSION, DISTAL CLAVICLE EXCISION, BICEPS TENODESIS / TENOTOMY;  Surgeon: Nathen Farrell MD;  Location:  [facility] OR;  Service: Orthopedics;  Laterality: Left;       Family History: family history includes Heart attack in his father; Heart disease in his father.    Social History:  reports that he has quit smoking. He has never used smokeless tobacco. He reports that he does not currently use alcohol. He reports that he does not currently use drugs.    Medications:  Prior to Admission medications    Medication Sig Start Date End Date Taking? Authorizing Provider   aspirin 81 MG EC tablet Take 1 tablet by mouth Daily.   Yes Aniceto Bennett MD   Dapagliflozin Propanediol (Farxiga) 10 MG tablet Take 10 mg by mouth Daily.   Yes Aniceto Bennett MD   lisinopril (PRINIVIL,ZESTRIL) 10 MG tablet Take 1 tablet by mouth Daily.   Yes ProviderAniceto MD   magnesium oxide (MAG-OX) 400 MG tablet Take 1 tablet by mouth Daily.   Yes Aniceto Bennett MD   meloxicam (MOBIC) 15 MG tablet Take 1 tablet by mouth Daily.   Yes Aniceto Bennett MD   metFORMIN (GLUCOPHAGE) 500 MG tablet Take 1 tablet by mouth 2 (Two) Times a Day With Meals.   Yes Aniceto Bennett MD   metoprolol succinate XL (TOPROL-XL) 50 MG 24 hr tablet Take 1 tablet by mouth Daily.   Yes Aniceto Bennett MD   multivitamin with minerals tablet tablet Take 1 tablet by mouth Daily.   Yes Aniceto Bennett MD   pantoprazole (PROTONIX) 40 MG EC tablet Take 1 tablet by mouth Daily.   Yes Emergency, Nurse Johnny, RN   simvastatin (ZOCOR) 40 MG tablet Take 1  "tablet by mouth Every Night.   Yes Provider, MD Aniceto   VITAMIN D PO Take 2,000 Units by mouth Daily.   Yes ProviderAniceto MD   isosorbide mononitrate (IMDUR) 30 MG 24 hr tablet Take 1 tablet by mouth Every Morning. 6/12/24   Scott Snowden DO     Allergies:  No Known Allergies    Objective     Vital Signs: /80   Pulse 78   Ht 182.9 cm (72\")   Wt 98 kg (216 lb)   SpO2 98%   BMI 29.29 kg/m²     Vitals and nursing note reviewed.   Constitutional:       Appearance: Normal and healthy appearance. Well-developed and not in distress.   Eyes:      Extraocular Movements: Extraocular movements intact.      Pupils: Pupils are equal, round, and reactive to light.   HENT:      Head: Normocephalic and atraumatic.    Mouth/Throat:      Pharynx: Oropharynx is clear.   Neck:      Vascular: JVD normal.      Trachea: Trachea normal.   Pulmonary:      Effort: Pulmonary effort is normal.      Breath sounds: Normal breath sounds. No wheezing. No rhonchi. No rales.   Cardiovascular:      PMI at left midclavicular line. Normal rate. Regular rhythm. Normal S1. Normal S2.       Murmurs: There is a grade 2/6 systolic murmur.      No gallop.  No click. No rub.   Pulses:     Dorsalis pedis: 2+ bilaterally.     Posterior tibial: 2+ bilaterally.  Abdominal:      General: Bowel sounds are normal.      Palpations: Abdomen is soft.      Tenderness: There is no abdominal tenderness.   Musculoskeletal: Normal range of motion.      Cervical back: Normal range of motion and neck supple. Skin:     General: Skin is warm and dry.      Capillary Refill: Capillary refill takes less than 2 seconds.   Feet:      Right foot:      Skin integrity: Skin integrity normal.      Left foot:      Skin integrity: Skin integrity normal.   Neurological:      Mental Status: Alert and oriented to person, place and time.      Sensory: Sensation is intact.      Motor: Motor function is intact.      Coordination: Coordination is intact. " "  Psychiatric:         Speech: Speech normal.         Behavior: Behavior is cooperative.       Physical Exam      Results Reviewed:      ECG 12 Lead    Date/Time: 6/13/2024 12:43 PM  Performed by: Scott Snowden DO    Authorized by: Scott Snowden DO          Results  Imaging  Stress test showed no ST changes.    No results found for: \"CHOL\", \"TRIG\", \"HDL\", \"VLDL\", \"LDLHDL\"  No results found for: \"HGBA1C\"    Assessment / Plan        Problem List Items Addressed This Visit       Abnormal stress test - Primary    Relevant Orders    Case Request Cath Lab: Left Heart Cath (Completed)    Type 2 diabetes mellitus without complication, without long-term current use of insulin    Primary hypertension    Mixed hyperlipidemia     Assessment & Plan  1. Shortness of breath.  Given the patient's family history, a heart catheterization is deemed necessary. A heart catheterization is scheduled for Monday, 06/17/2023. Imdur has been prescribed.      Transcribed from ambient dictation for Scott Snowden DO by Scott Snowden DO.  06/13/24   12:43 CDT    Patient or patient representative verbalized consent for the use of Ambient Listening during the visit with  Scott Snowden DO for chart documentation. 6/13/2024  12:45 CDT  "

## 2024-06-14 ENCOUNTER — PATIENT ROUNDING (BHMG ONLY) (OUTPATIENT)
Dept: CARDIOLOGY | Facility: CLINIC | Age: 64
End: 2024-06-14
Payer: COMMERCIAL

## 2024-06-14 NOTE — PROGRESS NOTES
A Pinterest message has been sent to the patient for PATIENT ROUNDING with [facility].

## 2024-06-18 ENCOUNTER — HOSPITAL ENCOUNTER (OUTPATIENT)
Facility: HOSPITAL | Age: 64
Setting detail: HOSPITAL OUTPATIENT SURGERY
Discharge: HOME | End: 2024-06-18
Attending: EMERGENCY MEDICINE | Admitting: EMERGENCY MEDICINE
Payer: COMMERCIAL

## 2024-06-18 VITALS
HEIGHT: 72 IN | HEART RATE: 64 BPM | DIASTOLIC BLOOD PRESSURE: 86 MMHG | OXYGEN SATURATION: 95 % | BODY MASS INDEX: 28.96 KG/M2 | TEMPERATURE: 98.2 F | RESPIRATION RATE: 14 BRPM | WEIGHT: 213.8 LBS | SYSTOLIC BLOOD PRESSURE: 137 MMHG

## 2024-06-18 DIAGNOSIS — I25.112 CORONARY ARTERY DISEASE INVOLVING NATIVE CORONARY ARTERY OF NATIVE HEART WITH REFRACTORY ANGINA PECTORIS: Primary | ICD-10-CM

## 2024-06-18 DIAGNOSIS — I20.2 REFRACTORY ANGINA PECTORIS: ICD-10-CM

## 2024-06-18 DIAGNOSIS — R94.39 ABNORMAL STRESS TEST: ICD-10-CM

## 2024-06-18 LAB
ANION GAP SERPL CALCULATED.3IONS-SCNC: 9 MMOL/L (ref 5–15)
BASOPHILS # BLD AUTO: 0.07 10*3/MM3 (ref 0–0.2)
BASOPHILS NFR BLD AUTO: 1 % (ref 0–1.5)
BUN SERPL-MCNC: 18 MG/DL (ref 8–23)
BUN/CREAT SERPL: 24.7 (ref 7–25)
CALCIUM SPEC-SCNC: 9.2 MG/DL (ref 8.6–10.5)
CHLORIDE SERPL-SCNC: 101 MMOL/L (ref 98–107)
CO2 SERPL-SCNC: 29 MMOL/L (ref 22–29)
CREAT SERPL-MCNC: 0.73 MG/DL (ref 0.76–1.27)
DEPRECATED RDW RBC AUTO: 41.8 FL (ref 37–54)
EGFRCR SERPLBLD CKD-EPI 2021: 102.9 ML/MIN/1.73
EOSINOPHIL # BLD AUTO: 0.11 10*3/MM3 (ref 0–0.4)
EOSINOPHIL NFR BLD AUTO: 1.6 % (ref 0.3–6.2)
ERYTHROCYTE [DISTWIDTH] IN BLOOD BY AUTOMATED COUNT: 11.9 % (ref 12.3–15.4)
GLUCOSE SERPL-MCNC: 114 MG/DL (ref 65–99)
HCT VFR BLD AUTO: 48.7 % (ref 37.5–51)
HGB BLD-MCNC: 16.8 G/DL (ref 13–17.7)
IMM GRANULOCYTES # BLD AUTO: 0.02 10*3/MM3 (ref 0–0.05)
IMM GRANULOCYTES NFR BLD AUTO: 0.3 % (ref 0–0.5)
INR PPP: 0.94 (ref 0.91–1.09)
LYMPHOCYTES # BLD AUTO: 1.44 10*3/MM3 (ref 0.7–3.1)
LYMPHOCYTES NFR BLD AUTO: 20.7 % (ref 19.6–45.3)
MCH RBC QN AUTO: 32.7 PG (ref 26.6–33)
MCHC RBC AUTO-ENTMCNC: 34.5 G/DL (ref 31.5–35.7)
MCV RBC AUTO: 94.9 FL (ref 79–97)
MONOCYTES # BLD AUTO: 0.65 10*3/MM3 (ref 0.1–0.9)
MONOCYTES NFR BLD AUTO: 9.3 % (ref 5–12)
NEUTROPHILS NFR BLD AUTO: 4.67 10*3/MM3 (ref 1.7–7)
NEUTROPHILS NFR BLD AUTO: 67.1 % (ref 42.7–76)
NRBC BLD AUTO-RTO: 0 /100 WBC (ref 0–0.2)
PLATELET # BLD AUTO: 194 10*3/MM3 (ref 140–450)
PMV BLD AUTO: 9.9 FL (ref 6–12)
POTASSIUM SERPL-SCNC: 4.6 MMOL/L (ref 3.5–5.2)
PROTHROMBIN TIME: 13 SECONDS (ref 11.8–14.8)
RBC # BLD AUTO: 5.13 10*6/MM3 (ref 4.14–5.8)
SODIUM SERPL-SCNC: 139 MMOL/L (ref 136–145)
WBC NRBC COR # BLD AUTO: 6.96 10*3/MM3 (ref 3.4–10.8)

## 2024-06-18 PROCEDURE — C1769 GUIDE WIRE: HCPCS | Performed by: EMERGENCY MEDICINE

## 2024-06-18 PROCEDURE — 25010000002 MIDAZOLAM HCL (PF) 5 MG/5ML SOLUTION: Performed by: EMERGENCY MEDICINE

## 2024-06-18 PROCEDURE — 25010000002 HEPARIN (PORCINE) PER 1000 UNITS: Performed by: EMERGENCY MEDICINE

## 2024-06-18 PROCEDURE — 93458 L HRT ARTERY/VENTRICLE ANGIO: CPT | Performed by: EMERGENCY MEDICINE

## 2024-06-18 PROCEDURE — 80048 BASIC METABOLIC PNL TOTAL CA: CPT | Performed by: EMERGENCY MEDICINE

## 2024-06-18 PROCEDURE — 25010000002 DIPHENHYDRAMINE PER 50 MG: Performed by: EMERGENCY MEDICINE

## 2024-06-18 PROCEDURE — 25010000002 FENTANYL CITRATE (PF) 50 MCG/ML SOLUTION: Performed by: EMERGENCY MEDICINE

## 2024-06-18 PROCEDURE — 25010000002 HEPARIN (PORCINE) 2000-0.9 UNIT/L-% SOLUTION: Performed by: EMERGENCY MEDICINE

## 2024-06-18 PROCEDURE — 25510000001 IOPAMIDOL PER 1 ML: Performed by: EMERGENCY MEDICINE

## 2024-06-18 PROCEDURE — 85610 PROTHROMBIN TIME: CPT | Performed by: EMERGENCY MEDICINE

## 2024-06-18 PROCEDURE — 99152 MOD SED SAME PHYS/QHP 5/>YRS: CPT | Performed by: EMERGENCY MEDICINE

## 2024-06-18 PROCEDURE — C1894 INTRO/SHEATH, NON-LASER: HCPCS | Performed by: EMERGENCY MEDICINE

## 2024-06-18 PROCEDURE — 85025 COMPLETE CBC W/AUTO DIFF WBC: CPT | Performed by: EMERGENCY MEDICINE

## 2024-06-18 PROCEDURE — S0260 H&P FOR SURGERY: HCPCS | Performed by: EMERGENCY MEDICINE

## 2024-06-18 PROCEDURE — 25010000002 HEPARIN (PORCINE) 1000-0.9 UT/500ML-% SOLUTION: Performed by: EMERGENCY MEDICINE

## 2024-06-18 RX ORDER — LIDOCAINE HYDROCHLORIDE 20 MG/ML
INJECTION, SOLUTION INFILTRATION; PERINEURAL
Status: DISCONTINUED | OUTPATIENT
Start: 2024-06-18 | End: 2024-06-18 | Stop reason: HOSPADM

## 2024-06-18 RX ORDER — SODIUM CHLORIDE 0.9 % (FLUSH) 0.9 %
10 SYRINGE (ML) INJECTION EVERY 12 HOURS SCHEDULED
Status: DISCONTINUED | OUTPATIENT
Start: 2024-06-18 | End: 2024-06-18 | Stop reason: HOSPADM

## 2024-06-18 RX ORDER — NITROGLYCERIN 0.4 MG/1
0.4 TABLET SUBLINGUAL
Status: CANCELLED | OUTPATIENT
Start: 2024-06-18

## 2024-06-18 RX ORDER — ACETAMINOPHEN 325 MG/1
650 TABLET ORAL EVERY 4 HOURS PRN
Status: CANCELLED | OUTPATIENT
Start: 2024-06-18

## 2024-06-18 RX ORDER — ATORVASTATIN CALCIUM 40 MG/1
40 TABLET, FILM COATED ORAL DAILY
Qty: 90 TABLET | Refills: 3 | Status: ON HOLD | OUTPATIENT
Start: 2024-06-18

## 2024-06-18 RX ORDER — HEPARIN SODIUM 1000 [USP'U]/ML
INJECTION, SOLUTION INTRAVENOUS; SUBCUTANEOUS
Status: DISCONTINUED | OUTPATIENT
Start: 2024-06-18 | End: 2024-06-18 | Stop reason: HOSPADM

## 2024-06-18 RX ORDER — SODIUM CHLORIDE 9 MG/ML
100 INJECTION, SOLUTION INTRAVENOUS CONTINUOUS
Status: CANCELLED | OUTPATIENT
Start: 2024-06-18

## 2024-06-18 RX ORDER — DIPHENHYDRAMINE HYDROCHLORIDE 50 MG/ML
INJECTION INTRAMUSCULAR; INTRAVENOUS
Status: DISCONTINUED | OUTPATIENT
Start: 2024-06-18 | End: 2024-06-18 | Stop reason: HOSPADM

## 2024-06-18 RX ORDER — SODIUM CHLORIDE 0.9 % (FLUSH) 0.9 %
10 SYRINGE (ML) INJECTION AS NEEDED
Status: DISCONTINUED | OUTPATIENT
Start: 2024-06-18 | End: 2024-06-18 | Stop reason: HOSPADM

## 2024-06-18 RX ORDER — FENTANYL CITRATE 50 UG/ML
INJECTION, SOLUTION INTRAMUSCULAR; INTRAVENOUS
Status: DISCONTINUED | OUTPATIENT
Start: 2024-06-18 | End: 2024-06-18 | Stop reason: HOSPADM

## 2024-06-18 RX ORDER — VERAPAMIL HYDROCHLORIDE 2.5 MG/ML
INJECTION, SOLUTION INTRAVENOUS
Status: DISCONTINUED | OUTPATIENT
Start: 2024-06-18 | End: 2024-06-18 | Stop reason: HOSPADM

## 2024-06-18 RX ORDER — HEPARIN SODIUM 200 [USP'U]/100ML
INJECTION, SOLUTION INTRAVENOUS
Status: DISCONTINUED | OUTPATIENT
Start: 2024-06-18 | End: 2024-06-18 | Stop reason: HOSPADM

## 2024-06-18 RX ORDER — MIDAZOLAM HYDROCHLORIDE 5 MG/5ML
INJECTION, SOLUTION INTRAMUSCULAR; INTRAVENOUS
Status: DISCONTINUED | OUTPATIENT
Start: 2024-06-18 | End: 2024-06-18 | Stop reason: HOSPADM

## 2024-06-18 NOTE — OP NOTE
[facility]      Date of procedure: 6/18/2024     Procedures performed:     1.  Access to the right radial artery via modified Seldinger technique  2.  Left heart catheterization with retrograde crossing aortic valve to the left ventricle  3.  LV ventriculography  4.  Selective bilateral coronary angiography  5.  Conscious sedation with continuous hemodynamic monitoring for 15 minutes  6.  Patent hemostasis achieved in the right radial artery access site using a TR band  7.  Aortic arch and nonselective bilateral internal mammary artery angiography            Risk, Benefits, and Alternatives discussed with the patient and/or family.  Plan is for moderate sedation, and the patient agrees to proceed with the procedure.  An immediate assessment was done prior to the administration of moderate sedation        Indication: Abnormal stress test, symptoms concerning for unstable angina  Premedication: Versed, Fentanyl  Contrast: Isovue 150 cc  Radiation: Flouro time= 2.5 minutes. Air Kerma= 416 mGy  Catheters: 5F TIG, pigtail      Procedural details:    The patient was brought to the cath lab and prepped and draped in the usual fashion.  Access was obtained in the right radial artery via modified Seldinger technique.  A 6 Mongolian sheath was placed in the artery and flushed.  Next, a TIG catheter was inserted and used to engage both the left and right coronary arteries and selective bilateral coronary angiography was performed in multiple views.  Next, pigtail catheter was inserted and used to cross the aortic valve to the left ventricle pressure recorded LV ventriculography was performed.  This catheter was then pulled back across aortic valve and again pressures were recorded.  Next, aortic arch and nonselective bilateral internal mammary artery angiography was performed.  Everything was then withdrawn through the sheath and the sheath was flushed.  Patent hemostasis was achieved in the right radial  artery access site using a TR band.  Patient tolerated procedure well without any complications.  He left the Cath Lab in stable condition.      I supervised the administration of conscious sedation by nursing staff throughout the case.  First dose was given at 1043 and the end of my face-to-face encounter was at 1059, accounting for a total of 15 minutes of supervision.  During the case, continuous pulse oximetry, heart rate, blood pressure, and patient status were monitored.     Findings:    Hemodynamics:    Aortic: 120/91 mmHg  LV: 147/2 mmHg  LVEDP: 22 mmHg    Left ventriculography:  1. The contractility of the left ventricle is normal.  Estimated ejection fraction 55%.  2.  Mild gradient across the aortic valve on pullback    Selective coronary angiography:     Left Main: Large-caliber vessel that trifurcates into the LAD, intermedius ramus and left circumflex, calcified, there is at least 50 to 60% stenosis just prior to the bifurcation, ENRICO-3 flow    LAD: Large-caliber vessel with heavy calcification noted in the proximal and mid segments, there is 40 to 50% stenosis in the midsegment after the diagonal branch, remainder the vessel has mild disease    Diagonals: First diagonal small caliber, the second diagonal is moderate caliber with mild disease at the ostium, the third diagonal is small caliber    Ramus: Small caliber vessels, ENRICO-3 flow    Left circumflex: Large-caliber vessel that is heavily calcified in its proximal segment, there is 20 to 30% stenosis in the midsegment, ENRICO-3 flow    Obtuse marginals: First OM is small caliber, the second OM is large caliber with 40 to 50% stenosis in the proximal segment, the third obtuse marginal is large caliber with 50 to 60% stenosis in the proximal segment    RCA: Large-caliber vessel with heavy calcification noted in the proximal and mid segment, there is 40 to 50% stenosis in the proximal/mid segment, there is 80 to 90% stenosis in the midsegment, the  distal segment has a 30 to 40% stenosis followed by a 70 to 80% stenosis, ENRICO-3 flow    PDA/ILIANA: The ILIANA is moderate caliber with 60 to 70% stenosis, the PDA is also moderate caliber with mild disease in the midsegment      Estimated Blood Loss: 20 cc    Specimens: None    Complications: None     Impression:  1.  Coronary artery disease as described above including significant lesions in the left main, LAD, large obtuse marginal branches and right coronary artery/ILIANA  2.  Type 2 diabetes  3.  Hypertension  4.  Hyperlipidemia     Plan:   1.  TR band off in 2 hours, discharge home later today  2.  We will set the patient up with CT surgery, Dr. Cancino, to discuss proceeding with coronary artery bypass grafting as a means for revascularization given the findings on angiography today.  3.  Start workup, will order echocardiogram, CT of the chest and carotid ultrasounds as well as vein mapping  4.  Referral for cardiac rehab  5.  Continue on current doses of aspirin 81, procedure, Endor, lisinopril, Toprol.  Stop Zocor and start Lipitor 40  6.  Follow-up me in the office in a few months continue to optimize his cardiac regimen    Scott Snowden, DO  Interventional cardiology  [facility]  June 18, 2024

## 2024-06-18 NOTE — H&P
Patient seen and examined at bedside.  The history and physical not changed as below.    We will be performing a diagnostic left heart catheterization today with possible intervention based on findings.    Risk, benefits and alternatives were explained to the patient and he wished to proceed.    New Patient Initial Outpatient Evaulation     Primary Care Physician: Aayush Ewing MD     Subjective           Chief Complaint   Patient presents with    ABNORMAL TESTING        NEW PT          History of Present Illness  History of Present Illness  The patient is a 64-year-old male who presents for evaluation of shortness of breath.     The patient's primary care physician, Dr. Sanchez, reported experiencing dyspnea and a sensation of heaviness in his chest. An EKG was performed in the office, revealing some abnormalities. Subsequently, a stress test was conducted, revealing some cardiac abnormalities. The patient's dyspnea does not necessitate rest, but he can occasionally perceive his heartbeat in his head. He is uncertain if these symptoms are related to his cardiac condition. His medical history includes a heart catheterization performed approximately 20 years ago during his time in [employer]. He was diagnosed with metabolic syndrome and initiated on all medications. The patient experienced chest pressure during a treadmill stress test. His symptoms have been present for approximately 6 months.   He has a family history of heart disease. His father had 2 or 3 bypass surgeries. He had his first heart attack at 38. He lived to be 91. He has 4 sisters who have had heart issues.     Review of Systems   Constitutional: Negative for diaphoresis, fever and malaise/fatigue.   HENT:  Negative for congestion.    Eyes:  Negative for vision loss in left eye and vision loss in right eye.   Cardiovascular:  Positive for chest pain, dyspnea on exertion, irregular heartbeat and palpitations. Negative for claudication, leg swelling,  orthopnea and syncope.   Respiratory:  Positive for shortness of breath. Negative for cough and wheezing.    Hematologic/Lymphatic: Negative for adenopathy.   Skin:  Negative for rash.   Musculoskeletal:  Negative for joint pain and joint swelling.   Gastrointestinal:  Negative for abdominal pain, diarrhea, nausea and vomiting.   Neurological:  Negative for excessive daytime sleepiness, dizziness, focal weakness, light-headedness, numbness and weakness.   Psychiatric/Behavioral:  Negative for depression. The patient does not have insomnia.          Otherwise complete ROS reviewed and negative except as mentioned in the HPI.        Past Medical History:   Medical History        Past Medical History:   Diagnosis Date    Arthritis      Cervical radiculopathy      Diabetic neuropathy      Epidermal cyst      GERD (gastroesophageal reflux disease)      History of adenomatous polyp of colon      Hyperlipidemia      Hypertension      Paresthesia      PONV (postoperative nausea and vomiting)      Vitamin D deficiency              Past Surgical History:  Surgical History         Past Surgical History:   Procedure Laterality Date    BICEPS TENDONESIS SUBPECTORALIS REPAIR Left 06/29/2021     Procedure: BICEPS TENODESIS / TENOTOMY;  Surgeon: Nathen Farrell MD;  Location: [facility] OR;  Service: Orthopedics;  Laterality: Left;    CARDIAC CATHETERIZATION         no stents     COLONOSCOPY   05/22/2009     2 polyps, adenomatous    COLONOSCOPY N/A 08/27/2020     Procedure: COLONOSCOPY WITH ANESTHESIA;  Surgeon: Mundo Rodrigues MD;  Location: [facility] ENDOSCOPY;  Service: Gastroenterology;  Laterality: N/A;  preop; hx of polyps  postop; poylps   PCP Aayush Ewing     EYE SURGERY         trk    HARDWARE REMOVAL Left 01/18/2022     Procedure: HARDWARE REMOVAL;  Surgeon: Nathen Farrell MD;  Location: [facility] OR;  Service: Orthopedics;  Laterality: Left;    INCISION AND DRAINAGE SHOULDER Left 11/16/2021     Procedure:  INCISION AND DRAINAGE LEFT SHOULDER SURGICAL WOUND;  Surgeon: Nathen Farrell MD;  Location:  [facility] OR;  Service: Orthopedics;  Laterality: Left;    INCISION AND DRAINAGE SHOULDER Left 01/18/2022     Procedure: LEFT SHOULDER SURGICAL WOUND INCISION AND DRAINAGE, HARDWARE REMOVAL;  Surgeon: Nathen Farrell MD;  Location:  [facility] OR;  Service: Orthopedics;  Laterality: Left;    KNEE SURGERY        MANDIBLE SURGERY         x 2    RESECTION DISTAL CLAVICLE Left 06/29/2021     Procedure: DISTAL CLAVICLE EXCISION;  Surgeon: Nathen Farrell MD;  Location:  [facility] OR;  Service: Orthopedics;  Laterality: Left;    SHOULDER ARTHROSCOPY W/ ROTATOR CUFF REPAIR Left 06/29/2021     Procedure: LEFT SHOULDER  ROTATOR CUFF REPAIR, SUBACROMIAL DECOMPRESSION, DISTAL CLAVICLE EXCISION, BICEPS TENODESIS / TENOTOMY;  Surgeon: Nathen Farrell MD;  Location:  [facility] OR;  Service: Orthopedics;  Laterality: Left;            Family History: family history includes Heart attack in his father; Heart disease in his father.     Social History:  reports that he has quit smoking. He has never used smokeless tobacco. He reports that he does not currently use alcohol. He reports that he does not currently use drugs.     Medications:          Prior to Admission medications    Medication Sig Start Date End Date Taking? Authorizing Provider   aspirin 81 MG EC tablet Take 1 tablet by mouth Daily.     Yes ProviderAniceto MD   Dapagliflozin Propanediol (Farxiga) 10 MG tablet Take 10 mg by mouth Daily.     Yes ProviderAniceto MD   lisinopril (PRINIVIL,ZESTRIL) 10 MG tablet Take 1 tablet by mouth Daily.     Yes ProviderAniceto MD   magnesium oxide (MAG-OX) 400 MG tablet Take 1 tablet by mouth Daily.     Yes ProviderAniceto MD   meloxicam (MOBIC) 15 MG tablet Take 1 tablet by mouth Daily.     Yes Aniceto Bennett MD   metFORMIN (GLUCOPHAGE) 500 MG tablet Take 1 tablet by mouth 2 (Two) Times a Day With  "Meals.     Yes Provider, MD Aniceto   metoprolol succinate XL (TOPROL-XL) 50 MG 24 hr tablet Take 1 tablet by mouth Daily.     Yes Provider, MD Aniceto   multivitamin with minerals tablet tablet Take 1 tablet by mouth Daily.     Yes ProviderAniceto MD   pantoprazole (PROTONIX) 40 MG EC tablet Take 1 tablet by mouth Daily.     Yes Emergency, Nurse Johnny, RN   simvastatin (ZOCOR) 40 MG tablet Take 1 tablet by mouth Every Night.     Yes ProviderAniceto MD   VITAMIN D PO Take 2,000 Units by mouth Daily.     Yes Provider, MD Aniceto   isosorbide mononitrate (IMDUR) 30 MG 24 hr tablet Take 1 tablet by mouth Every Morning. 6/12/24     Scott Snowden,       Allergies:  Allergies   No Known Allergies        Objective      Vital Signs: /80   Pulse 78   Ht 182.9 cm (72\")   Wt 98 kg (216 lb)   SpO2 98%   BMI 29.29 kg/m²      Vitals and nursing note reviewed.   Constitutional:       Appearance: Normal and healthy appearance. Well-developed and not in distress.   Eyes:      Extraocular Movements: Extraocular movements intact.      Pupils: Pupils are equal, round, and reactive to light.   HENT:      Head: Normocephalic and atraumatic.    Mouth/Throat:      Pharynx: Oropharynx is clear.   Neck:      Vascular: JVD normal.      Trachea: Trachea normal.   Pulmonary:      Effort: Pulmonary effort is normal.      Breath sounds: Normal breath sounds. No wheezing. No rhonchi. No rales.   Cardiovascular:      PMI at left midclavicular line. Normal rate. Regular rhythm. Normal S1. Normal S2.       Murmurs: There is a grade 2/6 systolic murmur.      No gallop.  No click. No rub.   Pulses:     Dorsalis pedis: 2+ bilaterally.     Posterior tibial: 2+ bilaterally.  Abdominal:      General: Bowel sounds are normal.      Palpations: Abdomen is soft.      Tenderness: There is no abdominal tenderness.   Musculoskeletal: Normal range of motion.      Cervical back: Normal range of motion and neck supple. " "Skin:     General: Skin is warm and dry.      Capillary Refill: Capillary refill takes less than 2 seconds.   Feet:      Right foot:      Skin integrity: Skin integrity normal.      Left foot:      Skin integrity: Skin integrity normal.   Neurological:      Mental Status: Alert and oriented to person, place and time.      Sensory: Sensation is intact.      Motor: Motor function is intact.      Coordination: Coordination is intact.   Psychiatric:         Speech: Speech normal.         Behavior: Behavior is cooperative.         Physical Exam        Results Reviewed:        ECG 12 Lead     Date/Time: 6/13/2024 12:43 PM  Performed by: Scott Snowden DO     Authorized by: Scott Snowden DO             Results  Imaging  Stress test showed no ST changes.     No results found for: \"CHOL\", \"TRIG\", \"HDL\", \"VLDL\", \"LDLHDL\"  No results found for: \"HGBA1C\"     Assessment / Plan         Problem List Items Addressed This Visit         Abnormal stress test - Primary     Relevant Orders     Case Request Cath Lab: Left Heart Cath (Completed)     Type 2 diabetes mellitus without complication, without long-term current use of insulin     Primary hypertension     Mixed hyperlipidemia      Assessment & Plan  1. Shortness of breath.  Given the patient's family history, a heart catheterization is deemed necessary. A heart catheterization is scheduled for Monday, 06/17/2023. Imdur has been prescribed.        Transcribed from ambient dictation for Scott Snowden DO by Scott Snowden DO.  06/13/24   12:43 CDT     Patient or patient representative verbalized consent for the use of Ambient Listening during the visit with  Scott Snowden DO for chart documentation. 6/13/2024  12:45 CDT  "

## 2024-06-26 ENCOUNTER — OFFICE VISIT (OUTPATIENT)
Dept: CARDIAC SURGERY | Facility: CLINIC | Age: 64
End: 2024-06-26
Payer: COMMERCIAL

## 2024-06-26 VITALS
DIASTOLIC BLOOD PRESSURE: 78 MMHG | WEIGHT: 211.4 LBS | SYSTOLIC BLOOD PRESSURE: 119 MMHG | BODY MASS INDEX: 28.63 KG/M2 | OXYGEN SATURATION: 97 % | HEIGHT: 72 IN | HEART RATE: 67 BPM

## 2024-06-26 DIAGNOSIS — I25.110 CORONARY ARTERY DISEASE INVOLVING NATIVE CORONARY ARTERY OF NATIVE HEART WITH UNSTABLE ANGINA PECTORIS: Primary | ICD-10-CM

## 2024-06-26 DIAGNOSIS — I25.10 CORONARY ARTERY DISEASE INVOLVING NATIVE CORONARY ARTERY OF NATIVE HEART, UNSPECIFIED WHETHER ANGINA PRESENT: Primary | ICD-10-CM

## 2024-06-26 DIAGNOSIS — I49.9 IRREGULAR HEART BEAT: Primary | ICD-10-CM

## 2024-06-26 DIAGNOSIS — E11.9 TYPE 2 DIABETES MELLITUS WITHOUT COMPLICATION, WITHOUT LONG-TERM CURRENT USE OF INSULIN: ICD-10-CM

## 2024-06-26 NOTE — PROGRESS NOTES
Chief Complaint   Patient presents with    Coronary Artery Disease     New patient from Dr Snowden          Subjective     History of Present Illness  The patient presents for evaluation of multiple medical concerns. He is accompanied by his wife.    The patient was informed of a number of blockages, necessitating bypass surgery. He experienced dyspnea approximately 3 weeks ago, which has progressively worsened. An EKG conducted in the office revealed some abnormalities, prompting a subsequent echocardiogram stress test. Subsequently, he was referred to a cardiologist and underwent cardiac catheterization last Tuesday. He reports chest tightness and occasional palpitations. His occupation involves heavy lifting, which exacerbates his dyspnea. He denies any lower extremity edema.    Supplemental Information  He has borderline diabetes, which is diet controlled. He has had some polyps removed. He has no history of cancer. He had rotator cuff and tendon for the bicep was deteriorating, so they cut it and re-anchored it in his armpit. The hardware where they anchored it to the bone got infected, and he was on a PICC line for 6 weeks of antibiotics 3 times a day. He had 3 different surgeries on that because it kept opening up and draining. After the second surgery, they did an MRI and they saw that the screw was infected, so they had to take the screw out. He has GERD.   He used to smoke a pack a day.   His father had his first heart attack at 35, but he lived to be 91. He has 4 sisters and 1 brother, and all of them except for his brother and father have had bypass surgery.    Review of Systems       Past Medical History:   Diagnosis Date    Arthritis     Cervical radiculopathy     Coronary artery disease     Diabetes     Epidermal cyst     GERD (gastroesophageal reflux disease)     History of adenomatous polyp of colon     Hyperlipidemia     Hypertension     PONV (postoperative nausea and vomiting)     Vitamin D  deficiency      Past Surgical History:   Procedure Laterality Date    BICEPS TENDONESIS SUBPECTORALIS REPAIR Left 06/29/2021    Procedure: BICEPS TENODESIS / TENOTOMY;  Surgeon: Nathen Farrell MD;  Location:  [facility];  Service: Orthopedics;  Laterality: Left;    CARDIAC CATHETERIZATION      no stents     CARDIAC CATHETERIZATION N/A 06/18/2024    Procedure: Left Heart Cath;  Surgeon: Scott Snowden DO;  Location: [facility] CATH INVASIVE LOCATION;  Service: Cardiovascular;  Laterality: N/A;    COLONOSCOPY  05/22/2009    2 polyps, adenomatous    COLONOSCOPY N/A 08/27/2020    Procedure: COLONOSCOPY WITH ANESTHESIA;  Surgeon: Mundo Rodrigues MD;  Location: [facility] ENDOSCOPY;  Service: Gastroenterology;  Laterality: N/A;  preop; hx of polyps  postop; poylps   PCP Aayush Kaylin     EYE SURGERY      trk    HARDWARE REMOVAL Left 01/18/2022    Procedure: HARDWARE REMOVAL;  Surgeon: Nathen Farrell MD;  Location:  [facility];  Service: Orthopedics;  Laterality: Left;    INCISION AND DRAINAGE SHOULDER Left 11/16/2021    Procedure: INCISION AND DRAINAGE LEFT SHOULDER SURGICAL WOUND;  Surgeon: Nathen Farrell MD;  Location:  [facility];  Service: Orthopedics;  Laterality: Left;    INCISION AND DRAINAGE SHOULDER Left 01/18/2022    Procedure: LEFT SHOULDER SURGICAL WOUND INCISION AND DRAINAGE, HARDWARE REMOVAL;  Surgeon: Nathen Farrell MD;  Location:  [facility];  Service: Orthopedics;  Laterality: Left;    KNEE CARTILAGE SURGERY Left     MANDIBLE SURGERY      x 2    RESECTION DISTAL CLAVICLE Left 06/29/2021    Procedure: DISTAL CLAVICLE EXCISION;  Surgeon: Nathen Farrell MD;  Location:  [facility];  Service: Orthopedics;  Laterality: Left;    SHOULDER ARTHROSCOPY W/ ROTATOR CUFF REPAIR Left 06/29/2021    Procedure: LEFT SHOULDER  ROTATOR CUFF REPAIR, SUBACROMIAL DECOMPRESSION, DISTAL CLAVICLE EXCISION, BICEPS TENODESIS / TENOTOMY;  Surgeon: Nathen Farrell MD;  Location:  [facility];   Service: Orthopedics;  Laterality: Left;     Family History   Problem Relation Age of Onset    Heart attack Father     Heart disease Father     Colon cancer Neg Hx     Colon polyps Neg Hx      Social History     Tobacco Use    Smoking status: Former     Current packs/day: 0.00     Average packs/day: 1 pack/day for 10.0 years (10.0 ttl pk-yrs)     Types: Cigarettes     Start date:      Quit date:      Years since quittin.5    Smokeless tobacco: Never   Vaping Use    Vaping status: Never Used   Substance Use Topics    Alcohol use: Yes     Comment: pt states frequent beer drinker    Drug use: Not Currently     No current facility-administered medications for this visit.     No current outpatient medications on file.     Facility-Administered Medications Ordered in Other Visits   Medication Dose Route Frequency Provider Last Rate Last Admin    ceFAZolin 2000 mg IVPB in 100 mL NS (MBP)  2,000 mg Intravenous Once Nicolasa Hartmann APRN        Chlorhexidine Gluconate Cloth 2 % pads 1 Application  1 Application Topical Q12H PRN Nicolasa Hartmann APRN   1 Application at 24 0701    dextrose (D50W) (25 g/50 mL) IV injection 10-50 mL  10-50 mL Intravenous Q15 Min PRN Nicolasa Hartmann APRN        dextrose (GLUTOSE) oral gel 15 g  15 g Oral Q15 Min PRN Nicolasa Hartmann APRN        glucagon (GLUCAGEN) injection 1 mg  1 mg Intramuscular Q15 Min PRN Nicolasa Hartmann APRN        insulin regular (HumuLIN R,NovoLIN R) 100 Units in sodium chloride 0.9 % 100 mL (1 Units/mL) infusion  0-100 Units/hr Intravenous Titrated Nicolasa Hartmann APRN        lactated ringers infusion 1,000 mL  1,000 mL Intravenous Continuous Tim Cancino MD 25 mL/hr at 24 0701 1,000 mL at 24 0701    lactated ringers infusion 1,000 mL  1,000 mL Intravenous Continuous Tim Cancino MD 25 mL/hr at 24 0701 1,000 mL at 24 0701    lactated ringers infusion  100 mL/hr Intravenous Continuous Yovany Charles MD         "lidocaine PF 1% (XYLOCAINE) injection 0.5 mL  0.5 mL Intradermal Once PRN Tim Cancino MD        midazolam (VERSED) injection 1 mg  1 mg Intravenous Q10 Min PRN Yovany Charles MD        sodium chloride 0.9 % flush 10 mL  10 mL Intravenous PRN Tim Cancino MD        sodium chloride 0.9 % flush 3 mL  3 mL Intravenous Q12H Nicolasa Hartmann APRN        sodium chloride 0.9 % flush 3 mL  3 mL Intravenous PRN Tim Cancino MD        sodium chloride 0.9 % flush 3 mL  3 mL Intravenous Q12H Yovany Charles MD        sodium chloride 0.9 % flush 3-10 mL  3-10 mL Intravenous PRN Nicolasa Hartmann, JESUS        sodium chloride 0.9 % flush 3-10 mL  3-10 mL Intravenous PRN Yovany Charles MD        sodium chloride 0.9 % flush 30 mL  30 mL Intravenous Once PRN Nicolasa Hartmann APRN        sodium chloride 0.9 % infusion 40 mL  40 mL Intravenous PRN Yovany Charles MD        sodium chloride 0.9 % infusion  30 mL/hr Intravenous Continuous PRN Nicolasa Hartmann, APRCHARLOTTE         Allergies:  Patient has no known allergies.    Objective      Vital Signs  Visit Vitals  /78 (BP Location: Right arm, Patient Position: Sitting, Cuff Size: Adult)   Pulse 67   Ht 182.9 cm (72\")   Wt 95.9 kg (211 lb 6.4 oz)   SpO2 97%   BMI 28.67 kg/m²         Physical Exam  Physical Exam  Patient is in no acute distress, appropriate.  Lungs are clear to auscultation bilaterally. No wheezing, rales, or rales.  Heart has a regular rate and rhythm without murmurs, rubs, or gallops.  Abdomen is soft, nontender.  Extremities show no clubbing, cyanosis, or edema.    Results Review:     Results  Laboratory Studies  A1c is 5.9.    Imaging    I reviewed the patient's new clinical results.  Discussed with patient and wife      Assessment & Plan       Diagnoses and all orders for this visit:    1. Coronary artery disease involving native coronary artery of native heart with unstable angina pectoris (Primary)    2. Type 2 diabetes mellitus without " complication, without long-term current use of insulin      Assessment & Plan  1. Multivessel coronary artery disease, left main coronary artery disease, diabetes mellitus, well controlled, history of MSSA infection post shoulder procedure, former smoker.  Angiography was thoroughly reviewed with the patient and his wife. The potential treatment options for coronary artery disease, including medical therapy, PCI, or surgical revascularization, were discussed. The advantages and disadvantages of each option were thoroughly discussed. The patient's comorbidities, which influence the decision-making process for one treatment plan versus the other, were also discussed. I concur with the belief that the patient would benefit from surgical revascularization. However, the patient's significant dyspnea has progressively worsened. The plan is to expedite the workup. Tentatively, the plan for coronary artery bypass grafting is scheduled for 07/01/2024. A CT of the chest, bedside pulmonary function test, 2D complete echocardiogram, carotid duplex, vein mapping, and bilateral lower extremity mapping will be performed. The operative contact and expected postoperative course were thoroughly discussed. The operative risks, including, but not limited to, bleeding, infection, stroke, heart attack, need for additional procedures, general anesthesia, prolonged mechanical ventilation, prolonged ICU stay, organ dysfunction and/or failure, chronic pain syndromes, sternal nonunion, infection, and/or death, were also discussed. The plan for sternal plating for the management of the patient's external division, as well as a Prevena for sternotomy incision, was also discussed. All queries raised by both the patient and his wife, who were present, were satisfactorily addressed. The patient, a non-smoker, and I commended him for this. I will continue to keep you updated on his care.      Transcribed from ambient dictation for Tim SILVEIRA  MD Barak by Tim Cancino MD.  06/26/24   13:54 CDT    Patient or patient representative verbalized consent for the use of Ambient Listening during the visit with  Tim Cancino MD for chart documentation. 6/28/2024  07:11 CDT

## 2024-06-26 NOTE — H&P (VIEW-ONLY)
Chief Complaint   Patient presents with    Coronary Artery Disease     New patient from Dr Snowden          Subjective     History of Present Illness  The patient presents for evaluation of multiple medical concerns. He is accompanied by his wife.    The patient was informed of a number of blockages, necessitating bypass surgery. He experienced dyspnea approximately 3 weeks ago, which has progressively worsened. An EKG conducted in the office revealed some abnormalities, prompting a subsequent echocardiogram stress test. Subsequently, he was referred to a cardiologist and underwent cardiac catheterization last Tuesday. He reports chest tightness and occasional palpitations. His occupation involves heavy lifting, which exacerbates his dyspnea. He denies any lower extremity edema.    Supplemental Information  He has borderline diabetes, which is diet controlled. He has had some polyps removed. He has no history of cancer. He had rotator cuff and tendon for the bicep was deteriorating, so they cut it and re-anchored it in his armpit. The hardware where they anchored it to the bone got infected, and he was on a PICC line for 6 weeks of antibiotics 3 times a day. He had 3 different surgeries on that because it kept opening up and draining. After the second surgery, they did an MRI and they saw that the screw was infected, so they had to take the screw out. He has GERD.   He used to smoke a pack a day.   His father had his first heart attack at 35, but he lived to be 91. He has 4 sisters and 1 brother, and all of them except for his brother and father have had bypass surgery.    Review of Systems       Past Medical History:   Diagnosis Date    Arthritis     Cervical radiculopathy     Coronary artery disease     Diabetes     Epidermal cyst     GERD (gastroesophageal reflux disease)     History of adenomatous polyp of colon     Hyperlipidemia     Hypertension     PONV (postoperative nausea and vomiting)     Vitamin D  deficiency      Past Surgical History:   Procedure Laterality Date    BICEPS TENDONESIS SUBPECTORALIS REPAIR Left 06/29/2021    Procedure: BICEPS TENODESIS / TENOTOMY;  Surgeon: Nathen Farrell MD;  Location:  [facility];  Service: Orthopedics;  Laterality: Left;    CARDIAC CATHETERIZATION      no stents     CARDIAC CATHETERIZATION N/A 06/18/2024    Procedure: Left Heart Cath;  Surgeon: Scott Snowden DO;  Location: [facility] CATH INVASIVE LOCATION;  Service: Cardiovascular;  Laterality: N/A;    COLONOSCOPY  05/22/2009    2 polyps, adenomatous    COLONOSCOPY N/A 08/27/2020    Procedure: COLONOSCOPY WITH ANESTHESIA;  Surgeon: Mundo Rodrigues MD;  Location: [facility] ENDOSCOPY;  Service: Gastroenterology;  Laterality: N/A;  preop; hx of polyps  postop; poylps   PCP Aayush Kaylin     EYE SURGERY      trk    HARDWARE REMOVAL Left 01/18/2022    Procedure: HARDWARE REMOVAL;  Surgeon: Nathen Farrell MD;  Location:  [facility];  Service: Orthopedics;  Laterality: Left;    INCISION AND DRAINAGE SHOULDER Left 11/16/2021    Procedure: INCISION AND DRAINAGE LEFT SHOULDER SURGICAL WOUND;  Surgeon: Nathen Farrell MD;  Location:  [facility];  Service: Orthopedics;  Laterality: Left;    INCISION AND DRAINAGE SHOULDER Left 01/18/2022    Procedure: LEFT SHOULDER SURGICAL WOUND INCISION AND DRAINAGE, HARDWARE REMOVAL;  Surgeon: Nathen Farrell MD;  Location:  [facility];  Service: Orthopedics;  Laterality: Left;    KNEE CARTILAGE SURGERY Left     MANDIBLE SURGERY      x 2    RESECTION DISTAL CLAVICLE Left 06/29/2021    Procedure: DISTAL CLAVICLE EXCISION;  Surgeon: Nathen Farrell MD;  Location:  [facility];  Service: Orthopedics;  Laterality: Left;    SHOULDER ARTHROSCOPY W/ ROTATOR CUFF REPAIR Left 06/29/2021    Procedure: LEFT SHOULDER  ROTATOR CUFF REPAIR, SUBACROMIAL DECOMPRESSION, DISTAL CLAVICLE EXCISION, BICEPS TENODESIS / TENOTOMY;  Surgeon: Nathen Farrell MD;  Location:  [facility];   Service: Orthopedics;  Laterality: Left;     Family History   Problem Relation Age of Onset    Heart attack Father     Heart disease Father     Colon cancer Neg Hx     Colon polyps Neg Hx      Social History     Tobacco Use    Smoking status: Former     Current packs/day: 0.00     Average packs/day: 1 pack/day for 10.0 years (10.0 ttl pk-yrs)     Types: Cigarettes     Start date:      Quit date:      Years since quittin.5    Smokeless tobacco: Never   Vaping Use    Vaping status: Never Used   Substance Use Topics    Alcohol use: Yes     Comment: pt states frequent beer drinker    Drug use: Not Currently     No current facility-administered medications for this visit.     No current outpatient medications on file.     Facility-Administered Medications Ordered in Other Visits   Medication Dose Route Frequency Provider Last Rate Last Admin    ceFAZolin 2000 mg IVPB in 100 mL NS (MBP)  2,000 mg Intravenous Once Nicolasa Hartmann APRN        Chlorhexidine Gluconate Cloth 2 % pads 1 Application  1 Application Topical Q12H PRN Nicolasa Hartmann APRN   1 Application at 24 0701    dextrose (D50W) (25 g/50 mL) IV injection 10-50 mL  10-50 mL Intravenous Q15 Min PRN Nicolasa Hartmann APRN        dextrose (GLUTOSE) oral gel 15 g  15 g Oral Q15 Min PRN Nicolasa Hartmann APRN        glucagon (GLUCAGEN) injection 1 mg  1 mg Intramuscular Q15 Min PRN Nicolasa Hartmann APRN        insulin regular (HumuLIN R,NovoLIN R) 100 Units in sodium chloride 0.9 % 100 mL (1 Units/mL) infusion  0-100 Units/hr Intravenous Titrated Nicolasa Hartmann APRN        lactated ringers infusion 1,000 mL  1,000 mL Intravenous Continuous Tim Cancino MD 25 mL/hr at 24 0701 1,000 mL at 24 0701    lactated ringers infusion 1,000 mL  1,000 mL Intravenous Continuous Tim Cancino MD 25 mL/hr at 24 0701 1,000 mL at 24 0701    lactated ringers infusion  100 mL/hr Intravenous Continuous Yovany Charles MD         "lidocaine PF 1% (XYLOCAINE) injection 0.5 mL  0.5 mL Intradermal Once PRN Tim Cancino MD        midazolam (VERSED) injection 1 mg  1 mg Intravenous Q10 Min PRN Yovany Charles MD        sodium chloride 0.9 % flush 10 mL  10 mL Intravenous PRN Tim Cancino MD        sodium chloride 0.9 % flush 3 mL  3 mL Intravenous Q12H Nicolasa Hartmann APRN        sodium chloride 0.9 % flush 3 mL  3 mL Intravenous PRN Tim Cancino MD        sodium chloride 0.9 % flush 3 mL  3 mL Intravenous Q12H Yovany Charles MD        sodium chloride 0.9 % flush 3-10 mL  3-10 mL Intravenous PRN Nicolasa Hartmann, JESUS        sodium chloride 0.9 % flush 3-10 mL  3-10 mL Intravenous PRN Yovany Charles MD        sodium chloride 0.9 % flush 30 mL  30 mL Intravenous Once PRN Nicolasa Hartmann APRN        sodium chloride 0.9 % infusion 40 mL  40 mL Intravenous PRN Yovany Charles MD        sodium chloride 0.9 % infusion  30 mL/hr Intravenous Continuous PRN Nicolasa Hartmann, APRCHARLOTTE         Allergies:  Patient has no known allergies.    Objective      Vital Signs  Visit Vitals  /78 (BP Location: Right arm, Patient Position: Sitting, Cuff Size: Adult)   Pulse 67   Ht 182.9 cm (72\")   Wt 95.9 kg (211 lb 6.4 oz)   SpO2 97%   BMI 28.67 kg/m²         Physical Exam  Physical Exam  Patient is in no acute distress, appropriate.  Lungs are clear to auscultation bilaterally. No wheezing, rales, or rales.  Heart has a regular rate and rhythm without murmurs, rubs, or gallops.  Abdomen is soft, nontender.  Extremities show no clubbing, cyanosis, or edema.    Results Review:     Results  Laboratory Studies  A1c is 5.9.    Imaging    I reviewed the patient's new clinical results.  Discussed with patient and wife      Assessment & Plan       Diagnoses and all orders for this visit:    1. Coronary artery disease involving native coronary artery of native heart with unstable angina pectoris (Primary)    2. Type 2 diabetes mellitus without " complication, without long-term current use of insulin      Assessment & Plan  1. Multivessel coronary artery disease, left main coronary artery disease, diabetes mellitus, well controlled, history of MSSA infection post shoulder procedure, former smoker.  Angiography was thoroughly reviewed with the patient and his wife. The potential treatment options for coronary artery disease, including medical therapy, PCI, or surgical revascularization, were discussed. The advantages and disadvantages of each option were thoroughly discussed. The patient's comorbidities, which influence the decision-making process for one treatment plan versus the other, were also discussed. I concur with the belief that the patient would benefit from surgical revascularization. However, the patient's significant dyspnea has progressively worsened. The plan is to expedite the workup. Tentatively, the plan for coronary artery bypass grafting is scheduled for 07/01/2024. A CT of the chest, bedside pulmonary function test, 2D complete echocardiogram, carotid duplex, vein mapping, and bilateral lower extremity mapping will be performed. The operative contact and expected postoperative course were thoroughly discussed. The operative risks, including, but not limited to, bleeding, infection, stroke, heart attack, need for additional procedures, general anesthesia, prolonged mechanical ventilation, prolonged ICU stay, organ dysfunction and/or failure, chronic pain syndromes, sternal nonunion, infection, and/or death, were also discussed. The plan for sternal plating for the management of the patient's external division, as well as a Prevena for sternotomy incision, was also discussed. All queries raised by both the patient and his wife, who were present, were satisfactorily addressed. The patient, a non-smoker, and I commended him for this. I will continue to keep you updated on his care.      Transcribed from ambient dictation for Tim SILVEIRA  MD Barak by Tim Cancino MD.  06/26/24   13:54 CDT    Patient or patient representative verbalized consent for the use of Ambient Listening during the visit with  Tim Cancino MD for chart documentation. 6/28/2024  07:11 CDT

## 2024-06-27 ENCOUNTER — PRE-ADMISSION TESTING (OUTPATIENT)
Dept: PREADMISSION TESTING | Facility: HOSPITAL | Age: 64
DRG: 236 | End: 2024-06-27
Payer: COMMERCIAL

## 2024-06-27 ENCOUNTER — HOSPITAL ENCOUNTER (OUTPATIENT)
Dept: PULMONOLOGY | Facility: HOSPITAL | Age: 64
Discharge: HOME | End: 2024-06-27
Payer: COMMERCIAL

## 2024-06-27 ENCOUNTER — HOSPITAL ENCOUNTER (OUTPATIENT)
Dept: ULTRASOUND IMAGING | Facility: HOSPITAL | Age: 64
Discharge: HOME | End: 2024-06-27
Payer: COMMERCIAL

## 2024-06-27 ENCOUNTER — PREP FOR SURGERY (OUTPATIENT)
Dept: OTHER | Facility: HOSPITAL | Age: 64
End: 2024-06-27
Payer: COMMERCIAL

## 2024-06-27 ENCOUNTER — HOSPITAL ENCOUNTER (OUTPATIENT)
Dept: GENERAL RADIOLOGY | Facility: HOSPITAL | Age: 64
Discharge: HOME | End: 2024-06-27
Payer: COMMERCIAL

## 2024-06-27 ENCOUNTER — HOSPITAL ENCOUNTER (OUTPATIENT)
Dept: CARDIOLOGY | Facility: HOSPITAL | Age: 64
Discharge: HOME | End: 2024-06-27
Payer: COMMERCIAL

## 2024-06-27 ENCOUNTER — HOSPITAL ENCOUNTER (OUTPATIENT)
Dept: CT IMAGING | Facility: HOSPITAL | Age: 64
Discharge: HOME | End: 2024-06-27
Payer: COMMERCIAL

## 2024-06-27 ENCOUNTER — ANESTHESIA EVENT (OUTPATIENT)
Dept: PERIOP | Facility: HOSPITAL | Age: 64
End: 2024-06-27
Payer: COMMERCIAL

## 2024-06-27 VITALS
SYSTOLIC BLOOD PRESSURE: 132 MMHG | DIASTOLIC BLOOD PRESSURE: 81 MMHG | OXYGEN SATURATION: 97 % | HEIGHT: 71 IN | WEIGHT: 209.88 LBS | BODY MASS INDEX: 29.38 KG/M2 | HEART RATE: 80 BPM | RESPIRATION RATE: 16 BRPM

## 2024-06-27 VITALS
SYSTOLIC BLOOD PRESSURE: 132 MMHG | BODY MASS INDEX: 29.26 KG/M2 | DIASTOLIC BLOOD PRESSURE: 81 MMHG | HEIGHT: 71 IN | WEIGHT: 209 LBS

## 2024-06-27 DIAGNOSIS — I25.10 CORONARY ARTERY DISEASE INVOLVING NATIVE CORONARY ARTERY OF NATIVE HEART, UNSPECIFIED WHETHER ANGINA PRESENT: ICD-10-CM

## 2024-06-27 DIAGNOSIS — I25.10 CORONARY ARTERY DISEASE INVOLVING NATIVE CORONARY ARTERY OF NATIVE HEART, UNSPECIFIED WHETHER ANGINA PRESENT: Primary | ICD-10-CM

## 2024-06-27 LAB
ABO GROUP BLD: NORMAL
ALBUMIN SERPL-MCNC: 4.8 G/DL (ref 3.5–5.2)
ALBUMIN/GLOB SERPL: 1.8 G/DL
ALP SERPL-CCNC: 35 U/L (ref 39–117)
ALT SERPL W P-5'-P-CCNC: 24 U/L (ref 1–41)
ANION GAP SERPL CALCULATED.3IONS-SCNC: 10 MMOL/L (ref 5–15)
APTT PPP: 27.2 SECONDS (ref 24.5–36)
ARTERIAL PATENCY WRIST A: POSITIVE
AST SERPL-CCNC: 18 U/L (ref 1–40)
ATMOSPHERIC PRESS: 750 MMHG
BASE EXCESS BLDA CALC-SCNC: 1.1 MMOL/L (ref 0–2)
BASOPHILS # BLD AUTO: 0.08 10*3/MM3 (ref 0–0.2)
BASOPHILS NFR BLD AUTO: 1.1 % (ref 0–1.5)
BDY SITE: ABNORMAL
BILIRUB SERPL-MCNC: 0.5 MG/DL (ref 0–1.2)
BILIRUB UR QL STRIP: NEGATIVE
BLD GP AB SCN SERPL QL: NEGATIVE
BODY TEMPERATURE: 37
BUN SERPL-MCNC: 20 MG/DL (ref 8–23)
BUN/CREAT SERPL: 25.3 (ref 7–25)
CALCIUM SPEC-SCNC: 9.6 MG/DL (ref 8.6–10.5)
CHLORIDE SERPL-SCNC: 101 MMOL/L (ref 98–107)
CLARITY UR: CLEAR
CO2 SERPL-SCNC: 27 MMOL/L (ref 22–29)
COLOR UR: YELLOW
CREAT SERPL-MCNC: 0.79 MG/DL (ref 0.76–1.27)
DEPRECATED RDW RBC AUTO: 40.9 FL (ref 37–54)
EGFRCR SERPLBLD CKD-EPI 2021: 100.4 ML/MIN/1.73
EOSINOPHIL # BLD AUTO: 0.11 10*3/MM3 (ref 0–0.4)
EOSINOPHIL NFR BLD AUTO: 1.5 % (ref 0.3–6.2)
ERYTHROCYTE [DISTWIDTH] IN BLOOD BY AUTOMATED COUNT: 12 % (ref 12.3–15.4)
GLOBULIN UR ELPH-MCNC: 2.6 GM/DL
GLUCOSE SERPL-MCNC: 87 MG/DL (ref 65–99)
GLUCOSE UR STRIP-MCNC: ABNORMAL MG/DL
HBA1C MFR BLD: 6.1 % (ref 4.8–5.6)
HCO3 BLDA-SCNC: 25.2 MMOL/L (ref 20–26)
HCT VFR BLD AUTO: 48.1 % (ref 37.5–51)
HGB BLD-MCNC: 17.4 G/DL (ref 13–17.7)
HGB UR QL STRIP.AUTO: NEGATIVE
IMM GRANULOCYTES # BLD AUTO: 0.01 10*3/MM3 (ref 0–0.05)
IMM GRANULOCYTES NFR BLD AUTO: 0.1 % (ref 0–0.5)
INR PPP: 0.98 (ref 0.91–1.09)
KETONES UR QL STRIP: NEGATIVE
LEUKOCYTE ESTERASE UR QL STRIP.AUTO: NEGATIVE
LYMPHOCYTES # BLD AUTO: 1.93 10*3/MM3 (ref 0.7–3.1)
LYMPHOCYTES NFR BLD AUTO: 26 % (ref 19.6–45.3)
Lab: ABNORMAL
MCH RBC QN AUTO: 33.5 PG (ref 26.6–33)
MCHC RBC AUTO-ENTMCNC: 36.2 G/DL (ref 31.5–35.7)
MCV RBC AUTO: 92.5 FL (ref 79–97)
MODALITY: ABNORMAL
MONOCYTES # BLD AUTO: 0.83 10*3/MM3 (ref 0.1–0.9)
MONOCYTES NFR BLD AUTO: 11.2 % (ref 5–12)
NEUTROPHILS NFR BLD AUTO: 4.46 10*3/MM3 (ref 1.7–7)
NEUTROPHILS NFR BLD AUTO: 60.1 % (ref 42.7–76)
NITRITE UR QL STRIP: NEGATIVE
NRBC BLD AUTO-RTO: 0 /100 WBC (ref 0–0.2)
PCO2 BLDA: 38 MM HG (ref 35–45)
PCO2 TEMP ADJ BLD: 38 MM HG (ref 35–45)
PH BLDA: 7.43 PH UNITS (ref 7.35–7.45)
PH UR STRIP.AUTO: 7 [PH] (ref 5–8)
PH, TEMP CORRECTED: 7.43 PH UNITS (ref 7.35–7.45)
PLATELET # BLD AUTO: 228 10*3/MM3 (ref 140–450)
PMV BLD AUTO: 10 FL (ref 6–12)
PO2 BLDA: 73.6 MM HG (ref 83–108)
PO2 TEMP ADJ BLD: 73.6 MM HG (ref 83–108)
POTASSIUM SERPL-SCNC: 4 MMOL/L (ref 3.5–5.2)
PROT SERPL-MCNC: 7.4 G/DL (ref 6–8.5)
PROT UR QL STRIP: NEGATIVE
PROTHROMBIN TIME: 13.4 SECONDS (ref 11.8–14.8)
RBC # BLD AUTO: 5.2 10*6/MM3 (ref 4.14–5.8)
RH BLD: POSITIVE
SAO2 % BLDCOA: 96.3 % (ref 94–99)
SODIUM SERPL-SCNC: 138 MMOL/L (ref 136–145)
SP GR UR STRIP: >1.03 (ref 1–1.03)
T&S EXPIRATION DATE: NORMAL
UROBILINOGEN UR QL STRIP: ABNORMAL
VENTILATOR MODE: ABNORMAL
WBC NRBC COR # BLD AUTO: 7.42 10*3/MM3 (ref 3.4–10.8)

## 2024-06-27 PROCEDURE — 86850 RBC ANTIBODY SCREEN: CPT

## 2024-06-27 PROCEDURE — 82803 BLOOD GASES ANY COMBINATION: CPT

## 2024-06-27 PROCEDURE — 86901 BLOOD TYPING SEROLOGIC RH(D): CPT

## 2024-06-27 PROCEDURE — 93306 TTE W/DOPPLER COMPLETE: CPT

## 2024-06-27 PROCEDURE — 85730 THROMBOPLASTIN TIME PARTIAL: CPT

## 2024-06-27 PROCEDURE — 85025 COMPLETE CBC W/AUTO DIFF WBC: CPT

## 2024-06-27 PROCEDURE — 80053 COMPREHEN METABOLIC PANEL: CPT

## 2024-06-27 PROCEDURE — 93970 EXTREMITY STUDY: CPT

## 2024-06-27 PROCEDURE — 85610 PROTHROMBIN TIME: CPT

## 2024-06-27 PROCEDURE — 83036 HEMOGLOBIN GLYCOSYLATED A1C: CPT

## 2024-06-27 PROCEDURE — 71046 X-RAY EXAM CHEST 2 VIEWS: CPT

## 2024-06-27 PROCEDURE — 93005 ELECTROCARDIOGRAM TRACING: CPT

## 2024-06-27 PROCEDURE — 94010 BREATHING CAPACITY TEST: CPT

## 2024-06-27 PROCEDURE — 25510000001 IOPAMIDOL PER 1 ML

## 2024-06-27 PROCEDURE — 93356 MYOCRD STRAIN IMG SPCKL TRCK: CPT

## 2024-06-27 PROCEDURE — 81003 URINALYSIS AUTO W/O SCOPE: CPT

## 2024-06-27 PROCEDURE — 36600 WITHDRAWAL OF ARTERIAL BLOOD: CPT

## 2024-06-27 PROCEDURE — 36415 COLL VENOUS BLD VENIPUNCTURE: CPT

## 2024-06-27 PROCEDURE — 86900 BLOOD TYPING SEROLOGIC ABO: CPT

## 2024-06-27 PROCEDURE — 94010 BREATHING CAPACITY TEST: CPT | Performed by: INTERNAL MEDICINE

## 2024-06-27 PROCEDURE — 93880 EXTRACRANIAL BILAT STUDY: CPT

## 2024-06-27 PROCEDURE — 93010 ELECTROCARDIOGRAM REPORT: CPT | Performed by: INTERNAL MEDICINE

## 2024-06-27 PROCEDURE — 86920 COMPATIBILITY TEST SPIN: CPT

## 2024-06-27 PROCEDURE — 71275 CT ANGIOGRAPHY CHEST: CPT

## 2024-06-27 RX ORDER — IBUPROFEN 600 MG/1
1 TABLET ORAL
Status: CANCELLED | OUTPATIENT
Start: 2024-06-27

## 2024-06-27 RX ORDER — DEXTROSE MONOHYDRATE 25 G/50ML
10-50 INJECTION, SOLUTION INTRAVENOUS
Status: CANCELLED | OUTPATIENT
Start: 2024-06-27

## 2024-06-27 RX ORDER — CHLORHEXIDINE GLUCONATE 500 MG/1
1 CLOTH TOPICAL EVERY 12 HOURS PRN
Status: CANCELLED | OUTPATIENT
Start: 2024-06-27

## 2024-06-27 RX ORDER — NICOTINE POLACRILEX 4 MG
15 LOZENGE BUCCAL
Status: CANCELLED | OUTPATIENT
Start: 2024-06-27

## 2024-06-27 RX ORDER — MULTIVIT WITH MINERALS/LUTEIN
1000 TABLET ORAL DAILY
COMMUNITY

## 2024-06-27 RX ORDER — DICLOFENAC SODIUM AND MISOPROSTOL 75; 200 MG/1; UG/1
2 TABLET, DELAYED RELEASE ORAL EVERY MORNING
COMMUNITY
End: 2024-06-27

## 2024-06-27 RX ORDER — SODIUM CHLORIDE 0.9 % (FLUSH) 0.9 %
3 SYRINGE (ML) INJECTION EVERY 12 HOURS SCHEDULED
Status: CANCELLED | OUTPATIENT
Start: 2024-06-27

## 2024-06-27 RX ORDER — SODIUM CHLORIDE 0.9 % (FLUSH) 0.9 %
30 SYRINGE (ML) INJECTION ONCE AS NEEDED
Status: CANCELLED | OUTPATIENT
Start: 2024-06-27

## 2024-06-27 RX ORDER — SODIUM CHLORIDE 9 MG/ML
30 INJECTION, SOLUTION INTRAVENOUS CONTINUOUS PRN
Status: CANCELLED | OUTPATIENT
Start: 2024-06-27

## 2024-06-27 RX ORDER — ACETAMINOPHEN 500 MG
1000 TABLET ORAL ONCE
Status: CANCELLED | OUTPATIENT
Start: 2024-07-01

## 2024-06-27 RX ORDER — UBIDECARENONE 100 MG
100 CAPSULE ORAL DAILY
COMMUNITY

## 2024-06-27 RX ORDER — SODIUM PHOSPHATE,MONO-DIBASIC 19G-7G/118
1 ENEMA (ML) RECTAL DAILY
COMMUNITY

## 2024-06-27 RX ORDER — SODIUM CHLORIDE 0.9 % (FLUSH) 0.9 %
3-10 SYRINGE (ML) INJECTION AS NEEDED
Status: CANCELLED | OUTPATIENT
Start: 2024-06-27

## 2024-06-27 RX ADMIN — IOPAMIDOL 100 ML: 755 INJECTION, SOLUTION INTRAVENOUS at 09:00

## 2024-06-27 NOTE — ANESTHESIA PREPROCEDURE EVALUATION
Anesthesia Evaluation     Patient summary reviewed   history of anesthetic complications:  PONV               Airway   Mallampati: II  Dental    (+) upper dentures    Pulmonary    (-) COPD, asthma, sleep apnea, not a smoker  Cardiovascular   Exercise tolerance: good (4-7 METS)    ECG reviewed    (+) hypertension, CAD, hyperlipidemia  (-) pacemaker, past MI, angina, CHF, cardiac stents, carotid artery disease      Neuro/Psych  (-) seizures, TIA, CVA  GI/Hepatic/Renal/Endo    (+) diabetes mellitus  (-) GERD, liver disease, no renal disease    Musculoskeletal     Abdominal    Substance History      OB/GYN          Other                      Anesthesia Plan    ASA 4     general, Jud, CVL and PAC     (No CI to LUIS.)  intravenous induction     Anesthetic plan, risks, benefits, and alternatives have been provided, discussed and informed consent has been obtained with: patient.    Use of blood products discussed with patient  Consented to blood products.        CODE STATUS:

## 2024-06-27 NOTE — DISCHARGE INSTRUCTIONS
Preparing for Surgery  Follow these instructions before the procedure:  Several days or weeks before your procedure      Ask your health care provider about:  Changing or stopping your regular medicines. This is especially important if you are taking diabetes medicines or blood thinners.  Taking medicines such as aspirin and ibuprofen. These medicines can thin your blood. Do not take these medicines unless your health care provider tells you to take them.  Taking over-the-counter medicines, vitamins, herbs, and supplements.    Contact your surgeon if you:  Develop a fever of more than 100.4°F (38°C) or other feelings of illness during the 48 hours before your surgery.  Have symptoms that get worse.  Have questions or concerns about your surgery.  If you are going home the same day of your surgery you will need to arrange for a responsible adult, age 18 years old or older, to drive you home from the hospital and stay with you for 24 hours. Verification of the  will be made prior to any procedure requiring sedation. You may not go home in a taxi or any form of public transportation by yourself.     Day before your procedure  Medication(s) you need to stop the day before your surgery: DO NOT TAKE LISINOPRIL FOR 24 HOURS PRIOR TO SURGERY    24 hours before your procedure DO NOT drink alcoholic beverages or smoke.  24 hours before your procedure STOP taking Erectile Dysfunction medication (i.e.,Cialis, Viagra)   You may be asked to shower with a germ-killing soap.  Day of your procedure   You may take the following medication(s) the morning of surgery with a sip of water: NONE PER DR GODOY ORDERS      8 hours before your procedure STOP all food, any dairy products, and full liquids. This includes hard candy, chewing gum or mints. This is extremely important to prevent serious complications.     Up to 2 hours before your scheduled arrival time, you may have clear liquids no cream, powder, or pulp of any kind. Safe  options are water, black coffee, plain tea, soda, Gatorade/Powerade, clear broth, apple juice.    2 hours before your scheduled arrival time, STOP drinking clear liquids.    You may need to take another shower with a germ-killing soap before you leave home in the morning. Do not use perfumes, colognes, or body lotions.  Wear comfortable loose-fitting clothing.  Remove all jewelry including body piercing and rings, dark colored nail polish, and make up prior to arrival at the hospital. Leave all valuables at home.   Bring your hearing aids if you rely on them.  Do not wear contact lenses. If you wear eyeglasses remember to bring a case to store them in while you are in surgery.  Do not use denture adhesives since you will be asked to remove them during your surgery.    You do not need to bring your home medications into the hospital.   Bring your sleep apnea device with you on the day of your surgery (if this applies to you).  If you wear portable oxygen, bring it with you.   If you are staying overnight, you may bring a bag of items you may need such as slippers, robe and a change of clothes for your discharge. You may want to leave these items in the car until you are ready for them since your family will take your belongings when you leave the pre-operative area.  Arrive at the hospital as scheduled by the office. You will be asked to arrive 2 hours prior to your surgery time in order to prepare for your procedure.  When you arrive at the hospital  Go to the registration desk located at the main entrance of the hospital.  After registration is completed, you will be given a beeper and a sticker sheet. Take the stickers to Outpatient Surgery and place in the tray at the end of the desk to notify the staff that you have arrived and registered.   Return to the lobby to wait. You are not always called back according to the time of arrival but rather the time your doctor will be ready.  When your beeper lights up and  vibrates proceed through the double doors, under the stairs, and a member of the Outpatient Surgery staff will escort you to your preoperative room.     How to Use Chlorhexidine Before Surgery  Chlorhexidine gluconate (CHG) is a germ-killing (antiseptic) solution that is used to clean the skin. It can get rid of the bacteria that normally live on the skin and can keep them away for about 24 hours. To clean your skin with CHG, you may be given:  A CHG solution to use in the shower or as part of a sponge bath.  A prepackaged cloth that contains CHG.  Cleaning your skin with CHG may help lower the risk for infection:  While you are staying in the intensive care unit of the hospital.  If you have a vascular access, such as a central line, to provide short-term or long-term access to your veins.  If you have a catheter to drain urine from your bladder.  If you are on a ventilator. A ventilator is a machine that helps you breathe by moving air in and out of your lungs.  After surgery.  What are the risks?  Risks of using CHG include:  A skin reaction.  Hearing loss, if CHG gets in your ears and you have a perforated eardrum.  Eye injury, if CHG gets in your eyes and is not rinsed out.  The CHG product catching fire.  Make sure that you avoid smoking and flames after applying CHG to your skin.  Do not use CHG:  If you have a chlorhexidine allergy or have previously reacted to chlorhexidine.  On babies younger than 2 months of age.  How to use CHG solution  Use CHG only as told by your health care provider, and follow the instructions on the label.  Use the full amount of CHG as directed. Usually, this is one bottle.  During a shower    Follow these steps when using CHG solution during a shower (unless your health care provider gives you different instructions):  Start the shower.  Use your normal soap and shampoo to wash your face and hair.  Turn off the shower or move out of the shower stream.  Pour the CHG onto a clean  washcloth. Do not use any type of brush or rough-edged sponge.  Starting at your neck, lather your body down to your toes. Make sure you follow these instructions:  If you will be having surgery, pay special attention to the part of your body where you will be having surgery. Scrub this area for at least 1 minute.  Do not use CHG on your head or face. If the solution gets into your ears or eyes, rinse them well with water.  Avoid your genital area.  Avoid any areas of skin that have broken skin, cuts, or scrapes.  Scrub your back and under your arms. Make sure to wash skin folds.  Let the lather sit on your skin for 1-2 minutes or as long as told by your health care provider.  Thoroughly rinse your entire body in the shower. Make sure that all body creases and crevices are rinsed well.  Dry off with a clean towel. Do not put any substances on your body afterward--such as powder, lotion, or perfume--unless you are told to do so by your health care provider. Only use lotions that are recommended by the .  Put on clean clothes or pajamas.  If it is the night before your surgery, sleep in clean sheets.     During a sponge bath  Follow these steps when using CHG solution during a sponge bath (unless your health care provider gives you different instructions):  Use your normal soap and shampoo to wash your face and hair.  Pour the CHG onto a clean washcloth.  Starting at your neck, lather your body down to your toes. Make sure you follow these instructions:  If you will be having surgery, pay special attention to the part of your body where you will be having surgery. Scrub this area for at least 1 minute.  Do not use CHG on your head or face. If the solution gets into your ears or eyes, rinse them well with water.  Avoid your genital area.  Avoid any areas of skin that have broken skin, cuts, or scrapes.  Scrub your back and under your arms. Make sure to wash skin folds.  Let the lather sit on your skin for  1-2 minutes or as long as told by your health care provider.  Using a different clean, wet washcloth, thoroughly rinse your entire body. Make sure that all body creases and crevices are rinsed well.  Dry off with a clean towel. Do not put any substances on your body afterward--such as powder, lotion, or perfume--unless you are told to do so by your health care provider. Only use lotions that are recommended by the .  Put on clean clothes or pajamas.  If it is the night before your surgery, sleep in clean sheets.  How to use CHG prepackaged cloths  Only use CHG cloths as told by your health care provider, and follow the instructions on the label.  Use the CHG cloth on clean, dry skin.  Do not use the CHG cloth on your head or face unless your health care provider tells you to.  When washing with the CHG cloth:  Avoid your genital area.  Avoid any areas of skin that have broken skin, cuts, or scrapes.  Before surgery    Follow these steps when using a CHG cloth to clean before surgery (unless your health care provider gives you different instructions):  Using the CHG cloth, vigorously scrub the part of your body where you will be having surgery. Scrub using a back-and-forth motion for 3 minutes. The area on your body should be completely wet with CHG when you are done scrubbing.  Do not rinse. Discard the cloth and let the area air-dry. Do not put any substances on the area afterward, such as powder, lotion, or perfume.  Put on clean clothes or pajamas.  If it is the night before your surgery, sleep in clean sheets.     For general bathing  Follow these steps when using CHG cloths for general bathing (unless your health care provider gives you different instructions).  Use a separate CHG cloth for each area of your body. Make sure you wash between any folds of skin and between your fingers and toes. Wash your body in the following order, switching to a new cloth after each step:  The front of your neck,  shoulders, and chest.  Both of your arms, under your arms, and your hands.  Your stomach and groin area, avoiding the genitals.  Your right leg and foot.  Your left leg and foot.  The back of your neck, your back, and your buttocks.  Do not rinse. Discard the cloth and let the area air-dry. Do not put any substances on your body afterward--such as powder, lotion, or perfume--unless you are told to do so by your health care provider. Only use lotions that are recommended by the .  Put on clean clothes or pajamas.  Contact a health care provider if:  Your skin gets irritated after scrubbing.  You have questions about using your solution or cloth.  You swallow any chlorhexidine. Call your local poison control center (1-441.222.3060 in the [place]  Get help right away if:  Your eyes itch badly, or they become very red or swollen.  Your skin itches badly and is red or swollen.  Your hearing changes.  You have trouble seeing.  You have swelling or tingling in your mouth or throat.  You have trouble breathing.  These symptoms may represent a serious problem that is an emergency. Do not wait to see if the symptoms will go away. Get medical help right away. Call your local emergency services (352 in the [place] Do not drive yourself to the hospital.  Summary  Chlorhexidine gluconate (CHG) is a germ-killing (antiseptic) solution that is used to clean the skin. Cleaning your skin with CHG may help to lower your risk for infection.  You may be given CHG to use for bathing. It may be in a bottle or in a prepackaged cloth to use on your skin. Carefully follow your health care provider's instructions and the instructions on the product label.  Do not use CHG if you have a chlorhexidine allergy.  Contact your health care provider if your skin gets irritated after scrubbing.  This information is not intended to replace advice given to you by your health care provider. Make sure you discuss any questions you have with your  health care provider.  Document Revised: 04/17/2023 Document Reviewed: 02/28/2022  Elsevier Patient Education © 2023 [employer]

## 2024-06-28 ENCOUNTER — PATIENT ROUNDING (BHMG ONLY) (OUTPATIENT)
Dept: CARDIAC SURGERY | Facility: CLINIC | Age: 64
End: 2024-06-28
Payer: COMMERCIAL

## 2024-06-28 ENCOUNTER — APPOINTMENT (OUTPATIENT)
Dept: CARDIOLOGY | Facility: HOSPITAL | Age: 64
DRG: 236 | End: 2024-06-28
Payer: COMMERCIAL

## 2024-06-28 ENCOUNTER — APPOINTMENT (OUTPATIENT)
Dept: GENERAL RADIOLOGY | Facility: HOSPITAL | Age: 64
DRG: 236 | End: 2024-06-28
Payer: COMMERCIAL

## 2024-06-28 ENCOUNTER — HOSPITAL ENCOUNTER (INPATIENT)
Facility: HOSPITAL | Age: 64
LOS: 5 days | Discharge: HOME | DRG: 236 | End: 2024-07-03
Attending: THORACIC SURGERY (CARDIOTHORACIC VASCULAR SURGERY) | Admitting: THORACIC SURGERY (CARDIOTHORACIC VASCULAR SURGERY)
Payer: COMMERCIAL

## 2024-06-28 ENCOUNTER — ANESTHESIA (OUTPATIENT)
Dept: PERIOP | Facility: HOSPITAL | Age: 64
End: 2024-06-28
Payer: COMMERCIAL

## 2024-06-28 DIAGNOSIS — I25.10 CORONARY ARTERY DISEASE INVOLVING NATIVE CORONARY ARTERY OF NATIVE HEART, UNSPECIFIED WHETHER ANGINA PRESENT: ICD-10-CM

## 2024-06-28 DIAGNOSIS — Z74.09 IMPAIRED MOBILITY: ICD-10-CM

## 2024-06-28 DIAGNOSIS — Z95.1 S/P CABG (CORONARY ARTERY BYPASS GRAFT): Primary | ICD-10-CM

## 2024-06-28 LAB
ALBUMIN SERPL-MCNC: 4 G/DL (ref 3.5–5.2)
ALBUMIN SERPL-MCNC: 4 G/DL (ref 3.5–5.2)
ANION GAP SERPL CALCULATED.3IONS-SCNC: 10 MMOL/L (ref 5–15)
ANION GAP SERPL CALCULATED.3IONS-SCNC: 12 MMOL/L (ref 5–15)
APTT PPP: 66 SECONDS (ref 24.5–36)
ARTERIAL PATENCY WRIST A: ABNORMAL
ATMOSPHERIC PRESS: 748 MMHG
ATMOSPHERIC PRESS: 749 MMHG
ATMOSPHERIC PRESS: 750 MMHG
BASE EXCESS BLDA CALC-SCNC: -0.2 MMOL/L (ref 0–2)
BASE EXCESS BLDA CALC-SCNC: -0.2 MMOL/L (ref 0–2)
BASE EXCESS BLDA CALC-SCNC: -0.4 MMOL/L (ref 0–2)
BASE EXCESS BLDA CALC-SCNC: -0.8 MMOL/L (ref 0–2)
BASE EXCESS BLDA CALC-SCNC: -1.6 MMOL/L (ref 0–2)
BASE EXCESS BLDA CALC-SCNC: -2.1 MMOL/L (ref 0–2)
BASE EXCESS BLDA CALC-SCNC: 0.1 MMOL/L (ref 0–2)
BASE EXCESS BLDA CALC-SCNC: 0.3 MMOL/L (ref 0–2)
BASE EXCESS BLDA CALC-SCNC: 0.8 MMOL/L (ref 0–2)
BASE EXCESS BLDA CALC-SCNC: 1.6 MMOL/L (ref 0–2)
BDY SITE: ABNORMAL
BH CV ECHO LEFT VENTRICLE GLOBAL LONGITUDINAL STRAIN: -19.6 %
BH CV ECHO MEAS - AO MAX PG: 14.1 MMHG
BH CV ECHO MEAS - AO MEAN PG: 8.8 MMHG
BH CV ECHO MEAS - AO V2 MAX: 187.5 CM/SEC
BH CV ECHO MEAS - AO V2 VTI: 33.6 CM
BH CV ECHO MEAS - AVA(I,D): 4.2 CM2
BH CV ECHO MEAS - EDV(CUBED): 76.5 ML
BH CV ECHO MEAS - EDV(MOD-SP2): 85.6 ML
BH CV ECHO MEAS - EDV(MOD-SP4): 129.2 ML
BH CV ECHO MEAS - EF(MOD-SP2): 62.1 %
BH CV ECHO MEAS - EF(MOD-SP4): 67.9 %
BH CV ECHO MEAS - ESV(CUBED): 15.5 ML
BH CV ECHO MEAS - ESV(MOD-SP2): 32.4 ML
BH CV ECHO MEAS - ESV(MOD-SP4): 41.4 ML
BH CV ECHO MEAS - FS: 41.2 %
BH CV ECHO MEAS - IVS/LVPW: 1.28 CM
BH CV ECHO MEAS - IVSD: 1.41 CM
BH CV ECHO MEAS - LA DIMENSION: 4.2 CM
BH CV ECHO MEAS - LV DIASTOLIC VOL/BSA (35-75): 59.3 CM2
BH CV ECHO MEAS - LV MASS(C)D: 193.3 GRAMS
BH CV ECHO MEAS - LV MAX PG: 8.8 MMHG
BH CV ECHO MEAS - LV MEAN PG: 5.7 MMHG
BH CV ECHO MEAS - LV SYSTOLIC VOL/BSA (12-30): 19 CM2
BH CV ECHO MEAS - LV V1 MAX: 148.5 CM/SEC
BH CV ECHO MEAS - LV V1 VTI: 29.2 CM
BH CV ECHO MEAS - LVIDD: 4.2 CM
BH CV ECHO MEAS - LVIDS: 2.5 CM
BH CV ECHO MEAS - LVOT AREA: 4.8 CM2
BH CV ECHO MEAS - LVOT DIAM: 2.47 CM
BH CV ECHO MEAS - LVPWD: 1.3 CM
BH CV ECHO MEAS - MR MAX PG: 164.9 MMHG
BH CV ECHO MEAS - MR MAX VEL: 642.1 CM/SEC
BH CV ECHO MEAS - MV A MAX VEL: 75.6 CM/SEC
BH CV ECHO MEAS - MV DEC SLOPE: 233.3 CM/SEC2
BH CV ECHO MEAS - MV DEC TIME: 0.27 SEC
BH CV ECHO MEAS - MV E MAX VEL: 64 CM/SEC
BH CV ECHO MEAS - MV E/A: 0.85
BH CV ECHO MEAS - MV MAX PG: 5.5 MMHG
BH CV ECHO MEAS - MV MEAN PG: 1.86 MMHG
BH CV ECHO MEAS - MV V2 VTI: 24.2 CM
BH CV ECHO MEAS - MVA(VTI): 5.8 CM2
BH CV ECHO MEAS - PA V2 MAX: 79.1 CM/SEC
BH CV ECHO MEAS - RV MAX PG: 2.6 MMHG
BH CV ECHO MEAS - RV V1 MAX: 80.2 CM/SEC
BH CV ECHO MEAS - RV V1 VTI: 11.8 CM
BH CV ECHO MEAS - RVDD: 3.9 CM
BH CV ECHO MEAS - SV(LVOT): 139.7 ML
BH CV ECHO MEAS - SV(MOD-SP2): 53.2 ML
BH CV ECHO MEAS - SV(MOD-SP4): 87.7 ML
BH CV ECHO MEAS - SVI(LVOT): 64.1 ML/M2
BH CV ECHO MEAS - SVI(MOD-SP2): 24.4 ML/M2
BH CV ECHO MEAS - SVI(MOD-SP4): 40.3 ML/M2
BH CV ECHO MEAS - TR MAX PG: 15.6 MMHG
BH CV ECHO MEAS - TR MAX VEL: 197.2 CM/SEC
BODY TEMPERATURE: 37
BUN SERPL-MCNC: 19 MG/DL (ref 8–23)
BUN SERPL-MCNC: 19 MG/DL (ref 8–23)
BUN/CREAT SERPL: 22.4 (ref 7–25)
BUN/CREAT SERPL: 23.5 (ref 7–25)
CA-I BLD-MCNC: 4.01 MG/DL (ref 4.6–5.4)
CA-I BLD-MCNC: 4.15 MG/DL (ref 4.6–5.4)
CA-I BLD-MCNC: 4.18 MG/DL (ref 4.6–5.4)
CA-I BLD-MCNC: 4.21 MG/DL (ref 4.6–5.4)
CA-I BLD-MCNC: 4.54 MG/DL (ref 4.6–5.4)
CA-I BLD-MCNC: 4.71 MG/DL (ref 4.6–5.4)
CA-I BLD-MCNC: 5.08 MG/DL (ref 4.6–5.4)
CA-I BLD-MCNC: 5.15 MG/DL (ref 4.6–5.4)
CA-I BLD-MCNC: 5.4 MG/DL (ref 4.6–5.4)
CALCIUM SPEC-SCNC: 8.5 MG/DL (ref 8.6–10.5)
CALCIUM SPEC-SCNC: 9.7 MG/DL (ref 8.6–10.5)
CHLORIDE SERPL-SCNC: 106 MMOL/L (ref 98–107)
CHLORIDE SERPL-SCNC: 106 MMOL/L (ref 98–107)
CO2 SERPL-SCNC: 24 MMOL/L (ref 22–29)
CO2 SERPL-SCNC: 25 MMOL/L (ref 22–29)
COHGB MFR BLD: 0.7 % (ref 0–5)
COHGB MFR BLD: 0.8 % (ref 0–5)
COHGB MFR BLD: 0.9 % (ref 0–5)
COHGB MFR BLD: 0.9 % (ref 0–5)
COHGB MFR BLD: 1 % (ref 0–5)
COHGB MFR BLD: 1 % (ref 0–5)
COHGB MFR BLD: 1.3 % (ref 0–5)
COHGB MFR BLD: 1.4 % (ref 0–5)
CREAT SERPL-MCNC: 0.81 MG/DL (ref 0.76–1.27)
CREAT SERPL-MCNC: 0.85 MG/DL (ref 0.76–1.27)
DEPRECATED RDW RBC AUTO: 41.4 FL (ref 37–54)
DEPRECATED RDW RBC AUTO: 41.4 FL (ref 37–54)
EGFRCR SERPLBLD CKD-EPI 2021: 98.2 ML/MIN/1.73
EGFRCR SERPLBLD CKD-EPI 2021: 99.7 ML/MIN/1.73
ERYTHROCYTE [DISTWIDTH] IN BLOOD BY AUTOMATED COUNT: 11.9 % (ref 12.3–15.4)
ERYTHROCYTE [DISTWIDTH] IN BLOOD BY AUTOMATED COUNT: 11.9 % (ref 12.3–15.4)
GAS FLOW AIRWAY: 2.3 LPM
GAS FLOW AIRWAY: 2.3 LPM
GAS FLOW AIRWAY: 2.5 LPM
GAS FLOW AIRWAY: 2.5 LPM
GLUCOSE BLDC GLUCOMTR-MCNC: 126 MG/DL (ref 70–130)
GLUCOSE BLDC GLUCOMTR-MCNC: 146 MG/DL (ref 70–130)
GLUCOSE BLDC GLUCOMTR-MCNC: 146 MG/DL (ref 70–130)
GLUCOSE BLDC GLUCOMTR-MCNC: 150 MG/DL (ref 70–130)
GLUCOSE BLDC GLUCOMTR-MCNC: 156 MG/DL (ref 70–130)
GLUCOSE BLDC GLUCOMTR-MCNC: 175 MG/DL (ref 70–130)
GLUCOSE SERPL-MCNC: 124 MG/DL (ref 65–99)
GLUCOSE SERPL-MCNC: 189 MG/DL (ref 65–99)
HCO3 BLDA-SCNC: 24.2 MMOL/L (ref 20–26)
HCO3 BLDA-SCNC: 24.3 MMOL/L (ref 20–26)
HCO3 BLDA-SCNC: 24.9 MMOL/L (ref 20–26)
HCO3 BLDA-SCNC: 25 MMOL/L (ref 20–26)
HCO3 BLDA-SCNC: 25.7 MMOL/L (ref 20–26)
HCO3 BLDA-SCNC: 25.9 MMOL/L (ref 20–26)
HCO3 BLDA-SCNC: 25.9 MMOL/L (ref 20–26)
HCO3 BLDA-SCNC: 26 MMOL/L (ref 20–26)
HCO3 BLDA-SCNC: 26.5 MMOL/L (ref 20–26)
HCO3 BLDA-SCNC: 27.3 MMOL/L (ref 20–26)
HCT VFR BLD AUTO: 41.9 % (ref 37.5–51)
HCT VFR BLD AUTO: 42 % (ref 37.5–51)
HCT VFR BLD CALC: 38.1 % (ref 38–51)
HCT VFR BLD CALC: 39.1 % (ref 38–51)
HCT VFR BLD CALC: 39.5 % (ref 38–51)
HCT VFR BLD CALC: 42.3 % (ref 38–51)
HCT VFR BLD CALC: 44.2 % (ref 38–51)
HCT VFR BLD CALC: 48.2 % (ref 38–51)
HCT VFR BLD CALC: 48.2 % (ref 38–51)
HCT VFR BLD CALC: 51.5 % (ref 38–51)
HGB BLD-MCNC: 14.6 G/DL (ref 13–17.7)
HGB BLD-MCNC: 14.7 G/DL (ref 13–17.7)
HGB BLDA-MCNC: 12.4 G/DL (ref 14–18)
HGB BLDA-MCNC: 12.8 G/DL (ref 14–18)
HGB BLDA-MCNC: 12.9 G/DL (ref 14–18)
HGB BLDA-MCNC: 13.8 G/DL (ref 14–18)
HGB BLDA-MCNC: 14.4 G/DL (ref 14–18)
HGB BLDA-MCNC: 15.7 G/DL (ref 14–18)
HGB BLDA-MCNC: 15.7 G/DL (ref 14–18)
HGB BLDA-MCNC: 16.8 G/DL (ref 14–18)
INHALED O2 CONCENTRATION: 100 %
INHALED O2 CONCENTRATION: 30 %
INHALED O2 CONCENTRATION: 30 %
INHALED O2 CONCENTRATION: 60 %
INHALED O2 CONCENTRATION: 80 %
INHALED O2 CONCENTRATION: 80 %
INHALED O2 CONCENTRATION: 90 %
INR PPP: 1.14 (ref 0.91–1.09)
LEFT ATRIUM VOLUME: 78 ML
Lab: ABNORMAL
Lab: NORMAL
MCH RBC QN AUTO: 32.9 PG (ref 26.6–33)
MCH RBC QN AUTO: 33 PG (ref 26.6–33)
MCHC RBC AUTO-ENTMCNC: 34.8 G/DL (ref 31.5–35.7)
MCHC RBC AUTO-ENTMCNC: 35 G/DL (ref 31.5–35.7)
MCV RBC AUTO: 94 FL (ref 79–97)
MCV RBC AUTO: 94.8 FL (ref 79–97)
METHGB BLD QL: 0.5 % (ref 0–3)
METHGB BLD QL: 0.6 % (ref 0–3)
METHGB BLD QL: 0.8 % (ref 0–3)
MODALITY: ABNORMAL
NOTIFIED BY: ABNORMAL
NOTIFIED WHO: ABNORMAL
OXYHGB MFR BLDV: 86.4 % (ref 94–99)
OXYHGB MFR BLDV: 90.9 % (ref 94–99)
OXYHGB MFR BLDV: 97.7 % (ref 94–99)
OXYHGB MFR BLDV: 98.5 % (ref 94–99)
OXYHGB MFR BLDV: 98.7 % (ref 94–99)
OXYHGB MFR BLDV: 98.9 % (ref 94–99)
OXYHGB MFR BLDV: 99 % (ref 94–99)
OXYHGB MFR BLDV: 99.1 % (ref 94–99)
PCO2 BLDA: 39.5 MM HG (ref 35–45)
PCO2 BLDA: 42.1 MM HG (ref 35–45)
PCO2 BLDA: 44.6 MM HG (ref 35–45)
PCO2 BLDA: 44.7 MM HG (ref 35–45)
PCO2 BLDA: 45.8 MM HG (ref 35–45)
PCO2 BLDA: 45.9 MM HG (ref 35–45)
PCO2 BLDA: 46.1 MM HG (ref 35–45)
PCO2 BLDA: 47.3 MM HG (ref 35–45)
PCO2 BLDA: 47.9 MM HG (ref 35–45)
PCO2 BLDA: 49.3 MM HG (ref 35–45)
PCO2 TEMP ADJ BLD: 39.5 MM HG (ref 35–45)
PCO2 TEMP ADJ BLD: 42.1 MM HG (ref 35–45)
PCO2 TEMP ADJ BLD: 44.6 MM HG (ref 35–45)
PCO2 TEMP ADJ BLD: 44.7 MM HG (ref 35–45)
PCO2 TEMP ADJ BLD: 45.8 MM HG (ref 35–45)
PCO2 TEMP ADJ BLD: 45.9 MM HG (ref 35–45)
PCO2 TEMP ADJ BLD: 46.1 MM HG (ref 35–45)
PCO2 TEMP ADJ BLD: 47.3 MM HG (ref 35–45)
PCO2 TEMP ADJ BLD: 47.9 MM HG (ref 35–45)
PCO2 TEMP ADJ BLD: 49.3 MM HG (ref 35–45)
PEEP RESPIRATORY: 5 CM[H2O]
PEEP RESPIRATORY: 8 CM[H2O]
PEEP RESPIRATORY: 8 CM[H2O]
PH BLDA: 7.33 PH UNITS (ref 7.35–7.45)
PH BLDA: 7.34 PH UNITS (ref 7.35–7.45)
PH BLDA: 7.35 PH UNITS (ref 7.35–7.45)
PH BLDA: 7.36 PH UNITS (ref 7.35–7.45)
PH BLDA: 7.36 PH UNITS (ref 7.35–7.45)
PH BLDA: 7.38 PH UNITS (ref 7.35–7.45)
PH BLDA: 7.4 PH UNITS (ref 7.35–7.45)
PH BLDA: 7.41 PH UNITS (ref 7.35–7.45)
PH, TEMP CORRECTED: 7.33 PH UNITS (ref 7.35–7.45)
PH, TEMP CORRECTED: 7.34 PH UNITS (ref 7.35–7.45)
PH, TEMP CORRECTED: 7.35 PH UNITS (ref 7.35–7.45)
PH, TEMP CORRECTED: 7.36 PH UNITS (ref 7.35–7.45)
PH, TEMP CORRECTED: 7.36 PH UNITS (ref 7.35–7.45)
PH, TEMP CORRECTED: 7.38 PH UNITS (ref 7.35–7.45)
PH, TEMP CORRECTED: 7.4 PH UNITS (ref 7.35–7.45)
PH, TEMP CORRECTED: 7.41 PH UNITS (ref 7.35–7.45)
PHOSPHATE SERPL-MCNC: 4.3 MG/DL (ref 2.5–4.5)
PHOSPHATE SERPL-MCNC: 4.9 MG/DL (ref 2.5–4.5)
PLATELET # BLD AUTO: 151 10*3/MM3 (ref 140–450)
PLATELET # BLD AUTO: 174 10*3/MM3 (ref 140–450)
PMV BLD AUTO: 10 FL (ref 6–12)
PMV BLD AUTO: 9.9 FL (ref 6–12)
PO2 BLD: 268 MM[HG] (ref 0–500)
PO2 BLD: 279 MM[HG] (ref 0–500)
PO2 BLDA: 133 MM HG (ref 83–108)
PO2 BLDA: 324 MM HG (ref 83–108)
PO2 BLDA: 328 MM HG (ref 83–108)
PO2 BLDA: 361 MM HG (ref 83–108)
PO2 BLDA: 397 MM HG (ref 83–108)
PO2 BLDA: 507 MM HG (ref 83–108)
PO2 BLDA: 54.8 MM HG (ref 83–108)
PO2 BLDA: 63.4 MM HG (ref 83–108)
PO2 BLDA: 80.5 MM HG (ref 83–108)
PO2 BLDA: 83.7 MM HG (ref 83–108)
PO2 TEMP ADJ BLD: 133 MM HG (ref 83–108)
PO2 TEMP ADJ BLD: 324 MM HG (ref 83–108)
PO2 TEMP ADJ BLD: 328 MM HG (ref 83–108)
PO2 TEMP ADJ BLD: 361 MM HG (ref 83–108)
PO2 TEMP ADJ BLD: 397 MM HG (ref 83–108)
PO2 TEMP ADJ BLD: 507 MM HG (ref 83–108)
PO2 TEMP ADJ BLD: 54.8 MM HG (ref 83–108)
PO2 TEMP ADJ BLD: 63.4 MM HG (ref 83–108)
PO2 TEMP ADJ BLD: 80.5 MM HG (ref 83–108)
PO2 TEMP ADJ BLD: 83.7 MM HG (ref 83–108)
POTASSIUM BLDA-SCNC: 4 MMOL/L (ref 3.5–5.2)
POTASSIUM BLDA-SCNC: 4.4 MMOL/L (ref 3.5–5.2)
POTASSIUM BLDA-SCNC: 4.6 MMOL/L (ref 3.5–5.2)
POTASSIUM BLDA-SCNC: 4.6 MMOL/L (ref 3.5–5.2)
POTASSIUM BLDA-SCNC: 4.8 MMOL/L (ref 3.5–5.2)
POTASSIUM BLDA-SCNC: 5.4 MMOL/L (ref 3.5–5.2)
POTASSIUM BLDA-SCNC: 5.5 MMOL/L (ref 3.5–5.2)
POTASSIUM BLDA-SCNC: 5.9 MMOL/L (ref 3.5–5.2)
POTASSIUM SERPL-SCNC: 4 MMOL/L (ref 3.5–5.2)
POTASSIUM SERPL-SCNC: 4.2 MMOL/L (ref 3.5–5.2)
PROTHROMBIN TIME: 15.1 SECONDS (ref 11.8–14.8)
PSV: 10 CMH2O
RBC # BLD AUTO: 4.42 10*6/MM3 (ref 4.14–5.8)
RBC # BLD AUTO: 4.47 10*6/MM3 (ref 4.14–5.8)
SAO2 % BLDCOA: 88.2 % (ref 94–99)
SAO2 % BLDCOA: 92.5 % (ref 94–99)
SAO2 % BLDCOA: 95.9 % (ref 94–99)
SAO2 % BLDCOA: 96.3 % (ref 94–99)
SAO2 % BLDCOA: 99 % (ref 94–99)
SAO2 % BLDCOA: >100.1 % (ref 94–99)
SET MECH RESP RATE: 20
SET MECH RESP RATE: 20
SODIUM BLDA-SCNC: 137 MMOL/L (ref 136–145)
SODIUM BLDA-SCNC: 138 MMOL/L (ref 136–145)
SODIUM BLDA-SCNC: 139 MMOL/L (ref 136–145)
SODIUM BLDA-SCNC: 141 MMOL/L (ref 136–145)
SODIUM BLDA-SCNC: 141 MMOL/L (ref 136–145)
SODIUM BLDA-SCNC: 142 MMOL/L (ref 136–145)
SODIUM SERPL-SCNC: 141 MMOL/L (ref 136–145)
SODIUM SERPL-SCNC: 142 MMOL/L (ref 136–145)
VENTILATOR MODE: ABNORMAL
VT ON VENT VENT: 600 ML
VT ON VENT VENT: 600 ML
WBC NRBC COR # BLD AUTO: 10.42 10*3/MM3 (ref 3.4–10.8)
WBC NRBC COR # BLD AUTO: 13.17 10*3/MM3 (ref 3.4–10.8)

## 2024-06-28 PROCEDURE — 85730 THROMBOPLASTIN TIME PARTIAL: CPT | Performed by: THORACIC SURGERY (CARDIOTHORACIC VASCULAR SURGERY)

## 2024-06-28 PROCEDURE — 25010000002 FUROSEMIDE PER 20 MG

## 2024-06-28 PROCEDURE — 33533 CABG ARTERIAL SINGLE: CPT | Performed by: THORACIC SURGERY (CARDIOTHORACIC VASCULAR SURGERY)

## 2024-06-28 PROCEDURE — 82805 BLOOD GASES W/O2 SATURATION: CPT

## 2024-06-28 PROCEDURE — 82948 REAGENT STRIP/BLOOD GLUCOSE: CPT

## 2024-06-28 PROCEDURE — 93318 ECHO TRANSESOPHAGEAL INTRAOP: CPT

## 2024-06-28 PROCEDURE — 25010000002 PAPAVERINE PER 60 MG: Performed by: THORACIC SURGERY (CARDIOTHORACIC VASCULAR SURGERY)

## 2024-06-28 PROCEDURE — 85027 COMPLETE CBC AUTOMATED: CPT | Performed by: THORACIC SURGERY (CARDIOTHORACIC VASCULAR SURGERY)

## 2024-06-28 PROCEDURE — 25010000002 ALBUMIN HUMAN 25% PER 50 ML

## 2024-06-28 PROCEDURE — 25010000002 PHENYLEPHRINE 10 MG/ML SOLUTION: Performed by: THORACIC SURGERY (CARDIOTHORACIC VASCULAR SURGERY)

## 2024-06-28 PROCEDURE — 25010000002 AMIODARONE IN DEXTROSE 5% 360-4.14 MG/200ML-% SOLUTION: Performed by: THORACIC SURGERY (CARDIOTHORACIC VASCULAR SURGERY)

## 2024-06-28 PROCEDURE — B24BZZ4 ULTRASONOGRAPHY OF HEART WITH AORTA, TRANSESOPHAGEAL: ICD-10-PCS | Performed by: THORACIC SURGERY (CARDIOTHORACIC VASCULAR SURGERY)

## 2024-06-28 PROCEDURE — 25010000002 PROTAMINE SULFATE PER 10 MG: Performed by: THORACIC SURGERY (CARDIOTHORACIC VASCULAR SURGERY)

## 2024-06-28 PROCEDURE — 25010000002 NITROGLYCERIN 200 MCG/ML SOLUTION: Performed by: THORACIC SURGERY (CARDIOTHORACIC VASCULAR SURGERY)

## 2024-06-28 PROCEDURE — 25010000002 HEPARIN (PORCINE) PER 1000 UNITS: Performed by: THORACIC SURGERY (CARDIOTHORACIC VASCULAR SURGERY)

## 2024-06-28 PROCEDURE — 25010000002 SUGAMMADEX 200 MG/2ML SOLUTION: Performed by: NURSE ANESTHETIST, CERTIFIED REGISTERED

## 2024-06-28 PROCEDURE — 94640 AIRWAY INHALATION TREATMENT: CPT

## 2024-06-28 PROCEDURE — C1751 CATH, INF, PER/CENT/MIDLINE: HCPCS | Performed by: NURSE ANESTHETIST, CERTIFIED REGISTERED

## 2024-06-28 PROCEDURE — C1713 ANCHOR/SCREW BN/BN,TIS/BN: HCPCS | Performed by: THORACIC SURGERY (CARDIOTHORACIC VASCULAR SURGERY)

## 2024-06-28 PROCEDURE — 25010000002 FENTANYL CITRATE (PF) 50 MCG/ML SOLUTION: Performed by: THORACIC SURGERY (CARDIOTHORACIC VASCULAR SURGERY)

## 2024-06-28 PROCEDURE — 0 INSULIN REGULAR HUMAN PER 5 UNITS: Performed by: THORACIC SURGERY (CARDIOTHORACIC VASCULAR SURGERY)

## 2024-06-28 PROCEDURE — 25010000002 ALBUMIN HUMAN 5% PER 50 ML: Performed by: THORACIC SURGERY (CARDIOTHORACIC VASCULAR SURGERY)

## 2024-06-28 PROCEDURE — 25010000002 PROTAMINE SULFATE PER 10 MG

## 2024-06-28 PROCEDURE — 94799 UNLISTED PULMONARY SVC/PX: CPT

## 2024-06-28 PROCEDURE — 25010000002 MIDAZOLAM PER 1 MG: Performed by: NURSE ANESTHETIST, CERTIFIED REGISTERED

## 2024-06-28 PROCEDURE — 25010000002 MIDAZOLAM PER 1 MG: Performed by: ANESTHESIOLOGY

## 2024-06-28 PROCEDURE — 25010000002 CEFAZOLIN PER 500 MG: Performed by: THORACIC SURGERY (CARDIOTHORACIC VASCULAR SURGERY)

## 2024-06-28 PROCEDURE — 25810000003 DEXTROSE 5 % WITH KCL 20 MEQ 20 MEQ/L SOLUTION: Performed by: THORACIC SURGERY (CARDIOTHORACIC VASCULAR SURGERY)

## 2024-06-28 PROCEDURE — 33519 CABG ARTERY-VEIN THREE: CPT | Performed by: THORACIC SURGERY (CARDIOTHORACIC VASCULAR SURGERY)

## 2024-06-28 PROCEDURE — 25810000003 LACTATED RINGERS PER 1000 ML: Performed by: THORACIC SURGERY (CARDIOTHORACIC VASCULAR SURGERY)

## 2024-06-28 PROCEDURE — 25010000002 NICARDIPINE 2.5 MG/ML SOLUTION: Performed by: NURSE ANESTHETIST, CERTIFIED REGISTERED

## 2024-06-28 PROCEDURE — 25010000002 ONDANSETRON PER 1 MG: Performed by: THORACIC SURGERY (CARDIOTHORACIC VASCULAR SURGERY)

## 2024-06-28 PROCEDURE — 25810000003 SODIUM CHLORIDE 0.9 % SOLUTION

## 2024-06-28 PROCEDURE — 02100Z9 BYPASS CORONARY ARTERY, ONE ARTERY FROM LEFT INTERNAL MAMMARY, OPEN APPROACH: ICD-10-PCS | Performed by: THORACIC SURGERY (CARDIOTHORACIC VASCULAR SURGERY)

## 2024-06-28 PROCEDURE — 25010000002 VANCOMYCIN 1 G RECONSTITUTED SOLUTION 1 EACH VIAL: Performed by: THORACIC SURGERY (CARDIOTHORACIC VASCULAR SURGERY)

## 2024-06-28 PROCEDURE — 25010000002 HEPARIN (PORCINE) PER 1000 UNITS: Performed by: NURSE ANESTHETIST, CERTIFIED REGISTERED

## 2024-06-28 PROCEDURE — 0212093 BYPASS CORONARY ARTERY, THREE ARTERIES FROM CORONARY ARTERY WITH AUTOLOGOUS VENOUS TISSUE, OPEN APPROACH: ICD-10-PCS | Performed by: THORACIC SURGERY (CARDIOTHORACIC VASCULAR SURGERY)

## 2024-06-28 PROCEDURE — 85610 PROTHROMBIN TIME: CPT | Performed by: THORACIC SURGERY (CARDIOTHORACIC VASCULAR SURGERY)

## 2024-06-28 PROCEDURE — 25010000002 MANNITOL PER 50 ML

## 2024-06-28 PROCEDURE — 82803 BLOOD GASES ANY COMBINATION: CPT

## 2024-06-28 PROCEDURE — 25810000003 LACTATED RINGERS PER 1000 ML

## 2024-06-28 PROCEDURE — 93005 ELECTROCARDIOGRAM TRACING: CPT | Performed by: THORACIC SURGERY (CARDIOTHORACIC VASCULAR SURGERY)

## 2024-06-28 PROCEDURE — 83050 HGB METHEMOGLOBIN QUAN: CPT

## 2024-06-28 PROCEDURE — 06BP4ZZ EXCISION OF RIGHT SAPHENOUS VEIN, PERCUTANEOUS ENDOSCOPIC APPROACH: ICD-10-PCS | Performed by: THORACIC SURGERY (CARDIOTHORACIC VASCULAR SURGERY)

## 2024-06-28 PROCEDURE — 25010000002 PROPOFOL 10 MG/ML EMULSION: Performed by: NURSE ANESTHETIST, CERTIFIED REGISTERED

## 2024-06-28 PROCEDURE — 5A1221Z PERFORMANCE OF CARDIAC OUTPUT, CONTINUOUS: ICD-10-PCS | Performed by: THORACIC SURGERY (CARDIOTHORACIC VASCULAR SURGERY)

## 2024-06-28 PROCEDURE — 71045 X-RAY EXAM CHEST 1 VIEW: CPT

## 2024-06-28 PROCEDURE — 33508 ENDOSCOPIC VEIN HARVEST: CPT | Performed by: THORACIC SURGERY (CARDIOTHORACIC VASCULAR SURGERY)

## 2024-06-28 PROCEDURE — 82375 ASSAY CARBOXYHB QUANT: CPT

## 2024-06-28 PROCEDURE — A4648 IMPLANTABLE TISSUE MARKER: HCPCS | Performed by: THORACIC SURGERY (CARDIOTHORACIC VASCULAR SURGERY)

## 2024-06-28 PROCEDURE — 25010000002 VANCOMYCIN 10 G RECONSTITUTED SOLUTION: Performed by: THORACIC SURGERY (CARDIOTHORACIC VASCULAR SURGERY)

## 2024-06-28 PROCEDURE — 82330 ASSAY OF CALCIUM: CPT

## 2024-06-28 PROCEDURE — P9041 ALBUMIN (HUMAN),5%, 50ML: HCPCS | Performed by: THORACIC SURGERY (CARDIOTHORACIC VASCULAR SURGERY)

## 2024-06-28 PROCEDURE — 25010000002 HEPARIN (PORCINE) PER 1000 UNITS

## 2024-06-28 PROCEDURE — 80069 RENAL FUNCTION PANEL: CPT | Performed by: THORACIC SURGERY (CARDIOTHORACIC VASCULAR SURGERY)

## 2024-06-28 PROCEDURE — 25010000002 ACETAMINOPHEN 10 MG/ML SOLUTION: Performed by: THORACIC SURGERY (CARDIOTHORACIC VASCULAR SURGERY)

## 2024-06-28 PROCEDURE — 25810000003 SODIUM CHLORIDE 0.9 % SOLUTION: Performed by: THORACIC SURGERY (CARDIOTHORACIC VASCULAR SURGERY)

## 2024-06-28 PROCEDURE — 25010000002 PHENYLEPHRINE 10 MG/ML SOLUTION

## 2024-06-28 PROCEDURE — 25010000002 POTASSIUM CHLORIDE PER 2 MEQ OF POTASSIUM

## 2024-06-28 PROCEDURE — 94002 VENT MGMT INPAT INIT DAY: CPT

## 2024-06-28 PROCEDURE — 93010 ELECTROCARDIOGRAM REPORT: CPT | Performed by: HOSPITALIST

## 2024-06-28 PROCEDURE — 25010000002 CEFAZOLIN PER 500 MG

## 2024-06-28 PROCEDURE — 25810000003 SODIUM CHLORIDE 0.9 % SOLUTION 250 ML FLEX CONT: Performed by: NURSE ANESTHETIST, CERTIFIED REGISTERED

## 2024-06-28 PROCEDURE — 25010000002 METHYLENE BLUE 50 MG/10ML SOLUTION: Performed by: THORACIC SURGERY (CARDIOTHORACIC VASCULAR SURGERY)

## 2024-06-28 PROCEDURE — P9047 ALBUMIN (HUMAN), 25%, 50ML: HCPCS

## 2024-06-28 PROCEDURE — 25010000002 PROTAMINE SULFATE PER 10 MG: Performed by: NURSE ANESTHETIST, CERTIFIED REGISTERED

## 2024-06-28 PROCEDURE — 25010000002 FENTANYL CITRATE (PF) 250 MCG/5ML SOLUTION: Performed by: NURSE ANESTHETIST, CERTIFIED REGISTERED

## 2024-06-28 DEVICE — CLIP LIGAT VASC HORIZON TI LG ORNG 6CT: Type: IMPLANTABLE DEVICE | Site: CHEST | Status: FUNCTIONAL

## 2024-06-28 DEVICE — IMPLANTABLE DEVICE: Type: IMPLANTABLE DEVICE | Site: HEART | Status: FUNCTIONAL

## 2024-06-28 DEVICE — KT STERNALOCK XP X/PLATE: Type: IMPLANTABLE DEVICE | Site: STERNUM | Status: FUNCTIONAL

## 2024-06-28 DEVICE — HEMOST ABS SURGIFOAM SZ100 8X12 10MM: Type: IMPLANTABLE DEVICE | Site: STERNUM | Status: FUNCTIONAL

## 2024-06-28 DEVICE — KT HEMOST ABS SURGIFOAM PORCN 1GRAM: Type: IMPLANTABLE DEVICE | Site: STERNUM | Status: FUNCTIONAL

## 2024-06-28 DEVICE — DISK-SHAPED STYLE, SILICONE (1 PER STERILE PKG)
Type: IMPLANTABLE DEVICE | Site: HEART | Status: FUNCTIONAL
Brand: SCANLAN® RADIOMARK® GRAFT MARKERS

## 2024-06-28 DEVICE — KT PLT STERNALOCK/XP AXILR SHRP: Type: IMPLANTABLE DEVICE | Site: STERNUM | Status: FUNCTIONAL

## 2024-06-28 DEVICE — PLEDGET INCISIONLINE REINF TFE SFT PTFE 1.5X3X7MM WHT: Type: IMPLANTABLE DEVICE | Site: HEART | Status: FUNCTIONAL

## 2024-06-28 RX ORDER — OXYCODONE HYDROCHLORIDE 5 MG/1
5 TABLET ORAL
Status: DISCONTINUED | OUTPATIENT
Start: 2024-06-28 | End: 2024-07-02

## 2024-06-28 RX ORDER — PREGABALIN 25 MG/1
25 CAPSULE ORAL EVERY 12 HOURS SCHEDULED
Status: DISCONTINUED | OUTPATIENT
Start: 2024-06-29 | End: 2024-07-03 | Stop reason: HOSPADM

## 2024-06-28 RX ORDER — ACETAMINOPHEN 650 MG/1
650 SUPPOSITORY RECTAL EVERY 6 HOURS
Status: DISCONTINUED | OUTPATIENT
Start: 2024-06-29 | End: 2024-07-03 | Stop reason: HOSPADM

## 2024-06-28 RX ORDER — CEFAZOLIN SODIUM 1 G/3ML
INJECTION, POWDER, FOR SOLUTION INTRAMUSCULAR; INTRAVENOUS AS NEEDED
Status: DISCONTINUED | OUTPATIENT
Start: 2024-06-28 | End: 2024-06-28 | Stop reason: SURG

## 2024-06-28 RX ORDER — POTASSIUM CHLORIDE, DEXTROSE MONOHYDRATE 150; 5 MG/100ML; G/100ML
30 INJECTION, SOLUTION INTRAVENOUS CONTINUOUS
Status: DISCONTINUED | OUTPATIENT
Start: 2024-06-28 | End: 2024-07-01

## 2024-06-28 RX ORDER — SODIUM CHLORIDE 9 MG/ML
30 INJECTION, SOLUTION INTRAVENOUS CONTINUOUS PRN
Status: DISCONTINUED | OUTPATIENT
Start: 2024-06-28 | End: 2024-06-28 | Stop reason: HOSPADM

## 2024-06-28 RX ORDER — FENTANYL CITRATE 50 UG/ML
25 INJECTION, SOLUTION INTRAMUSCULAR; INTRAVENOUS
Status: DISCONTINUED | OUTPATIENT
Start: 2024-06-28 | End: 2024-06-29

## 2024-06-28 RX ORDER — MAGNESIUM HYDROXIDE 1200 MG/15ML
LIQUID ORAL AS NEEDED
Status: DISCONTINUED | OUTPATIENT
Start: 2024-06-28 | End: 2024-06-28 | Stop reason: HOSPADM

## 2024-06-28 RX ORDER — BUPIVACAINE HCL/0.9 % NACL/PF 0.125 %
PLASTIC BAG, INJECTION (ML) EPIDURAL AS NEEDED
Status: DISCONTINUED | OUTPATIENT
Start: 2024-06-28 | End: 2024-06-28 | Stop reason: SURG

## 2024-06-28 RX ORDER — SODIUM CHLORIDE 0.9 % (FLUSH) 0.9 %
30 SYRINGE (ML) INJECTION ONCE AS NEEDED
Status: DISCONTINUED | OUTPATIENT
Start: 2024-06-28 | End: 2024-06-28 | Stop reason: HOSPADM

## 2024-06-28 RX ORDER — SODIUM CHLORIDE 0.9 % (FLUSH) 0.9 %
3 SYRINGE (ML) INJECTION AS NEEDED
Status: DISCONTINUED | OUTPATIENT
Start: 2024-06-28 | End: 2024-06-28 | Stop reason: HOSPADM

## 2024-06-28 RX ORDER — ASPIRIN 81 MG/1
81 TABLET ORAL DAILY
Status: DISCONTINUED | OUTPATIENT
Start: 2024-06-30 | End: 2024-07-03 | Stop reason: HOSPADM

## 2024-06-28 RX ORDER — DEXTROSE MONOHYDRATE 25 G/50ML
10-50 INJECTION, SOLUTION INTRAVENOUS
Status: DISCONTINUED | OUTPATIENT
Start: 2024-06-28 | End: 2024-06-28 | Stop reason: HOSPADM

## 2024-06-28 RX ORDER — SODIUM CHLORIDE 0.9 % (FLUSH) 0.9 %
3-10 SYRINGE (ML) INJECTION AS NEEDED
Status: DISCONTINUED | OUTPATIENT
Start: 2024-06-28 | End: 2024-06-28 | Stop reason: HOSPADM

## 2024-06-28 RX ORDER — HEPARIN SODIUM 1000 [USP'U]/ML
INJECTION, SOLUTION INTRAVENOUS; SUBCUTANEOUS AS NEEDED
Status: DISCONTINUED | OUTPATIENT
Start: 2024-06-28 | End: 2024-06-28 | Stop reason: SURG

## 2024-06-28 RX ORDER — VANCOMYCIN/0.9 % SOD CHLORIDE 1.5G/250ML
PLASTIC BAG, INJECTION (ML) INTRAVENOUS AS NEEDED
Status: DISCONTINUED | OUTPATIENT
Start: 2024-06-28 | End: 2024-06-28 | Stop reason: SURG

## 2024-06-28 RX ORDER — NICOTINE POLACRILEX 4 MG
15 LOZENGE BUCCAL
Status: DISCONTINUED | OUTPATIENT
Start: 2024-06-28 | End: 2024-06-28 | Stop reason: HOSPADM

## 2024-06-28 RX ORDER — ACETAMINOPHEN 500 MG
1000 TABLET ORAL EVERY 6 HOURS
Status: DISCONTINUED | OUTPATIENT
Start: 2024-06-29 | End: 2024-07-03 | Stop reason: HOSPADM

## 2024-06-28 RX ORDER — BISACODYL 5 MG/1
10 TABLET, DELAYED RELEASE ORAL 2 TIMES DAILY
Status: DISCONTINUED | OUTPATIENT
Start: 2024-06-29 | End: 2024-07-03 | Stop reason: HOSPADM

## 2024-06-28 RX ORDER — CHLORHEXIDINE GLUCONATE ORAL RINSE 1.2 MG/ML
15 SOLUTION DENTAL EVERY 12 HOURS SCHEDULED
Status: DISCONTINUED | OUTPATIENT
Start: 2024-06-28 | End: 2024-06-28 | Stop reason: SDUPTHER

## 2024-06-28 RX ORDER — SUFENTANIL CITRATE 50 UG/ML
INJECTION EPIDURAL; INTRAVENOUS AS NEEDED
Status: DISCONTINUED | OUTPATIENT
Start: 2024-06-28 | End: 2024-06-28 | Stop reason: SURG

## 2024-06-28 RX ORDER — DEXTROSE MONOHYDRATE 25 G/50ML
10-50 INJECTION, SOLUTION INTRAVENOUS
Status: DISCONTINUED | OUTPATIENT
Start: 2024-06-28 | End: 2024-07-03 | Stop reason: HOSPADM

## 2024-06-28 RX ORDER — MEPERIDINE HYDROCHLORIDE 50 MG/ML
25 INJECTION INTRAMUSCULAR; INTRAVENOUS; SUBCUTANEOUS
Status: ACTIVE | OUTPATIENT
Start: 2024-06-28 | End: 2024-06-29

## 2024-06-28 RX ORDER — ALBUTEROL SULFATE 2.5 MG/3ML
2.5 SOLUTION RESPIRATORY (INHALATION) EVERY 4 HOURS PRN
Status: ACTIVE | OUTPATIENT
Start: 2024-06-28 | End: 2024-06-29

## 2024-06-28 RX ORDER — NITROGLYCERIN 20 MG/100ML
5 INJECTION INTRAVENOUS CONTINUOUS
Status: DISCONTINUED | OUTPATIENT
Start: 2024-06-28 | End: 2024-07-01

## 2024-06-28 RX ORDER — ENOXAPARIN SODIUM 100 MG/ML
40 INJECTION SUBCUTANEOUS DAILY
Status: DISCONTINUED | OUTPATIENT
Start: 2024-06-29 | End: 2024-07-03 | Stop reason: HOSPADM

## 2024-06-28 RX ORDER — ASPIRIN 81 MG/1
162 TABLET, CHEWABLE ORAL ONCE
Status: COMPLETED | OUTPATIENT
Start: 2024-06-29 | End: 2024-06-29

## 2024-06-28 RX ORDER — MIDAZOLAM HYDROCHLORIDE 1 MG/ML
2 INJECTION INTRAMUSCULAR; INTRAVENOUS ONCE
Status: COMPLETED | OUTPATIENT
Start: 2024-06-28 | End: 2024-06-28

## 2024-06-28 RX ORDER — CHLORHEXIDINE GLUCONATE 500 MG/1
1 CLOTH TOPICAL EVERY 12 HOURS PRN
Status: DISCONTINUED | OUTPATIENT
Start: 2024-06-28 | End: 2024-06-28 | Stop reason: HOSPADM

## 2024-06-28 RX ORDER — SODIUM CHLORIDE, SODIUM LACTATE, POTASSIUM CHLORIDE, CALCIUM CHLORIDE 600; 310; 30; 20 MG/100ML; MG/100ML; MG/100ML; MG/100ML
1000 INJECTION, SOLUTION INTRAVENOUS CONTINUOUS
Status: DISCONTINUED | OUTPATIENT
Start: 2024-06-28 | End: 2024-06-28

## 2024-06-28 RX ORDER — ACETAMINOPHEN 160 MG/5ML
1000 SOLUTION ORAL EVERY 6 HOURS
Status: DISCONTINUED | OUTPATIENT
Start: 2024-06-29 | End: 2024-07-03 | Stop reason: HOSPADM

## 2024-06-28 RX ORDER — LIDOCAINE 4 G/G
2 PATCH TOPICAL
Status: DISCONTINUED | OUTPATIENT
Start: 2024-06-28 | End: 2024-07-03 | Stop reason: HOSPADM

## 2024-06-28 RX ORDER — LIDOCAINE HYDROCHLORIDE 10 MG/ML
0.5 INJECTION, SOLUTION EPIDURAL; INFILTRATION; INTRACAUDAL; PERINEURAL ONCE AS NEEDED
Status: DISCONTINUED | OUTPATIENT
Start: 2024-06-28 | End: 2024-06-28 | Stop reason: HOSPADM

## 2024-06-28 RX ORDER — CLOPIDOGREL BISULFATE 75 MG/1
75 TABLET ORAL DAILY
Status: DISCONTINUED | OUTPATIENT
Start: 2024-06-29 | End: 2024-07-03 | Stop reason: HOSPADM

## 2024-06-28 RX ORDER — SODIUM CHLORIDE 0.9 % (FLUSH) 0.9 %
3 SYRINGE (ML) INJECTION EVERY 12 HOURS SCHEDULED
Status: DISCONTINUED | OUTPATIENT
Start: 2024-06-28 | End: 2024-06-28 | Stop reason: HOSPADM

## 2024-06-28 RX ORDER — ONDANSETRON 2 MG/ML
4 INJECTION INTRAMUSCULAR; INTRAVENOUS EVERY 6 HOURS PRN
Status: DISCONTINUED | OUTPATIENT
Start: 2024-06-28 | End: 2024-07-03 | Stop reason: HOSPADM

## 2024-06-28 RX ORDER — ATORVASTATIN CALCIUM 10 MG/1
20 TABLET, FILM COATED ORAL NIGHTLY
Status: DISCONTINUED | OUTPATIENT
Start: 2024-06-29 | End: 2024-07-03 | Stop reason: HOSPADM

## 2024-06-28 RX ORDER — LIDOCAINE HYDROCHLORIDE 20 MG/ML
INJECTION, SOLUTION EPIDURAL; INFILTRATION; INTRACAUDAL; PERINEURAL AS NEEDED
Status: DISCONTINUED | OUTPATIENT
Start: 2024-06-28 | End: 2024-06-28 | Stop reason: SURG

## 2024-06-28 RX ORDER — FENTANYL CITRATE 50 UG/ML
INJECTION, SOLUTION INTRAMUSCULAR; INTRAVENOUS AS NEEDED
Status: DISCONTINUED | OUTPATIENT
Start: 2024-06-28 | End: 2024-06-28 | Stop reason: SURG

## 2024-06-28 RX ORDER — FENTANYL CITRATE 50 UG/ML
50 INJECTION, SOLUTION INTRAMUSCULAR; INTRAVENOUS
Status: DISCONTINUED | OUTPATIENT
Start: 2024-06-28 | End: 2024-06-29

## 2024-06-28 RX ORDER — POLYETHYLENE GLYCOL 3350 17 G/17G
17 POWDER, FOR SOLUTION ORAL DAILY
Status: DISCONTINUED | OUTPATIENT
Start: 2024-06-29 | End: 2024-07-03 | Stop reason: HOSPADM

## 2024-06-28 RX ORDER — PANTOPRAZOLE SODIUM 40 MG/1
40 TABLET, DELAYED RELEASE ORAL EVERY MORNING
Status: COMPLETED | OUTPATIENT
Start: 2024-06-29 | End: 2024-07-01

## 2024-06-28 RX ORDER — NALOXONE HCL 0.4 MG/ML
0.4 VIAL (ML) INJECTION
Status: DISCONTINUED | OUTPATIENT
Start: 2024-06-28 | End: 2024-06-29

## 2024-06-28 RX ORDER — PROTAMINE SULFATE 10 MG/ML
INJECTION, SOLUTION INTRAVENOUS AS NEEDED
Status: DISCONTINUED | OUTPATIENT
Start: 2024-06-28 | End: 2024-06-28 | Stop reason: SURG

## 2024-06-28 RX ORDER — SODIUM CHLORIDE 9 MG/ML
40 INJECTION, SOLUTION INTRAVENOUS AS NEEDED
Status: DISCONTINUED | OUTPATIENT
Start: 2024-06-28 | End: 2024-06-28 | Stop reason: HOSPADM

## 2024-06-28 RX ORDER — ROCURONIUM BROMIDE 10 MG/ML
INJECTION, SOLUTION INTRAVENOUS AS NEEDED
Status: DISCONTINUED | OUTPATIENT
Start: 2024-06-28 | End: 2024-06-28 | Stop reason: SURG

## 2024-06-28 RX ORDER — ALBUMIN, HUMAN INJ 5% 5 %
500 SOLUTION INTRAVENOUS ONCE
Status: COMPLETED | OUTPATIENT
Start: 2024-06-29 | End: 2024-06-29

## 2024-06-28 RX ORDER — IPRATROPIUM BROMIDE AND ALBUTEROL SULFATE 2.5; .5 MG/3ML; MG/3ML
1.5 SOLUTION RESPIRATORY (INHALATION)
Status: DISCONTINUED | OUTPATIENT
Start: 2024-06-28 | End: 2024-07-02

## 2024-06-28 RX ORDER — PROPOFOL 10 MG/ML
VIAL (ML) INTRAVENOUS AS NEEDED
Status: DISCONTINUED | OUTPATIENT
Start: 2024-06-28 | End: 2024-06-28 | Stop reason: SURG

## 2024-06-28 RX ORDER — SODIUM CHLORIDE 450 MG/100ML
80 INJECTION, SOLUTION INTRAVENOUS CONTINUOUS
Status: DISCONTINUED | OUTPATIENT
Start: 2024-06-28 | End: 2024-06-28

## 2024-06-28 RX ORDER — SODIUM CHLORIDE 0.9 % (FLUSH) 0.9 %
10 SYRINGE (ML) INJECTION AS NEEDED
Status: DISCONTINUED | OUTPATIENT
Start: 2024-06-28 | End: 2024-06-28 | Stop reason: HOSPADM

## 2024-06-28 RX ORDER — MIDAZOLAM HYDROCHLORIDE 1 MG/ML
INJECTION INTRAMUSCULAR; INTRAVENOUS AS NEEDED
Status: DISCONTINUED | OUTPATIENT
Start: 2024-06-28 | End: 2024-06-28 | Stop reason: SURG

## 2024-06-28 RX ORDER — ACETAMINOPHEN 500 MG
1000 TABLET ORAL ONCE
Status: COMPLETED | OUTPATIENT
Start: 2024-06-28 | End: 2024-06-28

## 2024-06-28 RX ORDER — ALBUMIN, HUMAN INJ 5% 5 %
250 SOLUTION INTRAVENOUS ONCE
Status: COMPLETED | OUTPATIENT
Start: 2024-06-28 | End: 2024-06-28

## 2024-06-28 RX ORDER — IBUPROFEN 600 MG/1
1 TABLET ORAL
Status: DISCONTINUED | OUTPATIENT
Start: 2024-06-28 | End: 2024-06-28 | Stop reason: HOSPADM

## 2024-06-28 RX ORDER — MIDAZOLAM HYDROCHLORIDE 1 MG/ML
1 INJECTION INTRAMUSCULAR; INTRAVENOUS
Status: DISCONTINUED | OUTPATIENT
Start: 2024-06-28 | End: 2024-06-28 | Stop reason: HOSPADM

## 2024-06-28 RX ORDER — OXYCODONE HYDROCHLORIDE 10 MG/1
10 TABLET ORAL
Status: DISCONTINUED | OUTPATIENT
Start: 2024-06-28 | End: 2024-07-02

## 2024-06-28 RX ORDER — CHLORHEXIDINE GLUCONATE 500 MG/1
1 CLOTH TOPICAL EVERY 24 HOURS
Status: DISCONTINUED | OUTPATIENT
Start: 2024-06-29 | End: 2024-06-29

## 2024-06-28 RX ORDER — ACETAMINOPHEN 10 MG/ML
1000 INJECTION, SOLUTION INTRAVENOUS EVERY 8 HOURS SCHEDULED
Status: COMPLETED | OUTPATIENT
Start: 2024-06-28 | End: 2024-06-29

## 2024-06-28 RX ORDER — PANTOPRAZOLE SODIUM 40 MG/10ML
40 INJECTION, POWDER, LYOPHILIZED, FOR SOLUTION INTRAVENOUS ONCE
Status: COMPLETED | OUTPATIENT
Start: 2024-06-28 | End: 2024-06-28

## 2024-06-28 RX ORDER — AMIODARONE HYDROCHLORIDE 200 MG/1
400 TABLET ORAL 2 TIMES DAILY WITH MEALS
Status: DISCONTINUED | OUTPATIENT
Start: 2024-06-29 | End: 2024-06-30

## 2024-06-28 RX ORDER — SODIUM CHLORIDE, SODIUM LACTATE, POTASSIUM CHLORIDE, CALCIUM CHLORIDE 600; 310; 30; 20 MG/100ML; MG/100ML; MG/100ML; MG/100ML
100 INJECTION, SOLUTION INTRAVENOUS CONTINUOUS
Status: DISCONTINUED | OUTPATIENT
Start: 2024-06-28 | End: 2024-06-28

## 2024-06-28 RX ORDER — VANCOMYCIN/0.9 % SOD CHLORIDE 1.5G/250ML
1500 PLASTIC BAG, INJECTION (ML) INTRAVENOUS EVERY 12 HOURS
Status: COMPLETED | OUTPATIENT
Start: 2024-06-28 | End: 2024-06-29

## 2024-06-28 RX ORDER — NICOTINE POLACRILEX 4 MG
15 LOZENGE BUCCAL
Status: DISCONTINUED | OUTPATIENT
Start: 2024-06-28 | End: 2024-07-03 | Stop reason: HOSPADM

## 2024-06-28 RX ORDER — DEXMEDETOMIDINE HYDROCHLORIDE 4 UG/ML
.2-1.5 INJECTION, SOLUTION INTRAVENOUS
Status: DISCONTINUED | OUTPATIENT
Start: 2024-06-28 | End: 2024-06-29

## 2024-06-28 RX ORDER — CHLORHEXIDINE GLUCONATE ORAL RINSE 1.2 MG/ML
15 SOLUTION DENTAL EVERY 12 HOURS
Status: DISCONTINUED | OUTPATIENT
Start: 2024-06-28 | End: 2024-07-02

## 2024-06-28 RX ORDER — METOPROLOL TARTRATE 1 MG/ML
INJECTION, SOLUTION INTRAVENOUS AS NEEDED
Status: DISCONTINUED | OUTPATIENT
Start: 2024-06-28 | End: 2024-06-28 | Stop reason: SURG

## 2024-06-28 RX ORDER — PHENYLEPHRINE HCL IN 0.9% NACL 50MG/250ML
.5-3 PLASTIC BAG, INJECTION (ML) INTRAVENOUS CONTINUOUS PRN
Status: DISCONTINUED | OUTPATIENT
Start: 2024-06-28 | End: 2024-06-29 | Stop reason: HOSPADM

## 2024-06-28 RX ORDER — NICARDIPINE HYDROCHLORIDE 2.5 MG/ML
INJECTION INTRAVENOUS AS NEEDED
Status: DISCONTINUED | OUTPATIENT
Start: 2024-06-28 | End: 2024-06-28 | Stop reason: SURG

## 2024-06-28 RX ADMIN — ACETAMINOPHEN 1000 MG: 10 INJECTION, SOLUTION INTRAVENOUS at 14:44

## 2024-06-28 RX ADMIN — HEPARIN SODIUM 3000 UNITS: 1000 INJECTION, SOLUTION INTRAVENOUS; SUBCUTANEOUS at 08:34

## 2024-06-28 RX ADMIN — SUFENTANIL CITRATE 50 MCG: 50 INJECTION, SOLUTION EPIDURAL; INTRAVENOUS at 11:06

## 2024-06-28 RX ADMIN — ROCURONIUM 25 MG: 50 INJECTION, SOLUTION INTRAVENOUS at 10:36

## 2024-06-28 RX ADMIN — Medication 1 APPLICATION: at 17:23

## 2024-06-28 RX ADMIN — SUFENTANIL CITRATE 50 MCG: 50 INJECTION, SOLUTION EPIDURAL; INTRAVENOUS at 10:36

## 2024-06-28 RX ADMIN — OXYCODONE HYDROCHLORIDE 10 MG: 10 TABLET ORAL at 19:58

## 2024-06-28 RX ADMIN — AMINOCAPROIC ACID 1 G/HR: 250 INJECTION, SOLUTION INTRAVENOUS at 08:08

## 2024-06-28 RX ADMIN — ALBUMIN (HUMAN) 250 ML: 12.5 INJECTION, SOLUTION INTRAVENOUS at 18:41

## 2024-06-28 RX ADMIN — MIDAZOLAM HYDROCHLORIDE 5 MG: 1 INJECTION, SOLUTION INTRAMUSCULAR; INTRAVENOUS at 07:28

## 2024-06-28 RX ADMIN — PROTAMINE SULFATE 150 MG: 10 INJECTION, SOLUTION INTRAVENOUS at 12:46

## 2024-06-28 RX ADMIN — PHENYLEPHRINE HYDROCHLORIDE 0.5 MCG/KG/MIN: 10 INJECTION INTRAVENOUS at 18:34

## 2024-06-28 RX ADMIN — ACETAMINOPHEN 1000 MG: 10 INJECTION, SOLUTION INTRAVENOUS at 22:03

## 2024-06-28 RX ADMIN — LIDOCAINE 2 PATCH: 4 PATCH TOPICAL at 15:13

## 2024-06-28 RX ADMIN — AMIODARONE HYDROCHLORIDE 0.5 MG/MIN: 1.8 INJECTION, SOLUTION INTRAVENOUS at 22:55

## 2024-06-28 RX ADMIN — LIDOCAINE HYDROCHLORIDE 100 MG: 20 INJECTION, SOLUTION EPIDURAL; INFILTRATION; INTRACAUDAL; PERINEURAL at 07:30

## 2024-06-28 RX ADMIN — Medication 1.5 G: at 08:08

## 2024-06-28 RX ADMIN — ONDANSETRON 4 MG: 2 INJECTION INTRAMUSCULAR; INTRAVENOUS at 18:09

## 2024-06-28 RX ADMIN — Medication 1 APPLICATION: at 06:06

## 2024-06-28 RX ADMIN — CHLORHEXIDINE GLUCONATE 1 APPLICATION: 500 CLOTH TOPICAL at 07:01

## 2024-06-28 RX ADMIN — SODIUM CHLORIDE 1500 MG: 9 INJECTION, SOLUTION INTRAVENOUS at 19:23

## 2024-06-28 RX ADMIN — POTASSIUM CHLORIDE AND DEXTROSE MONOHYDRATE 30 ML/HR: 150; 5 INJECTION, SOLUTION INTRAVENOUS at 14:33

## 2024-06-28 RX ADMIN — IPRATROPIUM BROMIDE AND ALBUTEROL SULFATE 1.5 ML: 2.5; .5 SOLUTION RESPIRATORY (INHALATION) at 19:30

## 2024-06-28 RX ADMIN — SUFENTANIL CITRATE 50 MCG: 50 INJECTION, SOLUTION EPIDURAL; INTRAVENOUS at 07:30

## 2024-06-28 RX ADMIN — CEFAZOLIN 2000 MG: 2 INJECTION, POWDER, FOR SOLUTION INTRAMUSCULAR; INTRAVENOUS at 08:30

## 2024-06-28 RX ADMIN — SODIUM CHLORIDE, POTASSIUM CHLORIDE, SODIUM LACTATE AND CALCIUM CHLORIDE 1000 ML: 600; 310; 30; 20 INJECTION, SOLUTION INTRAVENOUS at 07:01

## 2024-06-28 RX ADMIN — SODIUM CHLORIDE 1.9 UNITS/HR: 9 INJECTION, SOLUTION INTRAVENOUS at 16:11

## 2024-06-28 RX ADMIN — SUFENTANIL CITRATE 25 MCG: 50 INJECTION, SOLUTION EPIDURAL; INTRAVENOUS at 08:10

## 2024-06-28 RX ADMIN — CEFAZOLIN 2 G: 2 INJECTION, POWDER, FOR SOLUTION INTRAMUSCULAR; INTRAVENOUS at 21:13

## 2024-06-28 RX ADMIN — SUFENTANIL CITRATE 25 MCG: 50 INJECTION, SOLUTION EPIDURAL; INTRAVENOUS at 07:56

## 2024-06-28 RX ADMIN — ROCURONIUM 25 MG: 50 INJECTION, SOLUTION INTRAVENOUS at 11:07

## 2024-06-28 RX ADMIN — PROPOFOL 50 MG: 10 INJECTION, EMULSION INTRAVENOUS at 07:30

## 2024-06-28 RX ADMIN — SUFENTANIL CITRATE 50 MCG: 50 INJECTION, SOLUTION EPIDURAL; INTRAVENOUS at 09:18

## 2024-06-28 RX ADMIN — ROCURONIUM 100 MG: 50 INJECTION, SOLUTION INTRAVENOUS at 07:30

## 2024-06-28 RX ADMIN — ACETAMINOPHEN 1000 MG: 500 TABLET, FILM COATED ORAL at 06:06

## 2024-06-28 RX ADMIN — NICARDIPINE HYDROCHLORIDE 500 MCG: 25 INJECTION INTRAVENOUS at 12:58

## 2024-06-28 RX ADMIN — CEFAZOLIN 2000 MG: 2 INJECTION, POWDER, FOR SOLUTION INTRAMUSCULAR; INTRAVENOUS at 12:45

## 2024-06-28 RX ADMIN — IPRATROPIUM BROMIDE AND ALBUTEROL SULFATE 1.5 ML: 2.5; .5 SOLUTION RESPIRATORY (INHALATION) at 19:25

## 2024-06-28 RX ADMIN — AMINOCAPROIC ACID 5 G: 250 INJECTION, SOLUTION INTRAVENOUS at 08:07

## 2024-06-28 RX ADMIN — ROCURONIUM 25 MG: 50 INJECTION, SOLUTION INTRAVENOUS at 09:19

## 2024-06-28 RX ADMIN — SUFENTANIL CITRATE 50 MCG: 50 INJECTION, SOLUTION EPIDURAL; INTRAVENOUS at 13:42

## 2024-06-28 RX ADMIN — MIDAZOLAM 2 MG: 1 INJECTION INTRAMUSCULAR; INTRAVENOUS at 07:01

## 2024-06-28 RX ADMIN — MIDAZOLAM HYDROCHLORIDE 3 MG: 1 INJECTION, SOLUTION INTRAMUSCULAR; INTRAVENOUS at 12:31

## 2024-06-28 RX ADMIN — OXYCODONE HYDROCHLORIDE 5 MG: 5 TABLET ORAL at 23:56

## 2024-06-28 RX ADMIN — ALBUMIN (HUMAN) 500 ML: 12.5 INJECTION, SOLUTION INTRAVENOUS at 23:16

## 2024-06-28 RX ADMIN — SUFENTANIL CITRATE 50 MCG: 50 INJECTION, SOLUTION EPIDURAL; INTRAVENOUS at 08:58

## 2024-06-28 RX ADMIN — HEPARIN SODIUM 5000 UNITS: 1000 INJECTION, SOLUTION INTRAVENOUS; SUBCUTANEOUS at 09:47

## 2024-06-28 RX ADMIN — PROTAMINE SULFATE 50 MG: 10 INJECTION, SOLUTION INTRAVENOUS at 17:53

## 2024-06-28 RX ADMIN — CHLORHEXIDINE GLUCONATE 0.12% ORAL RINSE 15 ML: 1.2 LIQUID ORAL at 17:23

## 2024-06-28 RX ADMIN — FENTANYL CITRATE 25 MCG: 50 INJECTION, SOLUTION INTRAMUSCULAR; INTRAVENOUS at 17:51

## 2024-06-28 RX ADMIN — NITROGLYCERIN 5 MCG/MIN: 20 INJECTION INTRAVENOUS at 14:41

## 2024-06-28 RX ADMIN — MIDAZOLAM HYDROCHLORIDE 2 MG: 1 INJECTION, SOLUTION INTRAMUSCULAR; INTRAVENOUS at 12:55

## 2024-06-28 RX ADMIN — FENTANYL CITRATE 250 MCG: 50 INJECTION, SOLUTION INTRAMUSCULAR; INTRAVENOUS at 12:38

## 2024-06-28 RX ADMIN — IPRATROPIUM BROMIDE AND ALBUTEROL SULFATE 1.5 ML: 2.5; .5 SOLUTION RESPIRATORY (INHALATION) at 14:51

## 2024-06-28 RX ADMIN — PANTOPRAZOLE SODIUM 40 MG: 40 INJECTION, POWDER, FOR SOLUTION INTRAVENOUS at 14:44

## 2024-06-28 RX ADMIN — SUGAMMADEX 200 MG: 100 INJECTION, SOLUTION INTRAVENOUS at 14:05

## 2024-06-28 RX ADMIN — AMIODARONE HYDROCHLORIDE 1 MG/MIN: 1.8 INJECTION, SOLUTION INTRAVENOUS at 17:19

## 2024-06-28 RX ADMIN — HEPARIN SODIUM 28000 UNITS: 1000 INJECTION, SOLUTION INTRAVENOUS; SUBCUTANEOUS at 10:21

## 2024-06-28 RX ADMIN — POTASSIUM CHLORIDE AND DEXTROSE MONOHYDRATE 30 ML/HR: 150; 5 INJECTION, SOLUTION INTRAVENOUS at 14:31

## 2024-06-28 RX ADMIN — Medication 100 MCG: at 10:43

## 2024-06-28 RX ADMIN — METOPROLOL TARTRATE 1 MG: 5 INJECTION INTRAVENOUS at 08:05

## 2024-06-28 NOTE — ANESTHESIA PROCEDURE NOTES
Arterial Line    Pre-sedation assessment completed: 6/28/2024 7:06 AM    Patient reassessed immediately prior to procedure    Patient location during procedure: pre-op  Start time: 6/28/2024 7:06 AM  Stop Time:6/28/2024 7:06 AM       Line placed for hemodynamic monitoring.  Performed By   Anesthesiologist: Yovany Charles MD   Preanesthetic Checklist  Completed: patient identified, IV checked, site marked, risks and benefits discussed, surgical consent, monitors and equipment checked, pre-op evaluation and timeout performed  Arterial Line Prep    Sterile Tech: cap, gloves and mask  Prep: ChloraPrep  Patient monitoring: blood pressure monitoring, continuous pulse oximetry and EKG  Arterial Line Procedure   Laterality:left  Location:  radial artery  Catheter size: 20 G   Guidance: landmark technique and palpation technique  Number of attempts: 1  Successful placement: yes   Post Assessment   Dressing Type: occlusive dressing applied, secured with tape and wrist guard applied.   Complications no  Circ/Move/Sens Assessment: normal.   Patient Tolerance: patient tolerated the procedure well with no apparent complications  Additional Notes  Wire intact. Calibrated/Connections checked, line flushed.  Appropriate waveform. Clear occlusive opsite applied over catheter insertion site. Pt remained hemodynamically stable throughout procedure.

## 2024-06-28 NOTE — PROGRESS NOTES
A [employer] message has been sent to the patient for PATIENT ROUNDING with [facility].

## 2024-06-28 NOTE — ANESTHESIA PROCEDURE NOTES
Airway  Urgency: elective    Date/Time: 6/28/2024 7:33 AM  Airway not difficult    General Information and Staff    Patient location during procedure: OR  CRNA/CAA: Paulina Palma CRNA    Indications and Patient Condition  Indications for airway management: airway protection    Preoxygenated: yes  Mask difficulty assessment: 1 - vent by mask    Final Airway Details  Final airway type: endotracheal airway      Successful airway: ETT and Microcuff Subglottic Suctioning ETT  Cuffed: yes   Successful intubation technique: direct laryngoscopy  Facilitating devices/methods: intubating stylet  Endotracheal tube insertion site: oral  Blade: Alonso  Blade size: 2  ETT size (mm): 8.0  Cormack-Lehane Classification: grade I - full view of glottis  Placement verified by: chest auscultation and capnometry   Cuff volume (mL): 8  Measured from: lips  ETT/EBT  to lips (cm): 23  Number of attempts at approach: 1  Assessment: lips, teeth, and gum same as pre-op and atraumatic intubation    Additional Comments  Easy mask, easy atraumatic intubation.

## 2024-06-28 NOTE — ANESTHESIA POSTPROCEDURE EVALUATION
"Patient: Max Oliveira    Procedure Summary       Date: 06/28/24 Room / Location:  [facility] OR  /  [facility]    Anesthesia Start: 0721 Anesthesia Stop: 1359    Procedures:       CORONARY ARTERY BYPASS GRAFTING x4, BILATERAL INTERNAL MAMMARY ARTERY GRAFTS, RIGHT LEG ENDOSCOPIC VEIN HARVEST, TRANSESOPHAGEAL ECHOCARDIOGRAM, XP STERNAL PLATING (Chest)      STERNAL PLATING (Chest) Diagnosis:       Coronary artery disease involving native coronary artery of native heart, unspecified whether angina present      (Coronary artery disease involving native coronary artery of native heart, unspecified whether angina present [I25.10])    Surgeons: Tim Cancino MD Provider: Paulina Palma CRNA    Anesthesia Type: general, [place], CVL, PAC ASA Status: 4            Anesthesia Type: general, Jud, CVL, PAC    Vitals  Vitals Value Taken Time   BP 76/46 06/28/24 1431   Temp     Pulse 83 06/28/24 1433   Resp     SpO2 91 % 06/28/24 1433   Vitals shown include unfiled device data.        Post Anesthesia Care and Evaluation    Patient location during evaluation: ICU  Patient participation: complete - patient cannot participate  Post-procedure mental status: sedated on vent.  Pain management: adequate    Airway patency: patent  Anesthetic complications: No anesthetic complications  PONV Status: NA  Cardiovascular status: acceptable and stable  Respiratory status: acceptable, ventilator and ETT  Hydration status: acceptable    Comments: Blood pressure 138/90, pulse 81, temperature 97.6 °F (36.4 °C), temperature source Temporal, resp. rate 16, height 180.5 cm (71.06\"), weight 94.8 kg (208 lb 15.9 oz), SpO2 96%.      "

## 2024-06-28 NOTE — INTERVAL H&P NOTE
H&P reviewed. The patient was examined and there are no changes to the H&P.   Pt states he ran out of medication for ulcer, couldn't remember the name of the medication.  Pt is requesting refill    Verified pharamacy: [facility]

## 2024-06-28 NOTE — ANESTHESIA PROCEDURE NOTES
Intra-Op Anesthesia LUIS    Procedure Performed: Intra-Op Anesthesia LUIS       Start Time:  6/28/2024 7:45 AM       End Time:   6/28/2024 7:53 AM    Preanesthesia Checklist:  Patient identified, IV assessed, risks and benefits discussed, monitors and equipment assessed, procedure being performed at surgeon's request and anesthesia consent obtained.    General Procedure Information  LUIS Placed for monitoring purposes only -- This is not a diagnostic LUIS

## 2024-06-28 NOTE — ANESTHESIA PROCEDURE NOTES
Central Line    Pre-sedation assessment completed: 6/28/2024 7:36 AM    Patient reassessed immediately prior to procedure    Patient location during procedure: OR  Start time: 6/28/2024 7:36 AM  Stop Time:6/28/2024 7:43 AM  Indications: vascular access  Staff  Anesthesiologist: Yovany Charles MD  Preanesthetic Checklist  Completed: patient identified, IV checked, site marked, risks and benefits discussed, surgical consent, monitors and equipment checked, pre-op evaluation and timeout performed  Central Line Prep  Sterile Tech:cap, gloves, mask, sterile barriers and gown  Prep: chloraprep  no medical exclusion documented for following all elements of maximal sterile barrier technique  Patient monitoring: blood pressure monitoring, continuous pulse oximetry and EKG  Central Line Procedure  Laterality:right  Location:internal jugular  Catheter Type:MAC and [place]-Pam  Catheter Size:9 Fr  Guidance:ultrasound guided  PROCEDURE NOTE/ULTRASOUND INTERPRETATION.  Using ultrasound guidance the potential vascular sites for insertion of the catheter were visualized to determine the patency of the vessel to be used for vascular access.  After selecting the appropriate site for insertion, the needle was visualized under ultrasound being inserted into the internal jugular vein, followed by ultrasound confirmation of wire and catheter placement. There were no abnormalities seen on ultrasound; an image was taken; and the patient tolerated the procedure with no complications. Images: still images not obtained  Assessment  Post procedure:occlusive dressing applied, line sutured and biopatch applied  Assessement:blood return through all ports, free fluid flow, chest x-ray ordered and Kemar Test  Complications:no  Patient Tolerance:patient tolerated the procedure well with no apparent complications  Additional Notes  Pcw 51

## 2024-06-28 NOTE — BRIEF OP NOTE
Max Roxanne  6/28/2024    Pre-op Diagnosis:   Coronary artery disease involving native coronary artery of native heart, unstable angina present [I25.10]  Type 2 diabetes mellitus  Hypertension  Hyperlipidemia         Procedure(s):  CORONARY ARTERY BYPASS GRAFTING x4 (LIMA/LAD, GAMAL/PDA, RSVG/OM1, and RSVG/OM2)  BILATERAL INTERNAL MAMMARY ARTERY GRAFTS  RIGHT LEG ENDOSCOPIC VEIN HARVEST  XP STERNAL PLATING        Surgeon(s):  Tim Cancino MD    Anesthesia: General    Staff:   Circulator: Diego Hedrick RN  Perfusionist: Apple Baron  Scrub Person: Ada Hui; Chad Sandoval; Luz Nichole         Estimated Blood Loss: cell saver    Urine Voided: 2030 mL    Specimens:                None          Drains:   Closed/Suction Drain 1 Left Pleural Bulb 19 Fr. (Active)   Dressing Status Clean;Dry 06/28/24 1308   Status To bulb suction 06/28/24 1308       Closed/Suction Drain 1 Right Pleural Bulb 19 Fr. (Active)   Dressing Status Clean;Dry 06/28/24 1308   Status To bulb suction 06/28/24 1308       Urethral Catheter Silicone;Temperature probe 16 Fr. (Active)       Y Chest Tube 1 and 2 1 Right Mediastinal 24 Fr. 2 Left Mediastinal 24 Fr. (Active)   Dressing Type 1 Gauze 06/28/24 1309   Dressing Status 1 Clean;Dry 06/28/24 1309   Dressing Intervention 1 New dressing 06/28/24 1309   Securement 1 tubing anchored to body distal to insertion site with tape 06/28/24 1309   Dressing Type 2 Gauze;Other (Comment) 06/28/24 1309   Dressing Status 2 Clean;Dry 06/28/24 1309   Dressing Intervention 2 New dressing 06/28/24 1309   Securement 2 tubing anchored to body distal to insertion site with tape 06/28/24 1309       Findings: see op note        Complications: None          Tim Cancino MD     Date: 6/28/2024  Time: 14:02 CDT

## 2024-06-28 NOTE — CONSULTS
Adult Nutrition  Assessment    Patient Name:  Max Oliveira  YOB: 1960  MRN: [id number]  Admit Date:  6/28/2024    Assessment Date:  6/28/2024    Comments:  Nutrition consult for diet education for CAD. He is s/p CABG x 4 with sternal plating today. Currently he remains intubated and sedated. Will follow and offer diet education when appropriate. Vent assessment complete.     Reason for Assessment       Row Name 06/28/24 1718          Reason for Assessment    Reason For Assessment physician consult     Diagnosis cardiac disease;diabetes diagnosis/complications     Identified At Risk by Screening Criteria need for education                    Nutrition/Diet History       Row Name 06/28/24 1719          Nutrition/Diet History    Typical Intake (Food/Fluid/EN/PN) Nutrition consult for diet education for CAD. He is s/p CABG x 4 with sternal plating today. Currently he remains intubated and sedated. Will follow and offer diet education when appropriate.     Factors Affecting Nutritional Intake respiratory difficulty/therapies                    Labs/Tests/Procedures/Meds       Row Name 06/28/24 1721          Labs/Procedures/Meds    Lab Results Reviewed reviewed, pertinent     Lab Results Comments A1c 6.1        Diagnostic Tests/Procedures    Diagnostic Test/Procedure Reviewed reviewed, pertinent     Diagnostic Test/Procedures Comments 6/28 CABG x 4 with sternal plating        Medications    Pertinent Medications Reviewed reviewed                    Physical Findings       Row Name 06/28/24 1722          Physical Findings    Overall Physical Appearance olimpia score 14, R & L pleural drain, sternal incision, R leg incision, intubated/sedated                    Estimated/Assessed Needs - Anthropometrics       Row Name 06/28/24 1726          Anthropometrics    Weight for Calculation 94.8 kg (208 lb 15.9 oz)        Estimated/Assessed Needs    Additional Documentation KCAL/KG (Group);Fluid Requirements  (Group);Protein Requirements (Group)        KCAL/KG    KCAL/KG 20 Kcal/Kg (kcal);25 Kcal/Kg (kcal)     20 Kcal/Kg (kcal) 1896     25 Kcal/Kg (kcal) 2370        Protein Requirements    Weight Used For Protein Calculations 78 kg (172 lb)  IBW     Est Protein Requirement Amount (gms/kg) 1.2 gm protein     Estimated Protein Requirements (gms/day) 93.62        Fluid Requirements    Fluid Requirements (mL/day) 1896     Estimated Fluid Requirement Method other (see comments)  1mL/kcal     RDA Method (mL) 1896                    Nutrition Prescription Ordered       Row Name 06/28/24 1724          Nutrition Prescription PO    Current PO Diet NPO                    Evaluation of Received Nutrient/Fluid Intake       Row Name 06/28/24 1724          Nutrient/Fluid Evaluation    Number of Days Evaluated 1 day     Additional Documentation Fluid Intake Evaluation (Group)        Fluid Intake Evaluation    Other Fluid (mL) 2761        PO Evaluation    % PO Intake NPO                       Electronically signed by:  Ivet Mckeon RDN, AAMIR  06/28/24 17:26 CDT

## 2024-06-29 ENCOUNTER — APPOINTMENT (OUTPATIENT)
Dept: GENERAL RADIOLOGY | Facility: HOSPITAL | Age: 64
DRG: 236 | End: 2024-06-29
Payer: COMMERCIAL

## 2024-06-29 LAB
ALBUMIN SERPL-MCNC: 4.2 G/DL (ref 3.5–5.2)
ANION GAP SERPL CALCULATED.3IONS-SCNC: 10 MMOL/L (ref 5–15)
BASOPHILS # BLD AUTO: 0.03 10*3/MM3 (ref 0–0.2)
BASOPHILS NFR BLD AUTO: 0.3 % (ref 0–1.5)
BUN SERPL-MCNC: 15 MG/DL (ref 8–23)
BUN/CREAT SERPL: 24.2 (ref 7–25)
CALCIUM SPEC-SCNC: 8.2 MG/DL (ref 8.6–10.5)
CHLORIDE SERPL-SCNC: 105 MMOL/L (ref 98–107)
CO2 SERPL-SCNC: 24 MMOL/L (ref 22–29)
CREAT SERPL-MCNC: 0.62 MG/DL (ref 0.76–1.27)
DEPRECATED RDW RBC AUTO: 42 FL (ref 37–54)
EGFRCR SERPLBLD CKD-EPI 2021: 108.1 ML/MIN/1.73
EOSINOPHIL # BLD AUTO: 0 10*3/MM3 (ref 0–0.4)
EOSINOPHIL NFR BLD AUTO: 0 % (ref 0.3–6.2)
ERYTHROCYTE [DISTWIDTH] IN BLOOD BY AUTOMATED COUNT: 12.1 % (ref 12.3–15.4)
GLUCOSE BLDC GLUCOMTR-MCNC: 109 MG/DL (ref 70–130)
GLUCOSE BLDC GLUCOMTR-MCNC: 117 MG/DL (ref 70–130)
GLUCOSE BLDC GLUCOMTR-MCNC: 117 MG/DL (ref 70–130)
GLUCOSE BLDC GLUCOMTR-MCNC: 129 MG/DL (ref 70–130)
GLUCOSE BLDC GLUCOMTR-MCNC: 130 MG/DL (ref 70–130)
GLUCOSE BLDC GLUCOMTR-MCNC: 133 MG/DL (ref 70–130)
GLUCOSE BLDC GLUCOMTR-MCNC: 138 MG/DL (ref 70–130)
GLUCOSE BLDC GLUCOMTR-MCNC: 141 MG/DL (ref 70–130)
GLUCOSE BLDC GLUCOMTR-MCNC: 147 MG/DL (ref 70–130)
GLUCOSE SERPL-MCNC: 136 MG/DL (ref 65–99)
HCT VFR BLD AUTO: 36.1 % (ref 37.5–51)
HGB BLD-MCNC: 12.6 G/DL (ref 13–17.7)
IMM GRANULOCYTES # BLD AUTO: 0.03 10*3/MM3 (ref 0–0.05)
IMM GRANULOCYTES NFR BLD AUTO: 0.3 % (ref 0–0.5)
INR PPP: 1.15 (ref 0.91–1.09)
LYMPHOCYTES # BLD AUTO: 0.64 10*3/MM3 (ref 0.7–3.1)
LYMPHOCYTES NFR BLD AUTO: 6 % (ref 19.6–45.3)
MAGNESIUM SERPL-MCNC: 2 MG/DL (ref 1.6–2.4)
MCH RBC QN AUTO: 33.2 PG (ref 26.6–33)
MCHC RBC AUTO-ENTMCNC: 34.9 G/DL (ref 31.5–35.7)
MCV RBC AUTO: 95 FL (ref 79–97)
MONOCYTES # BLD AUTO: 1.15 10*3/MM3 (ref 0.1–0.9)
MONOCYTES NFR BLD AUTO: 10.7 % (ref 5–12)
NEUTROPHILS NFR BLD AUTO: 8.86 10*3/MM3 (ref 1.7–7)
NEUTROPHILS NFR BLD AUTO: 82.7 % (ref 42.7–76)
NRBC BLD AUTO-RTO: 0 /100 WBC (ref 0–0.2)
PHOSPHATE SERPL-MCNC: 4.1 MG/DL (ref 2.5–4.5)
PLATELET # BLD AUTO: 148 10*3/MM3 (ref 140–450)
PMV BLD AUTO: 9.7 FL (ref 6–12)
POTASSIUM SERPL-SCNC: 4.2 MMOL/L (ref 3.5–5.2)
PROTHROMBIN TIME: 15.2 SECONDS (ref 11.8–14.8)
RBC # BLD AUTO: 3.8 10*6/MM3 (ref 4.14–5.8)
SODIUM SERPL-SCNC: 139 MMOL/L (ref 136–145)
WBC NRBC COR # BLD AUTO: 10.71 10*3/MM3 (ref 3.4–10.8)

## 2024-06-29 PROCEDURE — 93005 ELECTROCARDIOGRAM TRACING: CPT | Performed by: THORACIC SURGERY (CARDIOTHORACIC VASCULAR SURGERY)

## 2024-06-29 PROCEDURE — 85025 COMPLETE CBC W/AUTO DIFF WBC: CPT | Performed by: THORACIC SURGERY (CARDIOTHORACIC VASCULAR SURGERY)

## 2024-06-29 PROCEDURE — 25810000003 SODIUM CHLORIDE 0.9 % SOLUTION: Performed by: THORACIC SURGERY (CARDIOTHORACIC VASCULAR SURGERY)

## 2024-06-29 PROCEDURE — 97116 GAIT TRAINING THERAPY: CPT

## 2024-06-29 PROCEDURE — 25010000002 CEFAZOLIN PER 500 MG: Performed by: THORACIC SURGERY (CARDIOTHORACIC VASCULAR SURGERY)

## 2024-06-29 PROCEDURE — 25010000002 ACETAMINOPHEN 10 MG/ML SOLUTION: Performed by: THORACIC SURGERY (CARDIOTHORACIC VASCULAR SURGERY)

## 2024-06-29 PROCEDURE — 80069 RENAL FUNCTION PANEL: CPT | Performed by: THORACIC SURGERY (CARDIOTHORACIC VASCULAR SURGERY)

## 2024-06-29 PROCEDURE — 25010000002 ONDANSETRON PER 1 MG: Performed by: THORACIC SURGERY (CARDIOTHORACIC VASCULAR SURGERY)

## 2024-06-29 PROCEDURE — 99024 POSTOP FOLLOW-UP VISIT: CPT | Performed by: SURGERY

## 2024-06-29 PROCEDURE — 83735 ASSAY OF MAGNESIUM: CPT | Performed by: THORACIC SURGERY (CARDIOTHORACIC VASCULAR SURGERY)

## 2024-06-29 PROCEDURE — 93010 ELECTROCARDIOGRAM REPORT: CPT | Performed by: HOSPITALIST

## 2024-06-29 PROCEDURE — 97162 PT EVAL MOD COMPLEX 30 MIN: CPT

## 2024-06-29 PROCEDURE — 94664 DEMO&/EVAL PT USE INHALER: CPT

## 2024-06-29 PROCEDURE — 94799 UNLISTED PULMONARY SVC/PX: CPT

## 2024-06-29 PROCEDURE — 94761 N-INVAS EAR/PLS OXIMETRY MLT: CPT

## 2024-06-29 PROCEDURE — 25010000002 ENOXAPARIN PER 10 MG: Performed by: THORACIC SURGERY (CARDIOTHORACIC VASCULAR SURGERY)

## 2024-06-29 PROCEDURE — 25010000002 VANCOMYCIN 10 G RECONSTITUTED SOLUTION: Performed by: THORACIC SURGERY (CARDIOTHORACIC VASCULAR SURGERY)

## 2024-06-29 PROCEDURE — 82948 REAGENT STRIP/BLOOD GLUCOSE: CPT

## 2024-06-29 PROCEDURE — 71045 X-RAY EXAM CHEST 1 VIEW: CPT

## 2024-06-29 PROCEDURE — 85610 PROTHROMBIN TIME: CPT | Performed by: THORACIC SURGERY (CARDIOTHORACIC VASCULAR SURGERY)

## 2024-06-29 RX ORDER — METHOCARBAMOL 500 MG/1
500 TABLET, FILM COATED ORAL EVERY 8 HOURS SCHEDULED
Status: DISCONTINUED | OUTPATIENT
Start: 2024-06-29 | End: 2024-07-03 | Stop reason: HOSPADM

## 2024-06-29 RX ORDER — INSULIN LISPRO 100 [IU]/ML
2-9 INJECTION, SOLUTION INTRAVENOUS; SUBCUTANEOUS
Status: DISCONTINUED | OUTPATIENT
Start: 2024-06-29 | End: 2024-07-03 | Stop reason: HOSPADM

## 2024-06-29 RX ADMIN — METHOCARBAMOL 500 MG: 500 TABLET, FILM COATED ORAL at 08:03

## 2024-06-29 RX ADMIN — CHLORHEXIDINE GLUCONATE 0.12% ORAL RINSE 15 ML: 1.2 LIQUID ORAL at 17:34

## 2024-06-29 RX ADMIN — METOPROLOL TARTRATE 12.5 MG: 25 TABLET, FILM COATED ORAL at 08:05

## 2024-06-29 RX ADMIN — OXYCODONE HYDROCHLORIDE 10 MG: 10 TABLET ORAL at 03:11

## 2024-06-29 RX ADMIN — OXYCODONE HYDROCHLORIDE 10 MG: 10 TABLET ORAL at 16:28

## 2024-06-29 RX ADMIN — BISACODYL 10 MG: 5 TABLET, COATED ORAL at 20:47

## 2024-06-29 RX ADMIN — POLYETHYLENE GLYCOL 3350 17 G: 17 POWDER, FOR SOLUTION ORAL at 08:04

## 2024-06-29 RX ADMIN — SODIUM CHLORIDE 1500 MG: 9 INJECTION, SOLUTION INTRAVENOUS at 08:05

## 2024-06-29 RX ADMIN — CLOPIDOGREL BISULFATE 75 MG: 75 TABLET, FILM COATED ORAL at 17:34

## 2024-06-29 RX ADMIN — METHOCARBAMOL 500 MG: 500 TABLET, FILM COATED ORAL at 16:28

## 2024-06-29 RX ADMIN — CEFAZOLIN 2 G: 2 INJECTION, POWDER, FOR SOLUTION INTRAMUSCULAR; INTRAVENOUS at 04:35

## 2024-06-29 RX ADMIN — PANTOPRAZOLE SODIUM 40 MG: 40 TABLET, DELAYED RELEASE ORAL at 06:49

## 2024-06-29 RX ADMIN — Medication 1 APPLICATION: at 17:34

## 2024-06-29 RX ADMIN — ACETAMINOPHEN 1000 MG: 10 INJECTION, SOLUTION INTRAVENOUS at 05:34

## 2024-06-29 RX ADMIN — OXYCODONE HYDROCHLORIDE 10 MG: 10 TABLET ORAL at 23:29

## 2024-06-29 RX ADMIN — METOPROLOL TARTRATE 12.5 MG: 25 TABLET, FILM COATED ORAL at 20:51

## 2024-06-29 RX ADMIN — OXYCODONE HYDROCHLORIDE 10 MG: 10 TABLET ORAL at 12:51

## 2024-06-29 RX ADMIN — AMIODARONE HYDROCHLORIDE 400 MG: 200 TABLET ORAL at 08:04

## 2024-06-29 RX ADMIN — PREGABALIN 25 MG: 25 CAPSULE ORAL at 20:47

## 2024-06-29 RX ADMIN — ONDANSETRON 4 MG: 2 INJECTION INTRAMUSCULAR; INTRAVENOUS at 05:06

## 2024-06-29 RX ADMIN — IPRATROPIUM BROMIDE AND ALBUTEROL SULFATE 1.5 ML: 2.5; .5 SOLUTION RESPIRATORY (INHALATION) at 10:11

## 2024-06-29 RX ADMIN — AMIODARONE HYDROCHLORIDE 400 MG: 200 TABLET ORAL at 17:34

## 2024-06-29 RX ADMIN — BISACODYL 10 MG: 5 TABLET, COATED ORAL at 08:04

## 2024-06-29 RX ADMIN — OXYCODONE HYDROCHLORIDE 10 MG: 10 TABLET ORAL at 09:34

## 2024-06-29 RX ADMIN — METHOCARBAMOL 500 MG: 500 TABLET, FILM COATED ORAL at 21:10

## 2024-06-29 RX ADMIN — IPRATROPIUM BROMIDE AND ALBUTEROL SULFATE 1.5 ML: 2.5; .5 SOLUTION RESPIRATORY (INHALATION) at 06:08

## 2024-06-29 RX ADMIN — PREGABALIN 25 MG: 25 CAPSULE ORAL at 08:04

## 2024-06-29 RX ADMIN — CHLORHEXIDINE GLUCONATE 0.12% ORAL RINSE 15 ML: 1.2 LIQUID ORAL at 05:34

## 2024-06-29 RX ADMIN — OXYCODONE HYDROCHLORIDE 10 MG: 10 TABLET ORAL at 19:56

## 2024-06-29 RX ADMIN — Medication 1 APPLICATION: at 05:34

## 2024-06-29 RX ADMIN — ASPIRIN 81 MG CHEWABLE TABLET 162 MG: 81 TABLET CHEWABLE at 08:04

## 2024-06-29 RX ADMIN — ACETAMINOPHEN 1000 MG: 500 TABLET, FILM COATED ORAL at 20:47

## 2024-06-29 RX ADMIN — ENOXAPARIN SODIUM 40 MG: 100 INJECTION SUBCUTANEOUS at 08:04

## 2024-06-29 RX ADMIN — CHLORHEXIDINE GLUCONATE 1 APPLICATION: 500 CLOTH TOPICAL at 04:22

## 2024-06-29 RX ADMIN — CEFAZOLIN 2 G: 2 INJECTION, POWDER, FOR SOLUTION INTRAMUSCULAR; INTRAVENOUS at 21:11

## 2024-06-29 RX ADMIN — OXYCODONE HYDROCHLORIDE 10 MG: 10 TABLET ORAL at 06:49

## 2024-06-29 RX ADMIN — SODIUM CHLORIDE 1500 MG: 9 INJECTION, SOLUTION INTRAVENOUS at 20:47

## 2024-06-29 RX ADMIN — ATORVASTATIN CALCIUM 20 MG: 10 TABLET, FILM COATED ORAL at 20:51

## 2024-06-29 RX ADMIN — CEFAZOLIN 2 G: 2 INJECTION, POWDER, FOR SOLUTION INTRAMUSCULAR; INTRAVENOUS at 13:09

## 2024-06-29 RX ADMIN — IPRATROPIUM BROMIDE AND ALBUTEROL SULFATE 1.5 ML: 2.5; .5 SOLUTION RESPIRATORY (INHALATION) at 19:28

## 2024-06-29 RX ADMIN — LIDOCAINE 2 PATCH: 4 PATCH TOPICAL at 08:04

## 2024-06-29 RX ADMIN — IPRATROPIUM BROMIDE AND ALBUTEROL SULFATE 1.5 ML: 2.5; .5 SOLUTION RESPIRATORY (INHALATION) at 14:03

## 2024-06-29 RX ADMIN — ACETAMINOPHEN 1000 MG: 500 TABLET, FILM COATED ORAL at 13:10

## 2024-06-29 NOTE — THERAPY TREATMENT NOTE
Acute Care - Physical Therapy Treatment Note  [facility]     Patient Name: Max Oliveira  : 1960  MRN: [id number]  Today's Date: 2024      Visit Dx:     ICD-10-CM ICD-9-CM   1. Impaired mobility [Z74.09]  Z74.09 799.89   2. Coronary artery disease involving native coronary artery of native heart, unspecified whether angina present  I25.10 414.01     Patient Active Problem List   Diagnosis    History of adenomatous polyp of colon    Nontraumatic complete tear of left rotator cuff    Surgical wound infection    Abnormal stress test    Type 2 diabetes mellitus without complication, without long-term current use of insulin    Primary hypertension    Mixed hyperlipidemia    Coronary artery disease involving native coronary artery of native heart    CAD (coronary artery disease)     Past Medical History:   Diagnosis Date    Arthritis     Cervical radiculopathy     Coronary artery disease     Diabetes     Epidermal cyst     GERD (gastroesophageal reflux disease)     History of adenomatous polyp of colon     Hyperlipidemia     Hypertension     PONV (postoperative nausea and vomiting)     Vitamin D deficiency      Past Surgical History:   Procedure Laterality Date    BICEPS TENDONESIS SUBPECTORALIS REPAIR Left 2021    Procedure: BICEPS TENODESIS / TENOTOMY;  Surgeon: Nathen Farrell MD;  Location: [facility] OR;  Service: Orthopedics;  Laterality: Left;    CARDIAC CATHETERIZATION      no stents     CARDIAC CATHETERIZATION N/A 2024    Procedure: Left Heart Cath;  Surgeon: Scott Snowden DO;  Location: [facility] CATH INVASIVE LOCATION;  Service: Cardiovascular;  Laterality: N/A;    COLONOSCOPY  2009    2 polyps, adenomatous    COLONOSCOPY N/A 2020    Procedure: COLONOSCOPY WITH ANESTHESIA;  Surgeon: Mundo Rodrigues MD;  Location: [facility] ENDOSCOPY;  Service: Gastroenterology;  Laterality: N/A;  preop; hx of polyps  postop; poylps   PCP Aayush Ewing     EYE SURGERY      trk     HARDWARE REMOVAL Left 01/18/2022    Procedure: HARDWARE REMOVAL;  Surgeon: Nathen Farrell MD;  Location:  [facility] OR;  Service: Orthopedics;  Laterality: Left;    INCISION AND DRAINAGE SHOULDER Left 11/16/2021    Procedure: INCISION AND DRAINAGE LEFT SHOULDER SURGICAL WOUND;  Surgeon: Nathen Farrell MD;  Location:  [facility] OR;  Service: Orthopedics;  Laterality: Left;    INCISION AND DRAINAGE SHOULDER Left 01/18/2022    Procedure: LEFT SHOULDER SURGICAL WOUND INCISION AND DRAINAGE, HARDWARE REMOVAL;  Surgeon: Nathen Farrell MD;  Location:  [facility] OR;  Service: Orthopedics;  Laterality: Left;    KNEE CARTILAGE SURGERY Left     MANDIBLE SURGERY      x 2    RESECTION DISTAL CLAVICLE Left 06/29/2021    Procedure: DISTAL CLAVICLE EXCISION;  Surgeon: Nathen Farrell MD;  Location:  [facility] OR;  Service: Orthopedics;  Laterality: Left;    SHOULDER ARTHROSCOPY W/ ROTATOR CUFF REPAIR Left 06/29/2021    Procedure: LEFT SHOULDER  ROTATOR CUFF REPAIR, SUBACROMIAL DECOMPRESSION, DISTAL CLAVICLE EXCISION, BICEPS TENODESIS / TENOTOMY;  Surgeon: Nathen Farrell MD;  Location:  [facility] OR;  Service: Orthopedics;  Laterality: Left;     PT Assessment (Last 12 Hours)       PT Evaluation and Treatment       Row Name 06/29/24 1315          Physical Therapy Time and Intention    Subjective Information complains of;pain  -     Document Type therapy note (daily note)  -     Mode of Treatment physical therapy  -       Row Name 06/29/24 1315          General Information    Existing Precautions/Restrictions fall;oxygen therapy device and L/min;sternal  chest tube  -       Row Name 06/29/24 1315          Pain    Pretreatment Pain Rating 7/10  -     Posttreatment Pain Rating 8/10  -     Pain Location incisional  -     Pain Location - chest  -     Pain Intervention(s) Repositioned;Ambulation/increased activity;Medication (See MAR)  -       Row Name 06/29/24 1315          Bed Mobility    Comment, (Bed  Mobility) chair  -       Row Name 06/29/24 1315          Transfers    Comment, (Transfers) stood x 3 while switching out chairs.  -MF       Row Name 06/29/24 1315          Sit-Stand Transfer    Sit-Stand Whitman (Transfers) verbal cues;contact guard  -MF       Row Name 06/29/24 1315          Stand-Sit Transfer    Stand-Sit Whitman (Transfers) verbal cues;contact guard  -       Row Name 06/29/24 1315          Gait/Stairs (Locomotion)    Whitman Level (Gait) verbal cues;contact guard  -MF     Distance in Feet (Gait) 200  -MF     Deviations/Abnormal Patterns (Gait) grant decreased;stride length decreased  -MF     Bilateral Gait Deviations forward flexed posture  -       Row Name 06/29/24 1315          Motor Skills    Comments, Therapeutic Exercise cardiac warm ups x 10  -       Row Name             Wound 06/28/24 0833 Right lower leg Incision    Wound - Properties Group Placement Date: 06/28/24  -SF Placement Time: 0833  -SF Side: Right  -SF Orientation: lower  -SF Location: leg  -SF Primary Wound Type: Incision  -SF    Retired Wound - Properties Group Placement Date: 06/28/24  -SF Placement Time: 0833  -SF Side: Right  -SF Orientation: lower  -SF Location: leg  -SF Primary Wound Type: Incision  -SF    Retired Wound - Properties Group Date first assessed: 06/28/24  -SF Time first assessed: 0833  -SF Side: Right  -SF Location: leg  -SF Primary Wound Type: Incision  -SF      Row Name             Wound 06/28/24 0910 midline sternal Incision    Wound - Properties Group Placement Date: 06/28/24  -SF Placement Time: 0910  -SF Present on Original Admission: N  -SF Orientation: midline  -SF Location: sternal  -SF Primary Wound Type: Incision  -SF    Retired Wound - Properties Group Placement Date: 06/28/24  -SF Placement Time: 0910  -SF Present on Original Admission: N  -SF Orientation: midline  -SF Location: sternal  -SF Primary Wound Type: Incision  -SF    Retired Wound - Properties Group Date first  assessed: 06/28/24  - Time first assessed: 0910  -SF Present on Original Admission: N  -SF Location: sternal  -SF Primary Wound Type: Incision  -SF      Row Name 06/29/24 1315          Positioning and Restraints    Pre-Treatment Position sitting in chair/recliner  -     Post Treatment Position chair  -MF     In Chair reclined;call light within reach;encouraged to call for assist;with family/caregiver;RUE elevated;LUE elevated;compression device  -               User Key  (r) = Recorded By, (t) = Taken By, (c) = Cosigned By      Initials Name Provider Type     Josselyn Sandhu PTA Physical Therapist Assistant    Diego Parks, RN Registered Nurse                    Physical Therapy Education       Title: PT OT SLP Therapies (Done)       Topic: Physical Therapy (Done)       Point: Mobility training (Done)       Learning Progress Summary             Patient Acceptance, E, VU by  at 6/29/2024 1000    Comment: fall risk, d/c planning, sternal precautions                         Point: Home exercise program (Done)       Learning Progress Summary             Patient Acceptance, E, VU by  at 6/29/2024 1000    Comment: fall risk, d/c planning, sternal precautions                         Point: Body mechanics (Done)       Learning Progress Summary             Patient Acceptance, E, VU by  at 6/29/2024 1000    Comment: fall risk, d/c planning, sternal precautions                         Point: Precautions (Done)       Learning Progress Summary             Patient Acceptance, E, VU by  at 6/29/2024 1000    Comment: fall risk, d/c planning, sternal precautions                                         User Key       Initials Effective Dates Name Provider Type Discipline     05/07/24 -  Sixto Abreu, PT Physical Therapist PT                  PT Recommendation and Plan             Time Calculation:    PT Charges       Row Name 06/29/24 1349 06/29/24 0754          Time Calculation    Start Time 1315  -MF  0757  -     Stop Time 1347  -MF 0840  -     Time Calculation (min) 32 min  -MF 43 min  -AJ     PT Received On -- 06/29/24  -     PT Goal Re-Cert Due Date -- 07/09/24  -        Time Calculation- PT    Total Timed Code Minutes- PT 32 minute(s)  -MF --        Timed Charges    44232 - Gait Training Minutes  32  -MF --        Untimed Charges    PT Eval/Re-eval Minutes -- 43  -AJ        Total Minutes    Timed Charges Total Minutes 32  -MF --     Untimed Charges Total Minutes -- 43  -AJ      Total Minutes 32  -MF 43  -AJ               User Key  (r) = Recorded By, (t) = Taken By, (c) = Cosigned By      Initials Name Provider Type    Josselyn Kaba PTA Physical Therapist Assistant    Sixto Greer, PT Physical Therapist                  Therapy Charges for Today       Code Description Service Date Service Provider Modifiers Qty    01950176545 HC GAIT TRAINING EA 15 MIN 6/29/2024 Josselyn Sandhu PTA GP 2            PT G-Codes  Outcome Measure Options: AM-PAC 6 Clicks Basic Mobility (PT)  AM-PAC 6 Clicks Score (PT): 18    Josselyn Sandhu PTA  6/29/2024

## 2024-06-29 NOTE — PLAN OF CARE
Problem: Adult Inpatient Plan of Care  Goal: Plan of Care Review  Outcome: Ongoing, Progressing  Flowsheets (Taken 6/29/2024 0555)  Progress: improving  Goal: Patient-Specific Goal (Individualized)  Outcome: Ongoing, Progressing  Goal: Absence of Hospital-Acquired Illness or Injury  Outcome: Ongoing, Progressing  Intervention: Identify and Manage Fall Risk  Recent Flowsheet Documentation  Taken 6/29/2024 0400 by Pedro Jasso RN  Safety Promotion/Fall Prevention: safety round/check completed  Taken 6/29/2024 0300 by Pedro Jasso RN  Safety Promotion/Fall Prevention: safety round/check completed  Taken 6/29/2024 0100 by Pedro Jasso RN  Safety Promotion/Fall Prevention: safety round/check completed  Taken 6/28/2024 2000 by ePdro Jasso RN  Safety Promotion/Fall Prevention:   safety round/check completed   fall prevention program maintained   clutter free environment maintained   assistive device/personal items within reach  Intervention: Prevent Skin Injury  Recent Flowsheet Documentation  Taken 6/29/2024 0300 by Pedro Jasso RN  Body Position: supine, legs elevated  Taken 6/28/2024 2000 by Pedro Jasso RN  Body Position: supine  Skin Protection: adhesive use limited  Intervention: Prevent and Manage VTE (Venous Thromboembolism) Risk  Recent Flowsheet Documentation  Taken 6/29/2024 0400 by Pedro Jasso RN  Activity Management: bedrest  VTE Prevention/Management:   left   sequential compression devices on  Taken 6/29/2024 0000 by Pedro Jasso RN  Activity Management: bedrest  Taken 6/28/2024 2000 by Pedro Jasso RN  Activity Management: bedrest  Goal: Optimal Comfort and Wellbeing  Outcome: Ongoing, Progressing  Intervention: Monitor Pain and Promote Comfort  Recent Flowsheet Documentation  Taken 6/29/2024 0300 by Pedro Jasso RN  Pain Management Interventions: see MAR  Taken 6/28/2024 2000 by Pedro Jasso RN  Pain Management Interventions: see MAR  Goal: Readiness for Transition of Care  Outcome: Ongoing,  Progressing  Intervention: Mutually Develop Transition Plan  Recent Flowsheet Documentation  Taken 6/28/2024 2140 by Pedro Jasso, RN  Transportation Anticipated: family or friend will provide  Patient/Family Anticipated Services at Transition: none  Patient/Family Anticipates Transition to: home with family  Taken 6/28/2024 2136 by Pedro Jasso, RN  Equipment Currently Used at Home: none     Problem: Skin Injury Risk Increased  Goal: Skin Health and Integrity  Outcome: Ongoing, Progressing  Intervention: Optimize Skin Protection  Recent Flowsheet Documentation  Taken 6/29/2024 0300 by Pedro Jasso, RN  Head of Bed (HOB) Positioning: HOB at 30-45 degrees  Taken 6/28/2024 2000 by Pedro Jasso, RN  Head of Bed (HOB) Positioning: HOB flat  Skin Protection: adhesive use limited   Goal Outcome Evaluation:   Post Op Cabg. Extubated at 1940 to 2lnc. A&Ox4. NSR 60's per tele. Noted to have two brief episodes of asystematic bigeminy this morning. Strips placed in chart. Titrated off of marilyn at 2am. Given 500mls of albumin. UOP 1075mls this shift. MST drain 70mls Right barbara 28mls Left BARBARA 40mls. Up in chair without difficulty.         Progress: improving

## 2024-06-29 NOTE — PLAN OF CARE
Goal Outcome Evaluation:  Plan of Care Reviewed With: patient, spouse           Outcome Evaluation: PT eval complete. Pt up in recliner with [facility] staff, family at bedside upon arrival. Pt AOx4 with reports of 8/10 pain in chest, reports independence at baseline. Pt pre vitals; HR 72, SpO2 93% on RA initially, /62, no RR rate on screen upon arrival. Pt agreeable to perform mobility and educated on sternal precautions. Today pt demo sit<>stand with MIn Ax1 and support under elbow. Pt ambulated 200' with CGA/Min for equipment today. Pt placed on 2L prior to gait due to spO2 reading of 86% on RA and pt endorsing SOA. Pt demo decreased stride length and cadencence intially but improved independently and pain decreased with ambulation as well. Pt educated on continuing OOB activity and improving gait tolerance. Pt will benefit from skilled PT services to regain independence and return to PLOF. Pt post vitals; HR 72, SpO2 95% on 2L, /68. Recommend pt d/c home with assist from family when medically stable.      Anticipated Discharge Disposition (PT): home with assist

## 2024-06-29 NOTE — CASE MANAGEMENT/SOCIAL WORK
Discharge Planning Assessment   [facility]     Patient Name: Max Oliveira  MRN: [id number]  Today's Date: 6/29/2024    Admit Date: 6/28/2024        Discharge Needs Assessment       Row Name 06/29/24 1030       Living Environment    People in Home spouse    Current Living Arrangements home    Potentially Unsafe Housing Conditions none    Primary Care Provided by spouse/significant other    Provides Primary Care For no one    Family Caregiver if Needed spouse    Quality of Family Relationships helpful;involved;supportive    Able to Return to Prior Arrangements yes       Transition Planning    Patient/Family Anticipates Transition to home;home with family    Patient/Family Anticipated Services at Transition none    Transportation Anticipated family or friend will provide       Discharge Needs Assessment    Readmission Within the Last 30 Days no previous admission in last 30 days    Equipment Currently Used at Home none    Concerns to be Addressed no discharge needs identified;denies needs/concerns at this time    Discharge Coordination/Progress Patient lives with his wife and plans to return home at discharge. Patient has PCP and Rx coverage. No dc planning needs identified at this time.             EMI Tobar

## 2024-06-29 NOTE — PLAN OF CARE
Goal Outcome Evaluation:  Plan of Care Reviewed With: patient        Progress: improving  Outcome Evaluation: Pt. transfer out of unit to  earlier this evening. PRN pain meds given for c/o incisional chest pain with relief noted. VSS. 2L O2 being worn. MST and MIGUEL output placed in chart. Sitting up in chair with family at bedside. Will continue to monitor. NSR on tele.

## 2024-06-29 NOTE — PROGRESS NOTES
"    [facility] Cardiothoracic Surgery  PROGRESS NOTE   CC: Postop CABG    Subjective:  Doing well complains of pain at chest tube sites has been hemodynamically stable on 1 L nasal cannula    ROS: No fevers or chills hemodynamically stable    Objective:      /60   Pulse 74   Temp 98.3 °F (36.8 °C) (Oral)   Resp 14   Ht 180.5 cm (71.06\")   Wt 98.2 kg (216 lb 6.4 oz)   SpO2 94%   BMI 30.13 kg/m²       Intake/Output Summary (Last 24 hours) at 6/29/2024 1407  Last data filed at 6/29/2024 1200  Gross per 24 hour   Intake 5459.2 ml   Output 4148 ml   Net 1311.2 ml     Weight today 216, baseline 204    CT Output: Mediastinal: 230, left pleural: 180, right pleural: 50    PE:  Vitals:    06/29/24 1403   BP:    Pulse: 74   Resp: 14   Temp:    SpO2: 94%     GENERAL: NAD, resting comfortably, normal color  CARDIOVASCULAR: regular, regular rate, sinus, dressing clean dry and intact some ectopy on monitor  PULMONARY: Normal bilateral breath sounds, no labored breathing  ABDOMEN: soft, nontender/nondistended  EXTREMITIES: mild peripheral edema, normal pulses, normal ROM        Lab Results (last 72 hours)       Procedure Component Value Units Date/Time    POC Glucose Once [[id number]]  (Normal) Collected: 06/29/24 1135    Specimen: Blood Updated: 06/29/24 1146     Glucose 109 mg/dL      Comment: : [id number] Calvin MendezMeter ID: [id number]       POC Glucose Once [[id number]]  (Abnormal) Collected: 06/29/24 0838    Specimen: Blood Updated: 06/29/24 0849     Glucose 133 mg/dL      Comment: : [id number] Calvin MendezMeter ID: [id number]       POC Glucose Once [[id number]]  (Abnormal) Collected: 06/29/24 0647    Specimen: Blood Updated: 06/29/24 0707     Glucose 138 mg/dL      Comment: : [id number] Roemro VasquezMeter ID: [id number]       POC Glucose Once [[id number]]  (Abnormal) Collected: 06/29/24 0550    Specimen: Blood Updated: 06/29/24 0601     Glucose 141 mg/dL      Comment: : [id number] " Sensing ColtynMeter ID: [id number]       POC Glucose Once [[id number]]  (Normal) Collected: 06/29/24 0359    Specimen: Blood Updated: 06/29/24 0410     Glucose 130 mg/dL      Comment: : [id number] Sensing ColtynMeter ID: [id number]       Renal Function Panel [[id number]]  (Abnormal) Collected: 06/29/24 0301    Specimen: Blood from Arm, Left Updated: 06/29/24 0328     Glucose 136 mg/dL      BUN 15 mg/dL      Creatinine 0.62 mg/dL      Sodium 139 mmol/L      Potassium 4.2 mmol/L      Chloride 105 mmol/L      CO2 24.0 mmol/L      Calcium 8.2 mg/dL      Albumin 4.2 g/dL      Phosphorus 4.1 mg/dL      Anion Gap 10.0 mmol/L      BUN/Creatinine Ratio 24.2     eGFR 108.1 mL/min/1.73     Narrative:      GFR Normal >60  Chronic Kidney Disease <60  Kidney Failure <15      Magnesium [[id number]]  (Normal) Collected: 06/29/24 0301    Specimen: Blood from Arm, Left Updated: 06/29/24 0322     Magnesium 2.0 mg/dL     Protime-INR [[id number]]  (Abnormal) Collected: 06/29/24 0301    Specimen: Blood from Arm, Left Updated: 06/29/24 0320     Protime 15.2 Seconds      INR 1.15    POC Glucose Once [[id number]]  (Normal) Collected: 06/29/24 0259    Specimen: Blood Updated: 06/29/24 0310     Glucose 129 mg/dL      Comment: : [id number] Sensing ColtynMeter ID: [id number]       CBC & Differential [[id number]]  (Abnormal) Collected: 06/29/24 0301    Specimen: Blood from Arm, Left Updated: 06/29/24 0308    Narrative:      The following orders were created for panel order CBC & Differential.  Procedure                               Abnormality         Status                     ---------                               -----------         ------                     CBC Auto Differential[[id number]]        Abnormal            Final result                 Please view results for these tests on the individual orders.    CBC Auto Differential [[id number]]  (Abnormal) Collected: 06/29/24 0301    Specimen: Blood from Arm, Left Updated: 06/29/24 0308      WBC 10.71 10*3/mm3      RBC 3.80 10*6/mm3      Hemoglobin 12.6 g/dL      Hematocrit 36.1 %      MCV 95.0 fL      MCH 33.2 pg      MCHC 34.9 g/dL      RDW 12.1 %      RDW-SD 42.0 fl      MPV 9.7 fL      Platelets 148 10*3/mm3      Neutrophil % 82.7 %      Lymphocyte % 6.0 %      Monocyte % 10.7 %      Eosinophil % 0.0 %      Basophil % 0.3 %      Immature Grans % 0.3 %      Neutrophils, Absolute 8.86 10*3/mm3      Lymphocytes, Absolute 0.64 10*3/mm3      Monocytes, Absolute 1.15 10*3/mm3      Eosinophils, Absolute 0.00 10*3/mm3      Basophils, Absolute 0.03 10*3/mm3      Immature Grans, Absolute 0.03 10*3/mm3      nRBC 0.0 /100 WBC     POC Glucose Once [[id number]]  (Abnormal) Collected: 06/29/24 0055    Specimen: Blood Updated: 06/29/24 0106     Glucose 147 mg/dL      Comment: : [id number] Sensing ColtynMeter ID: [id number]       POC Glucose Once [[id number]]  (Abnormal) Collected: 06/28/24 2238    Specimen: Blood Updated: 06/28/24 2249     Glucose 146 mg/dL      Comment: : [id number] Sensing ColtynMeter ID: [id number]       POC Glucose Once [[id number]]  (Abnormal) Collected: 06/28/24 2025    Specimen: Blood Updated: 06/28/24 2045     Glucose 146 mg/dL      Comment: : [id number] [id number]       Blood Gas, Arterial - [[id number]]  (Abnormal) Collected: 06/28/24 1919    Specimen: Arterial Blood Updated: 06/28/24 1923     Site Arterial Line     Aiden's Test N/A     pH, Arterial 7.346 pH units      Comment: 84 Value below reference range        pCO2, Arterial 47.3 mm Hg      Comment: 83 Value above reference range        pO2, Arterial 83.7 mm Hg      HCO3, Arterial 25.9 mmol/L      Base Excess, Arterial -0.4 mmol/L      Comment: 84 Value below reference range        O2 Saturation, Arterial 96.3 %      Temperature 37.0     Barometric Pressure for Blood Gas 748 mmHg      Modality Ventilator     FIO2 30 %      Ventilator Mode PSV     PEEP 5.0     PSV 10.0 cmH2O      Collected by [id number]      Comment: Meter: [id number]     :  Yovany Smith, CRT        pCO2, Temperature Corrected 47.3 mm Hg      pH, Temp Corrected 7.346 pH Units      pO2, Temperature Corrected 83.7 mm Hg      PO2/FIO2 279    Renal Function Panel [[id number]]  (Abnormal) Collected: 06/28/24 1811    Specimen: Blood Updated: 06/28/24 1853     Glucose 189 mg/dL      BUN 19 mg/dL      Creatinine 0.81 mg/dL      Sodium 141 mmol/L      Potassium 4.0 mmol/L      Comment: Slight hemolysis detected by analyzer. Result may be falsely elevated.        Chloride 106 mmol/L      CO2 25.0 mmol/L      Calcium 8.5 mg/dL      Albumin 4.0 g/dL      Phosphorus 4.9 mg/dL      Anion Gap 10.0 mmol/L      BUN/Creatinine Ratio 23.5     eGFR 99.7 mL/min/1.73     Narrative:      GFR Normal >60  Chronic Kidney Disease <60  Kidney Failure <15      CBC (No Diff) [[id number]]  (Abnormal) Collected: 06/28/24 1811    Specimen: Blood Updated: 06/28/24 1834     WBC 13.17 10*3/mm3      RBC 4.42 10*6/mm3      Hemoglobin 14.6 g/dL      Hematocrit 41.9 %      MCV 94.8 fL      MCH 33.0 pg      MCHC 34.8 g/dL      RDW 11.9 %      RDW-SD 41.4 fl      MPV 9.9 fL      Platelets 174 10*3/mm3     POC Glucose Once [[id number]]  (Abnormal) Collected: 06/28/24 1803    Specimen: Blood Updated: 06/28/24 1814     Glucose 175 mg/dL      Comment: : [id number] Calvin MendezMeter ID: [id number]       POC Glucose Once [[id number]]  (Abnormal) Collected: 06/28/24 1704    Specimen: Blood Updated: 06/28/24 1715     Glucose 150 mg/dL      Comment: : [id number] Calvin MendezMeter ID: [id number]       Blood Gas, Arterial - [[id number]]  (Abnormal) Collected: 06/28/24 1608    Specimen: Arterial Blood Updated: 06/28/24 1610     Site Arterial Line     Aiden's Test N/A     pH, Arterial 7.357 pH units      pCO2, Arterial 44.6 mm Hg      pO2, Arterial 80.5 mm Hg      Comment: 84 Value below reference range        HCO3, Arterial 25.0 mmol/L      Base Excess, Arterial -0.8 mmol/L      Comment: 84  Value below reference range        O2 Saturation, Arterial 95.9 %      Temperature 37.0     Barometric Pressure for Blood Gas 748 mmHg      Modality Ventilator     FIO2 30 %      Ventilator Mode SIMV     Set Tidal Volume 600     Set Mech Resp Rate 20.0     PEEP 8.0     PSV 10.0 cmH2O      Collected by [id number]     Comment: Meter: [id number]     :  Adrienne Fiore, RRT        pCO2, Temperature Corrected 44.6 mm Hg      pH, Temp Corrected 7.357 pH Units      pO2, Temperature Corrected 80.5 mm Hg      PO2/FIO2 268    POC Glucose Once [[id number]]  (Abnormal) Collected: 06/28/24 1549    Specimen: Blood Updated: 06/28/24 1600     Glucose 156 mg/dL      Comment: : [id number] Calvin MendezMeter ID: [id number]       Renal Function Panel [[id number]]  (Abnormal) Collected: 06/28/24 1416    Specimen: Blood Updated: 06/28/24 1551     Glucose 124 mg/dL      BUN 19 mg/dL      Creatinine 0.85 mg/dL      Sodium 142 mmol/L      Potassium 4.2 mmol/L      Comment: Slight hemolysis detected by analyzer. Result may be falsely elevated.        Chloride 106 mmol/L      CO2 24.0 mmol/L      Calcium 9.7 mg/dL      Albumin 4.0 g/dL      Phosphorus 4.3 mg/dL      Anion Gap 12.0 mmol/L      BUN/Creatinine Ratio 22.4     eGFR 98.2 mL/min/1.73     Narrative:      GFR Normal >60  Chronic Kidney Disease <60  Kidney Failure <15      Protime-INR [[id number]]  (Abnormal) Collected: 06/28/24 1416    Specimen: Blood Updated: 06/28/24 1445     Protime 15.1 Seconds      INR 1.14    aPTT [[id number]]  (Abnormal) Collected: 06/28/24 1416    Specimen: Blood Updated: 06/28/24 1445     PTT 66.0 seconds     CBC (No Diff) [[id number]]  (Abnormal) Collected: 06/28/24 1416    Specimen: Blood Updated: 06/28/24 1441     WBC 10.42 10*3/mm3      RBC 4.47 10*6/mm3      Hemoglobin 14.7 g/dL      Hematocrit 42.0 %      MCV 94.0 fL      MCH 32.9 pg      MCHC 35.0 g/dL      RDW 11.9 %      RDW-SD 41.4 fl      MPV 10.0 fL      Platelets 151 10*3/mm3     Calcium,  Ionized [[id number]] Collected: 06/28/24 1410    Specimen: Blood Updated: 06/28/24 1412     Ionized Calcium 5.08 mg/dL      Collected by [id number]     Comment: Meter: [id number]     :  Camryn Solis RRT       Blood Gas, Arterial With Co-Ox [[id number]]  (Abnormal) Collected: 06/28/24 1319    Specimen: Arterial Blood Updated: 06/28/24 1327     Site Arterial Line     Aiden's Test N/A     pH, Arterial 7.381 pH units      pCO2, Arterial 46.1 mm Hg      Comment: 83 Value above reference range        pO2, Arterial 54.8 mm Hg      Comment: 85 Value below critical limit        HCO3, Arterial 27.3 mmol/L      Comment: 83 Value above reference range        Base Excess, Arterial 1.6 mmol/L      O2 Saturation, Arterial 88.2 %      Comment: 84 Value below reference range        Hemoglobin, Blood Gas 14.4 g/dL      Hematocrit, Blood Gas 44.2 %      Oxyhemoglobin 86.4 %      Comment: 84 Value below reference range        Methemoglobin 0.50 %      Carboxyhemoglobin 1.4 %      Temperature 37.0     Sodium, Arterial 141 mmol/L      Potassium, Arterial 4.6 mmol/L      Ionized Calcium 5.40 mg/dL      Barometric Pressure for Blood Gas 749 mmHg      Modality Ventilator     FIO2 100 %      Ventilator Mode NA     Notified Who CRNA     Notified By SAMANTHA     Notified Time 06/28/2024 13:26     Collected by SILVIA THORPE     Comment: Meter: [id number]     :  SAMANTHA        pH, Temp Corrected 7.381 pH Units      pCO2, Temperature Corrected 46.1 mm Hg      pO2, Temperature Corrected 54.8 mm Hg     Blood Gas, Arterial With Co-Ox [[id number]]  (Abnormal) Collected: 06/28/24 1233    Specimen: Arterial Blood Updated: 06/28/24 1236     Site Arterial Line     Aiden's Test N/A     pH, Arterial 7.343 pH units      Comment: 84 Value below reference range        pCO2, Arterial 47.9 mm Hg      Comment: 83 Value above reference range        pO2, Arterial 397.0 mm Hg      Comment: 83 Value above reference range        HCO3, Arterial  26.0 mmol/L      Comment: 83 Value above reference range        Base Excess, Arterial -0.2 mmol/L      Comment: 84 Value below reference range        O2 Saturation, Arterial >100.1 %      Comment: 93 Value above reportable range > 100.1        Hemoglobin, Blood Gas 12.4 g/dL      Comment: 84 Value below reference range        Hematocrit, Blood Gas 38.1 %      Oxyhemoglobin 98.5 %      Methemoglobin 0.80 %      Carboxyhemoglobin 1.0 %      Temperature 37.0     Sodium, Arterial 137 mmol/L      Potassium, Arterial 5.9 mmol/L      Comment: 83 Value above reference range        Ionized Calcium 4.15 mg/dL      Comment: 84 Value below reference range        Barometric Pressure for Blood Gas 749 mmHg      Modality pump     FIO2 90 %      Flow Rate 2.5 lpm      Ventilator Mode NA     Collected by SAMANTHA     Comment: Meter: [id number]     :  SAMANTHA        pH, Temp Corrected 7.343 pH Units      pCO2, Temperature Corrected 47.9 mm Hg      pO2, Temperature Corrected 397 mm Hg     Blood Gas, Arterial With Co-Ox [[id number]]  (Abnormal) Collected: 06/28/24 1157    Specimen: Arterial Blood Updated: 06/28/24 1200     Site Arterial Line     Aiden's Test N/A     pH, Arterial 7.357 pH units      pCO2, Arterial 45.8 mm Hg      Comment: 83 Value above reference range        pO2, Arterial 324.0 mm Hg      Comment: 83 Value above reference range        HCO3, Arterial 25.7 mmol/L      Base Excess, Arterial -0.2 mmol/L      Comment: 84 Value below reference range        O2 Saturation, Arterial >100.1 %      Comment: 93 Value above reportable range > 100.1        Hemoglobin, Blood Gas 12.8 g/dL      Comment: 84 Value below reference range        Hematocrit, Blood Gas 39.1 %      Oxyhemoglobin 98.7 %      Methemoglobin 0.60 %      Carboxyhemoglobin 1.0 %      Temperature 37.0     Sodium, Arterial 138 mmol/L      Potassium, Arterial 5.5 mmol/L      Comment: 83 Value above reference range        Ionized Calcium 4.21 mg/dL       Comment: 84 Value below reference range        Barometric Pressure for Blood Gas 749 mmHg      Modality pump     FIO2 80 %      Flow Rate 2.5 lpm      Ventilator Mode NA     Collected by SAMANTHA     Comment: Meter: [id number]     :  SAMANTHA        pH, Temp Corrected 7.357 pH Units      pCO2, Temperature Corrected 45.8 mm Hg      pO2, Temperature Corrected 324 mm Hg     Blood Gas, Arterial With Co-Ox [[id number]]  (Abnormal) Collected: 06/28/24 1133    Specimen: Arterial Blood Updated: 06/28/24 1136     Site Arterial Line     Aiden's Test N/A     pH, Arterial 7.339 pH units      Comment: 84 Value below reference range        pCO2, Arterial 49.3 mm Hg      Comment: 83 Value above reference range        pO2, Arterial 361.0 mm Hg      Comment: 83 Value above reference range        HCO3, Arterial 26.5 mmol/L      Comment: 83 Value above reference range        Base Excess, Arterial 0.1 mmol/L      O2 Saturation, Arterial >100.1 %      Comment: 93 Value above reportable range > 100.1        Hemoglobin, Blood Gas 12.9 g/dL      Comment: 84 Value below reference range        Hematocrit, Blood Gas 39.5 %      Oxyhemoglobin 98.9 %      Methemoglobin 0.60 %      Carboxyhemoglobin 0.9 %      Temperature 37.0     Sodium, Arterial 139 mmol/L      Potassium, Arterial 5.4 mmol/L      Comment: 83 Value above reference range        Ionized Calcium 4.18 mg/dL      Comment: 84 Value below reference range        Barometric Pressure for Blood Gas 749 mmHg      Modality pump     FIO2 80 %      Flow Rate 2.3 lpm      Ventilator Mode NA     Comment: Meter: [id number]     :  SAMANTHA        pH, Temp Corrected 7.339 pH Units      pCO2, Temperature Corrected 49.3 mm Hg      pO2, Temperature Corrected 361 mm Hg     Blood Gas, Arterial With Co-Ox [[id number]]  (Abnormal) Collected: 06/28/24 1107    Specimen: Arterial Blood Updated: 06/28/24 1109     Site Arterial Line     Aiden's Test N/A     pH, Arterial 7.344 pH units       Comment: 84 Value below reference range        pCO2, Arterial 44.7 mm Hg      pO2, Arterial 507.0 mm Hg      Comment: 83 Value above reference range        HCO3, Arterial 24.3 mmol/L      Base Excess, Arterial -1.6 mmol/L      Comment: 84 Value below reference range        O2 Saturation, Arterial >100.1 %      Comment: 93 Value above reportable range > 100.1        Hemoglobin, Blood Gas 13.8 g/dL      Comment: 84 Value below reference range        Hematocrit, Blood Gas 42.3 %      Oxyhemoglobin 99.1 %      Comment: 83 Value above reference range        Methemoglobin 0.50 %      Carboxyhemoglobin 0.9 %      Temperature 37.0     Sodium, Arterial 139 mmol/L      Potassium, Arterial 4.8 mmol/L      Ionized Calcium 4.01 mg/dL      Comment: 84 Value below reference range        Barometric Pressure for Blood Gas 749 mmHg      Modality pump     FIO2 100 %      Flow Rate 2.3 lpm      Ventilator Mode NA     Collected by SAMANTHA     Comment: Meter: [id number]     :  SAMANTHA        pH, Temp Corrected 7.344 pH Units      pCO2, Temperature Corrected 44.7 mm Hg      pO2, Temperature Corrected 507 mm Hg     Blood Gas, Arterial With Co-Ox [[id number]]  (Abnormal) Collected: 06/28/24 1032    Specimen: Arterial Blood Updated: 06/28/24 1036     Site Arterial Line     Aiden's Test N/A     pH, Arterial 7.330 pH units      Comment: 84 Value below reference range        pCO2, Arterial 45.9 mm Hg      Comment: 83 Value above reference range        pO2, Arterial 133.0 mm Hg      Comment: 83 Value above reference range        HCO3, Arterial 24.2 mmol/L      Base Excess, Arterial -2.1 mmol/L      Comment: 84 Value below reference range        O2 Saturation, Arterial 99.0 %      Hemoglobin, Blood Gas 15.7 g/dL      Hematocrit, Blood Gas 48.2 %      Oxyhemoglobin 97.7 %      Methemoglobin 0.50 %      Carboxyhemoglobin 0.8 %      Temperature 37.0     Sodium, Arterial 139 mmol/L      Potassium, Arterial 4.6 mmol/L      Ionized Calcium  4.54 mg/dL      Comment: 84 Value below reference range        Barometric Pressure for Blood Gas 749 mmHg      Modality Ventilator     FIO2 100 %      Ventilator Mode NA     Collected by SILVIA THORPE     Comment: Meter: [id number]     :  NFALK        pH, Temp Corrected 7.330 pH Units      pCO2, Temperature Corrected 45.9 mm Hg      pO2, Temperature Corrected 133 mm Hg     Blood Gas, Arterial With Co-Ox [[id number]]  (Abnormal) Collected: 06/28/24 0755    Specimen: Arterial Blood Updated: 06/28/24 0757     Site Arterial Line     Aiden's Test N/A     pH, Arterial 7.408 pH units      pCO2, Arterial 39.5 mm Hg      pO2, Arterial 328.0 mm Hg      Comment: 83 Value above reference range        HCO3, Arterial 24.9 mmol/L      Base Excess, Arterial 0.3 mmol/L      O2 Saturation, Arterial >100.1 %      Comment: 93 Value above reportable range > 100.1        Hemoglobin, Blood Gas 16.8 g/dL      Hematocrit, Blood Gas 51.5 %      Comment: 83 Value above reference range        Oxyhemoglobin 99.0 %      Comment: 83 Value above reference range        Methemoglobin 0.60 %      Carboxyhemoglobin 0.7 %      Temperature 37.0     Sodium, Arterial 141 mmol/L      Potassium, Arterial 4.4 mmol/L      Ionized Calcium 4.71 mg/dL      Barometric Pressure for Blood Gas 750 mmHg      Modality Ventilator     FIO2 100 %      Ventilator Mode NA     Collected by YOLY THORPE     Comment: Meter: [id number]     :  NFALK        pH, Temp Corrected 7.408 pH Units      pCO2, Temperature Corrected 39.5 mm Hg      pO2, Temperature Corrected 328 mm Hg     POC Glucose Once [[id number]]  (Normal) Collected: 06/28/24 0613    Specimen: Blood Updated: 06/28/24 0624     Glucose 126 mg/dL      Comment: : [id number] [facility] [id number]                   CXR: Expected postoperative stable cardiac silhouette good bilateral lung expansion    Assessment & Plan     64-year-old male who is postop day 1 from CABG.   Doing well overall.    Keep chest tubes today  Reglan if any nausea given a large stomach bubble  Out of bed walking  Multimodality pain regimen  Okay to transfer to floor    Sixto Diaz MD  Cardiothoracic Surgeon

## 2024-06-29 NOTE — THERAPY EVALUATION
Patient Name: Max Oliveira  : 1960    MRN: [id number]                              Today's Date: 2024       Admit Date: 2024    Visit Dx:     ICD-10-CM ICD-9-CM   1. Impaired mobility [Z74.09]  Z74.09 799.89   2. Coronary artery disease involving native coronary artery of native heart, unspecified whether angina present  I25.10 414.01     Patient Active Problem List   Diagnosis    History of adenomatous polyp of colon    Nontraumatic complete tear of left rotator cuff    Surgical wound infection    Abnormal stress test    Type 2 diabetes mellitus without complication, without long-term current use of insulin    Primary hypertension    Mixed hyperlipidemia    Coronary artery disease involving native coronary artery of native heart    CAD (coronary artery disease)     Past Medical History:   Diagnosis Date    Arthritis     Cervical radiculopathy     Coronary artery disease     Diabetes     Epidermal cyst     GERD (gastroesophageal reflux disease)     History of adenomatous polyp of colon     Hyperlipidemia     Hypertension     PONV (postoperative nausea and vomiting)     Vitamin D deficiency      Past Surgical History:   Procedure Laterality Date    BICEPS TENDONESIS SUBPECTORALIS REPAIR Left 2021    Procedure: BICEPS TENODESIS / TENOTOMY;  Surgeon: Nathen Farrell MD;  Location: [facility] OR;  Service: Orthopedics;  Laterality: Left;    CARDIAC CATHETERIZATION      no stents     CARDIAC CATHETERIZATION N/A 2024    Procedure: Left Heart Cath;  Surgeon: Scott Snowden DO;  Location: [facility] CATH INVASIVE LOCATION;  Service: Cardiovascular;  Laterality: N/A;    COLONOSCOPY  2009    2 polyps, adenomatous    COLONOSCOPY N/A 2020    Procedure: COLONOSCOPY WITH ANESTHESIA;  Surgeon: Mundo Rodrigues MD;  Location: [facility] ENDOSCOPY;  Service: Gastroenterology;  Laterality: N/A;  preop; hx of polyps  postop; poylps   PCP Aayush Ewing     EYE SURGERY      trk     HARDWARE REMOVAL Left 01/18/2022    Procedure: HARDWARE REMOVAL;  Surgeon: Nathen Farrell MD;  Location:  [facility] OR;  Service: Orthopedics;  Laterality: Left;    INCISION AND DRAINAGE SHOULDER Left 11/16/2021    Procedure: INCISION AND DRAINAGE LEFT SHOULDER SURGICAL WOUND;  Surgeon: Nathen Farrell MD;  Location:  [facility] OR;  Service: Orthopedics;  Laterality: Left;    INCISION AND DRAINAGE SHOULDER Left 01/18/2022    Procedure: LEFT SHOULDER SURGICAL WOUND INCISION AND DRAINAGE, HARDWARE REMOVAL;  Surgeon: Nathen Farrell MD;  Location:  [facility] OR;  Service: Orthopedics;  Laterality: Left;    KNEE CARTILAGE SURGERY Left     MANDIBLE SURGERY      x 2    RESECTION DISTAL CLAVICLE Left 06/29/2021    Procedure: DISTAL CLAVICLE EXCISION;  Surgeon: Nathen Farrell MD;  Location:  [facility] OR;  Service: Orthopedics;  Laterality: Left;    SHOULDER ARTHROSCOPY W/ ROTATOR CUFF REPAIR Left 06/29/2021    Procedure: LEFT SHOULDER  ROTATOR CUFF REPAIR, SUBACROMIAL DECOMPRESSION, DISTAL CLAVICLE EXCISION, BICEPS TENODESIS / TENOTOMY;  Surgeon: Nathen Farrell MD;  Location:  [facility] OR;  Service: Orthopedics;  Laterality: Left;      General Information       Row Name 06/29/24 0757          Physical Therapy Time and Intention    Document Type evaluation  Dx; worsening chest pain and SOA leadingto CABG x4 on 6/28. H/o CAD, Coronary heart disease, Htn, Hld, heart cath 6/18.  -AJ     Mode of Treatment physical therapy  -       Row Name 06/29/24 0757          General Information    Patient Profile Reviewed yes  -AJ     Prior Level of Function independent:;all household mobility;community mobility;gait;home management;bed mobility;ADL's  -AJ     Existing Precautions/Restrictions fall;oxygen therapy device and L/min;sternal;other (see comments)  2L, 1 chest tube, cam  -AJ     Barriers to Rehab medically complex;physical barrier  -AJ       Row Name 06/29/24 0757          Living Environment    People in  Home spouse  -       Row Name 06/29/24 0757          Home Main Entrance    Number of Stairs, Main Entrance three  -     Stair Railings, Main Entrance railing on right side (ascending)  -       Row Name 06/29/24 0757          Stairs Within Home, Primary    Number of Stairs, Within Home, Primary none  -       Row Name 06/29/24 0757          Cognition    Orientation Status (Cognition) oriented x 4  -       Row Name 06/29/24 0757          Safety Issues, Functional Mobility    Impairments Affecting Function (Mobility) endurance/activity tolerance;sensation/sensory awareness;shortness of breath;pain  -               User Key  (r) = Recorded By, (t) = Taken By, (c) = Cosigned By      Initials Name Provider Type    Sixto Greer PT Physical Therapist                   Mobility       Row Name 06/29/24 0757          Bed-Chair Transfer    Bed-Chair [place] (Transfers) minimum assist (75% patient effort);1 person assist  -     Assistive Device (Bed-Chair Transfers) other (see comments)  support under elbow and heart pillow  -       Row Name 06/29/24 0757          Sit-Stand Transfer    Sit-Stand [place] (Transfers) minimum assist (75% patient effort)  -     Assistive Device (Sit-Stand Transfers) other (see comments)  support under elbow and heart pillow  -       Row Name 06/29/24 0757          Gait/Stairs (Locomotion)    [place] Level (Gait) contact guard;minimum assist (75% patient effort)  -     Assistive Device (Gait) other (see comments)  support under elbow and heart pillow  -     Distance in Feet (Gait) 200  -AJ     Deviations/Abnormal Patterns (Gait) bilateral deviations;stride length decreased;base of support, narrow;grant decreased;gait speed decreased  -AJ     Bilateral Gait Deviations forward flexed posture  -               User Key  (r) = Recorded By, (t) = Taken By, (c) = Cosigned By      Initials Name Provider Type    Sixto Greer PT Physical Therapist                    Obj/Interventions       Row Name 06/29/24 0757          Range of Motion Comprehensive    General Range of Motion bilateral lower extremity ROM WFL  -AJ       Row Name 06/29/24 0757          Strength Comprehensive (MMT)    General Manual Muscle Testing (MMT) Assessment no strength deficits identified  -AJ       Row Name 06/29/24 0757          Motor Skills    Motor Skills functional endurance  -AJ       Row Name 06/29/24 0757          Balance    Balance Assessment sitting static balance;sitting dynamic balance;standing static balance;standing dynamic balance  -     Static Sitting Balance independent  -     Dynamic Sitting Balance independent  -     Position, Sitting Balance supported;sitting in chair  -     Static Standing Balance contact guard  -     Dynamic Standing Balance contact guard  -     Position/Device Used, Standing Balance supported;other (see comments)  support under elbow and heart pillow  -     Balance Interventions sitting;standing;sit to stand;supported;static;dynamic  -AJ       Row Name 06/29/24 0757          Sensory Assessment (Somatosensory)    Sensory Assessment (Somatosensory) sensation intact  -               User Key  (r) = Recorded By, (t) = Taken By, (c) = Cosigned By      Initials Name Provider Type    AJ Sixto Abreu, PT Physical Therapist                   Goals/Plan       Row Name 06/29/24 0757          Bed Mobility Goal 1 (PT)    Activity/Assistive Device (Bed Mobility Goal 1, PT) rolling to left;rolling to right;sit to supine;supine to sit  -     [place] Level/Cues Needed (Bed Mobility Goal 1, PT) modified independence  -     Time Frame (Bed Mobility Goal 1, PT) long term goal (LTG);10 days  -     Strategies/Barriers (Bed Mobility Goal 1, PT) heart pillow and verbal cues  -     Progress/Outcomes (Bed Mobility Goal 1, PT) new goal  -AJ       Row Name 06/29/24 0757          Transfer Goal 1 (PT)    Activity/Assistive Device (Transfer Goal 1, PT)  sit-to-stand/stand-to-sit;bed-to-chair/chair-to-bed;toilet;walk-in shower  -AJ     [place] Level/Cues Needed (Transfer Goal 1, PT) standby assist  -AJ     Time Frame (Transfer Goal 1, PT) long term goal (LTG);10 days  -AJ     Progress/Outcome (Transfer Goal 1, PT) new goal  -       Row Name 06/29/24 0757          Gait Training Goal 1 (PT)    Activity/Assistive Device (Gait Training Goal 1, PT) gait (walking locomotion);decrease asymmetrical patterns;decrease fall risk;diminish gait deviation;forward stepping;improve balance and speed;increase endurance/gait distance;increase energy conservation  -AJ     [place] Level (Gait Training Goal 1, PT) standby assist  -AJ     Distance (Gait Training Goal 1, PT) 500' to return to PLOF  -AJ     Time Frame (Gait Training Goal 1, PT) long term goal (LTG);10 days  -AJ     Progress/Outcome (Gait Training Goal 1, PT) new goal  -       Row Name 06/29/24 0757          Stairs Goal 1 (PT)    Activity/Assistive Device (Stairs Goal 1, PT) ascending stairs;descending stairs;using handrail, right;step-to-step;decrease fall risk;improve balance and speed  -AJ     [place] Level/Cues Needed (Stairs Goal 1, PT) standby assist  -AJ     Number of Stairs (Stairs Goal 1, PT) 3 as needed at home  -AJ     Time Frame (Stairs Goal 1, PT) long term goal (LTG);10 days  -AJ     Progress/Outcome (Stairs Goal 1, PT) new goal  -       Row Name 06/29/24 0757          Therapy Assessment/Plan (PT)    Planned Therapy Interventions (PT) balance training;bed mobility training;gait training;home exercise program;strengthening;stair training;ROM (range of motion);patient/family education;transfer training  -               User Key  (r) = Recorded By, (t) = Taken By, (c) = Cosigned By      Initials Name Provider Type    Sixto Greer, PT Physical Therapist                   Clinical Impression       Row Name 06/29/24 0757          Pain    Pretreatment Pain Rating 8/10  -AJ     Posttreatment  Pain Rating 6/10  -     Pain Location generalized  -     Pain Location - chest  -     Pain Intervention(s) Ambulation/increased activity;Nursing Notified;Repositioned  -     Additional Documentation Pain Scale: Numbers Pre/Post-Treatment (Group)  -       Row Name 06/29/24 0757          Plan of Care Review    Plan of Care Reviewed With patient;spouse  -     Outcome Evaluation PT eval complete. Pt up in recliner with [facility] staff, family at bedside upon arrival. Pt AOx4 with reports of 8/10 pain in chest, reports independence at baseline. Pt pre vitals; HR 72, SpO2 93% on RA initially, /62, no RR rate on screen upon arrival. Pt agreeable to perform mobility and educated on sternal precautions. Today pt demo sit<>stand with MIn Ax1 and support under elbow. Pt ambulated 200' with CGA/Min for equipment today. Pt placed on 2L prior to gait due to spO2 reading of 86% on RA and pt endorsing SOA. Pt demo decreased stride length and cadencence intially but improved independently and pain decreased with ambulation as well. Pt educated on continuing OOB activity and improving gait tolerance. Pt will benefit from skilled PT services to regain independence and return to PLOF. Pt post vitals; HR 72, SpO2 95% on 2L, /68. Recommend pt d/c home with assist from family when medically stable.  -       Row Name 06/29/24 0757          Therapy Assessment/Plan (PT)    Patient/Family Therapy Goals Statement (PT) go home and get well  -     Rehab Potential (PT) good, to achieve stated therapy goals  -     Criteria for Skilled Interventions Met (PT) yes;meets criteria;skilled treatment is necessary  -     Therapy Frequency (PT) 2 times/day  -     Predicted Duration of Therapy Intervention (PT) until d/c  -       Row Name 06/29/24 0757          Vital Signs    Pre Systolic BP Rehab 107  -AJ     Pre Treatment Diastolic BP 62  -AJ     Post Systolic BP Rehab 110  -AJ     Post Treatment Diastolic BP 68  -AJ      Pretreatment Heart Rate (beats/min) 72  -AJ     Posttreatment Heart Rate (beats/min) 72  -AJ     Pre SpO2 (%) --  86-93  -AJ     O2 Delivery Pre Treatment room air  -AJ     Intra SpO2 (%) 93  -AJ     O2 Delivery Intra Treatment nasal cannula  2L  -AJ     Post SpO2 (%) 95  -AJ     O2 Delivery Post Treatment nasal cannula  2L  -AJ     Pre Patient Position Sitting  -AJ     Intra Patient Position Standing  -AJ     Post Patient Position Sitting  -AJ       Row Name 06/29/24 0757          Positioning and Restraints    Pre-Treatment Position sitting in chair/recliner  -AJ     Post Treatment Position chair  -AJ     In Chair notified nsg;reclined;call light within reach;encouraged to call for assist;with family/caregiver;patient within staff view;legs elevated  -AJ               User Key  (r) = Recorded By, (t) = Taken By, (c) = Cosigned By      Initials Name Provider Type    Sixto Greer, PT Physical Therapist                   Outcome Measures       Row Name 06/29/24 0757 06/29/24 0733       How much help from another person do you currently need...    Turning from your back to your side while in flat bed without using bedrails? 3  -AJ 3  -AB    Moving from lying on back to sitting on the side of a flat bed without bedrails? 3  -AJ 3  -AB    Moving to and from a bed to a chair (including a wheelchair)? 3  -AJ 3  -AB    Standing up from a chair using your arms (e.g., wheelchair, bedside chair)? 3  -AJ 3  -AB    Climbing 3-5 steps with a railing? 3  -AJ 3  -AB    To walk in hospital room? 3  -AJ 3  -AB    AM-PAC 6 Clicks Score (PT) 18  -AJ 18  -AB    Highest Level of Mobility Goal 6 --> Walk 10 steps or more  -AJ 6 --> Walk 10 steps or more  -AB      Row Name 06/29/24 0400 06/29/24 0000       How much help from another person do you currently need...    Turning from your back to your side while in flat bed without using bedrails? 3  -SY 3  -SY    Moving from lying on back to sitting on the side of a flat bed without  bedrails? 3  -SY 3  -SY    Moving to and from a bed to a chair (including a wheelchair)? 3  -SY 3  -SY    Standing up from a chair using your arms (e.g., wheelchair, bedside chair)? 3  -SY 3  -SY    Climbing 3-5 steps with a railing? 3  -SY 3  -SY    To walk in hospital room? 3  -SY 3  -SY    AM-PAC 6 Clicks Score (PT) 18  -SY 18  -SY    Highest Level of Mobility Goal 6 --> Walk 10 steps or more  -SY 6 --> Walk 10 steps or more  -SY      Row Name 06/29/24 0757          Functional Assessment    Outcome Measure Options AM-PAC 6 Clicks Basic Mobility (PT)  -               User Key  (r) = Recorded By, (t) = Taken By, (c) = Cosigned By      Initials Name Provider Type    SY Pedro Jasso, RN Registered Nurse    Andrea New RN Registered Nurse    Sixto Greer, PT Physical Therapist                                 Physical Therapy Education       Title: PT OT SLP Therapies (Done)       Topic: Physical Therapy (Done)       Point: Mobility training (Done)       Learning Progress Summary             Patient Acceptance, E, VU by TOM at 6/29/2024 1000    Comment: fall risk, d/c planning, sternal precautions                         Point: Home exercise program (Done)       Learning Progress Summary             Patient Acceptance, E, VU by TOM at 6/29/2024 1000    Comment: fall risk, d/c planning, sternal precautions                         Point: Body mechanics (Done)       Learning Progress Summary             Patient Acceptance, E, VU by TOM at 6/29/2024 1000    Comment: fall risk, d/c planning, sternal precautions                         Point: Precautions (Done)       Learning Progress Summary             Patient Acceptance, E, VU by TOM at 6/29/2024 1000    Comment: fall risk, d/c planning, sternal precautions                                         User Key       Initials Effective Dates Name Provider Type [facility] 05/07/24 -  Sixto Abreu, JONI Physical Therapist PT                  PT Recommendation and  Plan  Planned Therapy Interventions (PT): balance training, bed mobility training, gait training, home exercise program, strengthening, stair training, ROM (range of motion), patient/family education, transfer training  Plan of Care Reviewed With: patient, spouse  Outcome Evaluation: PT eval complete. Pt up in recliner with [facility] staff, family at bedside upon arrival. Pt AOx4 with reports of 8/10 pain in chest, reports independence at baseline. Pt pre vitals; HR 72, SpO2 93% on RA initially, /62, no RR rate on screen upon arrival. Pt agreeable to perform mobility and educated on sternal precautions. Today pt demo sit<>stand with MIn Ax1 and support under elbow. Pt ambulated 200' with CGA/Min for equipment today. Pt placed on 2L prior to gait due to spO2 reading of 86% on RA and pt endorsing SOA. Pt demo decreased stride length and cadencence intially but improved independently and pain decreased with ambulation as well. Pt educated on continuing OOB activity and improving gait tolerance. Pt will benefit from skilled PT services to regain independence and return to PLOF. Pt post vitals; HR 72, SpO2 95% on 2L, /68. Recommend pt d/c home with assist from family when medically stable.     Time Calculation:         PT Charges       Row Name 06/29/24 0757             Time Calculation    Start Time 0757  -AJ      Stop Time 0840  -AJ      Time Calculation (min) 43 min  -AJ      PT Received On 06/29/24  -AJ      PT Goal Re-Cert Due Date 07/09/24  -AJ         Untimed Charges    PT Eval/Re-eval Minutes 43  -AJ         Total Minutes    Untimed Charges Total Minutes 43  -AJ       Total Minutes 43  -AJ                User Key  (r) = Recorded By, (t) = Taken By, (c) = Cosigned By      Initials Name Provider Type    Sixto Greer, PT Physical Therapist                  Therapy Charges for Today       Code Description Service Date Service Provider Modifiers Qty    [id number] HC PT EVAL MOD COMPLEXITY 3 6/29/2024 Brady  Sixto, PT GP 1            PT G-Codes  Outcome Measure Options: AM-PAC 6 Clicks Basic Mobility (PT)  AM-PAC 6 Clicks Score (PT): 18  PT Discharge Summary  Anticipated Discharge Disposition (PT): home with assist    Sixto Abreu, JONI  6/29/2024

## 2024-06-30 ENCOUNTER — APPOINTMENT (OUTPATIENT)
Dept: GENERAL RADIOLOGY | Facility: HOSPITAL | Age: 64
DRG: 236 | End: 2024-06-30
Payer: COMMERCIAL

## 2024-06-30 LAB
ALBUMIN SERPL-MCNC: 3.9 G/DL (ref 3.5–5.2)
ANION GAP SERPL CALCULATED.3IONS-SCNC: 10 MMOL/L (ref 5–15)
BASOPHILS # BLD AUTO: 0.03 10*3/MM3 (ref 0–0.2)
BASOPHILS NFR BLD AUTO: 0.3 % (ref 0–1.5)
BUN SERPL-MCNC: 14 MG/DL (ref 8–23)
BUN/CREAT SERPL: 19.2 (ref 7–25)
CALCIUM SPEC-SCNC: 8.3 MG/DL (ref 8.6–10.5)
CHLORIDE SERPL-SCNC: 101 MMOL/L (ref 98–107)
CO2 SERPL-SCNC: 26 MMOL/L (ref 22–29)
CREAT SERPL-MCNC: 0.73 MG/DL (ref 0.76–1.27)
DEPRECATED RDW RBC AUTO: 43.8 FL (ref 37–54)
EGFRCR SERPLBLD CKD-EPI 2021: 102.9 ML/MIN/1.73
EOSINOPHIL # BLD AUTO: 0.01 10*3/MM3 (ref 0–0.4)
EOSINOPHIL NFR BLD AUTO: 0.1 % (ref 0.3–6.2)
ERYTHROCYTE [DISTWIDTH] IN BLOOD BY AUTOMATED COUNT: 12.1 % (ref 12.3–15.4)
GLUCOSE BLDC GLUCOMTR-MCNC: 141 MG/DL (ref 70–130)
GLUCOSE BLDC GLUCOMTR-MCNC: 149 MG/DL (ref 70–130)
GLUCOSE BLDC GLUCOMTR-MCNC: 153 MG/DL (ref 70–130)
GLUCOSE BLDC GLUCOMTR-MCNC: 282 MG/DL (ref 70–130)
GLUCOSE SERPL-MCNC: 135 MG/DL (ref 65–99)
HCT VFR BLD AUTO: 36.5 % (ref 37.5–51)
HGB BLD-MCNC: 12.3 G/DL (ref 13–17.7)
IMM GRANULOCYTES # BLD AUTO: 0.05 10*3/MM3 (ref 0–0.05)
IMM GRANULOCYTES NFR BLD AUTO: 0.4 % (ref 0–0.5)
LYMPHOCYTES # BLD AUTO: 1.05 10*3/MM3 (ref 0.7–3.1)
LYMPHOCYTES NFR BLD AUTO: 9.3 % (ref 19.6–45.3)
MCH RBC QN AUTO: 32.9 PG (ref 26.6–33)
MCHC RBC AUTO-ENTMCNC: 33.7 G/DL (ref 31.5–35.7)
MCV RBC AUTO: 97.6 FL (ref 79–97)
MONOCYTES # BLD AUTO: 1.27 10*3/MM3 (ref 0.1–0.9)
MONOCYTES NFR BLD AUTO: 11.3 % (ref 5–12)
NEUTROPHILS NFR BLD AUTO: 78.6 % (ref 42.7–76)
NEUTROPHILS NFR BLD AUTO: 8.83 10*3/MM3 (ref 1.7–7)
NRBC BLD AUTO-RTO: 0 /100 WBC (ref 0–0.2)
PHOSPHATE SERPL-MCNC: 1.7 MG/DL (ref 2.5–4.5)
PHOSPHATE SERPL-MCNC: 2.1 MG/DL (ref 2.5–4.5)
PLATELET # BLD AUTO: 144 10*3/MM3 (ref 140–450)
PMV BLD AUTO: 9.7 FL (ref 6–12)
POTASSIUM SERPL-SCNC: 4.3 MMOL/L (ref 3.5–5.2)
QT INTERVAL: 376 MS
QT INTERVAL: 382 MS
QT INTERVAL: 384 MS
QTC INTERVAL: 415 MS
QTC INTERVAL: 439 MS
QTC INTERVAL: 446 MS
RBC # BLD AUTO: 3.74 10*6/MM3 (ref 4.14–5.8)
SODIUM SERPL-SCNC: 137 MMOL/L (ref 136–145)
WBC NRBC COR # BLD AUTO: 11.24 10*3/MM3 (ref 3.4–10.8)

## 2024-06-30 PROCEDURE — 63710000001 INSULIN LISPRO (HUMAN) PER 5 UNITS: Performed by: SURGERY

## 2024-06-30 PROCEDURE — 93010 ELECTROCARDIOGRAM REPORT: CPT | Performed by: HOSPITALIST

## 2024-06-30 PROCEDURE — 25010000002 FUROSEMIDE PER 20 MG: Performed by: SURGERY

## 2024-06-30 PROCEDURE — 82948 REAGENT STRIP/BLOOD GLUCOSE: CPT

## 2024-06-30 PROCEDURE — 94799 UNLISTED PULMONARY SVC/PX: CPT

## 2024-06-30 PROCEDURE — 25010000002 CALCIUM GLUCONATE-NACL 1-0.675 GM/50ML-% SOLUTION: Performed by: SURGERY

## 2024-06-30 PROCEDURE — 93005 ELECTROCARDIOGRAM TRACING: CPT | Performed by: THORACIC SURGERY (CARDIOTHORACIC VASCULAR SURGERY)

## 2024-06-30 PROCEDURE — 80069 RENAL FUNCTION PANEL: CPT | Performed by: THORACIC SURGERY (CARDIOTHORACIC VASCULAR SURGERY)

## 2024-06-30 PROCEDURE — 25010000002 CEFAZOLIN PER 500 MG: Performed by: THORACIC SURGERY (CARDIOTHORACIC VASCULAR SURGERY)

## 2024-06-30 PROCEDURE — 99024 POSTOP FOLLOW-UP VISIT: CPT | Performed by: SURGERY

## 2024-06-30 PROCEDURE — 97116 GAIT TRAINING THERAPY: CPT

## 2024-06-30 PROCEDURE — 94664 DEMO&/EVAL PT USE INHALER: CPT

## 2024-06-30 PROCEDURE — 25010000002 ENOXAPARIN PER 10 MG: Performed by: THORACIC SURGERY (CARDIOTHORACIC VASCULAR SURGERY)

## 2024-06-30 PROCEDURE — 71045 X-RAY EXAM CHEST 1 VIEW: CPT

## 2024-06-30 PROCEDURE — 84100 ASSAY OF PHOSPHORUS: CPT | Performed by: THORACIC SURGERY (CARDIOTHORACIC VASCULAR SURGERY)

## 2024-06-30 PROCEDURE — 25810000003 SODIUM CHLORIDE 0.9 % SOLUTION 250 ML FLEX CONT: Performed by: THORACIC SURGERY (CARDIOTHORACIC VASCULAR SURGERY)

## 2024-06-30 PROCEDURE — 85025 COMPLETE CBC W/AUTO DIFF WBC: CPT | Performed by: THORACIC SURGERY (CARDIOTHORACIC VASCULAR SURGERY)

## 2024-06-30 RX ORDER — FUROSEMIDE 10 MG/ML
20 INJECTION INTRAMUSCULAR; INTRAVENOUS
Status: DISCONTINUED | OUTPATIENT
Start: 2024-06-30 | End: 2024-07-03 | Stop reason: HOSPADM

## 2024-06-30 RX ORDER — AMIODARONE HYDROCHLORIDE 200 MG/1
400 TABLET ORAL
Status: DISCONTINUED | OUTPATIENT
Start: 2024-07-01 | End: 2024-07-03 | Stop reason: HOSPADM

## 2024-06-30 RX ORDER — METOPROLOL TARTRATE 50 MG/1
25 TABLET, FILM COATED ORAL EVERY 12 HOURS SCHEDULED
Status: DISCONTINUED | OUTPATIENT
Start: 2024-06-30 | End: 2024-07-02

## 2024-06-30 RX ORDER — POTASSIUM CHLORIDE 750 MG/1
20 CAPSULE, EXTENDED RELEASE ORAL 2 TIMES DAILY WITH MEALS
Status: DISCONTINUED | OUTPATIENT
Start: 2024-06-30 | End: 2024-07-03 | Stop reason: HOSPADM

## 2024-06-30 RX ORDER — CALCIUM GLUCONATE 20 MG/ML
1000 INJECTION, SOLUTION INTRAVENOUS ONCE
Status: COMPLETED | OUTPATIENT
Start: 2024-06-30 | End: 2024-06-30

## 2024-06-30 RX ADMIN — IPRATROPIUM BROMIDE AND ALBUTEROL SULFATE 1.5 ML: 2.5; .5 SOLUTION RESPIRATORY (INHALATION) at 15:19

## 2024-06-30 RX ADMIN — LIDOCAINE 2 PATCH: 4 PATCH TOPICAL at 09:11

## 2024-06-30 RX ADMIN — OXYCODONE HYDROCHLORIDE 5 MG: 5 TABLET ORAL at 09:09

## 2024-06-30 RX ADMIN — ATORVASTATIN CALCIUM 20 MG: 10 TABLET, FILM COATED ORAL at 20:29

## 2024-06-30 RX ADMIN — ASPIRIN 81 MG: 81 TABLET, COATED ORAL at 09:08

## 2024-06-30 RX ADMIN — METOPROLOL TARTRATE 25 MG: 50 TABLET, FILM COATED ORAL at 20:29

## 2024-06-30 RX ADMIN — PREGABALIN 25 MG: 25 CAPSULE ORAL at 20:29

## 2024-06-30 RX ADMIN — POTASSIUM CHLORIDE 20 MEQ: 750 CAPSULE, EXTENDED RELEASE ORAL at 17:06

## 2024-06-30 RX ADMIN — SODIUM PHOSPHATE, MONOBASIC, MONOHYDRATE AND SODIUM PHOSPHATE, DIBASIC, ANHYDROUS 15 MMOL: 142; 276 INJECTION, SOLUTION INTRAVENOUS at 06:35

## 2024-06-30 RX ADMIN — ACETAMINOPHEN 1000 MG: 500 TABLET, FILM COATED ORAL at 21:05

## 2024-06-30 RX ADMIN — METHOCARBAMOL 500 MG: 500 TABLET, FILM COATED ORAL at 06:00

## 2024-06-30 RX ADMIN — CHLORHEXIDINE GLUCONATE 0.12% ORAL RINSE 15 ML: 1.2 LIQUID ORAL at 17:06

## 2024-06-30 RX ADMIN — METHOCARBAMOL 500 MG: 500 TABLET, FILM COATED ORAL at 21:05

## 2024-06-30 RX ADMIN — Medication 1 APPLICATION: at 06:00

## 2024-06-30 RX ADMIN — IPRATROPIUM BROMIDE AND ALBUTEROL SULFATE 1.5 ML: 2.5; .5 SOLUTION RESPIRATORY (INHALATION) at 06:56

## 2024-06-30 RX ADMIN — ACETAMINOPHEN 1000 MG: 500 TABLET, FILM COATED ORAL at 09:09

## 2024-06-30 RX ADMIN — OXYCODONE HYDROCHLORIDE 5 MG: 5 TABLET ORAL at 05:59

## 2024-06-30 RX ADMIN — CEFAZOLIN 2 G: 2 INJECTION, POWDER, FOR SOLUTION INTRAMUSCULAR; INTRAVENOUS at 05:59

## 2024-06-30 RX ADMIN — METOPROLOL TARTRATE 25 MG: 50 TABLET, FILM COATED ORAL at 09:09

## 2024-06-30 RX ADMIN — INSULIN LISPRO 6 UNITS: 100 INJECTION, SOLUTION INTRAVENOUS; SUBCUTANEOUS at 11:46

## 2024-06-30 RX ADMIN — PANTOPRAZOLE SODIUM 40 MG: 40 TABLET, DELAYED RELEASE ORAL at 06:35

## 2024-06-30 RX ADMIN — OXYCODONE HYDROCHLORIDE 5 MG: 5 TABLET ORAL at 17:06

## 2024-06-30 RX ADMIN — BISACODYL 10 MG: 5 TABLET, COATED ORAL at 20:29

## 2024-06-30 RX ADMIN — INSULIN LISPRO 2 UNITS: 100 INJECTION, SOLUTION INTRAVENOUS; SUBCUTANEOUS at 09:09

## 2024-06-30 RX ADMIN — FUROSEMIDE 20 MG: 10 INJECTION, SOLUTION INTRAMUSCULAR; INTRAVENOUS at 17:06

## 2024-06-30 RX ADMIN — METHOCARBAMOL 500 MG: 500 TABLET, FILM COATED ORAL at 13:41

## 2024-06-30 RX ADMIN — FUROSEMIDE 20 MG: 10 INJECTION, SOLUTION INTRAMUSCULAR; INTRAVENOUS at 09:09

## 2024-06-30 RX ADMIN — BISACODYL 10 MG: 5 TABLET, COATED ORAL at 09:08

## 2024-06-30 RX ADMIN — POLYETHYLENE GLYCOL 3350 17 G: 17 POWDER, FOR SOLUTION ORAL at 09:10

## 2024-06-30 RX ADMIN — OXYCODONE HYDROCHLORIDE 5 MG: 5 TABLET ORAL at 20:29

## 2024-06-30 RX ADMIN — ACETAMINOPHEN 1000 MG: 500 TABLET, FILM COATED ORAL at 13:41

## 2024-06-30 RX ADMIN — CHLORHEXIDINE GLUCONATE 0.12% ORAL RINSE 15 ML: 1.2 LIQUID ORAL at 05:59

## 2024-06-30 RX ADMIN — SODIUM PHOSPHATE, MONOBASIC, MONOHYDRATE AND SODIUM PHOSPHATE, DIBASIC, ANHYDROUS 15 MMOL: 142; 276 INJECTION, SOLUTION INTRAVENOUS at 18:43

## 2024-06-30 RX ADMIN — AMIODARONE HYDROCHLORIDE 400 MG: 200 TABLET ORAL at 09:09

## 2024-06-30 RX ADMIN — CLOPIDOGREL BISULFATE 75 MG: 75 TABLET, FILM COATED ORAL at 17:06

## 2024-06-30 RX ADMIN — PREGABALIN 25 MG: 25 CAPSULE ORAL at 09:09

## 2024-06-30 RX ADMIN — IPRATROPIUM BROMIDE AND ALBUTEROL SULFATE 1.5 ML: 2.5; .5 SOLUTION RESPIRATORY (INHALATION) at 18:47

## 2024-06-30 RX ADMIN — ENOXAPARIN SODIUM 40 MG: 100 INJECTION SUBCUTANEOUS at 09:08

## 2024-06-30 RX ADMIN — CALCIUM GLUCONATE 1000 MG: 20 INJECTION, SOLUTION INTRAVENOUS at 10:01

## 2024-06-30 RX ADMIN — ACETAMINOPHEN 1000 MG: 500 TABLET, FILM COATED ORAL at 01:35

## 2024-06-30 RX ADMIN — POTASSIUM CHLORIDE 20 MEQ: 750 CAPSULE, EXTENDED RELEASE ORAL at 09:09

## 2024-06-30 NOTE — OP NOTE
Patient Name:  Max Oliveira  YOB: 1960     Date of Procedure:  6/28/2024     Preoperative Diagnosis:   1.  Coronary artery disease involving native coronary artery of native heart with unstable angina pectoris [I25.110]  2.  Type 2 diabetes mellitus  3.  Hypertension  4.  Hyperlipidemia     Postoperative Diagnosis:    Same     Procedures Performed:  1. Coronary artery bypass grafting-4 vessel (left internal mammary artery/left anterior descending, right internal mammary artery/posterior descending artery, reverse saphenous vein graft/first obtuse marginal, and reverse saphenous vein graft/second diagonal artery)    2. Right endoscopic vein harvest  3. Sternal plating with Sternalock XP sternal plating     Surgeon:  Tim Cancino MD  Assistants: Chad Sandoval  Anesthesia Staff:  CRNA: Paulina Palma CRNA; Yovany Charles MD  Anesthesia Type:  General     Estimated Blood Loss:  Minimal (Cell Saver)  Drains:  1. 19 Libyan Agustin drain-left pleural space  2. 24 Libyan Agustin drains-anterior and posterior mediastinum  3. 19 Libyan Agustin drain-right pleural space    Specimens:  None        Operative Findings:  Excellent arterial conduit. Good-excellent venous conduit. The LAD at site of grafting measured 2.4 mm in size and had mild atherosclerotic disease burden. Post bypass grafting had excellent arterial runoff.  The PDA measured 1.8 mm in size and had mild-moderate atherosclerotic disease burden.  Post bypass grafting had excellent arterial runoff. The OM1 artery measured 2 mm in size and had excellent arterial runoff with mild atherosclerotic disease burden.  The OM2 artery measured 2.3 mm in size and had mild atherosclerotic disease burden.  Post bypass grafting had excellent arterial runoff.   Transesophageal echocardiography salient findings include preserved left ventricular function with no significant valvular dysfunction.       Total aortic cross-clamp time: 80 minutes  Total cardiac bypass  time: 103 minutes     Operative description in detail:  The patient was taken to the operative suite where he was placed in a supine position.  Induction of general anesthesia and placement of a single-lumen endotracheal tube was performed without remark.  Appropriate arterial and venous access was established without remark.  Through the previously placed right internal jugular central venous line, a pulmonary artery catheter was floated into position.  The patient was then prepped and draped in the usual and sterile surgical fashion.  A timeout was performed.  Perioperative antibiotics were administered. Beta blocker was given.     A two team approach was utilized to harvest each internal mammary artery and the right greater saphenous vein.   Briefly the right greater saphenous vein was taken at the level of the knee medially and taken in a prograde fashion for an anticipated length of vein to the fossa ovalis.  Blunt dissection was performed and each branch was taken using the [employer] device.  A counter stab incision was made with both proximal and distal control obtained.  The vein was extracted from a hemostatic tunnel.  Additional vein was taken in a retrograde fashion towards the ankle and in a mirrored fashion.  The leg was closed in a layered fashion with Vicryl suture.  The vein was prepared without remark.       While the vein was being harvested, median sternotomy was performed by me. Pericardial fat in midline from the level of the innominate vein to the level of the diaphragm was divided in midline.  A Rultract device was used to expose the posterior sternal table.  The right internal mammary artery was taken down using a standard pedicle technique with a combination of electrocautery and/or clips to control all side branches.  At that time, the patient was systemically anticoagulated with IV heparin.  A 19 Thai Agustin drain was placed in the right pleural space.  After suitable time of circulating  heparin, clips were placed doubly onto the mammary artery distally and it was divided proximal to the previously placed clips.  It had excellent arterial inflow.  The mammary artery was controlled distally.  The mammary artery harvest bed was hemostatic.  In years fashion the left internal mammary artery was similarly taken down and the left chest drained with a 19 Bruneian Agustin drain.  The harvest bedside was hemostatic.  The Rultract device was removed from the sterile field and a [place] retractor was used for exposure.  Each mammary artery was prepared for bypass grafting and deemed excellent.  A pericardial well was created. I elected to cannulate the heart centrally accessing distally the ascending aorta and the right atrial appendage.  Each cannula was placed in continuity with the appropriate pump line. Retrograde autologous prime was completed as indicated.  A combined cardioplegia/aortic root vent set was secured with a horizontal mattress 4-0 Prolene suture.  With an appropriate ACT and all in readiness, cardiopulmonary bypass was commenced.  I dissected out the dominant obtuse marginal, diagonal artery, dRCA,  and the LAD for suitable sites for bypass grafting.  With that, I proceeded to apply the aortic cross-clamp and administered cardioplegia utilizing a warm induction strategy.  Upon achieving electrical-mechanical arrest, cold blood potassium cardioplegia was administered to a total standard dose.  We did implement systemic hypothermia mild and applied topical hypothermia to the ventricle.  At appropriate intervals, doses of cardioplegia were administered throughout the conduct of bypass grafting.      I directed my attention towards the OM2.  A coronary arteriotomy was made and augmented to size with Maurer scissors.  It is as per operative findings.  The anastomosis was constructed in an end-to-side orientation with running 7-0 Prolene suture.  With that its proximal anastomosis was  constructed  following aortotomy with 11 blade and augmented size with 4 mm arterial punch subsequently.  This was constructed in a side aorta end vein graft fashion with running 6-0 Prolene suture. An AC  was placed.  The graft was assessed for lay which was excellent. It is without tension or torsion.      To that end I directed my attention towards the OM2.  A coronary arteriotomy was made and augmented to size with Maurer scissors.  It is as per operative findings.  The anastomosis was constructed in an end-to-side orientation with running 7-0 Prolene suture.  With that its proximal anastomosis was constructed following aortotomy with 11 blade and augmented size with 4 mm arterial punch subsequently.  This was constructed in a side aorta end vein graft fashion with running 6-0 Prolene suture. An AC  was placed.  The graft was assessed for lay which was excellent.  It is without tension or torsion.      During a dose of cardioplegia, a pericardial slit left lateral was made while dividing associate pericardiophrenic fat and vasculature while being mindful of the lay of the phrenic nerve.  As such I proceeded to obtain control proximally onto the mammary artery and spatulated it distally.  I grafted this onto the LAD following coronary arteriotomy using previous described techniques.  The anastomosis was constructed in an end-to-side orientation with running 7-0 Prolene suture.  It is as per operative findings and the anastomosis was hemostatic.  I did tack the mammary artery pedicle to the anterior aspect heart with 6-0 Prolene sutures.     During a dose of cardioplegia, a pericardial slit right lateral was made while dividing associate pericardiophrenic fat and vasculature while being mindful of the lay of the phrenic nerve.  As such I proceeded to obtain control proximally onto the mammary artery and spatulated it distally.  I grafted this onto the PDA following coronary arteriotomy using previous described  techniques.  The anastomosis was constructed in an end-to-side orientation with running 7-0 Prolene suture.  It is as per operative findings and the anastomosis was hemostatic.  I did tack the mammary artery pedicle to the AV groove epicardium with 6-0 Prolene sutures.     With that being accomplished, a terminal hotshot was administered.  The patient was placed in Trendelenburg position.  Upon completion of terminal hotshot and placement of temporary epicardial pacing wires, with the aortic vent on high and pump flows diminished, the aortic cross-clamp was released.  With that, full support was implemented.  A perfusable rhythm was was obtained.  A nonworking beating phase was implemented.  Ventilation restored.  I surveyed each graft and each anastomosis was hemostatic and had excellent lay.  With all in readiness, the heart was allowed to fill.  De-airing maneuvers were performed as guided by transesophageal echocardiography.  With that the heart was decompressed and we removed the aortic root vent/cardioplegia cannula set.  Its associated pursestring suture was tied securely and this was reinforced as per matter of routine. The table was now placed in neutral position.  With all in readiness, we proceeded to wean from and separate from cardiopulmonary bypass.  I did decannulate the venous line and snared down its associated pursestring suture.  Systemic intravenous protamine was administered.  All associated blood volume was returned to the patient. With continued good hemodynamics, I decannulated the arterial line and tied down its associated pursestring sutures.  At this time I tied down the previously snared pursestring suture.  The mediastinum was drained with 24 Mongolian Agustin drains placed anteriorly and posteriorly.  I surveyed the chest and hemostasis was pristine.  With that I impregnated the sternal edges with vancomycin, thrombin, and Gelfoam paste.  The sternum was reapproximated with Sternalock XP  sternal plating system.  In layers anatomically the soft tissue planes were reapproximated. Instruments, sharps, and sponge counts were reported as correct.      Complications: None     Disposition: Transferred to ICU in stable and guarded condition.     Tim Cancino MD

## 2024-06-30 NOTE — THERAPY TREATMENT NOTE
Acute Care - Physical Therapy Treatment Note  [facility]     Patient Name: Max Oliveira  : 1960  MRN: [id number]  Today's Date: 2024      Visit Dx:     ICD-10-CM ICD-9-CM   1. Impaired mobility [Z74.09]  Z74.09 799.89   2. Coronary artery disease involving native coronary artery of native heart, unspecified whether angina present  I25.10 414.01     Patient Active Problem List   Diagnosis    History of adenomatous polyp of colon    Nontraumatic complete tear of left rotator cuff    Surgical wound infection    Abnormal stress test    Type 2 diabetes mellitus without complication, without long-term current use of insulin    Primary hypertension    Mixed hyperlipidemia    Coronary artery disease involving native coronary artery of native heart    CAD (coronary artery disease)     Past Medical History:   Diagnosis Date    Arthritis     Cervical radiculopathy     Coronary artery disease     Diabetes     Epidermal cyst     GERD (gastroesophageal reflux disease)     History of adenomatous polyp of colon     Hyperlipidemia     Hypertension     PONV (postoperative nausea and vomiting)     Vitamin D deficiency      Past Surgical History:   Procedure Laterality Date    BICEPS TENDONESIS SUBPECTORALIS REPAIR Left 2021    Procedure: BICEPS TENODESIS / TENOTOMY;  Surgeon: Nathen Farrell MD;  Location: [facility] OR;  Service: Orthopedics;  Laterality: Left;    CARDIAC CATHETERIZATION      no stents     CARDIAC CATHETERIZATION N/A 2024    Procedure: Left Heart Cath;  Surgeon: Scott Snowden DO;  Location: [facility] CATH INVASIVE LOCATION;  Service: Cardiovascular;  Laterality: N/A;    COLONOSCOPY  2009    2 polyps, adenomatous    COLONOSCOPY N/A 2020    Procedure: COLONOSCOPY WITH ANESTHESIA;  Surgeon: Mundo Rodrigues MD;  Location: [facility] ENDOSCOPY;  Service: Gastroenterology;  Laterality: N/A;  preop; hx of polyps  postop; poylps   PCP Aayush Ewing     EYE SURGERY      trk     HARDWARE REMOVAL Left 01/18/2022    Procedure: HARDWARE REMOVAL;  Surgeon: Nathen Farrell MD;  Location:  [facility] OR;  Service: Orthopedics;  Laterality: Left;    INCISION AND DRAINAGE SHOULDER Left 11/16/2021    Procedure: INCISION AND DRAINAGE LEFT SHOULDER SURGICAL WOUND;  Surgeon: Nathen Farrell MD;  Location:  [facility] OR;  Service: Orthopedics;  Laterality: Left;    INCISION AND DRAINAGE SHOULDER Left 01/18/2022    Procedure: LEFT SHOULDER SURGICAL WOUND INCISION AND DRAINAGE, HARDWARE REMOVAL;  Surgeon: Nathen Farrell MD;  Location:  [facility] OR;  Service: Orthopedics;  Laterality: Left;    KNEE CARTILAGE SURGERY Left     MANDIBLE SURGERY      x 2    RESECTION DISTAL CLAVICLE Left 06/29/2021    Procedure: DISTAL CLAVICLE EXCISION;  Surgeon: Nathen Farrell MD;  Location:  [facility] OR;  Service: Orthopedics;  Laterality: Left;    SHOULDER ARTHROSCOPY W/ ROTATOR CUFF REPAIR Left 06/29/2021    Procedure: LEFT SHOULDER  ROTATOR CUFF REPAIR, SUBACROMIAL DECOMPRESSION, DISTAL CLAVICLE EXCISION, BICEPS TENODESIS / TENOTOMY;  Surgeon: Nathen Farrell MD;  Location:  [facility] OR;  Service: Orthopedics;  Laterality: Left;     PT Assessment (Last 12 Hours)       PT Evaluation and Treatment       Row Name 06/30/24 1440 06/30/24 1039       Physical Therapy Time and Intention    Subjective Information complains of;pain  -MF complains of;pain  -MF    Document Type therapy note (daily note)  -MF therapy note (daily note)  -MF    Mode of Treatment physical therapy  -MF physical therapy  -    Comment reviewed progression of activity and home safety with pt and family.  - --      Row Name 06/30/24 1440 06/30/24 1039       General Information    Existing Precautions/Restrictions fall;sternal  -MF fall;oxygen therapy device and L/min;sternal  -MF      Row Name 06/30/24 1440 06/30/24 1039       Pain    Pretreatment Pain Rating 0/10 - no pain  - 4/10  -MF    Posttreatment Pain Rating 5/10  -MF 4/10   -MF    Pain Location incisional  -MF incisional  -MF    Pain Location - chest  -MF chest  -MF    Pain Intervention(s) Repositioned;Ambulation/increased activity  - Repositioned;Ambulation/increased activity  -MF      Row Name 06/30/24 1440 06/30/24 1039       Bed Mobility    Comment, (Bed Mobility) chair-discussed bed mobility and will practice tomorrow.  -MF chair  -MF      Row Name 06/30/24 1440 06/30/24 1039       Sit-Stand Transfer    Sit-Stand [place] (Transfers) standby assist  - contact guard  -MF      Row Name 06/30/24 1440 06/30/24 1039       Stand-Sit Transfer    Stand-Sit [place] (Transfers) standby assist  -MF contact guard  -MF      Row Name 06/30/24 1440 06/30/24 1039       Gait/Stairs (Locomotion)    [place] Level (Gait) standby assist  - verbal cues;contact guard  -MF    Distance in Feet (Gait) 400  1 standing rest  -  1 standing rest  -MF    Deviations/Abnormal Patterns (Gait) grant decreased  -MF grant decreased;stride length decreased  -    Comment, (Gait/Stairs) pt requested to practice steps tomorrow.  -MF --      Row Name 06/30/24 1440 06/30/24 1039       Motor Skills    Comments, Therapeutic Exercise cardiac warm ups  - cardiac warm ups  -      Row Name             Wound 06/28/24 0833 Right lower leg Incision    Wound - Properties Group Placement Date: 06/28/24  - Placement Time: 0833 -SF Side: Right  -SF Orientation: lower  -SF Location: leg  -SF Primary Wound Type: Incision  -SF    Retired Wound - Properties Group Placement Date: 06/28/24  - Placement Time: 0833 -SF Side: Right  -SF Orientation: lower  -SF Location: leg  -SF Primary Wound Type: Incision  -SF    Retired Wound - Properties Group Date first assessed: 06/28/24  - Time first assessed: 0833 -SF Side: Right  -SF Location: leg  -SF Primary Wound Type: Incision  -SF      Row Name             Wound 06/28/24 0910 midline sternal Incision    Wound - Properties Group Placement Date: 06/28/24   -SF Placement Time: 0910 -SF Present on Original Admission: N  -SF Orientation: midline  -SF Location: sternal  -SF Primary Wound Type: Incision  -SF    Retired Wound - Properties Group Placement Date: 06/28/24  -SF Placement Time: 0910 -SF Present on Original Admission: N  -SF Orientation: midline  -SF Location: sternal  -SF Primary Wound Type: Incision  -SF    Retired Wound - Properties Group Date first assessed: 06/28/24  -SF Time first assessed: 0910 -SF Present on Original Admission: N  -SF Location: sternal  -SF Primary Wound Type: Incision  -SF      Row Name 06/30/24 1039          Plan of Care Review    Plan of Care Reviewed With patient  -MF     Progress improving  -     Outcome Evaluation Pt. agreeable to therapy. He rates his pain 4/10 this morning. He stands and ambulates with CGA. Walked 400' with 1 standing rest. O2 sat did drop to 88% while on 1L with ambulation, but increased to 92% when given verbal cues for pursed lip breathing. Will continue to work on progressive ambulation.  -       Row Name 06/30/24 1440 06/30/24 1039       Vital Signs    Pre SpO2 (%) 93  -MF 93  -MF    O2 Delivery Pre Treatment room air  -MF supplemental O2  2l  -MF    Intra SpO2 (%) 88  increased to 91% within 2 minutes  - 88  -MF    O2 Delivery Intra Treatment room air  -MF supplemental O2  1l  -MF    Post SpO2 (%) 94  -MF 96  -MF    O2 Delivery Post Treatment room air  -MF supplemental O2  2l  -MF    Pre Patient Position Sitting  -MF Sitting  -MF    Intra Patient Position Standing  -MF Standing  -MF    Post Patient Position Sitting  -MF Sitting  -      Row Name 06/30/24 1440 06/30/24 1039       Positioning and Restraints    Pre-Treatment Position sitting in chair/recliner  - sitting in chair/recliner  -MF    Post Treatment Position chair  - chair  -MF    In Chair reclined;call light within reach;encouraged to call for assist;with family/caregiver  - reclined;call light within reach;encouraged to call for  assist;with family/caregiver;RUE elevated;LUE elevated  -              User Key  (r) = Recorded By, (t) = Taken By, (c) = Cosigned By      Initials Name Provider Type    Josselyn Kaba PTA Physical Therapist Assistant    Diego Parks, RN Registered Nurse                    Physical Therapy Education       Title: PT OT SLP Therapies (Done)       Topic: Physical Therapy (Done)       Point: Mobility training (Done)       Learning Progress Summary             Patient Acceptance, E, VU by  at 6/29/2024 1000    Comment: fall risk, d/c planning, sternal precautions                         Point: Home exercise program (Done)       Learning Progress Summary             Patient Acceptance, E, VU by  at 6/29/2024 1000    Comment: fall risk, d/c planning, sternal precautions                         Point: Body mechanics (Done)       Learning Progress Summary             Patient Acceptance, E, VU by  at 6/29/2024 1000    Comment: fall risk, d/c planning, sternal precautions                         Point: Precautions (Done)       Learning Progress Summary             Patient Acceptance, E, VU by  at 6/29/2024 1000    Comment: fall risk, d/c planning, sternal precautions                                         User Key       Initials Effective Dates Name Provider Type Discipline     05/07/24 -  Sixto Abreu, PT Physical Therapist PT                  PT Recommendation and Plan     Plan of Care Reviewed With: patient  Progress: improving  Outcome Evaluation: Pt. agreeable to therapy. He rates his pain 4/10 this morning. He stands and ambulates with CGA. Walked 400' with 1 standing rest. O2 sat did drop to 88% while on 1L with ambulation, but increased to 92% when given verbal cues for pursed lip breathing. Will continue to work on progressive ambulation.   Outcome Measures       Row Name 06/30/24 1039             How much help from another person do you currently need...    Turning from your back to your  side while in flat bed without using bedrails? 3  -MF      Moving from lying on back to sitting on the side of a flat bed without bedrails? 3  -MF      Moving to and from a bed to a chair (including a wheelchair)? 3  -MF      Standing up from a chair using your arms (e.g., wheelchair, bedside chair)? 3  -MF      Climbing 3-5 steps with a railing? 3  -MF      To walk in hospital room? 3  -MF      AM-PAC 6 Clicks Score (PT) 18  -MF      Highest Level of Mobility Goal 6 --> Walk 10 steps or more  -MF         Functional Assessment    Outcome Measure Options AM-PAC 6 Clicks Basic Mobility (PT)  -MF                User Key  (r) = Recorded By, (t) = Taken By, (c) = Cosigned By      Initials Name Provider Type    Josselyn Kaba PTA Physical Therapist Assistant                     Time Calculation:    PT Charges       Row Name 06/30/24 1509 06/30/24 1152          Time Calculation    Start Time 1440  -MF 1039  -MF     Stop Time 1507  -MF 1105  -MF     Time Calculation (min) 27 min  -MF 26 min  -MF     PT Received On 06/30/24  -MF 06/30/24  -MF        Time Calculation- PT    Total Timed Code Minutes- PT 27 minute(s)  -MF 26 minute(s)  -MF        Timed Charges    81091 - Gait Training Minutes  27  -MF 26  -MF        Total Minutes    Timed Charges Total Minutes 27  -MF 26  -MF      Total Minutes 27  -MF 26  -MF               User Key  (r) = Recorded By, (t) = Taken By, (c) = Cosigned By      Initials Name Provider Type    Josselyn Kaba PTA Physical Therapist Assistant                  Therapy Charges for Today       Code Description Service Date Service Provider Modifiers Qty    84033762512 HC GAIT TRAINING EA 15 MIN 6/29/2024 Josselyn Sandhu, PRATIK GP 2    66291741663 HC GAIT TRAINING EA 15 MIN 6/30/2024 Josselyn Sandhu, PRATIK GP 2    44067411682 HC GAIT TRAINING EA 15 MIN 6/30/2024 Josselyn Sandhu, PRATIK GP 2            PT G-Codes  Outcome Measure Options: AM-PAC 6 Clicks Basic Mobility (PT)  AM-PAC 6  Clicks Score (PT): 18    Josselyn Sandhu, PTA  6/30/2024

## 2024-06-30 NOTE — PLAN OF CARE
Goal Outcome Evaluation:  Plan of Care Reviewed With: patient        Progress: improving                               Outcome Evaluation: Pt alert and oriented x4. Pleasant and cooperative with cares. He ambulated in the donis this morning and pt states pain is more manageable this morning compared to yesterday.  R MIGUEL drain 35ml for the shift L MIGUEL drain 25ml, CT had 30ml out for this shift. Pt able to void with no issue. Dressings clean, dry and intact. Tele SR [id number] with 1st degree HB, PVC's Quads and couplets.

## 2024-06-30 NOTE — THERAPY TREATMENT NOTE
Acute Care - Physical Therapy Treatment Note  [facility]     Patient Name: Max Oliveira  : 1960  MRN: [id number]  Today's Date: 2024      Visit Dx:     ICD-10-CM ICD-9-CM   1. Impaired mobility [Z74.09]  Z74.09 799.89   2. Coronary artery disease involving native coronary artery of native heart, unspecified whether angina present  I25.10 414.01     Patient Active Problem List   Diagnosis    History of adenomatous polyp of colon    Nontraumatic complete tear of left rotator cuff    Surgical wound infection    Abnormal stress test    Type 2 diabetes mellitus without complication, without long-term current use of insulin    Primary hypertension    Mixed hyperlipidemia    Coronary artery disease involving native coronary artery of native heart    CAD (coronary artery disease)     Past Medical History:   Diagnosis Date    Arthritis     Cervical radiculopathy     Coronary artery disease     Diabetes     Epidermal cyst     GERD (gastroesophageal reflux disease)     History of adenomatous polyp of colon     Hyperlipidemia     Hypertension     PONV (postoperative nausea and vomiting)     Vitamin D deficiency      Past Surgical History:   Procedure Laterality Date    BICEPS TENDONESIS SUBPECTORALIS REPAIR Left 2021    Procedure: BICEPS TENODESIS / TENOTOMY;  Surgeon: Nathen Farrell MD;  Location: [facility] OR;  Service: Orthopedics;  Laterality: Left;    CARDIAC CATHETERIZATION      no stents     CARDIAC CATHETERIZATION N/A 2024    Procedure: Left Heart Cath;  Surgeon: Scott Snowden DO;  Location: [facility] CATH INVASIVE LOCATION;  Service: Cardiovascular;  Laterality: N/A;    COLONOSCOPY  2009    2 polyps, adenomatous    COLONOSCOPY N/A 2020    Procedure: COLONOSCOPY WITH ANESTHESIA;  Surgeon: Mundo Rodrigues MD;  Location: [facility] ENDOSCOPY;  Service: Gastroenterology;  Laterality: N/A;  preop; hx of polyps  postop; poylps   PCP Aayush Ewing     EYE SURGERY      trk     HARDWARE REMOVAL Left 01/18/2022    Procedure: HARDWARE REMOVAL;  Surgeon: Nathen Farrell MD;  Location:  [facility] OR;  Service: Orthopedics;  Laterality: Left;    INCISION AND DRAINAGE SHOULDER Left 11/16/2021    Procedure: INCISION AND DRAINAGE LEFT SHOULDER SURGICAL WOUND;  Surgeon: Nathen Farrell MD;  Location:  [facility] OR;  Service: Orthopedics;  Laterality: Left;    INCISION AND DRAINAGE SHOULDER Left 01/18/2022    Procedure: LEFT SHOULDER SURGICAL WOUND INCISION AND DRAINAGE, HARDWARE REMOVAL;  Surgeon: Nathen Farrell MD;  Location:  [facility] OR;  Service: Orthopedics;  Laterality: Left;    KNEE CARTILAGE SURGERY Left     MANDIBLE SURGERY      x 2    RESECTION DISTAL CLAVICLE Left 06/29/2021    Procedure: DISTAL CLAVICLE EXCISION;  Surgeon: Nathen Farrell MD;  Location:  [facility] OR;  Service: Orthopedics;  Laterality: Left;    SHOULDER ARTHROSCOPY W/ ROTATOR CUFF REPAIR Left 06/29/2021    Procedure: LEFT SHOULDER  ROTATOR CUFF REPAIR, SUBACROMIAL DECOMPRESSION, DISTAL CLAVICLE EXCISION, BICEPS TENODESIS / TENOTOMY;  Surgeon: Nathen Farrell MD;  Location:  [facility] OR;  Service: Orthopedics;  Laterality: Left;     PT Assessment (Last 12 Hours)       PT Evaluation and Treatment       Row Name 06/30/24 1039          Physical Therapy Time and Intention    Subjective Information complains of;pain  -     Document Type therapy note (daily note)  -     Mode of Treatment physical therapy  -       Row Name 06/30/24 1039          General Information    Existing Precautions/Restrictions fall;oxygen therapy device and L/min;sternal  -       Row Name 06/30/24 1039          Pain    Pretreatment Pain Rating 4/10  -     Posttreatment Pain Rating 4/10  -     Pain Location incisional  -     Pain Location - chest  -     Pain Intervention(s) Repositioned;Ambulation/increased activity  -       Row Name 06/30/24 1039          Bed Mobility    Comment, (Bed Mobility) chair  -       Row  Name 06/30/24 1039          Sit-Stand Transfer    Sit-Stand [place] (Transfers) contact guard  -MF       Row Name 06/30/24 1039          Stand-Sit Transfer    Stand-Sit [place] (Transfers) contact guard  -MF       Row Name 06/30/24 1039          Gait/Stairs (Locomotion)    [place] Level (Gait) verbal cues;contact guard  -MF     Distance in Feet (Gait) 400  1 standing rest  -MF     Deviations/Abnormal Patterns (Gait) grant decreased;stride length decreased  -MF       Row Name 06/30/24 1039          Motor Skills    Comments, Therapeutic Exercise cardiac warm ups  -MF       Row Name             Wound 06/28/24 0833 Right lower leg Incision    Wound - Properties Group Placement Date: 06/28/24  -SF Placement Time: 0833  -SF Side: Right  -SF Orientation: lower  -SF Location: leg  -SF Primary Wound Type: Incision  -SF    Retired Wound - Properties Group Placement Date: 06/28/24  -SF Placement Time: 0833  -SF Side: Right  -SF Orientation: lower  -SF Location: leg  -SF Primary Wound Type: Incision  -SF    Retired Wound - Properties Group Date first assessed: 06/28/24  -SF Time first assessed: 0833  -SF Side: Right  -SF Location: leg  -SF Primary Wound Type: Incision  -SF      Row Name             Wound 06/28/24 0910 midline sternal Incision    Wound - Properties Group Placement Date: 06/28/24  -SF Placement Time: 0910  -SF Present on Original Admission: N  -SF Orientation: midline  -SF Location: sternal  -SF Primary Wound Type: Incision  -SF    Retired Wound - Properties Group Placement Date: 06/28/24  -SF Placement Time: 0910  -SF Present on Original Admission: N  -SF Orientation: midline  -SF Location: sternal  -SF Primary Wound Type: Incision  -SF    Retired Wound - Properties Group Date first assessed: 06/28/24  -SF Time first assessed: 0910  -SF Present on Original Admission: N  -SF Location: sternal  -SF Primary Wound Type: Incision  -SF      Row Name 06/30/24 1039          Plan of Care Review    Plan  of Care Reviewed With patient  -MF     Progress improving  -     Outcome Evaluation Pt. agreeable to therapy. He rates his pain 4/10 this morning. He stands and ambulates with CGA. Walked 400' with 1 standing rest. O2 sat did drop to 88% while on 1L with ambulation, but increased to 92% when given verbal cues for pursed lip breathing. Will continue to work on progressive ambulation.  -       Row Name 06/30/24 1039          Vital Signs    Pre SpO2 (%) 93  -MF     O2 Delivery Pre Treatment supplemental O2  2l  -MF     Intra SpO2 (%) 88  -MF     O2 Delivery Intra Treatment supplemental O2  1l  -MF     Post SpO2 (%) 96  -MF     O2 Delivery Post Treatment supplemental O2  2l  -MF     Pre Patient Position Sitting  -MF     Intra Patient Position Standing  -MF     Post Patient Position Sitting  -       Row Name 06/30/24 1039          Positioning and Restraints    Pre-Treatment Position sitting in chair/recliner  -MF     Post Treatment Position chair  -MF     In Chair reclined;call light within reach;encouraged to call for assist;with family/caregiver;RUE elevated;LUE elevated  -               User Key  (r) = Recorded By, (t) = Taken By, (c) = Cosigned By      Initials Name Provider Type    Josselyn Kaba PTA Physical Therapist Assistant    Diego Parks, RN Registered Nurse                    Physical Therapy Education       Title: PT OT SLP Therapies (Done)       Topic: Physical Therapy (Done)       Point: Mobility training (Done)       Learning Progress Summary             Patient Acceptance, E, VU by TOM at 6/29/2024 1000    Comment: fall risk, d/c planning, sternal precautions                         Point: Home exercise program (Done)       Learning Progress Summary             Patient Acceptance, E, VU by TOM at 6/29/2024 1000    Comment: fall risk, d/c planning, sternal precautions                         Point: Body mechanics (Done)       Learning Progress Summary             Patient Acceptance,  E, VU by TOM at 6/29/2024 1000    Comment: fall risk, d/c planning, sternal precautions                         Point: Precautions (Done)       Learning Progress Summary             Patient Acceptance, E, VU by TOM at 6/29/2024 1000    Comment: fall risk, d/c planning, sternal precautions                                         User Key       Initials Effective Dates Name Provider Type Discipline     05/07/24 -  Sixto Abreu, PT Physical Therapist PT                  PT Recommendation and Plan     Plan of Care Reviewed With: patient  Progress: improving  Outcome Evaluation: Pt. agreeable to therapy. He rates his pain 4/10 this morning. He stands and ambulates with CGA. Walked 400' with 1 standing rest. O2 sat did drop to 88% while on 1L with ambulation, but increased to 92% when given verbal cues for pursed lip breathing. Will continue to work on progressive ambulation.   Outcome Measures       Row Name 06/30/24 1039             How much help from another person do you currently need...    Turning from your back to your side while in flat bed without using bedrails? 3  -MF      Moving from lying on back to sitting on the side of a flat bed without bedrails? 3  -MF      Moving to and from a bed to a chair (including a wheelchair)? 3  -MF      Standing up from a chair using your arms (e.g., wheelchair, bedside chair)? 3  -MF      Climbing 3-5 steps with a railing? 3  -MF      To walk in hospital room? 3  -MF      AM-PAC 6 Clicks Score (PT) 18  -MF      Highest Level of Mobility Goal 6 --> Walk 10 steps or more  -         Functional Assessment    Outcome Measure Options AM-PAC 6 Clicks Basic Mobility (PT)  -                User Key  (r) = Recorded By, (t) = Taken By, (c) = Cosigned By      Initials Name Provider Type    Josselyn Kaba PTA Physical Therapist Assistant                     Time Calculation:    PT Charges       Row Name 06/30/24 1152             Time Calculation    Start Time 1039  -MF       Stop Time 1105  -MF      Time Calculation (min) 26 min  -MF      PT Received On 06/30/24  -MF         Time Calculation- PT    Total Timed Code Minutes- PT 26 minute(s)  -MF         Timed Charges    85000 - Gait Training Minutes  26  -MF         Total Minutes    Timed Charges Total Minutes 26  -MF       Total Minutes 26  -MF                User Key  (r) = Recorded By, (t) = Taken By, (c) = Cosigned By      Initials Name Provider Type    Josselyn Kaba PTA Physical Therapist Assistant                  Therapy Charges for Today       Code Description Service Date Service Provider Modifiers Qty    20100259590 HC GAIT TRAINING EA 15 MIN 6/29/2024 Josselyn Sandhu PTA GP 2    08884189971 HC GAIT TRAINING EA 15 MIN 6/30/2024 Josselyn Sandhu, PRATIK GP 2            PT G-Codes  Outcome Measure Options: AM-PAC 6 Clicks Basic Mobility (PT)  AM-PAC 6 Clicks Score (PT): 18    Josselyn Sandhu PTA  6/30/2024

## 2024-06-30 NOTE — PLAN OF CARE
Goal Outcome Evaluation:  Plan of Care Reviewed With: patient        Progress: improving  Outcome Evaluation: Pt. agreeable to therapy. He rates his pain 4/10 this morning. He stands and ambulates with CGA. Walked 400' with 1 standing rest. O2 sat did drop to 88% while on 1L with ambulation, but increased to 92% when given verbal cues for pursed lip breathing. Will continue to work on progressive ambulation.

## 2024-06-30 NOTE — PROGRESS NOTES
"    [facility] Cardiothoracic Surgery  PROGRESS NOTE   CC: Postop CABG    Subjective:  Overall doing very well feels much better than yesterday hemodynamically stable transfer the floor no bowel movement yet    ROS: No fevers or chills hemodynamically stable    Objective:      /71 (BP Location: Right arm, Patient Position: Sitting)   Pulse 83   Temp 98.1 °F (36.7 °C) (Oral)   Resp 16   Ht 180.5 cm (71.06\")   Wt 98 kg (216 lb)   SpO2 95%   BMI 30.07 kg/m²       Intake/Output Summary (Last 24 hours) at 6/30/2024 1200  Last data filed at 6/30/2024 1111  Gross per 24 hour   Intake 1160 ml   Output 3195 ml   Net -2035 ml     Weight today 216, yesterday 217, baseline 208    CT Output: Mediastinal: 130, left pleural: 125    PE:  Vitals:    06/30/24 1118   BP: 126/71   Pulse: 83   Resp: 16   Temp: 98.1 °F (36.7 °C)   SpO2: 95%     GENERAL: NAD, resting comfortably, normal color  CARDIOVASCULAR: regular, regular rate, sinus, dressing clean dry and intact  PULMONARY: Normal bilateral breath sounds, no labored breathing  ABDOMEN: soft, nontender/nondistended  EXTREMITIES: mild peripheral edema, normal pulses, normal ROM        Lab Results (last 72 hours)       Procedure Component Value Units Date/Time    POC Glucose Once [[id number]]  (Abnormal) Collected: 06/30/24 1116    Specimen: Blood Updated: 06/30/24 1128     Glucose 282 mg/dL      Comment: : [id number] i3 membrane BrittanyMeter ID: [id number]       POC Glucose Once [[id number]]  (Abnormal) Collected: 06/30/24 0725    Specimen: Blood Updated: 06/30/24 0736     Glucose 153 mg/dL      Comment: : [id number] Davida BrittanyMeter ID: [id number]       Renal Function Panel [[id number]]  (Abnormal) Collected: 06/30/24 0222    Specimen: Blood Updated: 06/30/24 0258     Glucose 135 mg/dL      BUN 14 mg/dL      Creatinine 0.73 mg/dL      Sodium 137 mmol/L      Potassium 4.3 mmol/L      Chloride 101 mmol/L      CO2 26.0 mmol/L      Calcium 8.3 mg/dL  "     Albumin 3.9 g/dL      Phosphorus 2.1 mg/dL      Anion Gap 10.0 mmol/L      BUN/Creatinine Ratio 19.2     eGFR 102.9 mL/min/1.73     Narrative:      GFR Normal >60  Chronic Kidney Disease <60  Kidney Failure <15      CBC & Differential [[id number]]  (Abnormal) Collected: 06/30/24 0222    Specimen: Blood Updated: 06/30/24 0229    Narrative:      The following orders were created for panel order CBC & Differential.  Procedure                               Abnormality         Status                     ---------                               -----------         ------                     CBC Auto Differential[[id number]]        Abnormal            Final result                 Please view results for these tests on the individual orders.    CBC Auto Differential [[id number]]  (Abnormal) Collected: 06/30/24 0222    Specimen: Blood Updated: 06/30/24 0229     WBC 11.24 10*3/mm3      RBC 3.74 10*6/mm3      Hemoglobin 12.3 g/dL      Hematocrit 36.5 %      MCV 97.6 fL      MCH 32.9 pg      MCHC 33.7 g/dL      RDW 12.1 %      RDW-SD 43.8 fl      MPV 9.7 fL      Platelets 144 10*3/mm3      Neutrophil % 78.6 %      Lymphocyte % 9.3 %      Monocyte % 11.3 %      Eosinophil % 0.1 %      Basophil % 0.3 %      Immature Grans % 0.4 %      Neutrophils, Absolute 8.83 10*3/mm3      Lymphocytes, Absolute 1.05 10*3/mm3      Monocytes, Absolute 1.27 10*3/mm3      Eosinophils, Absolute 0.01 10*3/mm3      Basophils, Absolute 0.03 10*3/mm3      Immature Grans, Absolute 0.05 10*3/mm3      nRBC 0.0 /100 WBC     POC Glucose Once [[id number]]  (Normal) Collected: 06/29/24 1957    Specimen: Blood Updated: 06/29/24 2008     Glucose 117 mg/dL      Comment: : [id number] Hilda CassandraMeter ID: [id number]       POC Glucose Once [[id number]]  (Normal) Collected: 06/29/24 1637    Specimen: Blood Updated: 06/29/24 1647     Glucose 117 mg/dL      Comment: : [id number] Davida BrittanyMeter ID: [id number]       POC Glucose Once [[id number]]   (Normal) Collected: 06/29/24 1135    Specimen: Blood Updated: 06/29/24 1146     Glucose 109 mg/dL      Comment: : [id number] Calvin GallegosonMeter ID: [id number]       POC Glucose Once [[id number]]  (Abnormal) Collected: 06/29/24 0838    Specimen: Blood Updated: 06/29/24 0849     Glucose 133 mg/dL      Comment: : [id number] Calvin AaronMeter ID: [id number]       POC Glucose Once [[id number]]  (Abnormal) Collected: 06/29/24 0647    Specimen: Blood Updated: 06/29/24 0707     Glucose 138 mg/dL      Comment: : [id number] Romero NyeyerMeter ID: [id number]       POC Glucose Once [[id number]]  (Abnormal) Collected: 06/29/24 0550    Specimen: Blood Updated: 06/29/24 0601     Glucose 141 mg/dL      Comment: : [id number] Sensing ColtynMeter ID: [id number]       POC Glucose Once [[id number]]  (Normal) Collected: 06/29/24 0359    Specimen: Blood Updated: 06/29/24 0410     Glucose 130 mg/dL      Comment: : [id number] Sensing ColtynMeter ID: [id number]       Renal Function Panel [[id number]]  (Abnormal) Collected: 06/29/24 0301    Specimen: Blood from Arm, Left Updated: 06/29/24 0328     Glucose 136 mg/dL      BUN 15 mg/dL      Creatinine 0.62 mg/dL      Sodium 139 mmol/L      Potassium 4.2 mmol/L      Chloride 105 mmol/L      CO2 24.0 mmol/L      Calcium 8.2 mg/dL      Albumin 4.2 g/dL      Phosphorus 4.1 mg/dL      Anion Gap 10.0 mmol/L      BUN/Creatinine Ratio 24.2     eGFR 108.1 mL/min/1.73     Narrative:      GFR Normal >60  Chronic Kidney Disease <60  Kidney Failure <15      Magnesium [[id number]]  (Normal) Collected: 06/29/24 0301    Specimen: Blood from Arm, Left Updated: 06/29/24 0322     Magnesium 2.0 mg/dL     Protime-INR [[id number]]  (Abnormal) Collected: 06/29/24 0301    Specimen: Blood from Arm, Left Updated: 06/29/24 0320     Protime 15.2 Seconds      INR 1.15    POC Glucose Once [[id number]]  (Normal) Collected: 06/29/24 0259    Specimen: Blood Updated: 06/29/24 0310     Glucose 129 mg/dL      Comment:  : [id number] Sensing ColtynMeter ID: [id number]       CBC & Differential [[id number]]  (Abnormal) Collected: 06/29/24 0301    Specimen: Blood from Arm, Left Updated: 06/29/24 0308    Narrative:      The following orders were created for panel order CBC & Differential.  Procedure                               Abnormality         Status                     ---------                               -----------         ------                     CBC Auto Differential[[id number]]        Abnormal            Final result                 Please view results for these tests on the individual orders.    CBC Auto Differential [[id number]]  (Abnormal) Collected: 06/29/24 0301    Specimen: Blood from Arm, Left Updated: 06/29/24 0308     WBC 10.71 10*3/mm3      RBC 3.80 10*6/mm3      Hemoglobin 12.6 g/dL      Hematocrit 36.1 %      MCV 95.0 fL      MCH 33.2 pg      MCHC 34.9 g/dL      RDW 12.1 %      RDW-SD 42.0 fl      MPV 9.7 fL      Platelets 148 10*3/mm3      Neutrophil % 82.7 %      Lymphocyte % 6.0 %      Monocyte % 10.7 %      Eosinophil % 0.0 %      Basophil % 0.3 %      Immature Grans % 0.3 %      Neutrophils, Absolute 8.86 10*3/mm3      Lymphocytes, Absolute 0.64 10*3/mm3      Monocytes, Absolute 1.15 10*3/mm3      Eosinophils, Absolute 0.00 10*3/mm3      Basophils, Absolute 0.03 10*3/mm3      Immature Grans, Absolute 0.03 10*3/mm3      nRBC 0.0 /100 WBC     POC Glucose Once [[id number]]  (Abnormal) Collected: 06/29/24 0055    Specimen: Blood Updated: 06/29/24 0106     Glucose 147 mg/dL      Comment: : [id number] Sensing ColtynMeter ID: [id number]       POC Glucose Once [[id number]]  (Abnormal) Collected: 06/28/24 2238    Specimen: Blood Updated: 06/28/24 2249     Glucose 146 mg/dL      Comment: : [id number] Sensing ColtynMeter ID: [id number]       POC Glucose Once [[id number]]  (Abnormal) Collected: 06/28/24 2025    Specimen: Blood Updated: 06/28/24 2045     Glucose 146 mg/dL      Comment: : [id number] York  PedroMeter ID: [id number]       Blood Gas, Arterial - [[id number]]  (Abnormal) Collected: 06/28/24 1919    Specimen: Arterial Blood Updated: 06/28/24 1923     Site Arterial Line     Aiden's Test N/A     pH, Arterial 7.346 pH units      Comment: 84 Value below reference range        pCO2, Arterial 47.3 mm Hg      Comment: 83 Value above reference range        pO2, Arterial 83.7 mm Hg      HCO3, Arterial 25.9 mmol/L      Base Excess, Arterial -0.4 mmol/L      Comment: 84 Value below reference range        O2 Saturation, Arterial 96.3 %      Temperature 37.0     Barometric Pressure for Blood Gas 748 mmHg      Modality Ventilator     FIO2 30 %      Ventilator Mode PSV     PEEP 5.0     PSV 10.0 cmH2O      Collected by [id number]     Comment: Meter: [id number]     :  Yovany Smith, MARIO ALBERTO        pCO2, Temperature Corrected 47.3 mm Hg      pH, Temp Corrected 7.346 pH Units      pO2, Temperature Corrected 83.7 mm Hg      PO2/FIO2 279    Renal Function Panel [[id number]]  (Abnormal) Collected: 06/28/24 1811    Specimen: Blood Updated: 06/28/24 1853     Glucose 189 mg/dL      BUN 19 mg/dL      Creatinine 0.81 mg/dL      Sodium 141 mmol/L      Potassium 4.0 mmol/L      Comment: Slight hemolysis detected by analyzer. Result may be falsely elevated.        Chloride 106 mmol/L      CO2 25.0 mmol/L      Calcium 8.5 mg/dL      Albumin 4.0 g/dL      Phosphorus 4.9 mg/dL      Anion Gap 10.0 mmol/L      BUN/Creatinine Ratio 23.5     eGFR 99.7 mL/min/1.73     Narrative:      GFR Normal >60  Chronic Kidney Disease <60  Kidney Failure <15      CBC (No Diff) [[id number]]  (Abnormal) Collected: 06/28/24 1811    Specimen: Blood Updated: 06/28/24 1834     WBC 13.17 10*3/mm3      RBC 4.42 10*6/mm3      Hemoglobin 14.6 g/dL      Hematocrit 41.9 %      MCV 94.8 fL      MCH 33.0 pg      MCHC 34.8 g/dL      RDW 11.9 %      RDW-SD 41.4 fl      MPV 9.9 fL      Platelets 174 10*3/mm3     POC Glucose Once [[id number]]  (Abnormal) Collected:  06/28/24 1803    Specimen: Blood Updated: 06/28/24 1814     Glucose 175 mg/dL      Comment: : [id number] Calvin MendezMeter ID: [id number]       POC Glucose Once [[id number]]  (Abnormal) Collected: 06/28/24 1704    Specimen: Blood Updated: 06/28/24 1715     Glucose 150 mg/dL      Comment: : [id number] Calvin MendezMeter ID: [id number]       Blood Gas, Arterial - [[id number]]  (Abnormal) Collected: 06/28/24 1608    Specimen: Arterial Blood Updated: 06/28/24 1610     Site Arterial Line     Aiden's Test N/A     pH, Arterial 7.357 pH units      pCO2, Arterial 44.6 mm Hg      pO2, Arterial 80.5 mm Hg      Comment: 84 Value below reference range        HCO3, Arterial 25.0 mmol/L      Base Excess, Arterial -0.8 mmol/L      Comment: 84 Value below reference range        O2 Saturation, Arterial 95.9 %      Temperature 37.0     Barometric Pressure for Blood Gas 748 mmHg      Modality Ventilator     FIO2 30 %      Ventilator Mode SIMV     Set Tidal Volume 600     Set Mech Resp Rate 20.0     PEEP 8.0     PSV 10.0 cmH2O      Collected by [id number]     Comment: Meter: [id number]     :  Adrienne Fiore, ALFONSO        pCO2, Temperature Corrected 44.6 mm Hg      pH, Temp Corrected 7.357 pH Units      pO2, Temperature Corrected 80.5 mm Hg      PO2/FIO2 268    POC Glucose Once [[id number]]  (Abnormal) Collected: 06/28/24 1549    Specimen: Blood Updated: 06/28/24 1600     Glucose 156 mg/dL      Comment: : [id number] Calvin MendezMeter ID: [id number]       Renal Function Panel [[id number]]  (Abnormal) Collected: 06/28/24 1416    Specimen: Blood Updated: 06/28/24 1551     Glucose 124 mg/dL      BUN 19 mg/dL      Creatinine 0.85 mg/dL      Sodium 142 mmol/L      Potassium 4.2 mmol/L      Comment: Slight hemolysis detected by analyzer. Result may be falsely elevated.        Chloride 106 mmol/L      CO2 24.0 mmol/L      Calcium 9.7 mg/dL      Albumin 4.0 g/dL      Phosphorus 4.3 mg/dL      Anion Gap 12.0 mmol/L      BUN/Creatinine  Ratio 22.4     eGFR 98.2 mL/min/1.73     Narrative:      GFR Normal >60  Chronic Kidney Disease <60  Kidney Failure <15      Protime-INR [[id number]]  (Abnormal) Collected: 06/28/24 1416    Specimen: Blood Updated: 06/28/24 1445     Protime 15.1 Seconds      INR 1.14    aPTT [[id number]]  (Abnormal) Collected: 06/28/24 1416    Specimen: Blood Updated: 06/28/24 1445     PTT 66.0 seconds     CBC (No Diff) [[id number]]  (Abnormal) Collected: 06/28/24 1416    Specimen: Blood Updated: 06/28/24 1441     WBC 10.42 10*3/mm3      RBC 4.47 10*6/mm3      Hemoglobin 14.7 g/dL      Hematocrit 42.0 %      MCV 94.0 fL      MCH 32.9 pg      MCHC 35.0 g/dL      RDW 11.9 %      RDW-SD 41.4 fl      MPV 10.0 fL      Platelets 151 10*3/mm3     Calcium, Ionized [[id number]] Collected: 06/28/24 1410    Specimen: Blood Updated: 06/28/24 1412     Ionized Calcium 5.08 mg/dL      Collected by [id number]     Comment: Meter: [id number]     :  Camryn Solis RRT       Blood Gas, Arterial With Co-Ox [[id number]]  (Abnormal) Collected: 06/28/24 1319    Specimen: Arterial Blood Updated: 06/28/24 1327     Site Arterial Line     Aiden's Test N/A     pH, Arterial 7.381 pH units      pCO2, Arterial 46.1 mm Hg      Comment: 83 Value above reference range        pO2, Arterial 54.8 mm Hg      Comment: 85 Value below critical limit        HCO3, Arterial 27.3 mmol/L      Comment: 83 Value above reference range        Base Excess, Arterial 1.6 mmol/L      O2 Saturation, Arterial 88.2 %      Comment: 84 Value below reference range        Hemoglobin, Blood Gas 14.4 g/dL      Hematocrit, Blood Gas 44.2 %      Oxyhemoglobin 86.4 %      Comment: 84 Value below reference range        Methemoglobin 0.50 %      Carboxyhemoglobin 1.4 %      Temperature 37.0     Sodium, Arterial 141 mmol/L      Potassium, Arterial 4.6 mmol/L      Ionized Calcium 5.40 mg/dL      Barometric Pressure for Blood Gas 749 mmHg      Modality Ventilator     FIO2 100 %      Ventilator  Mode NA     Notified Who CRNA     Notified By SAMANTHA     Notified Time 06/28/2024 13:26     Collected by SILVIA THORPE     Comment: Meter: [id number]     :  SAMANTHA        pH, Temp Corrected 7.381 pH Units      pCO2, Temperature Corrected 46.1 mm Hg      pO2, Temperature Corrected 54.8 mm Hg     Blood Gas, Arterial With Co-Ox [[id number]]  (Abnormal) Collected: 06/28/24 1233    Specimen: Arterial Blood Updated: 06/28/24 1236     Site Arterial Line     Aiden's Test N/A     pH, Arterial 7.343 pH units      Comment: 84 Value below reference range        pCO2, Arterial 47.9 mm Hg      Comment: 83 Value above reference range        pO2, Arterial 397.0 mm Hg      Comment: 83 Value above reference range        HCO3, Arterial 26.0 mmol/L      Comment: 83 Value above reference range        Base Excess, Arterial -0.2 mmol/L      Comment: 84 Value below reference range        O2 Saturation, Arterial >100.1 %      Comment: 93 Value above reportable range > 100.1        Hemoglobin, Blood Gas 12.4 g/dL      Comment: 84 Value below reference range        Hematocrit, Blood Gas 38.1 %      Oxyhemoglobin 98.5 %      Methemoglobin 0.80 %      Carboxyhemoglobin 1.0 %      Temperature 37.0     Sodium, Arterial 137 mmol/L      Potassium, Arterial 5.9 mmol/L      Comment: 83 Value above reference range        Ionized Calcium 4.15 mg/dL      Comment: 84 Value below reference range        Barometric Pressure for Blood Gas 749 mmHg      Modality pump     FIO2 90 %      Flow Rate 2.5 lpm      Ventilator Mode NA     Collected by SAMANTHA     Comment: Meter: [id number]     :  SAMANTHA        pH, Temp Corrected 7.343 pH Units      pCO2, Temperature Corrected 47.9 mm Hg      pO2, Temperature Corrected 397 mm Hg     Blood Gas, Arterial With Co-Ox [[id number]]  (Abnormal) Collected: 06/28/24 1157    Specimen: Arterial Blood Updated: 06/28/24 1200     Site Arterial Line     Aiden's Test N/A     pH, Arterial 7.357 pH units       pCO2, Arterial 45.8 mm Hg      Comment: 83 Value above reference range        pO2, Arterial 324.0 mm Hg      Comment: 83 Value above reference range        HCO3, Arterial 25.7 mmol/L      Base Excess, Arterial -0.2 mmol/L      Comment: 84 Value below reference range        O2 Saturation, Arterial >100.1 %      Comment: 93 Value above reportable range > 100.1        Hemoglobin, Blood Gas 12.8 g/dL      Comment: 84 Value below reference range        Hematocrit, Blood Gas 39.1 %      Oxyhemoglobin 98.7 %      Methemoglobin 0.60 %      Carboxyhemoglobin 1.0 %      Temperature 37.0     Sodium, Arterial 138 mmol/L      Potassium, Arterial 5.5 mmol/L      Comment: 83 Value above reference range        Ionized Calcium 4.21 mg/dL      Comment: 84 Value below reference range        Barometric Pressure for Blood Gas 749 mmHg      Modality pump     FIO2 80 %      Flow Rate 2.5 lpm      Ventilator Mode NA     Collected by SAMANTHA     Comment: Meter: [id number]     :  SAMANTHA        pH, Temp Corrected 7.357 pH Units      pCO2, Temperature Corrected 45.8 mm Hg      pO2, Temperature Corrected 324 mm Hg     Blood Gas, Arterial With Co-Ox [[id number]]  (Abnormal) Collected: 06/28/24 1133    Specimen: Arterial Blood Updated: 06/28/24 1136     Site Arterial Line     Aiden's Test N/A     pH, Arterial 7.339 pH units      Comment: 84 Value below reference range        pCO2, Arterial 49.3 mm Hg      Comment: 83 Value above reference range        pO2, Arterial 361.0 mm Hg      Comment: 83 Value above reference range        HCO3, Arterial 26.5 mmol/L      Comment: 83 Value above reference range        Base Excess, Arterial 0.1 mmol/L      O2 Saturation, Arterial >100.1 %      Comment: 93 Value above reportable range > 100.1        Hemoglobin, Blood Gas 12.9 g/dL      Comment: 84 Value below reference range        Hematocrit, Blood Gas 39.5 %      Oxyhemoglobin 98.9 %      Methemoglobin 0.60 %      Carboxyhemoglobin 0.9 %       Temperature 37.0     Sodium, Arterial 139 mmol/L      Potassium, Arterial 5.4 mmol/L      Comment: 83 Value above reference range        Ionized Calcium 4.18 mg/dL      Comment: 84 Value below reference range        Barometric Pressure for Blood Gas 749 mmHg      Modality pump     FIO2 80 %      Flow Rate 2.3 lpm      Ventilator Mode NA     Comment: Meter: [id number]     :  SAMANTHA        pH, Temp Corrected 7.339 pH Units      pCO2, Temperature Corrected 49.3 mm Hg      pO2, Temperature Corrected 361 mm Hg     Blood Gas, Arterial With Co-Ox [[id number]]  (Abnormal) Collected: 06/28/24 1107    Specimen: Arterial Blood Updated: 06/28/24 1109     Site Arterial Line     Aiden's Test N/A     pH, Arterial 7.344 pH units      Comment: 84 Value below reference range        pCO2, Arterial 44.7 mm Hg      pO2, Arterial 507.0 mm Hg      Comment: 83 Value above reference range        HCO3, Arterial 24.3 mmol/L      Base Excess, Arterial -1.6 mmol/L      Comment: 84 Value below reference range        O2 Saturation, Arterial >100.1 %      Comment: 93 Value above reportable range > 100.1        Hemoglobin, Blood Gas 13.8 g/dL      Comment: 84 Value below reference range        Hematocrit, Blood Gas 42.3 %      Oxyhemoglobin 99.1 %      Comment: 83 Value above reference range        Methemoglobin 0.50 %      Carboxyhemoglobin 0.9 %      Temperature 37.0     Sodium, Arterial 139 mmol/L      Potassium, Arterial 4.8 mmol/L      Ionized Calcium 4.01 mg/dL      Comment: 84 Value below reference range        Barometric Pressure for Blood Gas 749 mmHg      Modality pump     FIO2 100 %      Flow Rate 2.3 lpm      Ventilator Mode NA     Collected by SAMANTHA     Comment: Meter: [id number]     :  SAMANTHA        pH, Temp Corrected 7.344 pH Units      pCO2, Temperature Corrected 44.7 mm Hg      pO2, Temperature Corrected 507 mm Hg     Blood Gas, Arterial With Co-Ox [[id number]]  (Abnormal) Collected: 06/28/24 1032     Specimen: Arterial Blood Updated: 06/28/24 1036     Site Arterial Line     Aiden's Test N/A     pH, Arterial 7.330 pH units      Comment: 84 Value below reference range        pCO2, Arterial 45.9 mm Hg      Comment: 83 Value above reference range        pO2, Arterial 133.0 mm Hg      Comment: 83 Value above reference range        HCO3, Arterial 24.2 mmol/L      Base Excess, Arterial -2.1 mmol/L      Comment: 84 Value below reference range        O2 Saturation, Arterial 99.0 %      Hemoglobin, Blood Gas 15.7 g/dL      Hematocrit, Blood Gas 48.2 %      Oxyhemoglobin 97.7 %      Methemoglobin 0.50 %      Carboxyhemoglobin 0.8 %      Temperature 37.0     Sodium, Arterial 139 mmol/L      Potassium, Arterial 4.6 mmol/L      Ionized Calcium 4.54 mg/dL      Comment: 84 Value below reference range        Barometric Pressure for Blood Gas 749 mmHg      Modality Ventilator     FIO2 100 %      Ventilator Mode NA     Collected by SILVIA THORPE     Comment: Meter: [id number]     :  SAMANTHA        pH, Temp Corrected 7.330 pH Units      pCO2, Temperature Corrected 45.9 mm Hg      pO2, Temperature Corrected 133 mm Hg     Blood Gas, Arterial With Co-Ox [[id number]]  (Abnormal) Collected: 06/28/24 0755    Specimen: Arterial Blood Updated: 06/28/24 0757     Site Arterial Line     Aiden's Test N/A     pH, Arterial 7.408 pH units      pCO2, Arterial 39.5 mm Hg      pO2, Arterial 328.0 mm Hg      Comment: 83 Value above reference range        HCO3, Arterial 24.9 mmol/L      Base Excess, Arterial 0.3 mmol/L      O2 Saturation, Arterial >100.1 %      Comment: 93 Value above reportable range > 100.1        Hemoglobin, Blood Gas 16.8 g/dL      Hematocrit, Blood Gas 51.5 %      Comment: 83 Value above reference range        Oxyhemoglobin 99.0 %      Comment: 83 Value above reference range        Methemoglobin 0.60 %      Carboxyhemoglobin 0.7 %      Temperature 37.0     Sodium, Arterial 141 mmol/L      Potassium, Arterial 4.4 mmol/L       Ionized Calcium 4.71 mg/dL      Barometric Pressure for Blood Gas 750 mmHg      Modality Ventilator     FIO2 100 %      Ventilator Mode NA     Collected by YOLY THORPE     Comment: Meter: [id number]     :  NFALK        pH, Temp Corrected 7.408 pH Units      pCO2, Temperature Corrected 39.5 mm Hg      pO2, Temperature Corrected 328 mm Hg     POC Glucose Once [[id number]]  (Normal) Collected: 06/28/24 0613    Specimen: Blood Updated: 06/28/24 0624     Glucose 126 mg/dL      Comment: : [id number] Gutierrez (Price) JbphhseyMeter ID: [id number]                   CXR: Stable cardiac silhouette good expansion bilateral lungs    Assessment & Plan     64-year-old male who is postop day 2 from CABG.  Doing very well.    Remove mediastinal tubes today keep left pleural  Replace potassium and calcium  Start twice daily Lasix  Intensify bowel regimen  Anticipate he will be ready for home in 1 to 2 days    Sixto Diaz MD  Cardiothoracic Surgeon

## 2024-07-01 ENCOUNTER — APPOINTMENT (OUTPATIENT)
Dept: GENERAL RADIOLOGY | Facility: HOSPITAL | Age: 64
DRG: 236 | End: 2024-07-01
Payer: COMMERCIAL

## 2024-07-01 PROBLEM — Z87.891 FORMER SMOKER: Status: ACTIVE | Noted: 2024-07-01

## 2024-07-01 LAB
ALBUMIN SERPL-MCNC: 3.8 G/DL (ref 3.5–5.2)
ALBUMIN/GLOB SERPL: 1.4 G/DL
ALP SERPL-CCNC: 30 U/L (ref 39–117)
ALT SERPL W P-5'-P-CCNC: 12 U/L (ref 1–41)
ANION GAP SERPL CALCULATED.3IONS-SCNC: 12 MMOL/L (ref 5–15)
AST SERPL-CCNC: 18 U/L (ref 1–40)
BASOPHILS # BLD AUTO: 0.03 10*3/MM3 (ref 0–0.2)
BASOPHILS NFR BLD AUTO: 0.3 % (ref 0–1.5)
BH BB BLOOD EXPIRATION DATE: NORMAL
BH BB BLOOD EXPIRATION DATE: NORMAL
BH BB BLOOD TYPE BARCODE: 7300
BH BB BLOOD TYPE BARCODE: 7300
BH BB DISPENSE STATUS: NORMAL
BH BB DISPENSE STATUS: NORMAL
BH BB PRODUCT CODE: NORMAL
BH BB PRODUCT CODE: NORMAL
BH BB UNIT NUMBER: NORMAL
BH BB UNIT NUMBER: NORMAL
BILIRUB SERPL-MCNC: 0.8 MG/DL (ref 0–1.2)
BUN SERPL-MCNC: 14 MG/DL (ref 8–23)
BUN/CREAT SERPL: 20.3 (ref 7–25)
CALCIUM SPEC-SCNC: 9 MG/DL (ref 8.6–10.5)
CHLORIDE SERPL-SCNC: 97 MMOL/L (ref 98–107)
CO2 SERPL-SCNC: 27 MMOL/L (ref 22–29)
CREAT SERPL-MCNC: 0.69 MG/DL (ref 0.76–1.27)
CROSSMATCH INTERPRETATION: NORMAL
CROSSMATCH INTERPRETATION: NORMAL
DEPRECATED RDW RBC AUTO: 42.4 FL (ref 37–54)
EGFRCR SERPLBLD CKD-EPI 2021: 104.6 ML/MIN/1.73
EOSINOPHIL # BLD AUTO: 0.04 10*3/MM3 (ref 0–0.4)
EOSINOPHIL NFR BLD AUTO: 0.4 % (ref 0.3–6.2)
ERYTHROCYTE [DISTWIDTH] IN BLOOD BY AUTOMATED COUNT: 12 % (ref 12.3–15.4)
GLOBULIN UR ELPH-MCNC: 2.7 GM/DL
GLUCOSE BLDC GLUCOMTR-MCNC: 124 MG/DL (ref 70–130)
GLUCOSE BLDC GLUCOMTR-MCNC: 127 MG/DL (ref 70–130)
GLUCOSE BLDC GLUCOMTR-MCNC: 163 MG/DL (ref 70–130)
GLUCOSE BLDC GLUCOMTR-MCNC: 182 MG/DL (ref 70–130)
GLUCOSE SERPL-MCNC: 122 MG/DL (ref 65–99)
HCT VFR BLD AUTO: 36.7 % (ref 37.5–51)
HGB BLD-MCNC: 12.7 G/DL (ref 13–17.7)
IMM GRANULOCYTES # BLD AUTO: 0.08 10*3/MM3 (ref 0–0.05)
IMM GRANULOCYTES NFR BLD AUTO: 0.7 % (ref 0–0.5)
LYMPHOCYTES # BLD AUTO: 0.68 10*3/MM3 (ref 0.7–3.1)
LYMPHOCYTES NFR BLD AUTO: 6 % (ref 19.6–45.3)
MCH RBC QN AUTO: 33.4 PG (ref 26.6–33)
MCHC RBC AUTO-ENTMCNC: 34.6 G/DL (ref 31.5–35.7)
MCV RBC AUTO: 96.6 FL (ref 79–97)
MONOCYTES # BLD AUTO: 1.22 10*3/MM3 (ref 0.1–0.9)
MONOCYTES NFR BLD AUTO: 10.7 % (ref 5–12)
NEUTROPHILS NFR BLD AUTO: 81.9 % (ref 42.7–76)
NEUTROPHILS NFR BLD AUTO: 9.3 10*3/MM3 (ref 1.7–7)
NRBC BLD AUTO-RTO: 0 /100 WBC (ref 0–0.2)
PHOSPHATE SERPL-MCNC: 2.5 MG/DL (ref 2.5–4.5)
PLATELET # BLD AUTO: 173 10*3/MM3 (ref 140–450)
PMV BLD AUTO: 10.2 FL (ref 6–12)
POTASSIUM SERPL-SCNC: 4.3 MMOL/L (ref 3.5–5.2)
PROT SERPL-MCNC: 6.5 G/DL (ref 6–8.5)
QT INTERVAL: 378 MS
QTC INTERVAL: 401 MS
RBC # BLD AUTO: 3.8 10*6/MM3 (ref 4.14–5.8)
SODIUM SERPL-SCNC: 136 MMOL/L (ref 136–145)
UNIT  ABO: NORMAL
UNIT  ABO: NORMAL
UNIT  RH: NORMAL
UNIT  RH: NORMAL
WBC NRBC COR # BLD AUTO: 11.35 10*3/MM3 (ref 3.4–10.8)

## 2024-07-01 PROCEDURE — 97116 GAIT TRAINING THERAPY: CPT

## 2024-07-01 PROCEDURE — 25010000002 ENOXAPARIN PER 10 MG: Performed by: THORACIC SURGERY (CARDIOTHORACIC VASCULAR SURGERY)

## 2024-07-01 PROCEDURE — 85025 COMPLETE CBC W/AUTO DIFF WBC: CPT | Performed by: THORACIC SURGERY (CARDIOTHORACIC VASCULAR SURGERY)

## 2024-07-01 PROCEDURE — 80053 COMPREHEN METABOLIC PANEL: CPT | Performed by: THORACIC SURGERY (CARDIOTHORACIC VASCULAR SURGERY)

## 2024-07-01 PROCEDURE — 71046 X-RAY EXAM CHEST 2 VIEWS: CPT

## 2024-07-01 PROCEDURE — 94799 UNLISTED PULMONARY SVC/PX: CPT

## 2024-07-01 PROCEDURE — 25010000002 FUROSEMIDE PER 20 MG: Performed by: SURGERY

## 2024-07-01 PROCEDURE — 84100 ASSAY OF PHOSPHORUS: CPT | Performed by: THORACIC SURGERY (CARDIOTHORACIC VASCULAR SURGERY)

## 2024-07-01 PROCEDURE — 82948 REAGENT STRIP/BLOOD GLUCOSE: CPT

## 2024-07-01 PROCEDURE — 63710000001 INSULIN LISPRO (HUMAN) PER 5 UNITS: Performed by: SURGERY

## 2024-07-01 PROCEDURE — 94664 DEMO&/EVAL PT USE INHALER: CPT

## 2024-07-01 PROCEDURE — 94761 N-INVAS EAR/PLS OXIMETRY MLT: CPT

## 2024-07-01 RX ORDER — BISACODYL 10 MG
10 SUPPOSITORY, RECTAL RECTAL DAILY PRN
Status: DISCONTINUED | OUTPATIENT
Start: 2024-07-01 | End: 2024-07-03 | Stop reason: HOSPADM

## 2024-07-01 RX ORDER — PANTOPRAZOLE SODIUM 40 MG/1
40 TABLET, DELAYED RELEASE ORAL
Qty: 3 TABLET | Refills: 0 | Status: COMPLETED | OUTPATIENT
Start: 2024-07-01 | End: 2024-07-03

## 2024-07-01 RX ADMIN — IPRATROPIUM BROMIDE AND ALBUTEROL SULFATE 1.5 ML: 2.5; .5 SOLUTION RESPIRATORY (INHALATION) at 06:19

## 2024-07-01 RX ADMIN — PREGABALIN 25 MG: 25 CAPSULE ORAL at 09:29

## 2024-07-01 RX ADMIN — IPRATROPIUM BROMIDE AND ALBUTEROL SULFATE 1.5 ML: 2.5; .5 SOLUTION RESPIRATORY (INHALATION) at 14:38

## 2024-07-01 RX ADMIN — ACETAMINOPHEN 1000 MG: 500 TABLET, FILM COATED ORAL at 21:00

## 2024-07-01 RX ADMIN — INSULIN LISPRO 2 UNITS: 100 INJECTION, SOLUTION INTRAVENOUS; SUBCUTANEOUS at 12:30

## 2024-07-01 RX ADMIN — POLYETHYLENE GLYCOL 3350 17 G: 17 POWDER, FOR SOLUTION ORAL at 09:29

## 2024-07-01 RX ADMIN — OXYCODONE HYDROCHLORIDE 5 MG: 5 TABLET ORAL at 21:00

## 2024-07-01 RX ADMIN — ENOXAPARIN SODIUM 40 MG: 100 INJECTION SUBCUTANEOUS at 09:29

## 2024-07-01 RX ADMIN — FUROSEMIDE 20 MG: 10 INJECTION, SOLUTION INTRAMUSCULAR; INTRAVENOUS at 09:29

## 2024-07-01 RX ADMIN — FUROSEMIDE 20 MG: 10 INJECTION, SOLUTION INTRAMUSCULAR; INTRAVENOUS at 17:34

## 2024-07-01 RX ADMIN — POTASSIUM CHLORIDE 20 MEQ: 750 CAPSULE, EXTENDED RELEASE ORAL at 17:34

## 2024-07-01 RX ADMIN — ASPIRIN 81 MG: 81 TABLET, COATED ORAL at 09:29

## 2024-07-01 RX ADMIN — METHOCARBAMOL 500 MG: 500 TABLET, FILM COATED ORAL at 06:19

## 2024-07-01 RX ADMIN — IPRATROPIUM BROMIDE AND ALBUTEROL SULFATE 1.5 ML: 2.5; .5 SOLUTION RESPIRATORY (INHALATION) at 20:25

## 2024-07-01 RX ADMIN — METOPROLOL TARTRATE 25 MG: 50 TABLET, FILM COATED ORAL at 09:29

## 2024-07-01 RX ADMIN — PANTOPRAZOLE SODIUM 40 MG: 40 TABLET, DELAYED RELEASE ORAL at 06:19

## 2024-07-01 RX ADMIN — ACETAMINOPHEN 1000 MG: 500 TABLET, FILM COATED ORAL at 14:35

## 2024-07-01 RX ADMIN — ATORVASTATIN CALCIUM 20 MG: 10 TABLET, FILM COATED ORAL at 21:00

## 2024-07-01 RX ADMIN — CLOPIDOGREL BISULFATE 75 MG: 75 TABLET, FILM COATED ORAL at 17:34

## 2024-07-01 RX ADMIN — METHOCARBAMOL 500 MG: 500 TABLET, FILM COATED ORAL at 21:00

## 2024-07-01 RX ADMIN — PANTOPRAZOLE SODIUM 40 MG: 40 TABLET, DELAYED RELEASE ORAL at 12:30

## 2024-07-01 RX ADMIN — CHLORHEXIDINE GLUCONATE 0.12% ORAL RINSE 15 ML: 1.2 LIQUID ORAL at 06:19

## 2024-07-01 RX ADMIN — METHOCARBAMOL 500 MG: 500 TABLET, FILM COATED ORAL at 14:35

## 2024-07-01 RX ADMIN — BISACODYL 10 MG: 5 TABLET, COATED ORAL at 09:30

## 2024-07-01 RX ADMIN — IPRATROPIUM BROMIDE AND ALBUTEROL SULFATE 1.5 ML: 2.5; .5 SOLUTION RESPIRATORY (INHALATION) at 10:15

## 2024-07-01 RX ADMIN — AMIODARONE HYDROCHLORIDE 400 MG: 200 TABLET ORAL at 09:31

## 2024-07-01 RX ADMIN — LIDOCAINE 2 PATCH: 4 PATCH TOPICAL at 09:31

## 2024-07-01 RX ADMIN — ACETAMINOPHEN 1000 MG: 500 TABLET, FILM COATED ORAL at 09:29

## 2024-07-01 RX ADMIN — PREGABALIN 25 MG: 25 CAPSULE ORAL at 21:00

## 2024-07-01 RX ADMIN — OXYCODONE HYDROCHLORIDE 5 MG: 5 TABLET ORAL at 04:25

## 2024-07-01 RX ADMIN — INSULIN LISPRO 2 UNITS: 100 INJECTION, SOLUTION INTRAVENOUS; SUBCUTANEOUS at 21:01

## 2024-07-01 RX ADMIN — POTASSIUM CHLORIDE 20 MEQ: 750 CAPSULE, EXTENDED RELEASE ORAL at 09:29

## 2024-07-01 RX ADMIN — OXYCODONE HYDROCHLORIDE 5 MG: 5 TABLET ORAL at 09:30

## 2024-07-01 RX ADMIN — METOPROLOL TARTRATE 25 MG: 50 TABLET, FILM COATED ORAL at 21:00

## 2024-07-01 NOTE — PAYOR COMM NOTE
"7/1/24 [facility] [phone]    [phone]    PATIENT ADMITTED ON 6/28/24 POST INPATIENT HOSPITAL AND INPATIENT PROCEDURE ONLY.    APPROVED 6/28/24-6/28/24 .     INPATIENT AUTH APPROVAL [id number]    FAXING FOR CONT STAY.              Max Donnelly (64 y.o. Male)       Date of Birth   1960    Social Security Number       [place]   [address]  [place] [address]    Home Phone   [phone]    MRN   [id number]       Sikhism   Shinto    Marital Status                               Admission Date   6/28/24    Admission Type   Elective    Admitting Provider   Tim Cancino MD    Attending Provider   Tim Cancino MD    Department, Room/Bed   [facility] 4B, 431/1       Discharge Date       Discharge Disposition       Discharge Destination                                 Attending Provider: Tim Cancino MD    Allergies: No Known Allergies    Isolation: None   Infection: None   Code Status: CPR    Ht: 180.5 cm (71.06\")   Wt: 96.4 kg (212 lb 9.6 oz)    Admission Cmt: None   Principal Problem: Coronary artery disease involving native coronary artery of native heart [I25.10]                   Active Insurance as of 6/28/2024       Primary Coverage       Payor Plan Insurance Group Employer/Plan Group    [employer]HEM BLUE CROSS ANTHEM BLUE CROSS BLUE SHIELD PPO [id number]       Payor Plan Address Payor Plan Phone Number Payor Plan Fax Number Effective Dates    [address] [phone]  1/1/2021 - None Entered    [facility] [id number]         Subscriber Name Subscriber Birth Date Member ID       MAX DONNELLY 1960 [id number]               Secondary Coverage       Payor Plan Insurance Group Employer/Plan Group    [facility]        Payor Plan Address Payor Plan Phone Number Payor Plan Fax Number Effective Dates    [address] [phone]  7/30/2020 - None Entered    [facility] [id number]         Subscriber Name Subscriber Birth Date " Member ID       VLADIMIR DONNELLY 1960 [id number]                     Emergency Contacts        (Rel.) Home Phone Work Phone Mobile Phone    Lisbeth Donnelly (Spouse) [phone] -- --             [facility] Encounter Date/Time: 2024 [facility]   Hospital Account: [id number]    MRN: [id number]   Patient:  Vladimir Donnelly   Contact Serial #: [id number]   SSN:          ENCOUNTER             Patient Class: Inpatient   Unit: [address] Service: Cardiothoracic Surgery     Bed: 431/1   Admitting Provider: Tim Cancino MD   Referring Physician: Tim Cancino   Attending Provider: Tim Cancino MD   Adm Diagnosis: Coronary artery disease *               PATIENT             Name: Vladimir Donnelly : 1960 (64 yrs)   [facility]  Sex: Male   City: [place] [address]   County: [facility]   Marital Status:  Ethnicity: NOT                                                                         Race: WHITE   Primary Care Provider: Aayush Ewing MD Patients Phone: Home Phone: [phone]     Mobile Phone: [phone]     EMERGENCY CONTACT   Contact Name Legal Guardian? Relationship to Patient Home Phone Work Phone Mobile Phone   1. RoxanneLisbeth  2. *No Contact Specified* No    Spouse    [phone]                GUARANTOR             Guarantor: Vladimir Donnelly     : 1960   Address: [facility] [address] Sex: [place] [address]     Relation to Patient: Self       Home Phone: [phone]   Guarantor ID: [id number]       Work Phone:     GUARANTOR EMPLOYER   Employer: [employer]         Status: FULL TIME   COVERAGE          PRIMARY INSURANCE   Payor: KEELY BLUE CROSS Plan: ANTHWILIAN BLUE CROSS BLUE SHIELD PPO   Group Number: [id number] Insurance Type: INDEMNITY   Subscriber Name: VLADIMIR DONNELLY Subscriber : 1960   Subscriber ID: [id number] Coverage Address: [facility] [address]  [place] [address]   Pat. Rel. to Subscriber: Self Coverage  Phone: [phone]   SECONDARY INSURANCE   Payor: [facility] Plan: [facility]   Group Number:   Insurance Type: INDEMNITY   Subscriber Name: VLADIMIR DONNELLY Subscriber : 1960   Subscriber ID: [id number] Coverage Address: [facility] [address]  [place] [address]   Pat. Rel. to Subscriber: SELF Coverage Phone: [phone]      Contact Serial # ([id number])         2024    Chart ID ([id number])            Tim Cancino MD   Physician  Cardiothoracic Surgery     Op Note     Signed     Date of Service: 24  Creation Time: 24  Case Time: Procedures: Surgeons:   24 CORONARY ARTERY BYPASS GRAFTING x4, BILATERAL INTERNAL MAMMARY ARTERY GRAFTS, RIGHT LEG ENDOSCOPIC VEIN HARVEST, TRANSESOPHAGEAL ECHOCARDIOGRAM, XP STERNAL PLATING   STERNAL PLATING    Tim Cancino MD               Signed         Patient Name:  Vladimir Donnelly  YOB: 1960     Date of Procedure:  2024     Preoperative Diagnosis:   1.  Coronary artery disease involving native coronary artery of native heart with unstable angina pectoris [I25.110]  2.  Type 2 diabetes mellitus  3.  Hypertension  4.  Hyperlipidemia     Postoperative Diagnosis:    Same     Procedures Performed:  1. Coronary artery bypass grafting-4 vessel (left internal mammary artery/left anterior descending, right internal mammary artery/posterior descending artery, reverse saphenous vein graft/first obtuse marginal, and reverse saphenous vein graft/second diagonal artery)    2. Right endoscopic vein harvest  3. Sternal plating with Sternalock XP sternal plating     Surgeon:  Tim Cancino MD  Assistants: Chad Sandoval  Anesthesia Staff:  CRNA: Paulina Palma CRNA; Yovany Charles MD  Anesthesia Type:  General     Estimated Blood Loss:  Minimal (Cell Saver)  Drains:  1. 19 Palauan Agustin drain-left pleural space  2. 24 Palauan Agustin drains-anterior and posterior mediastinum  3. 19 Palauan Agustin drain-right pleural  space     Specimens:  None        Operative Findings:  Excellent arterial conduit. Good-excellent venous conduit. The LAD at site of grafting measured 2.4 mm in size and had mild atherosclerotic disease burden. Post bypass grafting had excellent arterial runoff.  The PDA measured 1.8 mm in size and had mild-moderate atherosclerotic disease burden.  Post bypass grafting had excellent arterial runoff. The OM1 artery measured 2 mm in size and had excellent arterial runoff with mild atherosclerotic disease burden.  The OM2 artery measured 2.3 mm in size and had mild atherosclerotic disease burden.  Post bypass grafting had excellent arterial runoff.   Transesophageal echocardiography salient findings include preserved left ventricular function with no significant valvular dysfunction.        Total aortic cross-clamp time: 80 minutes  Total cardiac bypass time: 103 minutes     Operative description in detail:  The patient was taken to the operative suite where he was placed in a supine position.  Induction of general anesthesia and placement of a single-lumen endotracheal tube was performed without remark.  Appropriate arterial and venous access was established without remark.  Through the previously placed right internal jugular central venous line, a pulmonary artery catheter was floated into position.  The patient was then prepped and draped in the usual and sterile surgical fashion.  A timeout was performed.  Perioperative antibiotics were administered. Beta blocker was given.     A two team approach was utilized to harvest each internal mammary artery and the right greater saphenous vein.   Briefly the right greater saphenous vein was taken at the level of the knee medially and taken in a prograde fashion for an anticipated length of vein to the fossa ovalis.  Blunt dissection was performed and each branch was taken using the Pro.com device.  A counter stab incision was made with both proximal and distal control  obtained.  The vein was extracted from a hemostatic tunnel.  Additional vein was taken in a retrograde fashion towards the ankle and in a mirrored fashion.  The leg was closed in a layered fashion with Vicryl suture.  The vein was prepared without remark.       While the vein was being harvested, median sternotomy was performed by me. Pericardial fat in midline from the level of the innominate vein to the level of the diaphragm was divided in midline.  A Rultract device was used to expose the posterior sternal table.  The right internal mammary artery was taken down using a standard pedicle technique with a combination of electrocautery and/or clips to control all side branches.  At that time, the patient was systemically anticoagulated with IV heparin.  A 19 Marshallese Agustin drain was placed in the right pleural space.  After suitable time of circulating heparin, clips were placed doubly onto the mammary artery distally and it was divided proximal to the previously placed clips.  It had excellent arterial inflow.  The mammary artery was controlled distally.  The mammary artery harvest bed was hemostatic.  In years fashion the left internal mammary artery was similarly taken down and the left chest drained with a 19 Marshallese Agustin drain.  The harvest bedside was hemostatic.  The Rultract device was removed from the sterile field and a Hobbs retractor was used for exposure.  Each mammary artery was prepared for bypass grafting and deemed excellent.  A pericardial well was created. I elected to cannulate the heart centrally accessing distally the ascending aorta and the right atrial appendage.  Each cannula was placed in continuity with the appropriate pump line. Retrograde autologous prime was completed as indicated.  A combined cardioplegia/aortic root vent set was secured with a horizontal mattress 4-0 Prolene suture.  With an appropriate ACT and all in readiness, cardiopulmonary bypass was commenced.  I dissected out  the dominant obtuse marginal, diagonal artery, dRCA,  and the LAD for suitable sites for bypass grafting.  With that, I proceeded to apply the aortic cross-clamp and administered cardioplegia utilizing a warm induction strategy.  Upon achieving electrical-mechanical arrest, cold blood potassium cardioplegia was administered to a total standard dose.  We did implement systemic hypothermia mild and applied topical hypothermia to the ventricle.  At appropriate intervals, doses of cardioplegia were administered throughout the conduct of bypass grafting.      I directed my attention towards the OM2.  A coronary arteriotomy was made and augmented to size with Maurer scissors.  It is as per operative findings.  The anastomosis was constructed in an end-to-side orientation with running 7-0 Prolene suture.  With that its proximal anastomosis was  constructed following aortotomy with 11 blade and augmented size with 4 mm arterial punch subsequently.  This was constructed in a side aorta end vein graft fashion with running 6-0 Prolene suture. An AC  was placed.  The graft was assessed for lay which was excellent. It is without tension or torsion.      To that end I directed my attention towards the OM2.  A coronary arteriotomy was made and augmented to size with Maurer scissors.  It is as per operative findings.  The anastomosis was constructed in an end-to-side orientation with running 7-0 Prolene suture.  With that its proximal anastomosis was constructed following aortotomy with 11 blade and augmented size with 4 mm arterial punch subsequently.  This was constructed in a side aorta end vein graft fashion with running 6-0 Prolene suture. An AC  was placed.  The graft was assessed for lay which was excellent.  It is without tension or torsion.      During a dose of cardioplegia, a pericardial slit left lateral was made while dividing associate pericardiophrenic fat and vasculature while being mindful of the lay of  the phrenic nerve.  As such I proceeded to obtain control proximally onto the mammary artery and spatulated it distally.  I grafted this onto the LAD following coronary arteriotomy using previous described techniques.  The anastomosis was constructed in an end-to-side orientation with running 7-0 Prolene suture.  It is as per operative findings and the anastomosis was hemostatic.  I did tack the mammary artery pedicle to the anterior aspect heart with 6-0 Prolene sutures.     During a dose of cardioplegia, a pericardial slit right lateral was made while dividing associate pericardiophrenic fat and vasculature while being mindful of the lay of the phrenic nerve.  As such I proceeded to obtain control proximally onto the mammary artery and spatulated it distally.  I grafted this onto the PDA following coronary arteriotomy using previous described techniques.  The anastomosis was constructed in an end-to-side orientation with running 7-0 Prolene suture.  It is as per operative findings and the anastomosis was hemostatic.  I did tack the mammary artery pedicle to the AV groove epicardium with 6-0 Prolene sutures.     With that being accomplished, a terminal hotshot was administered.  The patient was placed in Trendelenburg position.  Upon completion of terminal hotshot and placement of temporary epicardial pacing wires, with the aortic vent on high and pump flows diminished, the aortic cross-clamp was released.  With that, full support was implemented.  A perfusable rhythm was was obtained.  A nonworking beating phase was implemented.  Ventilation restored.  I surveyed each graft and each anastomosis was hemostatic and had excellent lay.  With all in readiness, the heart was allowed to fill.  De-airing maneuvers were performed as guided by transesophageal echocardiography.  With that the heart was decompressed and we removed the aortic root vent/cardioplegia cannula set.  Its associated pursestring suture was tied  "securely and this was reinforced as per matter of routine. The table was now placed in neutral position.  With all in readiness, we proceeded to wean from and separate from cardiopulmonary bypass.  I did decannulate the venous line and snared down its associated pursestring suture.  Systemic intravenous protamine was administered.  All associated blood volume was returned to the patient. With continued good hemodynamics, I decannulated the arterial line and tied down its associated pursestring sutures.  At this time I tied down the previously snared pursestring suture.  The mediastinum was drained with 24 Kyrgyz Agustin drains placed anteriorly and posteriorly.  I surveyed the chest and hemostasis was pristine.  With that I impregnated the sternal edges with vancomycin, thrombin, and Gelfoam paste.  The sternum was reapproximated with Sternalock XP sternal plating system.  In layers anatomically the soft tissue planes were reapproximated. Instruments, sharps, and sponge counts were reported as correct.      Complications: None     Disposition: Transferred to ICU in stable and guarded condition.     Tim Cancino MD                 Diaz, Sixto PORTER MD   Physician  Cardiothoracic Surgery     Progress Notes     Signed     Date of Service: 06/30/24 1200  Creation Time: 06/30/24 1200     Signed              [facility] Cardiothoracic Surgery  PROGRESS NOTE   CC: Postop CABG     Subjective:  Overall doing very well feels much better than yesterday hemodynamically stable transfer the floor no bowel movement yet     ROS: No fevers or chills hemodynamically stable     Objective:        /71 (BP Location: Right arm, Patient Position: Sitting)   Pulse 83   Temp 98.1 °F (36.7 °C) (Oral)   Resp 16   Ht 180.5 cm (71.06\")   Wt 98 kg (216 lb)   SpO2 95%   BMI 30.07 kg/m²         Intake/Output Summary (Last 24 hours) at 6/30/2024 1200  Last data filed at 6/30/2024 1111      Gross per 24 hour   Intake " 1160 ml   Output 3195 ml   Net -2035 ml      Weight today 216, yesterday 217, baseline 208     CT Output: Mediastinal: 130, left pleural: 125     PE:      Vitals:     06/30/24 1118   BP: 126/71   Pulse: 83   Resp: 16   Temp: 98.1 °F (36.7 °C)   SpO2: 95%      GENERAL: NAD, resting comfortably, normal color  CARDIOVASCULAR: regular, regular rate, sinus, dressing clean dry and intact  PULMONARY: Normal bilateral breath sounds, no labored breathing  ABDOMEN: soft, nontender/nondistended  EXTREMITIES: mild peripheral edema, normal pulses, normal ROM           Lab Results (last 72 hours)         Procedure Component Value Units Date/Time     POC Glucose Once [[id number]]  (Abnormal) Collected: 06/30/24 1116     Specimen: Blood Updated: 06/30/24 1128       Glucose 282 mg/dL         Comment: : [id number] [employer] BrittanyMeter ID: [id number]        POC Glucose Once [[id number]]  (Abnormal) Collected: 06/30/24 0725     Specimen: Blood Updated: 06/30/24 0736       Glucose 153 mg/dL         Comment: : [id number] [employer] BrittanyMeter ID: [id number]        Renal Function Panel [[id number]]  (Abnormal) Collected: 06/30/24 0222     Specimen: Blood Updated: 06/30/24 0258       Glucose 135 mg/dL         BUN 14 mg/dL         Creatinine 0.73 mg/dL         Sodium 137 mmol/L         Potassium 4.3 mmol/L         Chloride 101 mmol/L         CO2 26.0 mmol/L         Calcium 8.3 mg/dL         Albumin 3.9 g/dL         Phosphorus 2.1 mg/dL         Anion Gap 10.0 mmol/L         BUN/Creatinine Ratio 19.2       eGFR 102.9 mL/min/1.73       Narrative:       GFR Normal >60  Chronic Kidney Disease <60  Kidney Failure <15        CBC & Differential [[id number]]  (Abnormal) Collected: 06/30/24 0222     Specimen: Blood Updated: 06/30/24 0229     Narrative:       The following orders were created for panel order CBC & Differential.  Procedure                               Abnormality         Status                     ---------                                -----------         ------                     CBC Auto Differential[[id number]]        Abnormal            Final result                  Please view results for these tests on the individual orders.     CBC Auto Differential [[id number]]  (Abnormal) Collected: 06/30/24 0222     Specimen: Blood Updated: 06/30/24 0229       WBC 11.24 10*3/mm3         RBC 3.74 10*6/mm3         Hemoglobin 12.3 g/dL         Hematocrit 36.5 %         MCV 97.6 fL         MCH 32.9 pg         MCHC 33.7 g/dL         RDW 12.1 %         RDW-SD 43.8 fl         MPV 9.7 fL         Platelets 144 10*3/mm3         Neutrophil % 78.6 %         Lymphocyte % 9.3 %         Monocyte % 11.3 %         Eosinophil % 0.1 %         Basophil % 0.3 %         Immature Grans % 0.4 %         Neutrophils, Absolute 8.83 10*3/mm3         Lymphocytes, Absolute 1.05 10*3/mm3         Monocytes, Absolute 1.27 10*3/mm3         Eosinophils, Absolute 0.01 10*3/mm3         Basophils, Absolute 0.03 10*3/mm3         Immature Grans, Absolute 0.05 10*3/mm3         nRBC 0.0 /100 WBC       POC Glucose Once [[id number]]  (Normal) Collected: 06/29/24 1957     Specimen: Blood Updated: 06/29/24 2008       Glucose 117 mg/dL         Comment: : [id number] Hilda CassandraMeter ID: [id number]        POC Glucose Once [[id number]]  (Normal) Collected: 06/29/24 1637     Specimen: Blood Updated: 06/29/24 1647       Glucose 117 mg/dL         Comment: : [id number] [employer] BrittanyMeter ID: [id number]        POC Glucose Once [[id number]]  (Normal) Collected: 06/29/24 1135     Specimen: Blood Updated: 06/29/24 1146       Glucose 109 mg/dL         Comment: : [id number] Calvin MendezMeter ID: [id number]        POC Glucose Once [[id number]]  (Abnormal) Collected: 06/29/24 0838     Specimen: Blood Updated: 06/29/24 0849       Glucose 133 mg/dL         Comment: : [id number] Calvin GallegosonMeter ID: [id number]        POC Glucose Once [[id number]]  (Abnormal) Collected: 06/29/24 0647      Specimen: Blood Updated: 06/29/24 0707       Glucose 138 mg/dL         Comment: : [id number] York SawyerMeter ID: [id number]        POC Glucose Once [[id number]]  (Abnormal) Collected: 06/29/24 0550     Specimen: Blood Updated: 06/29/24 0601       Glucose 141 mg/dL         Comment: : [id number] [employer] ColtynMeter ID: [id number]        POC Glucose Once [[id number]]  (Normal) Collected: 06/29/24 0359     Specimen: Blood Updated: 06/29/24 0410       Glucose 130 mg/dL         Comment: : [id number] [employer] ColtynMeter ID: [id number]        Renal Function Panel [[id number]]  (Abnormal) Collected: 06/29/24 0301     Specimen: Blood from Arm, Left Updated: 06/29/24 0328       Glucose 136 mg/dL         BUN 15 mg/dL         Creatinine 0.62 mg/dL         Sodium 139 mmol/L         Potassium 4.2 mmol/L         Chloride 105 mmol/L         CO2 24.0 mmol/L         Calcium 8.2 mg/dL         Albumin 4.2 g/dL         Phosphorus 4.1 mg/dL         Anion Gap 10.0 mmol/L         BUN/Creatinine Ratio 24.2       eGFR 108.1 mL/min/1.73       Narrative:       GFR Normal >60  Chronic Kidney Disease <60  Kidney Failure <15        Magnesium [[id number]]  (Normal) Collected: 06/29/24 0301     Specimen: Blood from Arm, Left Updated: 06/29/24 0322       Magnesium 2.0 mg/dL       Protime-INR [[id number]]  (Abnormal) Collected: 06/29/24 0301     Specimen: Blood from Arm, Left Updated: 06/29/24 0320       Protime 15.2 Seconds         INR 1.15     POC Glucose Once [[id number]]  (Normal) Collected: 06/29/24 0259     Specimen: Blood Updated: 06/29/24 0310       Glucose 129 mg/dL         Comment: : [id number] [employer] ColtynMeter ID: [id number]        CBC & Differential [[id number]]  (Abnormal) Collected: 06/29/24 0301     Specimen: Blood from Arm, Left Updated: 06/29/24 0308     Narrative:       The following orders were created for panel order CBC & Differential.  Procedure                               Abnormality         Status                      ---------                               -----------         ------                     CBC Auto Differential[[id number]]        Abnormal            Final result                  Please view results for these tests on the individual orders.     CBC Auto Differential [[id number]]  (Abnormal) Collected: 06/29/24 0301     Specimen: Blood from Arm, Left Updated: 06/29/24 0308       WBC 10.71 10*3/mm3         RBC 3.80 10*6/mm3         Hemoglobin 12.6 g/dL         Hematocrit 36.1 %         MCV 95.0 fL         MCH 33.2 pg         MCHC 34.9 g/dL         RDW 12.1 %         RDW-SD 42.0 fl         MPV 9.7 fL         Platelets 148 10*3/mm3         Neutrophil % 82.7 %         Lymphocyte % 6.0 %         Monocyte % 10.7 %         Eosinophil % 0.0 %         Basophil % 0.3 %         Immature Grans % 0.3 %         Neutrophils, Absolute 8.86 10*3/mm3         Lymphocytes, Absolute 0.64 10*3/mm3         Monocytes, Absolute 1.15 10*3/mm3         Eosinophils, Absolute 0.00 10*3/mm3         Basophils, Absolute 0.03 10*3/mm3         Immature Grans, Absolute 0.03 10*3/mm3         nRBC 0.0 /100 WBC       POC Glucose Once [[id number]]  (Abnormal) Collected: 06/29/24 0055     Specimen: Blood Updated: 06/29/24 0106       Glucose 147 mg/dL         Comment: : [id number] [employer] ColtynMeter ID: [id number]        POC Glucose Once [[id number]]  (Abnormal) Collected: 06/28/24 2238     Specimen: Blood Updated: 06/28/24 2249       Glucose 146 mg/dL         Comment: : [id number] [employer] ColtynMeter ID: [id number]        POC Glucose Once [[id number]]  (Abnormal) Collected: 06/28/24 2025     Specimen: Blood Updated: 06/28/24 2045       Glucose 146 mg/dL         Comment: : [id number] York SawyerMeter ID: [id number]        Blood Gas, Arterial - [[id number]]  (Abnormal) Collected: 06/28/24 1919     Specimen: Arterial Blood Updated: 06/28/24 1923       Site Arterial Line       Aiden's Test N/A       pH, Arterial 7.346 pH units         Comment: 84  Value below reference range          pCO2, Arterial 47.3 mm Hg         Comment: 83 Value above reference range          pO2, Arterial 83.7 mm Hg         HCO3, Arterial 25.9 mmol/L         Base Excess, Arterial -0.4 mmol/L         Comment: 84 Value below reference range          O2 Saturation, Arterial 96.3 %         Temperature 37.0       Barometric Pressure for Blood Gas 748 mmHg         Modality Ventilator       FIO2 30 %         Ventilator Mode PSV       PEEP 5.0       PSV 10.0 cmH2O         Collected by [id number]       Comment: Meter: [id number]     :  Yovany Smith, CRT          pCO2, Temperature Corrected 47.3 mm Hg         pH, Temp Corrected 7.346 pH Units         pO2, Temperature Corrected 83.7 mm Hg         PO2/FIO2 279     Renal Function Panel [[id number]]  (Abnormal) Collected: 06/28/24 1811     Specimen: Blood Updated: 06/28/24 1853       Glucose 189 mg/dL         BUN 19 mg/dL         Creatinine 0.81 mg/dL         Sodium 141 mmol/L         Potassium 4.0 mmol/L         Comment: Slight hemolysis detected by analyzer. Result may be falsely elevated.          Chloride 106 mmol/L         CO2 25.0 mmol/L         Calcium 8.5 mg/dL         Albumin 4.0 g/dL         Phosphorus 4.9 mg/dL         Anion Gap 10.0 mmol/L         BUN/Creatinine Ratio 23.5       eGFR 99.7 mL/min/1.73       Narrative:       GFR Normal >60  Chronic Kidney Disease <60  Kidney Failure <15        CBC (No Diff) [[id number]]  (Abnormal) Collected: 06/28/24 1811     Specimen: Blood Updated: 06/28/24 1834       WBC 13.17 10*3/mm3         RBC 4.42 10*6/mm3         Hemoglobin 14.6 g/dL         Hematocrit 41.9 %         MCV 94.8 fL         MCH 33.0 pg         MCHC 34.8 g/dL         RDW 11.9 %         RDW-SD 41.4 fl         MPV 9.9 fL         Platelets 174 10*3/mm3       POC Glucose Once [[id number]]  (Abnormal) Collected: 06/28/24 1803     Specimen: Blood Updated: 06/28/24 1814       Glucose 175 mg/dL         Comment: : [id number] Calvin  ElonMeter ID: [id number]        POC Glucose Once [[id number]]  (Abnormal) Collected: 06/28/24 1704     Specimen: Blood Updated: 06/28/24 1715       Glucose 150 mg/dL         Comment: : [id number] Calvin MendezMeter ID: [id number]        Blood Gas, Arterial - [[id number]]  (Abnormal) Collected: 06/28/24 1608     Specimen: Arterial Blood Updated: 06/28/24 1610       Site Arterial Line       Aiden's Test N/A       pH, Arterial 7.357 pH units         pCO2, Arterial 44.6 mm Hg         pO2, Arterial 80.5 mm Hg         Comment: 84 Value below reference range          HCO3, Arterial 25.0 mmol/L         Base Excess, Arterial -0.8 mmol/L         Comment: 84 Value below reference range          O2 Saturation, Arterial 95.9 %         Temperature 37.0       Barometric Pressure for Blood Gas 748 mmHg         Modality Ventilator       FIO2 30 %         Ventilator Mode SIMV       Set Tidal Volume 600       Set Mech Resp Rate 20.0       PEEP 8.0       PSV 10.0 cmH2O         Collected by [id number]       Comment: Meter: [id number]     :  Adrienne Fiore, ALFONSO          pCO2, Temperature Corrected 44.6 mm Hg         pH, Temp Corrected 7.357 pH Units         pO2, Temperature Corrected 80.5 mm Hg         PO2/FIO2 268     POC Glucose Once [[id number]]  (Abnormal) Collected: 06/28/24 1549     Specimen: Blood Updated: 06/28/24 1600       Glucose 156 mg/dL         Comment: : [id number] Simpson AaronMeter ID: [id number]        Renal Function Panel [[id number]]  (Abnormal) Collected: 06/28/24 1416     Specimen: Blood Updated: 06/28/24 1551       Glucose 124 mg/dL         BUN 19 mg/dL         Creatinine 0.85 mg/dL         Sodium 142 mmol/L         Potassium 4.2 mmol/L         Comment: Slight hemolysis detected by analyzer. Result may be falsely elevated.          Chloride 106 mmol/L         CO2 24.0 mmol/L         Calcium 9.7 mg/dL         Albumin 4.0 g/dL         Phosphorus 4.3 mg/dL         Anion Gap 12.0 mmol/L         BUN/Creatinine  Ratio 22.4       eGFR 98.2 mL/min/1.73       Narrative:       GFR Normal >60  Chronic Kidney Disease <60  Kidney Failure <15        Protime-INR [[id number]]  (Abnormal) Collected: 06/28/24 1416     Specimen: Blood Updated: 06/28/24 1445       Protime 15.1 Seconds         INR 1.14     aPTT [[id number]]  (Abnormal) Collected: 06/28/24 1416     Specimen: Blood Updated: 06/28/24 1445       PTT 66.0 seconds       CBC (No Diff) [[id number]]  (Abnormal) Collected: 06/28/24 1416     Specimen: Blood Updated: 06/28/24 1441       WBC 10.42 10*3/mm3         RBC 4.47 10*6/mm3         Hemoglobin 14.7 g/dL         Hematocrit 42.0 %         MCV 94.0 fL         MCH 32.9 pg         MCHC 35.0 g/dL         RDW 11.9 %         RDW-SD 41.4 fl         MPV 10.0 fL         Platelets 151 10*3/mm3       Calcium, Ionized [[id number]] Collected: 06/28/24 1410     Specimen: Blood Updated: 06/28/24 1412       Ionized Calcium 5.08 mg/dL         Collected by [id number]       Comment: Meter: [id number]     :  Camryn Solis RRT        Blood Gas, Arterial With Co-Ox [[id number]]  (Abnormal) Collected: 06/28/24 1319     Specimen: Arterial Blood Updated: 06/28/24 1327       Site Arterial Line       Aiden's Test N/A       pH, Arterial 7.381 pH units         pCO2, Arterial 46.1 mm Hg         Comment: 83 Value above reference range          pO2, Arterial 54.8 mm Hg         Comment: 85 Value below critical limit          HCO3, Arterial 27.3 mmol/L         Comment: 83 Value above reference range          Base Excess, Arterial 1.6 mmol/L         O2 Saturation, Arterial 88.2 %         Comment: 84 Value below reference range          Hemoglobin, Blood Gas 14.4 g/dL         Hematocrit, Blood Gas 44.2 %         Oxyhemoglobin 86.4 %         Comment: 84 Value below reference range          Methemoglobin 0.50 %         Carboxyhemoglobin 1.4 %         Temperature 37.0       Sodium, Arterial 141 mmol/L         Potassium, Arterial 4.6 mmol/L         Ionized  Calcium 5.40 mg/dL         Barometric Pressure for Blood Gas 749 mmHg         Modality Ventilator       FIO2 100 %         Ventilator Mode NA       Notified Who CRNA       Notified By SAMANTHA       Notified Time 06/28/2024 13:26       Collected by SILVIA THORPE       Comment: Meter: [id number]     :  SAMANTHA          pH, Temp Corrected 7.381 pH Units         pCO2, Temperature Corrected 46.1 mm Hg         pO2, Temperature Corrected 54.8 mm Hg       Blood Gas, Arterial With Co-Ox [[id number]]  (Abnormal) Collected: 06/28/24 1233     Specimen: Arterial Blood Updated: 06/28/24 1236       Site Arterial Line       Aiden's Test N/A       pH, Arterial 7.343 pH units         Comment: 84 Value below reference range          pCO2, Arterial 47.9 mm Hg         Comment: 83 Value above reference range          pO2, Arterial 397.0 mm Hg         Comment: 83 Value above reference range          HCO3, Arterial 26.0 mmol/L         Comment: 83 Value above reference range          Base Excess, Arterial -0.2 mmol/L         Comment: 84 Value below reference range          O2 Saturation, Arterial >100.1 %         Comment: 93 Value above reportable range > 100.1          Hemoglobin, Blood Gas 12.4 g/dL         Comment: 84 Value below reference range          Hematocrit, Blood Gas 38.1 %         Oxyhemoglobin 98.5 %         Methemoglobin 0.80 %         Carboxyhemoglobin 1.0 %         Temperature 37.0       Sodium, Arterial 137 mmol/L         Potassium, Arterial 5.9 mmol/L         Comment: 83 Value above reference range          Ionized Calcium 4.15 mg/dL         Comment: 84 Value below reference range          Barometric Pressure for Blood Gas 749 mmHg         Modality pump       FIO2 90 %         Flow Rate 2.5 lpm         Ventilator Mode NA       Collected by SAMANTHA       Comment: Meter: [id number]     :  SAMANTHA          pH, Temp Corrected 7.343 pH Units         pCO2, Temperature Corrected 47.9 mm Hg         pO2,  Temperature Corrected 397 mm Hg       Blood Gas, Arterial With Co-Ox [[id number]]  (Abnormal) Collected: 06/28/24 1157     Specimen: Arterial Blood Updated: 06/28/24 1200       Site Arterial Line       Aiden's Test N/A       pH, Arterial 7.357 pH units         pCO2, Arterial 45.8 mm Hg         Comment: 83 Value above reference range          pO2, Arterial 324.0 mm Hg         Comment: 83 Value above reference range          HCO3, Arterial 25.7 mmol/L         Base Excess, Arterial -0.2 mmol/L         Comment: 84 Value below reference range          O2 Saturation, Arterial >100.1 %         Comment: 93 Value above reportable range > 100.1          Hemoglobin, Blood Gas 12.8 g/dL         Comment: 84 Value below reference range          Hematocrit, Blood Gas 39.1 %         Oxyhemoglobin 98.7 %         Methemoglobin 0.60 %         Carboxyhemoglobin 1.0 %         Temperature 37.0       Sodium, Arterial 138 mmol/L         Potassium, Arterial 5.5 mmol/L         Comment: 83 Value above reference range          Ionized Calcium 4.21 mg/dL         Comment: 84 Value below reference range          Barometric Pressure for Blood Gas 749 mmHg         Modality pump       FIO2 80 %         Flow Rate 2.5 lpm         Ventilator Mode NA       Collected by SAMANTHA       Comment: Meter: [id number]     :  SAMANTHA          pH, Temp Corrected 7.357 pH Units         pCO2, Temperature Corrected 45.8 mm Hg         pO2, Temperature Corrected 324 mm Hg       Blood Gas, Arterial With Co-Ox [[id number]]  (Abnormal) Collected: 06/28/24 1133     Specimen: Arterial Blood Updated: 06/28/24 1136       Site Arterial Line       Aiden's Test N/A       pH, Arterial 7.339 pH units         Comment: 84 Value below reference range          pCO2, Arterial 49.3 mm Hg         Comment: 83 Value above reference range          pO2, Arterial 361.0 mm Hg         Comment: 83 Value above reference range          HCO3, Arterial 26.5 mmol/L         Comment: 83 Value  above reference range          Base Excess, Arterial 0.1 mmol/L         O2 Saturation, Arterial >100.1 %         Comment: 93 Value above reportable range > 100.1          Hemoglobin, Blood Gas 12.9 g/dL         Comment: 84 Value below reference range          Hematocrit, Blood Gas 39.5 %         Oxyhemoglobin 98.9 %         Methemoglobin 0.60 %         Carboxyhemoglobin 0.9 %         Temperature 37.0       Sodium, Arterial 139 mmol/L         Potassium, Arterial 5.4 mmol/L         Comment: 83 Value above reference range          Ionized Calcium 4.18 mg/dL         Comment: 84 Value below reference range          Barometric Pressure for Blood Gas 749 mmHg         Modality pump       FIO2 80 %         Flow Rate 2.3 lpm         Ventilator Mode NA       Comment: Meter: [id number]     :  SAMANTHA          pH, Temp Corrected 7.339 pH Units         pCO2, Temperature Corrected 49.3 mm Hg         pO2, Temperature Corrected 361 mm Hg       Blood Gas, Arterial With Co-Ox [[id number]]  (Abnormal) Collected: 06/28/24 1107     Specimen: Arterial Blood Updated: 06/28/24 1109       Site Arterial Line       Aiden's Test N/A       pH, Arterial 7.344 pH units         Comment: 84 Value below reference range          pCO2, Arterial 44.7 mm Hg         pO2, Arterial 507.0 mm Hg         Comment: 83 Value above reference range          HCO3, Arterial 24.3 mmol/L         Base Excess, Arterial -1.6 mmol/L         Comment: 84 Value below reference range          O2 Saturation, Arterial >100.1 %         Comment: 93 Value above reportable range > 100.1          Hemoglobin, Blood Gas 13.8 g/dL         Comment: 84 Value below reference range          Hematocrit, Blood Gas 42.3 %         Oxyhemoglobin 99.1 %         Comment: 83 Value above reference range          Methemoglobin 0.50 %         Carboxyhemoglobin 0.9 %         Temperature 37.0       Sodium, Arterial 139 mmol/L         Potassium, Arterial 4.8 mmol/L         Ionized Calcium  4.01 mg/dL         Comment: 84 Value below reference range          Barometric Pressure for Blood Gas 749 mmHg         Modality pump       FIO2 100 %         Flow Rate 2.3 lpm         Ventilator Mode NA       Collected by SAMANTHA       Comment: Meter: [id number]     :  SAMANTHA          pH, Temp Corrected 7.344 pH Units         pCO2, Temperature Corrected 44.7 mm Hg         pO2, Temperature Corrected 507 mm Hg       Blood Gas, Arterial With Co-Ox [[id number]]  (Abnormal) Collected: 06/28/24 1032     Specimen: Arterial Blood Updated: 06/28/24 1036       Site Arterial Line       Aiden's Test N/A       pH, Arterial 7.330 pH units         Comment: 84 Value below reference range          pCO2, Arterial 45.9 mm Hg         Comment: 83 Value above reference range          pO2, Arterial 133.0 mm Hg         Comment: 83 Value above reference range          HCO3, Arterial 24.2 mmol/L         Base Excess, Arterial -2.1 mmol/L         Comment: 84 Value below reference range          O2 Saturation, Arterial 99.0 %         Hemoglobin, Blood Gas 15.7 g/dL         Hematocrit, Blood Gas 48.2 %         Oxyhemoglobin 97.7 %         Methemoglobin 0.50 %         Carboxyhemoglobin 0.8 %         Temperature 37.0       Sodium, Arterial 139 mmol/L         Potassium, Arterial 4.6 mmol/L         Ionized Calcium 4.54 mg/dL         Comment: 84 Value below reference range          Barometric Pressure for Blood Gas 749 mmHg         Modality Ventilator       FIO2 100 %         Ventilator Mode NA       Collected by SILVIA THORPE       Comment: Meter: [id number]     :  SAMANTHA          pH, Temp Corrected 7.330 pH Units         pCO2, Temperature Corrected 45.9 mm Hg         pO2, Temperature Corrected 133 mm Hg       Blood Gas, Arterial With Co-Ox [[id number]]  (Abnormal) Collected: 06/28/24 0755     Specimen: Arterial Blood Updated: 06/28/24 0757       Site Arterial Line       Aiden's Test N/A       pH, Arterial 7.408 pH units          pCO2, Arterial 39.5 mm Hg         pO2, Arterial 328.0 mm Hg         Comment: 83 Value above reference range          HCO3, Arterial 24.9 mmol/L         Base Excess, Arterial 0.3 mmol/L         O2 Saturation, Arterial >100.1 %         Comment: 93 Value above reportable range > 100.1          Hemoglobin, Blood Gas 16.8 g/dL         Hematocrit, Blood Gas 51.5 %         Comment: 83 Value above reference range          Oxyhemoglobin 99.0 %         Comment: 83 Value above reference range          Methemoglobin 0.60 %         Carboxyhemoglobin 0.7 %         Temperature 37.0       Sodium, Arterial 141 mmol/L         Potassium, Arterial 4.4 mmol/L         Ionized Calcium 4.71 mg/dL         Barometric Pressure for Blood Gas 750 mmHg         Modality Ventilator       FIO2 100 %         Ventilator Mode NA       Collected by YOLY THORPE       Comment: Meter: [id number]     :  NFALK          pH, Temp Corrected 7.408 pH Units         pCO2, Temperature Corrected 39.5 mm Hg         pO2, Temperature Corrected 328 mm Hg       POC Glucose Once [[id number]]  (Normal) Collected: 24     Specimen: Blood Updated: 24       Glucose 126 mg/dL         Comment: : [id number] Brenda Richards) [facility] [id number]                         CXR: Stable cardiac silhouette good expansion bilateral lungs        Assessment & Plan  64-year-old male who is postop day 2 from CABG.  Doing very well.     Remove mediastinal tubes today keep left pleural  Replace potassium and calcium  Start twice daily Lasix  Intensify bowel regimen  Anticipate he will be ready for home in 1 to 2 days     Sixto Diaz MD  Cardiothoracic Surgeon                         Nicolasa Hartmann, APRN   Nurse Practitioner  Cardiothoracic Surgery     Progress Notes      Cosign Needed     Date of Service: 24  Creation Time: 24     Cosign Needed       Expand All Collapse All    Patient name: Max Oliveira  Patient :  "1960  VISIT # [id number]  MR #[id number]     Procedure:Procedure(s):  CORONARY ARTERY BYPASS GRAFTING x4, BILATERAL INTERNAL MAMMARY ARTERY GRAFTS, RIGHT LEG ENDOSCOPIC VEIN HARVEST, TRANSESOPHAGEAL ECHOCARDIOGRAM, XP STERNAL PLATING  STERNAL PLATING  Procedure Date:6/28/2024  POD:3 Days Post-Op        Subjective  No chief complaint on file.     Sitting up in the chair. Tolerating room air. Pain is well controlled. Wife is at bedside.  Weight is down 4 pounds over the last 24 hours and he remains 1 pound above his baseline weight.  Walked 400 feet with 1 standing rest with physical therapy yesterday.  No BM.     Telemetry: Sinus 71 to 93 bpm              Objective[]Expand by Default  Visit Vitals  /60 (BP Location: Left arm, Patient Position: Sitting)   Pulse 89   Temp 98.4 °F (36.9 °C) (Oral)   Resp 16   Ht 180.5 cm (71.06\")   Wt 96.4 kg (212 lb 9.6 oz)   SpO2 93%   BMI 29.60 kg/m²   Baseline weight: 211 pounds     Intake/Output Summary (Last 24 hours) at 7/1/2024 0901  Last data filed at 7/1/2024 0900      Gross per 24 hour   Intake 840 ml   Output 3880 ml   Net -3040 ml   Left pleural chest tube: 35 mL over 24 hours, serosanguineous  Right pleural chest tube: 45 mL over 24 hours, serosanguineous     Lab:     CBC:         Results from last 7 days   Lab Units 07/01/24  0546 06/30/24  0222 06/29/24  0301   WBC 10*3/mm3 11.35* 11.24* 10.71   HEMATOCRIT % 36.7* 36.5* 36.1*   PLATELETS 10*3/mm3 173 144 148            BMP:         Results from last 7 days   Lab Units 07/01/24  0546 06/30/24  0222 06/29/24  0301   SODIUM mmol/L 136 137 139   POTASSIUM mmol/L 4.3 4.3 4.2   CHLORIDE mmol/L 97* 101 105   CO2 mmol/L 27.0 26.0 24.0   GLUCOSE mg/dL 122* 135* 136*   BUN mg/dL 14 14 15   CREATININE mg/dL 0.69* 0.73* 0.62*            COAG:        Results from last 7 days   Lab Units 06/29/24  0301 06/28/24  1416   INR   1.15* 1.14*   APTT seconds  --  66.0*         IMAGES:       Imaging Results (Last 24 Hours)         " Procedure Component Value Units Date/Time     XR Chest PA & Lateral [[id number]] Collected: 07/01/24 0632       Updated: 07/01/24 0637     Narrative:       EXAMINATION: XR CHEST PA AND LATERAL-     7/1/2024 3:04 AM     HISTORY: Post CABG; Z74.09-Other reduced mobility;  I25.10-Atherosclerotic heart disease of native coronary artery without  angina pectoris     A frontal and lateral view of the chest are compared with the previous  study dated 6/30/2024.     Lungs are poorly expanded.     There is a persistent left lower lobar consolidation.     There is a persistent small bibasilar pleural effusion.     Bilateral chest tubes are reidentified. No change in position. The  mediastinal drain has been removed.     The heart size is not optimally evaluated due to the obliteration of the  cardiac border by the adjacent consolidation and fluid. There is  evidence of prior cardiac surgery.     There is no evidence of pulmonary congestion or pneumothorax.     No acute bony abnormality.        Impression:       1. No significant change since the previous study. Left lower lobar  consolidation and a small bibasilar pleural effusion persist. Bilateral  chest tubes in place.        This report was signed and finalized on 7/1/2024 6:34 AM by Dr. Radha Allen MD.        XR Chest 1 View [[id number]] Collected: 06/30/24 0947       Updated: 06/30/24 0952     Narrative:                   obscured. Heart size is within normal limits. No acute bony abnormality  is seen.            Impression:       1. Removal of Britton-Pam introducer sheath. No other tube changes.  2. Hypoventilation. Infiltrates in both lung bases likely due to  atelectasis. Left hemidiaphragm obscured.           This report was signed and finalized on 6/30/2024 9:49 AM by Dr. Brett Armendariz MD.             Chest x-ray: Small bibasilar effusions and atelectasis, no visible pneumothorax.     Physical Exam:  General: Alert, oriented. No apparent distress.    Cardiovascular: Regular rate and rhythm without murmur, rubs, or gallops.    Pulmonary: Clear to auscultation bilaterally without wheezing, rubs, or rales.  Chest: Sternotomy incision clean, dry, and intact. Sternum stable. No clicks. Thoracic incisions clean, dry, and intact.  Fluid is serosanguineous.   Abdomen: Soft, non distended, and non tender.  Extremities: Warm, moves all extremities. No edema. Saphenectomy site clean, dry, and intact.   Neurologic:  Grossly intact with no focal deficits.        Impression:  Coronary artery disease involving native coronary artery of native heart with unstable angina pectoris   Type 2 diabetes mellitus  Hypertension  Hyperlipidemia     Plan:  DC Agustin drains  Milk and molasses enema today  Continue diuresis and potassium replacement  Routine postcardiac surgery care  Continue bowel regimen  Encourage pulmonary toilet and ambulation  Hospital discharge home later today  Discussed with patient, wife and nursing     Nicolasa MATHEW Shahbaz, APRN  07/01/24  09:01 CDT        talia Temp Pulse Resp BP Patient Position Device (Oxygen Therapy) Flow (L/min) Arterial Line BP 2 SpO2   07/01/24 1023 98.2 (36.8) 83 16 126/72 Lying room air -- -- 96   07/01/24 1015 -- 83 16 -- -- room air -- -- 93   07/01/24 0928 -- -- -- -- -- room air -- -- --   07/01/24 0700 98.4 (36.9) 89 16 115/60 Sitting room air -- -- 93   07/01/24 0627 -- 85 16 -- -- room air -- -- 98   07/01/24 0619 -- 89 16 -- -- room air -- -- 96   07/01/24 0340 98.2 (36.8) 83 16 125/74 Lying nasal cannula 1 -- 94   06/30/24 2325 98.7 (37.1) 79 16 118/76 Lying nasal cannula 1 -- 94   06/30/24 2029 -- -- -- -- -- nasal cannula 1 -- --   06/30/24 1946 99.4 (37.4) 87 18 127/70 Lying room air -- -- 93   06/30/24 1852 -- 87 18 -- -- room air -- -- 97   06/30/24 1847 -- 86 18 -- -- room air -- -- 98   06/30/24 1524 -- 83 16 -- -- room air -- -- 100   06/30/24 1519 -- 84 16 -- -- room air -- -- 92   06/30/24 1118 98.1 (36.7) 83 16 126/71  Sitting -- -- -- 95 06/30/24 0908 -- -- -- -- -- nasal cannula 1.5 -- --   06/30/24 0726 98.6 (37) 83 16 111/59 Sitting -- -- -- 95 06/30/24 0656 -- 81 16 -- -- -- -- -- 95 06/30/24 0329 98.8 (37.1) 84 16 120/69 Lying nasal cannula 1.5 -- 95 06/29/24 2320 99.5 (37.5) 90 16 121/57 Lying nasal cannula 1.5 -- 95 06/29/24 2300 -- -- -- -- -- nasal cannula 1.5 -- 95 06/29/24 2000 -- -- -- -- -- nasal cannula 1.5 -- --   06                   Current Facility-Administered Medications   Medication Dose Route Frequency Provider Last Rate Last Admin    acetaminophen (TYLENOL) tablet 1,000 mg  1,000 mg Oral Q6H Tim Cancino MD   1,000 mg at 07/01/24 0929    Or    acetaminophen (TYLENOL) 160 MG/5ML oral solution 1,000 mg  1,000 mg Oral Q6H Tim Cancino MD        Or    acetaminophen (TYLENOL) suppository 650 mg  650 mg Rectal Q6H Tim Cancino MD        amiodarone (PACERONE) tablet 400 mg  400 mg Oral Q24H Sixto Diaz MD   400 mg at 07/01/24 0931    aspirin EC tablet 81 mg  81 mg Oral Daily Tim Cancino MD   81 mg at 07/01/24 0929    atorvastatin (LIPITOR) tablet 20 mg  20 mg Oral Nightly Tim Cancino MD   20 mg at 06/30/24 2029    bisacodyl (DULCOLAX) EC tablet 10 mg  10 mg Oral BID Tim Cancino MD   10 mg at 07/01/24 0930    bisacodyl (DULCOLAX) suppository 10 mg  10 mg Rectal Daily PRN Nicolasa Trujillo APRN        chlorhexidine (PERIDEX) 0.12 % solution 15 mL  15 mL Mouth/Throat Q12H Tim Cancino MD   15 mL at 07/01/24 0619    clopidogrel (PLAVIX) tablet 75 mg  75 mg Oral Daily Tim Cancino MD   75 mg at 06/30/24 1706    dextrose (D50W) (25 g/50 mL) IV injection 10-50 mL  10-50 mL Intravenous Q15 Min PRN Tim Cancino MD        dextrose (GLUTOSE) oral gel 15 g  15 g Oral Q15 Min PRN Tim Cancino MD        Enoxaparin Sodium (LOVENOX) syringe 40 mg  40 mg Subcutaneous Daily Tim Cancino MD   40 mg at 07/01/24 0929    furosemide (LASIX) injection  20 mg  20 mg Intravenous BID AC Sixto Diaz MD   20 mg at 07/01/24 0929    glucagon (GLUCAGEN) injection 1 mg  1 mg Intramuscular Q15 Min PRN Tim Cancino MD        Hold All immunizations   Does not apply Continuous PRN Tim Cancino MD        Insulin Lispro (humaLOG) injection 2-9 Units  2-9 Units Subcutaneous 4x Daily AC & at Bedtime Sixto Diaz MD   2 Units at 07/01/24 1230    ipratropium-albuterol (DUO-NEB) nebulizer solution 1.5 mL  1.5 mL Nebulization 4x Daily - RT Tim Cancino MD   1.5 mL at 07/01/24 1015    Lidocaine 4 % 2 patch  2 patch Transdermal Q24H Tim Cancino MD   2 patch at 07/01/24 0931    methocarbamol (ROBAXIN) tablet 500 mg  500 mg Oral Q8H Sixto Diaz MD   500 mg at 07/01/24 0619    metoprolol tartrate (LOPRESSOR) tablet 25 mg  25 mg Oral Q12H Sixto Diaz MD   25 mg at 07/01/24 0929    ondansetron (ZOFRAN) injection 4 mg  4 mg Intravenous Q6H PRN Tim Cancino MD   4 mg at 06/29/24 0506    oxyCODONE (ROXICODONE) immediate release tablet 10 mg  10 mg Oral Q3H PRN Tim Cancino MD   10 mg at 06/29/24 2329    oxyCODONE (ROXICODONE) immediate release tablet 5 mg  5 mg Oral Q3H PRN Tim Cancino MD   5 mg at 07/01/24 0930    pantoprazole (PROTONIX) EC tablet 40 mg  40 mg Oral Q AM Nicolasa Trujillo APRN   40 mg at 07/01/24 1230    polyethylene glycol (MIRALAX) packet 17 g  17 g Oral Daily Tim Cancino MD   17 g at 07/01/24 0929    potassium chloride (MICRO-K/KLOR-CON) CR capsule  20 mEq Oral BID With Meals Sixto Diaz MD   20 mEq at 07/01/24 0929    pregabalin (LYRICA) capsule 25 mg  25 mg Oral Q12H Tim Cancino MD   25 mg at 07/01/24 0929

## 2024-07-01 NOTE — PLAN OF CARE
Goal Outcome Evaluation:  Plan of Care Reviewed With: patient        Progress: improving  Outcome Evaluation: Pt. stands with SUpervision and walks 525' with Supervision. Pt. went up and down 3 steps without issue. Reviewed sternal precautions and progression of activity with pt and spouse. He will be safe to return home at discharge.

## 2024-07-01 NOTE — PLAN OF CARE
Goal Outcome Evaluation:  Plan of Care Reviewed With: patient                    Outcome Evaluation: VSS, prn pain medication given x2 for incisional and right shoulder pain, MIGUEL drains had 5ml out of left and 25ml out of right, incisions c/d/i, ambulated in donis this a.m. sinus 71-93 with a first degree, PVC's and quad on telemetry.

## 2024-07-01 NOTE — PROGRESS NOTES
"Patient name: Max Oliveira  Patient : 1960  VISIT # [id number]  MR #[id number]    Procedure:Procedure(s):  CORONARY ARTERY BYPASS GRAFTING x4, BILATERAL INTERNAL MAMMARY ARTERY GRAFTS, RIGHT LEG ENDOSCOPIC VEIN HARVEST, TRANSESOPHAGEAL ECHOCARDIOGRAM, XP STERNAL PLATING  STERNAL PLATING  Procedure Date:2024  POD:3 Days Post-Op    Subjective   No chief complaint on file.    Sitting up in the chair. Tolerating room air. Pain is well controlled. Wife is at bedside.  Weight is down 4 pounds over the last 24 hours and he remains 1 pound above his baseline weight.  Walked 400 feet with 1 standing rest with physical therapy yesterday.  No BM.    Telemetry: Sinus 71 to 93 bpm       Objective     Visit Vitals  /60 (BP Location: Left arm, Patient Position: Sitting)   Pulse 89   Temp 98.4 °F (36.9 °C) (Oral)   Resp 16   Ht 180.5 cm (71.06\")   Wt 96.4 kg (212 lb 9.6 oz)   SpO2 93%   BMI 29.60 kg/m²   Baseline weight: 211 pounds    Intake/Output Summary (Last 24 hours) at 2024 0901  Last data filed at 2024 0900  Gross per 24 hour   Intake 840 ml   Output 3880 ml   Net -3040 ml   Left pleural chest tube: 35 mL over 24 hours, serosanguineous  Right pleural chest tube: 45 mL over 24 hours, serosanguineous    Lab:     CBC:  Results from last 7 days   Lab Units 24  0546 24  0222 24  0301   WBC 10*3/mm3 11.35* 11.24* 10.71   HEMATOCRIT % 36.7* 36.5* 36.1*   PLATELETS 10*3/mm3 173 144 148          BMP:  Results from last 7 days   Lab Units 24  0546 24  0222 24  0301   SODIUM mmol/L 136 137 139   POTASSIUM mmol/L 4.3 4.3 4.2   CHLORIDE mmol/L 97* 101 105   CO2 mmol/L 27.0 26.0 24.0   GLUCOSE mg/dL 122* 135* 136*   BUN mg/dL 14 14 15   CREATININE mg/dL 0.69* 0.73* 0.62*          COAG:  Results from last 7 days   Lab Units 24  0301 24  1416   INR  1.15* 1.14*   APTT seconds  --  66.0*       IMAGES:       Imaging Results (Last 24 Hours)       Procedure Component " Value Units Date/Time    XR Chest PA & Lateral [[id number]] Collected: 07/01/24 0632     Updated: 07/01/24 0637    Narrative:      EXAMINATION: XR CHEST PA AND LATERAL-     7/1/2024 3:04 AM     HISTORY: Post CABG; Z74.09-Other reduced mobility;  I25.10-Atherosclerotic heart disease of native coronary artery without  angina pectoris     A frontal and lateral view of the chest are compared with the previous  study dated 6/30/2024.     Lungs are poorly expanded.     There is a persistent left lower lobar consolidation.     There is a persistent small bibasilar pleural effusion.     Bilateral chest tubes are reidentified. No change in position. The  mediastinal drain has been removed.     The heart size is not optimally evaluated due to the obliteration of the  cardiac border by the adjacent consolidation and fluid. There is  evidence of prior cardiac surgery.     There is no evidence of pulmonary congestion or pneumothorax.     No acute bony abnormality.       Impression:      1. No significant change since the previous study. Left lower lobar  consolidation and a small bibasilar pleural effusion persist. Bilateral  chest tubes in place.        This report was signed and finalized on 7/1/2024 6:34 AM by Dr. Radha Allen MD.       XR Chest 1 View [[id number]] Collected: 06/30/24 0947     Updated: 06/30/24 0952    Narrative:      EXAMINATION:  XR CHEST 1 VW-  6/30/2024 2:15 AM     HISTORY: Post-Op Heart Surgery; Z74.09-Other reduced mobility;  I25.10-Atherosclerotic heart disease of native coronary artery without  angina pectoris.     COMPARISON: 6/29/2024.     TECHNIQUE: Single view AP image.     FINDINGS: The introducer sheath has been removed. Mediastinal drains and   chest tubes remain in place. There is hypoventilation. There is  infiltrate in both lung bases. The left hemidiaphragm is mostly  obscured. Heart size is within normal limits. No acute bony abnormality  is seen.          Impression:      1. Removal  of [place]-Pam introducer sheath. No other tube changes.  2. Hypoventilation. Infiltrates in both lung bases likely due to  atelectasis. Left hemidiaphragm obscured.           This report was signed and finalized on 6/30/2024 9:49 AM by Dr. Brett Armendariz MD.           Chest x-ray: Small bibasilar effusions and atelectasis, no visible pneumothorax.    Physical Exam:  General: Alert, oriented. No apparent distress.   Cardiovascular: Regular rate and rhythm without murmur, rubs, or gallops.    Pulmonary: Clear to auscultation bilaterally without wheezing, rubs, or rales.  Chest: Sternotomy incision clean, dry, and intact. Sternum stable. No clicks. Thoracic incisions clean, dry, and intact.  Fluid is serosanguineous.   Abdomen: Soft, non distended, and non tender.  Extremities: Warm, moves all extremities. No edema. Saphenectomy site clean, dry, and intact.   Neurologic:  Grossly intact with no focal deficits.       Impression:  Coronary artery disease involving native coronary artery of native heart with unstable angina pectoris   Type 2 diabetes mellitus  Hypertension  Hyperlipidemia    Plan:  DC Agustin drains  Milk and molasses enema today  Continue diuresis and potassium replacement  Routine postcardiac surgery care  Continue bowel regimen  Encourage pulmonary toilet and ambulation  Hospital discharge home later today  Discussed with patient, wife and nursing    JESUS Gregorio  07/01/24  09:01 CDT

## 2024-07-01 NOTE — DISCHARGE SUMMARY
[facility] Cardiothoracic Surgery  DISCHARGE SUMMARY        Date of Admission: 6/28/2024  Date of Discharge:  7/3/2024  Primary Care Physician: Aayush Ewing MD    Discharge Diagnoses:  Active Hospital Problems    Diagnosis     **Coronary artery disease involving native coronary artery of native heart     Former smoker     Primary hypertension     Type 2 diabetes mellitus without complication, without long-term current use of insulin     Mixed hyperlipidemia     Abnormal stress test      Procedures Performed: Coronary artery bypass grafting-4 vessel (left internal mammary artery/left anterior descending, right internal mammary artery/posterior descending artery, reverse saphenous vein graft/first obtuse marginal, and reverse saphenous vein graft/second diagonal artery), Right endoscopic vein harvest, Sternal plating with Sternalock XP sternal plating performed on 6/28/2024 by Dr. Cancino.     HPI:  Mr. Max Oliveira is a 64 y.o. male with former tobacco use, high blood pressure, type 2 diabetes mellitus, hyperlipidemia and previous rotator cuff and bicep tendon repair complicated by osteomyelitis, and strong family history of heart disease.  He has experienced dyspnea for the last several weeks which has progressively worsened.  An EKG was performed which showed abnormalities.  A echocardiogram stress test was obtained and was abnormal.  He was referred to a cardiologist and seen by Dr. Snowden who performed coronary angiography which has shown left main and multivessel coronary artery disease.  He has been referred to cardiothoracic surgery services for consideration of coronary artery bypass grafting.  Preoperative testing was obtained and he was deemed an acceptable risk candidate to proceed forward with CABG.    Hospital Course: Mr. Oliveira was admitted on the morning of surgery on June 28, 2024.  Please see a separate op note by Dr. Cancino.  Following surgery, he was transferred to the  intensive care unit stable guarded condition.  He remained hemodynamically stable.  He was extubated overnight demonstrated neurologically intact exam.  He was up to the chair and met criteria to transfer to  for continued recovery on the afternoon of postoperative day 1.  On postop day 2, mediastinal tubes were removed without remark.  He was given diuresis.  On postoperative day 3, pleural tubes were removed without remark.  He reported great improvement in pain control.  The remaining stay of his hospitalization was remarkable for encouraging pulmonary toilet and ambulation and bowel regimen.  He was planned to be discharged home on postoperative day 4 but due to ectopy was kept overnight for close observation.  Beta-blocker was titrated.  He remained in sinus rhythm with frequent PVCs which is baseline for him.  On postop day 5, he is medically cleared to discharge home and is agreeable to do so with his spouse.      Condition on Discharge:  Neurologically intact and has good pain control.  He is eating well and has demonstrable good bowel function.  The sternum is stable without clicks and the saphenectomy incisions are healing nicely.  The heart is in normal sinus rhythm.  He has met all physical therapy criteria and verbalizes understanding of sternotomy precautions.   He verbalizes understanding of a separately handed out cardiac surgery handout.       Discharge Disposition:  Home or Self Care [1]    Discharge Medications:     Discharge Medications        New Medications        Instructions Start Date   clopidogrel 75 MG tablet  Commonly known as: PLAVIX   75 mg, Oral, Daily      Metoprolol Tartrate 37.5 MG tablet   37.5 mg, Oral, Every 12 Hours Scheduled      oxyCODONE-acetaminophen 5-325 MG per tablet  Commonly known as: Percocet   1 tablet, Oral, Every 6 Hours PRN             Changes to Medications        Instructions Start Date   lisinopril 2.5 MG tablet  Commonly known as: PRINIVILZESTRIL  What  changed:   medication strength  how much to take   2.5 mg, Oral, Daily             Continue These Medications        Instructions Start Date   ascorbic acid 1000 MG tablet  Commonly known as: VITAMIN C   1,000 mg, Oral, Daily      aspirin 81 MG EC tablet   81 mg, Oral, Daily      atorvastatin 40 MG tablet  Commonly known as: LIPITOR   40 mg, Oral, Daily      coenzyme Q10 100 MG capsule   100 mg, Oral, Daily      Farxiga 10 MG tablet  Generic drug: dapagliflozin Propanediol   10 mg, Oral, Daily      glucosamine-chondroitin 500-400 MG capsule capsule   1 capsule, Oral, Daily      magnesium oxide 400 MG tablet  Commonly known as: MAG-OX   400 mg, Oral, Daily      metFORMIN 500 MG tablet  Commonly known as: GLUCOPHAGE   500 mg, Oral, 2 Times Daily With Meals      multivitamin with minerals tablet tablet   1 tablet, Oral, Daily      pantoprazole 40 MG EC tablet  Commonly known as: PROTONIX   40 mg, Oral, Daily      PROBIOTIC DAILY PO   1 tablet, Oral, Daily      Vitamin D-3 125 MCG (5000 UT) tablet   125 mcg, Oral, Daily             Stop These Medications      isosorbide mononitrate 30 MG 24 hr tablet  Commonly known as: IMDUR     meloxicam 15 MG tablet  Commonly known as: MOBIC     metoprolol succinate XL 50 MG 24 hr tablet  Commonly known as: TOPROL-XL              Discharge Diet: No concentrated sweets diet with an effort to maintain acceptable glycemic control.      Discharge Care Plan / Instructions: Please see the separately handed out cardiac surgery handout. Cardiac rehab deferred at this time due to sternotomy precautions-will reassess at formal office visit.     Activity at Discharge:   No heavy lifting greater than 5 pounds or a gallon of milk while maintaining sternal precautions.  Max Oliveira has been instructed on an exercise  regimen as detailed in a handed out cardiac surgery handout.    Tobacco:The patient does not use tobacco products and therefore does not need tobacco cessation education.    BMI: BMI  is >= 25 and <30. (Overweight) The following options were offered after discussion;: weight loss educational material (shared in after visit summary).    Follow-up Appointments: Max Oliveira  is requested to see Aayush Ewing MD within 1-2 weeks from time of discharge, to see Nicolasa LEE in 1 week, to follow-up with Dr. Cancino in 4 weeks,  and to follow-up with Dr. Snowden of the cardiology service in 6 weeks.

## 2024-07-01 NOTE — THERAPY TREATMENT NOTE
Acute Care - Physical Therapy Treatment Note  [facility]     Patient Name: Max Oliveira  : 1960  MRN: [id number]  Today's Date: 2024      Visit Dx:     ICD-10-CM ICD-9-CM   1. Impaired mobility [Z74.09]  Z74.09 799.89   2. Coronary artery disease involving native coronary artery of native heart, unspecified whether angina present  I25.10 414.01     Patient Active Problem List   Diagnosis    History of adenomatous polyp of colon    Nontraumatic complete tear of left rotator cuff    Surgical wound infection    Abnormal stress test    Type 2 diabetes mellitus without complication, without long-term current use of insulin    Primary hypertension    Mixed hyperlipidemia    Coronary artery disease involving native coronary artery of native heart    CAD (coronary artery disease)    Former smoker     Past Medical History:   Diagnosis Date    Arthritis     Cervical radiculopathy     Coronary artery disease     Diabetes     Epidermal cyst     GERD (gastroesophageal reflux disease)     History of adenomatous polyp of colon     Hyperlipidemia     Hypertension     PONV (postoperative nausea and vomiting)     Vitamin D deficiency      Past Surgical History:   Procedure Laterality Date    BICEPS TENDONESIS SUBPECTORALIS REPAIR Left 2021    Procedure: BICEPS TENODESIS / TENOTOMY;  Surgeon: Nathen Farrell MD;  Location: [facility] OR;  Service: Orthopedics;  Laterality: Left;    CARDIAC CATHETERIZATION      no stents     CARDIAC CATHETERIZATION N/A 2024    Procedure: Left Heart Cath;  Surgeon: Scott Snowden DO;  Location: [facility] CATH INVASIVE LOCATION;  Service: Cardiovascular;  Laterality: N/A;    COLONOSCOPY  2009    2 polyps, adenomatous    COLONOSCOPY N/A 2020    Procedure: COLONOSCOPY WITH ANESTHESIA;  Surgeon: Mundo Rodrigues MD;  Location: [facility] ENDOSCOPY;  Service: Gastroenterology;  Laterality: N/A;  preop; hx of polyps  postop; poylps   PCP Aayush VILLEGAS  ARTERY BYPASS GRAFT N/A 6/28/2024    Procedure: CORONARY ARTERY BYPASS GRAFTING x4, BILATERAL INTERNAL MAMMARY ARTERY GRAFTS, RIGHT LEG ENDOSCOPIC VEIN HARVEST, TRANSESOPHAGEAL ECHOCARDIOGRAM, XP STERNAL PLATING;  Surgeon: Tim Cancino MD;  Location:  [facility] OR;  Service: Cardiothoracic;  Laterality: N/A;    EYE SURGERY      trk    HARDWARE REMOVAL Left 01/18/2022    Procedure: HARDWARE REMOVAL;  Surgeon: Nathen Farrell MD;  Location:  [facility] OR;  Service: Orthopedics;  Laterality: Left;    INCISION AND DRAINAGE SHOULDER Left 11/16/2021    Procedure: INCISION AND DRAINAGE LEFT SHOULDER SURGICAL WOUND;  Surgeon: Nathen Farrell MD;  Location:  [facility] OR;  Service: Orthopedics;  Laterality: Left;    INCISION AND DRAINAGE SHOULDER Left 01/18/2022    Procedure: LEFT SHOULDER SURGICAL WOUND INCISION AND DRAINAGE, HARDWARE REMOVAL;  Surgeon: Nathen Farrell MD;  Location:  [facility] OR;  Service: Orthopedics;  Laterality: Left;    KNEE CARTILAGE SURGERY Left     MANDIBLE SURGERY      x 2    RESECTION DISTAL CLAVICLE Left 06/29/2021    Procedure: DISTAL CLAVICLE EXCISION;  Surgeon: Nathen Farrell MD;  Location:  [facility] OR;  Service: Orthopedics;  Laterality: Left;    SHOULDER ARTHROSCOPY W/ ROTATOR CUFF REPAIR Left 06/29/2021    Procedure: LEFT SHOULDER  ROTATOR CUFF REPAIR, SUBACROMIAL DECOMPRESSION, DISTAL CLAVICLE EXCISION, BICEPS TENODESIS / TENOTOMY;  Surgeon: Nathen Farrell MD;  Location:  [facility] OR;  Service: Orthopedics;  Laterality: Left;    STERNAL PLATING N/A 6/28/2024    Procedure: STERNAL PLATING;  Surgeon: Tim Cancino MD;  Location:  [facility] OR;  Service: Cardiothoracic;  Laterality: N/A;     PT Assessment (Last 12 Hours)       PT Evaluation and Treatment       Row Name 07/01/24 1030 07/01/24 1019       Physical Therapy Time and Intention    Subjective Information complains of;pain  -MF --    Document Type therapy note (daily note)  -MF therapy note (daily note)  -MF     Mode of Treatment physical therapy  - --    Session Not Performed -- other (see comments)  -    Comment, Session Not Performed -- pt with RT.  -    Comment reviewed sternal precautions and progression of care with pt and spouse  - --      Row Name 07/01/24 1030          General Information    Existing Precautions/Restrictions fall;sternal  -       Row Name 07/01/24 1030          Pain    Pretreatment Pain Rating 4/10  -     Posttreatment Pain Rating 4/10  -     Pain Location incisional  -     Pain Location - chest  -     Pain Intervention(s) Repositioned;Ambulation/increased activity  -       Row Name 07/01/24 1030          Bed Mobility    Comment, (Bed Mobility) verbally discussed bed mobility  -       Row Name 07/01/24 1030          Sit-Stand Transfer    Sit-Stand [place] (Transfers) supervision  -       Row Name 07/01/24 1030          Stand-Sit Transfer    Stand-Sit [place] (Transfers) supervision  -       Row Name 07/01/24 1030          Gait/Stairs (Locomotion)    [place] Level (Gait) supervision  -     Distance in Feet (Gait) 525  -     Deviations/Abnormal Patterns (Gait) grant decreased  -     Bilateral Gait Deviations forward flexed posture  -     [place] Level (Stairs) stand by assist  -     Handrail Location (Stairs) right side (ascending)  -     Number of Steps (Stairs) 3  -     Ascending Technique (Stairs) step-to-step  -     Descending Technique (Stairs) step-to-step  -       Row Name 07/01/24 1030          Motor Skills    Comments, Therapeutic Exercise cardiac warm ups  -       Row Name             Wound 06/28/24 0833 Right lower leg Incision    Wound - Properties Group Placement Date: 06/28/24  -SF Placement Time: 0833 -SF Side: Right  -SF Orientation: lower  -SF Location: leg  -SF Primary Wound Type: Incision  -SF    Retired Wound - Properties Group Placement Date: 06/28/24  -SF Placement Time: 0833 -SF Side: Right  -SF Orientation:  lower  -SF Location: leg  -SF Primary Wound Type: Incision  -SF    Retired Wound - Properties Group Date first assessed: 06/28/24  -SF Time first assessed: 0833  -SF Side: Right  -SF Location: leg  -SF Primary Wound Type: Incision  -SF      Row Name             Wound 06/28/24 0910 midline sternal Incision    Wound - Properties Group Placement Date: 06/28/24  -SF Placement Time: 0910  -SF Present on Original Admission: N  -SF Orientation: midline  -SF Location: sternal  -SF Primary Wound Type: Incision  -SF    Retired Wound - Properties Group Placement Date: 06/28/24  -SF Placement Time: 0910  -SF Present on Original Admission: N  -SF Orientation: midline  -SF Location: sternal  -SF Primary Wound Type: Incision  -SF    Retired Wound - Properties Group Date first assessed: 06/28/24  -SF Time first assessed: 0910  -SF Present on Original Admission: N  -SF Location: sternal  -SF Primary Wound Type: Incision  -SF      Row Name 07/01/24 1030          Plan of Care Review    Plan of Care Reviewed With patient  -MF     Progress improving  -     Outcome Evaluation Pt. stands with SUpervision and walks 525' with Supervision. Pt. went up and down 3 steps without issue. Reviewed sternal precautions and progression of activity with pt and spouse. He will be safe to return home at discharge.  -       Row Name 07/01/24 1030          Vital Signs    Pre SpO2 (%) 93  -MF     O2 Delivery Pre Treatment room air  -MF     Intra SpO2 (%) 93  -MF     O2 Delivery Intra Treatment room air  -MF     Post SpO2 (%) 94  -MF     O2 Delivery Post Treatment room air  -     Pre Patient Position Sitting  -     Intra Patient Position Standing  -MF     Post Patient Position Sitting  -       Row Name 07/01/24 1030          Positioning and Restraints    Pre-Treatment Position sitting in chair/recliner  -     Post Treatment Position chair  -MF     In Chair reclined;call light within reach;encouraged to call for assist;with family/caregiver  -                User Key  (r) = Recorded By, (t) = Taken By, (c) = Cosigned By      Initials Name Provider Type    Josselyn Kaba PTA Physical Therapist Assistant    Diego Parks, RN Registered Nurse                    Physical Therapy Education       Title: PT OT SLP Therapies (Done)       Topic: Physical Therapy (Done)       Point: Mobility training (Done)       Learning Progress Summary             Patient Acceptance, E, VU by  at 6/29/2024 1000    Comment: fall risk, d/c planning, sternal precautions                         Point: Home exercise program (Done)       Learning Progress Summary             Patient Acceptance, E, VU by  at 6/29/2024 1000    Comment: fall risk, d/c planning, sternal precautions                         Point: Body mechanics (Done)       Learning Progress Summary             Patient Acceptance, E, VU by  at 6/29/2024 1000    Comment: fall risk, d/c planning, sternal precautions                         Point: Precautions (Done)       Learning Progress Summary             Patient Acceptance, E, VU by  at 6/29/2024 1000    Comment: fall risk, d/c planning, sternal precautions                                         User Key       Initials Effective Dates Name Provider Type Discipline     05/07/24 -  Sixto Abreu, PT Physical Therapist PT                  PT Recommendation and Plan     Plan of Care Reviewed With: patient  Progress: improving  Outcome Evaluation: Pt. stands with SUpervision and walks 525' with Supervision. Pt. went up and down 3 steps without issue. Reviewed sternal precautions and progression of activity with pt and spouse. He will be safe to return home at discharge.   Outcome Measures       Row Name 07/01/24 1030 06/30/24 1039          How much help from another person do you currently need...    Turning from your back to your side while in flat bed without using bedrails? 3  -MF 3  -MF     Moving from lying on back to sitting on the side of a flat  bed without bedrails? 3  -MF 3  -MF     Moving to and from a bed to a chair (including a wheelchair)? 4  -MF 3  -MF     Standing up from a chair using your arms (e.g., wheelchair, bedside chair)? 4  -MF 3  -MF     Climbing 3-5 steps with a railing? 3  -MF 3  -MF     To walk in hospital room? 4  -MF 3  -MF     AM-PAC 6 Clicks Score (PT) 21  -MF 18  -MF     Highest Level of Mobility Goal 6 --> Walk 10 steps or more  -MF 6 --> Walk 10 steps or more  -MF        Functional Assessment    Outcome Measure Options AM-PAC 6 Clicks Basic Mobility (PT)  -MF AM-PAC 6 Clicks Basic Mobility (PT)  -MF               User Key  (r) = Recorded By, (t) = Taken By, (c) = Cosigned By      Initials Name Provider Type    Josselyn Kaba PTA Physical Therapist Assistant                     Time Calculation:    PT Charges       Row Name 07/01/24 1633             Time Calculation    Start Time 1030  -MF      Stop Time 1058  -MF      Time Calculation (min) 28 min  -MF      PT Received On 07/01/24  -MF         Time Calculation- PT    Total Timed Code Minutes- PT 28 minute(s)  -MF         Timed Charges    46326 - Gait Training Minutes  28  -MF         Total Minutes    Timed Charges Total Minutes 28  -MF       Total Minutes 28  -MF                User Key  (r) = Recorded By, (t) = Taken By, (c) = Cosigned By      Initials Name Provider Type    Josselyn Kbaa PTA Physical Therapist Assistant                  Therapy Charges for Today       Code Description Service Date Service Provider Modifiers Qty    35433184206 HC GAIT TRAINING EA 15 MIN 6/30/2024 Josselyn Sandhu, PRATIK GP 2    77573044968 HC GAIT TRAINING EA 15 MIN 6/30/2024 Josselyn Sandhu, PRATIK GP 2    50619765289 HC GAIT TRAINING EA 15 MIN 7/1/2024 Josselyn Sandhu, PRATIK GP 2            PT G-Codes  Outcome Measure Options: AM-PAC 6 Clicks Basic Mobility (PT)  AM-PAC 6 Clicks Score (PT): 21    Josselyn Sandhu PTA  7/1/2024

## 2024-07-01 NOTE — PLAN OF CARE
Goal Outcome Evaluation:  Plan of Care Reviewed With: patient        Progress: improving  Outcome Evaluation: VSS. Remains on room air with sats WNL. PRN roby given x 1 today for pain control with relief noted. Large BM early this afternoon. MIGULE's pulled today. Walking down hallway with standby assistance of wife. Plans to discharge home tomorrow. NSR 87-96.

## 2024-07-02 LAB
ANION GAP SERPL CALCULATED.3IONS-SCNC: 10 MMOL/L (ref 5–15)
BUN SERPL-MCNC: 15 MG/DL (ref 8–23)
BUN/CREAT SERPL: 20.3 (ref 7–25)
CALCIUM SPEC-SCNC: 9.2 MG/DL (ref 8.6–10.5)
CHLORIDE SERPL-SCNC: 97 MMOL/L (ref 98–107)
CO2 SERPL-SCNC: 29 MMOL/L (ref 22–29)
CREAT SERPL-MCNC: 0.74 MG/DL (ref 0.76–1.27)
DEPRECATED RDW RBC AUTO: 42 FL (ref 37–54)
EGFRCR SERPLBLD CKD-EPI 2021: 102.4 ML/MIN/1.73
ERYTHROCYTE [DISTWIDTH] IN BLOOD BY AUTOMATED COUNT: 11.9 % (ref 12.3–15.4)
GLUCOSE BLDC GLUCOMTR-MCNC: 116 MG/DL (ref 70–130)
GLUCOSE BLDC GLUCOMTR-MCNC: 161 MG/DL (ref 70–130)
GLUCOSE BLDC GLUCOMTR-MCNC: 172 MG/DL (ref 70–130)
GLUCOSE BLDC GLUCOMTR-MCNC: 202 MG/DL (ref 70–130)
GLUCOSE SERPL-MCNC: 140 MG/DL (ref 65–99)
HCT VFR BLD AUTO: 38.2 % (ref 37.5–51)
HGB BLD-MCNC: 13.2 G/DL (ref 13–17.7)
MCH RBC QN AUTO: 32.8 PG (ref 26.6–33)
MCHC RBC AUTO-ENTMCNC: 34.6 G/DL (ref 31.5–35.7)
MCV RBC AUTO: 94.8 FL (ref 79–97)
PLATELET # BLD AUTO: 232 10*3/MM3 (ref 140–450)
PMV BLD AUTO: 9.5 FL (ref 6–12)
POTASSIUM SERPL-SCNC: 4.1 MMOL/L (ref 3.5–5.2)
RBC # BLD AUTO: 4.03 10*6/MM3 (ref 4.14–5.8)
SODIUM SERPL-SCNC: 136 MMOL/L (ref 136–145)
WBC NRBC COR # BLD AUTO: 9.06 10*3/MM3 (ref 3.4–10.8)

## 2024-07-02 PROCEDURE — 25010000002 ENOXAPARIN PER 10 MG: Performed by: THORACIC SURGERY (CARDIOTHORACIC VASCULAR SURGERY)

## 2024-07-02 PROCEDURE — 25010000002 FUROSEMIDE PER 20 MG: Performed by: SURGERY

## 2024-07-02 PROCEDURE — 94799 UNLISTED PULMONARY SVC/PX: CPT

## 2024-07-02 PROCEDURE — 63710000001 INSULIN LISPRO (HUMAN) PER 5 UNITS: Performed by: SURGERY

## 2024-07-02 PROCEDURE — 82948 REAGENT STRIP/BLOOD GLUCOSE: CPT

## 2024-07-02 PROCEDURE — 97116 GAIT TRAINING THERAPY: CPT

## 2024-07-02 PROCEDURE — 80048 BASIC METABOLIC PNL TOTAL CA: CPT | Performed by: NURSE PRACTITIONER

## 2024-07-02 PROCEDURE — 85027 COMPLETE CBC AUTOMATED: CPT | Performed by: NURSE PRACTITIONER

## 2024-07-02 RX ORDER — OXYCODONE HYDROCHLORIDE 10 MG/1
10 TABLET ORAL EVERY 6 HOURS PRN
Status: DISCONTINUED | OUTPATIENT
Start: 2024-07-02 | End: 2024-07-03 | Stop reason: HOSPADM

## 2024-07-02 RX ORDER — OXYCODONE HYDROCHLORIDE 5 MG/1
5 TABLET ORAL EVERY 4 HOURS PRN
Status: DISCONTINUED | OUTPATIENT
Start: 2024-07-02 | End: 2024-07-03 | Stop reason: HOSPADM

## 2024-07-02 RX ADMIN — INSULIN LISPRO 4 UNITS: 100 INJECTION, SOLUTION INTRAVENOUS; SUBCUTANEOUS at 21:37

## 2024-07-02 RX ADMIN — ACETAMINOPHEN 1000 MG: 500 TABLET, FILM COATED ORAL at 08:03

## 2024-07-02 RX ADMIN — AMIODARONE HYDROCHLORIDE 400 MG: 200 TABLET ORAL at 08:04

## 2024-07-02 RX ADMIN — POTASSIUM CHLORIDE 20 MEQ: 750 CAPSULE, EXTENDED RELEASE ORAL at 18:07

## 2024-07-02 RX ADMIN — PREGABALIN 25 MG: 25 CAPSULE ORAL at 23:24

## 2024-07-02 RX ADMIN — PANTOPRAZOLE SODIUM 40 MG: 40 TABLET, DELAYED RELEASE ORAL at 05:32

## 2024-07-02 RX ADMIN — METHOCARBAMOL 500 MG: 500 TABLET, FILM COATED ORAL at 13:52

## 2024-07-02 RX ADMIN — METOPROLOL TARTRATE 25 MG: 50 TABLET, FILM COATED ORAL at 08:04

## 2024-07-02 RX ADMIN — INSULIN LISPRO 2 UNITS: 100 INJECTION, SOLUTION INTRAVENOUS; SUBCUTANEOUS at 11:34

## 2024-07-02 RX ADMIN — FUROSEMIDE 20 MG: 10 INJECTION, SOLUTION INTRAMUSCULAR; INTRAVENOUS at 18:07

## 2024-07-02 RX ADMIN — PREGABALIN 25 MG: 25 CAPSULE ORAL at 08:03

## 2024-07-02 RX ADMIN — ACETAMINOPHEN 1000 MG: 500 TABLET, FILM COATED ORAL at 02:59

## 2024-07-02 RX ADMIN — ASPIRIN 81 MG: 81 TABLET, COATED ORAL at 08:04

## 2024-07-02 RX ADMIN — ACETAMINOPHEN 1000 MG: 500 TABLET, FILM COATED ORAL at 21:36

## 2024-07-02 RX ADMIN — ENOXAPARIN SODIUM 40 MG: 100 INJECTION SUBCUTANEOUS at 08:04

## 2024-07-02 RX ADMIN — LIDOCAINE 2 PATCH: 4 PATCH TOPICAL at 08:04

## 2024-07-02 RX ADMIN — INSULIN LISPRO 2 UNITS: 100 INJECTION, SOLUTION INTRAVENOUS; SUBCUTANEOUS at 17:20

## 2024-07-02 RX ADMIN — METHOCARBAMOL 500 MG: 500 TABLET, FILM COATED ORAL at 05:32

## 2024-07-02 RX ADMIN — ACETAMINOPHEN 1000 MG: 500 TABLET, FILM COATED ORAL at 13:52

## 2024-07-02 RX ADMIN — POLYETHYLENE GLYCOL 3350 17 G: 17 POWDER, FOR SOLUTION ORAL at 08:04

## 2024-07-02 RX ADMIN — CHLORHEXIDINE GLUCONATE 0.12% ORAL RINSE 15 ML: 1.2 LIQUID ORAL at 05:32

## 2024-07-02 RX ADMIN — BISACODYL 10 MG: 5 TABLET, COATED ORAL at 08:04

## 2024-07-02 RX ADMIN — POTASSIUM CHLORIDE 20 MEQ: 750 CAPSULE, EXTENDED RELEASE ORAL at 08:03

## 2024-07-02 RX ADMIN — METHOCARBAMOL 500 MG: 500 TABLET, FILM COATED ORAL at 21:37

## 2024-07-02 RX ADMIN — ATORVASTATIN CALCIUM 20 MG: 10 TABLET, FILM COATED ORAL at 21:37

## 2024-07-02 RX ADMIN — FUROSEMIDE 20 MG: 10 INJECTION, SOLUTION INTRAMUSCULAR; INTRAVENOUS at 08:02

## 2024-07-02 RX ADMIN — METOPROLOL TARTRATE 12.5 MG: 25 TABLET, FILM COATED ORAL at 18:08

## 2024-07-02 RX ADMIN — CLOPIDOGREL BISULFATE 75 MG: 75 TABLET, FILM COATED ORAL at 17:20

## 2024-07-02 RX ADMIN — BISACODYL 10 MG: 5 TABLET, COATED ORAL at 21:37

## 2024-07-02 RX ADMIN — IPRATROPIUM BROMIDE AND ALBUTEROL SULFATE 1.5 ML: 2.5; .5 SOLUTION RESPIRATORY (INHALATION) at 06:07

## 2024-07-02 RX ADMIN — METOPROLOL TARTRATE 37.5 MG: 25 TABLET, FILM COATED ORAL at 21:35

## 2024-07-02 NOTE — DISCHARGE INSTRUCTIONS
SOMEONE FROM THE [facility] WILL BE CONTACTING YOU AFTER DISCHARGE TO CHECK ON YOUR RECOVERY. PLEASE ANSWER ALL CALLS FROM A [facility] NUMBER.

## 2024-07-02 NOTE — PROGRESS NOTES
"Patient name: Max Oliveira  Patient : 1960  VISIT # [id number]  MR #[id number]    Procedure:Procedure(s):  CORONARY ARTERY BYPASS GRAFTING x4, BILATERAL INTERNAL MAMMARY ARTERY GRAFTS, RIGHT LEG ENDOSCOPIC VEIN HARVEST, TRANSESOPHAGEAL ECHOCARDIOGRAM, XP STERNAL PLATING  STERNAL PLATING  Procedure Date:2024  POD:4 Days Post-Op    Subjective   No chief complaint on file.    Sitting up in the chair. Tolerating room air. Pain is well controlled. Wife is at bedside.  Weight is down 4 pounds over the last 24 hours and he is 3 pounds below his baseline weight.  Walked 525 feet with 1 standing rest with physical therapy yesterday. (+) BM.    Telemetry: Sinus 60-92 bpm     Objective     Visit Vitals  /56 (BP Location: Left arm, Patient Position: Lying)   Pulse 62   Temp 98.3 °F (36.8 °C) (Oral)   Resp 16   Ht 180.5 cm (71.06\")   Wt 94.3 kg (208 lb)   SpO2 93%   BMI 28.96 kg/m²   Baseline weight: 211 pounds    Intake/Output Summary (Last 24 hours) at 2024 0948  Last data filed at 2024 0853  Gross per 24 hour   Intake 1540 ml   Output 2450 ml   Net -910 ml     Lab:     CBC:  Results from last 7 days   Lab Units 24  0805 24  0546 24  0222   WBC 10*3/mm3 9.06 11.35* 11.24*   HEMATOCRIT % 38.2 36.7* 36.5*   PLATELETS 10*3/mm3 232 173 144          BMP:  Results from last 7 days   Lab Units 24  0805 24  0546 24  0222   SODIUM mmol/L 136 136 137   POTASSIUM mmol/L 4.1 4.3 4.3   CHLORIDE mmol/L 97* 97* 101   CO2 mmol/L 29.0 27.0 26.0   GLUCOSE mg/dL 140* 122* 135*   BUN mg/dL 15 14 14   CREATININE mg/dL 0.74* 0.69* 0.73*          COAG:  Results from last 7 days   Lab Units 24  0301 24  1416   INR  1.15* 1.14*   APTT seconds  --  66.0*       IMAGES:       Imaging Results (Last 24 Hours)       ** No results found for the last 24 hours. **          Physical Exam:  General: Alert, oriented. No apparent distress.   Cardiovascular: Regular rate and rhythm without " murmur, rubs, or gallops.    Pulmonary: Clear to auscultation bilaterally without wheezing, rubs, or rales.  Chest: Sternotomy incision clean, dry, and intact. Sternum stable. No clicks.   Abdomen: Soft, non distended, and non tender.  Extremities: Warm, moves all extremities. No edema. Saphenectomy site clean, dry, and intact.   Neurologic:  Grossly intact with no focal deficits.       Impression:  Coronary artery disease involving native coronary artery of native heart with unstable angina pectoris   Type 2 diabetes mellitus  Hypertension  Hyperlipidemia    Plan:  Continue diuresis and potassium replacement  Routine postcardiac surgery care  Encourage pulmonary toilet and ambulation  Discharge home later today  Follow-up with JESUS Varma 1 week after discharge  Follow-up with Dr. Cancino 4 to 6 weeks after discharge  Discussed with patient, wife and nursing    JESUS Gregorio  07/02/24  09:48 CDT

## 2024-07-02 NOTE — PROGRESS NOTES
Inpatient Nutrition Services  Patient Name:  Max Oliveira  YOB: 1960  MRN: [id number]  Admit Date:  6/28/2024  Assessment Date:  7/2/2024     Reason for Assessment       Row Name 07/02/24 1543          Reason for Assessment    Reason For Assessment follow-up protocol     Diagnosis cardiac disease;diabetes diagnosis/complications                    Nutrition/Diet History       Row Name 07/02/24 1543          Nutrition/Diet History    Typical Intake (Food/Fluid/EN/PN) Pt eating well and enjoying food while inpatient. Spouse asked about consuming beer post-op. Advised to wait 1 m. post op. Pt and pt spouse provided heart healthy nutrition education. Time left for questions. RD contact information provided. Will continue to follow while inpatient.     Food Intolerance(s) NFKA                    Labs/Tests/Procedures/Meds       Row Name 07/02/24 1546          Labs/Procedures/Meds    Lab Results Reviewed reviewed        Diagnostic Tests/Procedures    Diagnostic Test/Procedure Reviewed reviewed        Medications    Pertinent Medications Reviewed reviewed     Pertinent Medications Comments See MAR                    Physical Findings       Row Name 07/02/24 1549          Physical Findings    Overall Physical Appearance Room air, BM 7/1, Suleman Score 20, surgical incisions                    Estimated/Assessed Needs - Anthropometrics       Row Name 07/02/24 1550          Anthropometrics    Weight for Calculation 94.8 kg (208 lb 15.9 oz)        Estimated/Assessed Needs    Additional Documentation Fluid Requirements (Group);KCAL/KG (Group);Protein Requirements (Group)        KCAL/KG    KCAL/KG 20 Kcal/Kg (kcal);25 Kcal/Kg (kcal)     20 Kcal/Kg (kcal) 1896     25 Kcal/Kg (kcal) 2370        Protein Requirements    Weight Used For Protein Calculations 78 kg (172 lb)  IBW     Est Protein Requirement Amount (gms/kg) 1.2 gm protein     Estimated Protein Requirements (gms/day) 93.62        Fluid Requirements    Fluid  Requirements (mL/day) 1896     RDA Method (mL) 1896                    Nutrition Prescription Ordered       Row Name 07/02/24 1550          Nutrition Prescription PO    Current PO Diet Regular     Common Modifiers Cardiac                    Evaluation of Received Nutrient/Fluid Intake       Row Name 07/02/24 1551          Nutrient/Fluid Evaluation    Number of Days Evaluated 3 days        Fluid Intake Evaluation    Oral Fluid (mL) 1020        PO Evaluation    Number of Days PO Intake Evaluated 3 days     Number of Meals 6     % PO Intake 88                       Problem/Interventions:   Problem 1       Row Name 07/02/24 1551          Nutrition Diagnoses Problem 1    Problem 1 Knowledge Deficit     Etiology (related to) Medical Diagnosis;MNT for Treatment/Condition     Cardiac CAD;CABG     Other Comment 7/2 answered questions and provided post-op and heart healthy nutrition counseling/edu     Resolved? Yes                          Intervention Goal       Row Name 07/02/24 1551          Intervention Goal    General Disease management/therapy;Meet nutritional needs for age/condition;Reduce/improve symptoms     PO Meet estimated needs     Weight No significant weight loss                    Nutrition Intervention       Row Name 07/02/24 1552          Nutrition Intervention    RD/Tech Action Follow Tx progress;Care plan reviewd                    Nutrition Prescription       Row Name 07/02/24 1552          Other Orders    Other continue same protocol                    Education/Evaluation       Row Name 07/02/24 1552          Education    Education Provided education regarding;Education topics     Provided education regarding Nutrition related factor;Diet rationale     Education Topics Cardiac heart health        Monitor/Evaluation    Monitor Per protocol     Education Follow-up Reinforce PRN                     Electronically signed by:  Shante Kirkpatrick RDN, AAMIR  07/02/24 15:52 CDT

## 2024-07-02 NOTE — PLAN OF CARE
Problem: Adult Inpatient Plan of Care  Goal: Plan of Care Review  Outcome: Ongoing, Progressing  Flowsheets (Taken 7/2/2024 0306)  Outcome Evaluation: VSS. On room air. Moderate c/o incisional pain lastnight, prn pain meds given with relief. Down over 4 lbs this morning from yesterday's weight. NSR on tele 60-92 with PVCs. Safety maintained. Plans to discharge today.

## 2024-07-02 NOTE — THERAPY TREATMENT NOTE
Acute Care - Physical Therapy Treatment Note  [facility]     Patient Name: Max Oliveira  : 1960  MRN: [id number]  Today's Date: 2024      Visit Dx:     ICD-10-CM ICD-9-CM   1. Impaired mobility [Z74.09]  Z74.09 799.89   2. Coronary artery disease involving native coronary artery of native heart, unspecified whether angina present  I25.10 414.01     Patient Active Problem List   Diagnosis    History of adenomatous polyp of colon    Nontraumatic complete tear of left rotator cuff    Surgical wound infection    Abnormal stress test    Type 2 diabetes mellitus without complication, without long-term current use of insulin    Primary hypertension    Mixed hyperlipidemia    Coronary artery disease involving native coronary artery of native heart    CAD (coronary artery disease)    Former smoker     Past Medical History:   Diagnosis Date    Arthritis     Cervical radiculopathy     Coronary artery disease     Diabetes     Epidermal cyst     GERD (gastroesophageal reflux disease)     History of adenomatous polyp of colon     Hyperlipidemia     Hypertension     PONV (postoperative nausea and vomiting)     Vitamin D deficiency      Past Surgical History:   Procedure Laterality Date    BICEPS TENDONESIS SUBPECTORALIS REPAIR Left 2021    Procedure: BICEPS TENODESIS / TENOTOMY;  Surgeon: Nathen Farrell MD;  Location: [facility] OR;  Service: Orthopedics;  Laterality: Left;    CARDIAC CATHETERIZATION      no stents     CARDIAC CATHETERIZATION N/A 2024    Procedure: Left Heart Cath;  Surgeon: Scott Snowden DO;  Location: [facility] CATH INVASIVE LOCATION;  Service: Cardiovascular;  Laterality: N/A;    COLONOSCOPY  2009    2 polyps, adenomatous    COLONOSCOPY N/A 2020    Procedure: COLONOSCOPY WITH ANESTHESIA;  Surgeon: Mundo Rodrigues MD;  Location: [facility] ENDOSCOPY;  Service: Gastroenterology;  Laterality: N/A;  preop; hx of polyps  postop; poylps   PCP Aayush VILLEGAS  ARTERY BYPASS GRAFT N/A 6/28/2024    Procedure: CORONARY ARTERY BYPASS GRAFTING x4, BILATERAL INTERNAL MAMMARY ARTERY GRAFTS, RIGHT LEG ENDOSCOPIC VEIN HARVEST, TRANSESOPHAGEAL ECHOCARDIOGRAM, XP STERNAL PLATING;  Surgeon: Tim Cancino MD;  Location:  [facility] OR;  Service: Cardiothoracic;  Laterality: N/A;    EYE SURGERY      trk    HARDWARE REMOVAL Left 01/18/2022    Procedure: HARDWARE REMOVAL;  Surgeon: Nathen Farrell MD;  Location:  [facility] OR;  Service: Orthopedics;  Laterality: Left;    INCISION AND DRAINAGE SHOULDER Left 11/16/2021    Procedure: INCISION AND DRAINAGE LEFT SHOULDER SURGICAL WOUND;  Surgeon: Nathen Farrell MD;  Location:  [facility] OR;  Service: Orthopedics;  Laterality: Left;    INCISION AND DRAINAGE SHOULDER Left 01/18/2022    Procedure: LEFT SHOULDER SURGICAL WOUND INCISION AND DRAINAGE, HARDWARE REMOVAL;  Surgeon: Nathen Farrell MD;  Location:  [facility] OR;  Service: Orthopedics;  Laterality: Left;    KNEE CARTILAGE SURGERY Left     MANDIBLE SURGERY      x 2    RESECTION DISTAL CLAVICLE Left 06/29/2021    Procedure: DISTAL CLAVICLE EXCISION;  Surgeon: Nathen Farrell MD;  Location:  [facility] OR;  Service: Orthopedics;  Laterality: Left;    SHOULDER ARTHROSCOPY W/ ROTATOR CUFF REPAIR Left 06/29/2021    Procedure: LEFT SHOULDER  ROTATOR CUFF REPAIR, SUBACROMIAL DECOMPRESSION, DISTAL CLAVICLE EXCISION, BICEPS TENODESIS / TENOTOMY;  Surgeon: Nathen Farrell MD;  Location:  [facility] OR;  Service: Orthopedics;  Laterality: Left;    STERNAL PLATING N/A 6/28/2024    Procedure: STERNAL PLATING;  Surgeon: Tim Cancino MD;  Location:  [facility] OR;  Service: Cardiothoracic;  Laterality: N/A;     PT Assessment (Last 12 Hours)       PT Evaluation and Treatment       Row Name 07/02/24 0853          Physical Therapy Time and Intention    Subjective Information no complaints  -MARINO     Document Type therapy note (daily note)  -MARINO     Mode of Treatment physical therapy  -MARINO        Row Name 07/02/24 0853          General Information    Existing Precautions/Restrictions sternal  -MARINO       Row Name 07/02/24 0853          Pain    Pretreatment Pain Rating 2/10  -MARINO     Posttreatment Pain Rating 2/10  -MARINO     Pain Location - chest  -MARINO     Pain Intervention(s) Ambulation/increased activity  -MARINO       Row Name 07/02/24 0853          Sit-Stand Transfer    Sit-Stand [place] (Transfers) independent  -MARINO       Row Name 07/02/24 0853          Stand-Sit Transfer    Stand-Sit [place] (Transfers) independent  -MARINO       Row Name 07/02/24 0853          Gait/Stairs (Locomotion)    [place] Level (Gait) independent  -MARINO     Distance in Feet (Gait) 525  -MARINO       Row Name             Wound 06/28/24 0833 Right lower leg Incision    Wound - Properties Group Placement Date: 06/28/24  -SF Placement Time: 0833  -SF Side: Right  -SF Orientation: lower  -SF Location: leg  -SF Primary Wound Type: Incision  -SF    Retired Wound - Properties Group Placement Date: 06/28/24  -SF Placement Time: 0833  -SF Side: Right  -SF Orientation: lower  -SF Location: leg  -SF Primary Wound Type: Incision  -SF    Retired Wound - Properties Group Date first assessed: 06/28/24  -SF Time first assessed: 0833  -SF Side: Right  -SF Location: leg  -SF Primary Wound Type: Incision  -SF      Row Name             Wound 06/28/24 0910 midline sternal Incision    Wound - Properties Group Placement Date: 06/28/24  -SF Placement Time: 0910  -SF Present on Original Admission: N  -SF Orientation: midline  -SF Location: sternal  -SF Primary Wound Type: Incision  -SF    Retired Wound - Properties Group Placement Date: 06/28/24  -SF Placement Time: 0910  -SF Present on Original Admission: N  -SF Orientation: midline  -SF Location: sternal  -SF Primary Wound Type: Incision  -SF    Retired Wound - Properties Group Date first assessed: 06/28/24  -SF Time first assessed: 0910  -SF Present on Original Admission: N  -SF Location: sternal  -SF  Primary Wound Type: Incision  -SF      Row Name 07/02/24 0853          Positioning and Restraints    Pre-Treatment Position sitting in chair/recliner  -MARINO     Post Treatment Position chair  -MARINO     In Chair reclined;call light within reach;encouraged to call for assist;with family/caregiver  -MARINO               User Key  (r) = Recorded By, (t) = Taken By, (c) = Cosigned By      Initials Name Provider Type    MARINO Jorge Villaseñor PTA Physical Therapist Assistant    SF Diego Hedrick, RN Registered Nurse                    Physical Therapy Education       Title: PT OT SLP Therapies (Done)       Topic: Physical Therapy (Done)       Point: Mobility training (Done)       Learning Progress Summary             Patient Acceptance, E, VU by  at 6/29/2024 1000    Comment: fall risk, d/c planning, sternal precautions                         Point: Home exercise program (Done)       Learning Progress Summary             Patient Acceptance, E, VU by  at 6/29/2024 1000    Comment: fall risk, d/c planning, sternal precautions                         Point: Body mechanics (Done)       Learning Progress Summary             Patient Acceptance, E, VU by  at 6/29/2024 1000    Comment: fall risk, d/c planning, sternal precautions                         Point: Precautions (Done)       Learning Progress Summary             Patient Acceptance, E, VU by  at 6/29/2024 1000    Comment: fall risk, d/c planning, sternal precautions                                         User Key       Initials Effective Dates Name Provider Type [facility] 05/07/24 -  Sixto Abreu, PT Physical Therapist PT                  PT Recommendation and Plan         Outcome Measures       Row Name 07/02/24 0853 07/01/24 1030 06/30/24 1039       How much help from another person do you currently need...    Turning from your back to your side while in flat bed without using bedrails? 3  -MARINO 3  -MF 3  -MF    Moving from lying on back to sitting on the side  of a flat bed without bedrails? 3  -MARINO 3  -MF 3  -MF    Moving to and from a bed to a chair (including a wheelchair)? 4  -MARINO 4  -MF 3  -MF    Standing up from a chair using your arms (e.g., wheelchair, bedside chair)? 4  -MARINO 4  -MF 3  -MF    Climbing 3-5 steps with a railing? 4  -MARINO 3  -MF 3  -MF    To walk in hospital room? 4  -MARINO 4  -MF 3  -MF    AM-PAC 6 Clicks Score (PT) 22  -MARINO 21  -MF 18  -MF    Highest Level of Mobility Goal 7 --> Walk 25 feet or more  -MARINO 6 --> Walk 10 steps or more  -MF 6 --> Walk 10 steps or more  -MF       Functional Assessment    Outcome Measure Options AM-PAC 6 Clicks Basic Mobility (PT)  -MARINO AM-PAC 6 Clicks Basic Mobility (PT)  -MF AM-PAC 6 Clicks Basic Mobility (PT)  -              User Key  (r) = Recorded By, (t) = Taken By, (c) = Cosigned By      Initials Name Provider Type    Jorge Rojas PTA Physical Therapist Assistant    Josselyn Kaba PTA Physical Therapist Assistant                     Time Calculation:    PT Charges       Row Name 07/02/24 0853             Time Calculation    Start Time 0853  -MARINO      Stop Time 0903  -MARINO      Time Calculation (min) 10 min  -MARINO      PT Received On 07/02/24  -MARINO         Time Calculation- PT    Total Timed Code Minutes- PT 10 minute(s)  -MARINO         Timed Charges    82912 - Gait Training Minutes  10  -MARINO         Total Minutes    Timed Charges Total Minutes 10  -MARINO       Total Minutes 10  -MARINO                User Key  (r) = Recorded By, (t) = Taken By, (c) = Cosigned By      Initials Name Provider Type    Jorge Rojas PTA Physical Therapist Assistant                  Therapy Charges for Today       Code Description Service Date Service Provider Modifiers Qty    [id number] HC GAIT TRAINING EA 15 MIN 7/2/2024 Jorge Villaseñor PTA GP 1            PT G-Codes  Outcome Measure Options: AM-PAC 6 Clicks Basic Mobility (PT)  AM-PAC 6 Clicks Score (PT): 22    Jorge Villaseñor PTA  7/2/2024

## 2024-07-03 ENCOUNTER — READMISSION MANAGEMENT (OUTPATIENT)
Dept: CALL CENTER | Facility: HOSPITAL | Age: 64
End: 2024-07-03
Payer: COMMERCIAL

## 2024-07-03 ENCOUNTER — APPOINTMENT (OUTPATIENT)
Dept: GENERAL RADIOLOGY | Facility: HOSPITAL | Age: 64
DRG: 236 | End: 2024-07-03
Payer: COMMERCIAL

## 2024-07-03 VITALS
HEIGHT: 71 IN | HEART RATE: 77 BPM | SYSTOLIC BLOOD PRESSURE: 102 MMHG | OXYGEN SATURATION: 94 % | BODY MASS INDEX: 28.45 KG/M2 | WEIGHT: 203.2 LBS | TEMPERATURE: 98.3 F | DIASTOLIC BLOOD PRESSURE: 56 MMHG | RESPIRATION RATE: 18 BRPM

## 2024-07-03 LAB
ANION GAP SERPL CALCULATED.3IONS-SCNC: 9 MMOL/L (ref 5–15)
BUN SERPL-MCNC: 17 MG/DL (ref 8–23)
BUN/CREAT SERPL: 24.3 (ref 7–25)
CALCIUM SPEC-SCNC: 9 MG/DL (ref 8.6–10.5)
CHLORIDE SERPL-SCNC: 99 MMOL/L (ref 98–107)
CO2 SERPL-SCNC: 29 MMOL/L (ref 22–29)
CREAT SERPL-MCNC: 0.7 MG/DL (ref 0.76–1.27)
EGFRCR SERPLBLD CKD-EPI 2021: 104.2 ML/MIN/1.73
GLUCOSE BLDC GLUCOMTR-MCNC: 138 MG/DL (ref 70–130)
GLUCOSE BLDC GLUCOMTR-MCNC: 156 MG/DL (ref 70–130)
GLUCOSE BLDC GLUCOMTR-MCNC: 179 MG/DL (ref 70–130)
GLUCOSE SERPL-MCNC: 150 MG/DL (ref 65–99)
POTASSIUM SERPL-SCNC: 3.7 MMOL/L (ref 3.5–5.2)
SODIUM SERPL-SCNC: 137 MMOL/L (ref 136–145)

## 2024-07-03 PROCEDURE — 80048 BASIC METABOLIC PNL TOTAL CA: CPT | Performed by: NURSE PRACTITIONER

## 2024-07-03 PROCEDURE — 71046 X-RAY EXAM CHEST 2 VIEWS: CPT

## 2024-07-03 PROCEDURE — 25010000002 ENOXAPARIN PER 10 MG: Performed by: THORACIC SURGERY (CARDIOTHORACIC VASCULAR SURGERY)

## 2024-07-03 PROCEDURE — 25010000002 FUROSEMIDE PER 20 MG: Performed by: SURGERY

## 2024-07-03 PROCEDURE — 63710000001 INSULIN LISPRO (HUMAN) PER 5 UNITS: Performed by: SURGERY

## 2024-07-03 PROCEDURE — 82948 REAGENT STRIP/BLOOD GLUCOSE: CPT

## 2024-07-03 RX ORDER — LISINOPRIL 2.5 MG/1
2.5 TABLET ORAL DAILY
Qty: 30 TABLET | Refills: 2 | Status: SHIPPED | OUTPATIENT
Start: 2024-07-03

## 2024-07-03 RX ORDER — OXYCODONE HYDROCHLORIDE AND ACETAMINOPHEN 5; 325 MG/1; MG/1
1 TABLET ORAL EVERY 6 HOURS PRN
Qty: 12 TABLET | Refills: 0 | Status: SHIPPED | OUTPATIENT
Start: 2024-07-03 | End: 2024-07-11 | Stop reason: SDUPTHER

## 2024-07-03 RX ORDER — CLOPIDOGREL BISULFATE 75 MG/1
75 TABLET ORAL DAILY
Qty: 30 TABLET | Refills: 2 | Status: SHIPPED | OUTPATIENT
Start: 2024-07-03

## 2024-07-03 RX ORDER — METOPROLOL TARTRATE 37.5 MG/1
37.5 TABLET, FILM COATED ORAL EVERY 12 HOURS SCHEDULED
Qty: 60 TABLET | Refills: 2 | Status: SHIPPED | OUTPATIENT
Start: 2024-07-03

## 2024-07-03 RX ADMIN — AMIODARONE HYDROCHLORIDE 400 MG: 200 TABLET ORAL at 09:32

## 2024-07-03 RX ADMIN — METHOCARBAMOL 500 MG: 500 TABLET, FILM COATED ORAL at 06:34

## 2024-07-03 RX ADMIN — METHOCARBAMOL 500 MG: 500 TABLET, FILM COATED ORAL at 13:56

## 2024-07-03 RX ADMIN — INSULIN LISPRO 2 UNITS: 100 INJECTION, SOLUTION INTRAVENOUS; SUBCUTANEOUS at 11:56

## 2024-07-03 RX ADMIN — ACETAMINOPHEN 1000 MG: 500 TABLET, FILM COATED ORAL at 04:25

## 2024-07-03 RX ADMIN — ASPIRIN 81 MG: 81 TABLET, COATED ORAL at 09:32

## 2024-07-03 RX ADMIN — POTASSIUM CHLORIDE 20 MEQ: 750 CAPSULE, EXTENDED RELEASE ORAL at 09:33

## 2024-07-03 RX ADMIN — CLOPIDOGREL BISULFATE 75 MG: 75 TABLET, FILM COATED ORAL at 18:15

## 2024-07-03 RX ADMIN — PREGABALIN 25 MG: 25 CAPSULE ORAL at 09:31

## 2024-07-03 RX ADMIN — ACETAMINOPHEN 1000 MG: 500 TABLET, FILM COATED ORAL at 13:56

## 2024-07-03 RX ADMIN — ACETAMINOPHEN 1000 MG: 500 TABLET, FILM COATED ORAL at 09:31

## 2024-07-03 RX ADMIN — METOPROLOL TARTRATE 37.5 MG: 25 TABLET, FILM COATED ORAL at 09:33

## 2024-07-03 RX ADMIN — INSULIN LISPRO 2 UNITS: 100 INJECTION, SOLUTION INTRAVENOUS; SUBCUTANEOUS at 09:30

## 2024-07-03 RX ADMIN — ENOXAPARIN SODIUM 40 MG: 100 INJECTION SUBCUTANEOUS at 09:31

## 2024-07-03 RX ADMIN — FUROSEMIDE 20 MG: 10 INJECTION, SOLUTION INTRAMUSCULAR; INTRAVENOUS at 09:31

## 2024-07-03 RX ADMIN — PANTOPRAZOLE SODIUM 40 MG: 40 TABLET, DELAYED RELEASE ORAL at 06:34

## 2024-07-03 NOTE — PLAN OF CARE
Goal Outcome Evaluation:  Plan of Care Reviewed With: patient               Outcome Evaluation: VSS, scheduled pain medications given with relief, ambulated in donis this a.m., Sinus 75-87 with trigeminy, incisions c/d/i

## 2024-07-03 NOTE — PROGRESS NOTES
"Patient name: Max Oliveira  Patient : 1960  VISIT # [id number]  MR #[id number]    Procedure:Procedure(s):  CORONARY ARTERY BYPASS GRAFTING x4, BILATERAL INTERNAL MAMMARY ARTERY GRAFTS, RIGHT LEG ENDOSCOPIC VEIN HARVEST, TRANSESOPHAGEAL ECHOCARDIOGRAM, XP STERNAL PLATING  STERNAL PLATING  Procedure Date:2024  POD:5 Days Post-Op    Subjective   Up in the chair.  On room air.  Weight is down 5 pounds in the last 24 hours and he is below baseline weight.  Improved pain control, 2 out of 10.  Walking well with PT services.  Sinus rhythm overnight with PVCs and trigeminy.       Objective     Visit Vitals  /76 (BP Location: Right arm, Patient Position: Sitting)   Pulse 82   Temp 98.1 °F (36.7 °C) (Oral)   Resp 16   Ht 180.5 cm (71.06\")   Wt 92.2 kg (203 lb 3.2 oz)   SpO2 96%   BMI 28.29 kg/m²   Baseline weight: 211 pounds    Intake/Output Summary (Last 24 hours) at 7/3/2024 1017  Last data filed at 7/3/2024 0900  Gross per 24 hour   Intake 1560 ml   Output 2900 ml   Net -1340 ml     Lab:     CBC:  Results from last 7 days   Lab Units 24  0805 24  0546 24  0222   WBC 10*3/mm3 9.06 11.35* 11.24*   HEMATOCRIT % 38.2 36.7* 36.5*   PLATELETS 10*3/mm3 232 173 144          BMP:  Results from last 7 days   Lab Units 24  0811 24  0805 24  0546   SODIUM mmol/L 137 136 136   POTASSIUM mmol/L 3.7 4.1 4.3   CHLORIDE mmol/L 99 97* 97*   CO2 mmol/L 29.0 29.0 27.0   GLUCOSE mg/dL 150* 140* 122*   BUN mg/dL 17 15 14   CREATININE mg/dL 0.70* 0.74* 0.69*          COAG:  Results from last 7 days   Lab Units 24  0301 24  1416   INR  1.15* 1.14*   APTT seconds  --  66.0*       IMAGES:       Imaging Results (Last 24 Hours)       Procedure Component Value Units Date/Time    XR Chest PA & Lateral [[id number]] Collected: 24     Updated: 24    Narrative:      EXAMINATION: XR CHEST PA AND LATERAL-     7/3/2024 3:40 AM     HISTORY: Post CABG; Z74.09-Other reduced " mobility;  I25.10-Atherosclerotic heart disease of native coronary artery without  angina pectoris     2 views of the chest are compared with the previous study dated  7/1/2024.     The lungs are poorly expanded, slightly better than the previous study.     There is a persistent small bibasilar pleural effusion. No change.     There is left lower lobar consolidation similar to the previous study.     The left-sided chest tube have been removed in the interval.     There is no pulmonary congestion. There is no pneumothorax.     The heart size is in the normal range. There is evidence of prior  cardiac surgery.     There is no acute bony abnormality.       Impression:      1. The lungs are slightly better expanded since the previous study.  Left-sided chest tube have been removed since the previous study. No  pneumothorax.  2. Persistent small bibasilar pleural effusion and left lower lobar  consolidation.     This report was signed and finalized on 7/3/2024 6:39 AM by Dr. Radha Allen MD.             Physical Exam:  General: Alert, oriented. No apparent distress.   Cardiovascular: Regular rate and rhythm without murmur, rubs, or gallops.    Pulmonary: Clear to auscultation bilaterally without wheezing, rubs, or rales.  Chest: Sternotomy incision clean, dry, and intact. Sternum stable. No clicks.   Abdomen: Soft, non distended, and non tender.  Extremities: Warm, moves all extremities. Saphenectomy site clean, dry, and intact.   Neurologic:  Grossly intact with no focal deficits.       Impression:  Coronary artery disease involving native coronary artery of native heart with unstable angina pectoris   Type 2 diabetes mellitus  Hypertension  Hyperlipidemia    Plan:  Check BMP  Anticipate discharge home later today  Routine postcardiac surgery care  Encourage pulmonary toilet and ambulation  Discussed with patient, wife and nursing    JESUS Clay  07/03/24  10:17 CDT

## 2024-07-03 NOTE — PLAN OF CARE
Goal Outcome Evaluation:           Progress: improving  Outcome Evaluation: Patient with no c/o pain this shift. V wires removed. S 70-89 on tele with 8 beat run of MD maykel aware. Probable discharge home tomorrow. VSS, safety maintained.

## 2024-07-03 NOTE — PAYOR COMM NOTE
"7/3/24 [facility] [phone]    [phone]    PATIENT ADMITTED TO INPATIENT ON 6/28/24 INPATIENT ONLY AND INPATIENT PROCEDURE APPROVED.    6/28/24 THRU 6/30/24 TOTAL 3 DAYS AUTH [id number]    FAXING CLINICAL FOR DAYS 7/1/24 THRU 7/3/24 CONT STAY DAYS APPROVAL.        Max Donnelly (64 y.o. Male)       Date of Birth   1960    Social Security Number       Address   [address]  \[facility]""[place] [address]    Home Phone   [phone]    MRN   [id number]       Religious   Taoist    Marital Status                               Admission Date   6/28/24    Admission Type   Elective    Admitting Provider   Tim Cancino MD    Attending Provider   Tim Cancino MD    Department, Room/Bed   [facility] 4B, 431/1       Discharge Date       Discharge Disposition       Discharge Destination                                 Attending Provider: Tim Cancino MD    Allergies: No Known Allergies    Isolation: None   Infection: None   Code Status: CPR    Ht: 180.5 cm (71.06\")   Wt: 92.2 kg (203 lb 3.2 oz)    Admission Cmt: None   Principal Problem: Coronary artery disease involving native coronary artery of native heart [I25.10]                   Active Insurance as of 6/28/2024       Primary Coverage       Payor Plan Insurance Group Employer/Plan Group    [employer]HE[employer] BLUE CROSS ANTHE[employer] BLUE CROSS BLUE SHIELD PPO [id number]       Payor Plan Address Payor Plan Phone Number Payor Plan Fax Number Effective Dates    [address] [phone]  1/1/2021 - None Entered    [facility] [id number]         Subscriber Name Subscriber Birth Date Member ID       MAX DONNELLY 1960 [id number]               Secondary Coverage       Payor Plan Insurance Group Employer/Plan Group    [facility]        Payor Plan Address Payor Plan Phone Number Payor Plan Fax Number Effective Dates    [address] [phone]  7/30/2020 - None Entered    [facility] [id number]         " Subscriber Name Subscriber Birth Date Member ID       VLADIMIR DONNELLY 1960 [id number]                     Emergency Contacts        (Rel.) Home Phone Work Phone Mobile Phone    Lisbeth Donnelly (Spouse) [phone] -- --           [facility] Encounter Date/Time: 2024 0459   Hospital Account: [id number]    MRN: [id number]   Patient:  Vladimir Donnelly   Contact Serial #: [id number]   SSN:          ENCOUNTER             Patient Class: Inpatient   Unit: [address] Service: Cardiothoracic Surgery     Bed: 431/1   Admitting Provider: Tim Cancino MD   Referring Physician: Tim Cancino   Attending Provider: Tim Cancino MD   Adm Diagnosis: Coronary artery disease *               PATIENT             Name: Vladimir Donnelly : 1960 (64 yrs)   Address: [address] [place] [address]  Sex: Male   City: [place] [address]   County: [facility]   Marital Status:  Ethnicity: NOT                                                                         Race: WHITE   Primary Care Provider: Aayush Ewing MD Patients Phone: Home Phone: [phone]     Mobile Phone: [phone]     EMERGENCY CONTACT   Contact Name Legal Guardian? Relationship to Patient Home Phone Work Phone Mobile Phone   1. Lisbeth Donnelly  2. *No Contact Specified* No    Spouse    [phone]                GUARANTOR             Guarantor: Vladimir Donnelly     : 1960   Address: [facility] [place] [address]  Sex: [place] [address]     Relation to Patient: Self       Home Phone: [phone]   Guarantor ID: [id number]       Work Phone:     GUARANTOR EMPLOYER   Employer: [employer]         Status: FULL TIME   COVERAGE          PRIMARY INSURANCE   Payor: ANTHEM BLUE CROSS Plan: ANTHEM BLUE CROSS BLUE SHIELD PPO   Group Number: [id number] Insurance Type: INDEMNITY   Subscriber Name: VLADIMIR DONNELLY Subscriber : 1960   Subscriber ID: [id number] Coverage Address: [facility] [address]  [address]  "Rel. to Subscriber: Self Coverage Phone: [phone]   SECONDARY INSURANCE   Payor: [facility] Plan: [facility]   Group Number:   Insurance Type: INDEMNITY   Subscriber Name: MAX DONNELLY Subscriber : 1960   Subscriber ID: [id number] Coverage Address: [facility] [address]  [place] [address]   Pat. Rel. to Subscriber: SELF Coverage Phone: [phone]      Contact Serial # ([id number])         July 3, 2024    Chart ID ([id number])                Shahbaz, Nicolasa L, APRN   Nurse Practitioner  Cardiothoracic Surgery     Progress Notes      Cosign Needed     Date of Service: 24  Creation Time: 24     Cosign Needed       Expand All Collapse All    Patient name: Max Donnelly  Patient : 1960  VISIT # [id number]  MR #[id number]     Procedure:Procedure(s):  CORONARY ARTERY BYPASS GRAFTING x4, BILATERAL INTERNAL MAMMARY ARTERY GRAFTS, RIGHT LEG ENDOSCOPIC VEIN HARVEST, TRANSESOPHAGEAL ECHOCARDIOGRAM, XP STERNAL PLATING  STERNAL PLATING  Procedure Date:2024  POD:4 Days Post-Op        Subjective  No chief complaint on file.     Sitting up in the chair. Tolerating room air. Pain is well controlled. Wife is at bedside.  Weight is down 4 pounds over the last 24 hours and he is 3 pounds below his baseline weight.  Walked 525 feet with 1 standing rest with physical therapy yesterday. (+) BM.     Telemetry: Sinus 60-92 bpm           Objective[]Expand by Default  Visit Vitals  /56 (BP Location: Left arm, Patient Position: Lying)   Pulse 62   Temp 98.3 °F (36.8 °C) (Oral)   Resp 16   Ht 180.5 cm (71.06\")   Wt 94.3 kg (208 lb)   SpO2 93%   BMI 28.96 kg/m²   Baseline weight: 211 pounds     Intake/Output Summary (Last 24 hours) at 2024 0948  Last data filed at 2024 0853      Gross per 24 hour   Intake 1540 ml   Output 2450 ml   Net -910 ml      Lab:     CBC:         Results from last 7 days   Lab Units 24  0805 24  0546 24  0222   WBC 10*3/mm3 9.06 11.35* " 11.24*   HEMATOCRIT % 38.2 36.7* 36.5*   PLATELETS 10*3/mm3 232 173 144            BMP:         Results from last 7 days   Lab Units 07/02/24  0805 07/01/24  0546 06/30/24  0222   SODIUM mmol/L 136 136 137   POTASSIUM mmol/L 4.1 4.3 4.3   CHLORIDE mmol/L 97* 97* 101   CO2 mmol/L 29.0 27.0 26.0   GLUCOSE mg/dL 140* 122* 135*   BUN mg/dL 15 14 14   CREATININE mg/dL 0.74* 0.69* 0.73*            COAG:        Results from last 7 days   Lab Units 06/29/24  0301 06/28/24  1416   INR   1.15* 1.14*   APTT seconds  --  66.0*         IMAGES:       Imaging Results (Last 24 Hours)         ** No results found for the last 24 hours. **             Physical Exam:  General: Alert, oriented. No apparent distress.   Cardiovascular: Regular rate and rhythm without murmur, rubs, or gallops.    Pulmonary: Clear to auscultation bilaterally without wheezing, rubs, or rales.  Chest: Sternotomy incision clean, dry, and intact. Sternum stable. No clicks.   Abdomen: Soft, non distended, and non tender.  Extremities: Warm, moves all extremities. No edema. Saphenectomy site clean, dry, and intact.   Neurologic:  Grossly intact with no focal deficits.        Impression:  Coronary artery disease involving native coronary artery of native heart with unstable angina pectoris   Type 2 diabetes mellitus  Hypertension  Hyperlipidemia     Plan:  Continue diuresis and potassium replacement  Routine postcardiac surgery care  Encourage pulmonary toilet and ambulation  Discharge home later today  Follow-up with JESUS Varma 1 week after discharge  Follow-up with Dr. Cancino 4 to 6 weeks after discharge  Discussed with patient, wife and nursing     JESUS Gregorio  07/02/24  09:48 CDT                                Nicolasa Trujillo APRN   Nurse Practitioner  Cardiothoracic Surgery     Progress Notes      Cosign Needed     Date of Service: 07/03/24 0816  Creation Time: 07/03/24 1016     Cosign Needed       Expand All Collapse All    Patient name:  "Max Oliveira  Patient : 1960  VISIT # [id number]  MR #[id number]     Procedure:Procedure(s):  CORONARY ARTERY BYPASS GRAFTING x4, BILATERAL INTERNAL MAMMARY ARTERY GRAFTS, RIGHT LEG ENDOSCOPIC VEIN HARVEST, TRANSESOPHAGEAL ECHOCARDIOGRAM, XP STERNAL PLATING  STERNAL PLATING  Procedure Date:2024  POD:5 Days Post-Op        Subjective  Up in the chair.  On room air.  Weight is down 5 pounds in the last 24 hours and he is below baseline weight.  Improved pain control, 2 out of 10.  Walking well with PT services.  Sinus rhythm overnight with PVCs and trigeminy.              Objective[]Expand by Default  Visit Vitals  /76 (BP Location: Right arm, Patient Position: Sitting)   Pulse 82   Temp 98.1 °F (36.7 °C) (Oral)   Resp 16   Ht 180.5 cm (71.06\")   Wt 92.2 kg (203 lb 3.2 oz)   SpO2 96%   BMI 28.29 kg/m²   Baseline weight: 211 pounds     Intake/Output Summary (Last 24 hours) at 7/3/2024 1017  Last data filed at 7/3/2024 0900      Gross per 24 hour   Intake 1560 ml   Output 2900 ml   Net -1340 ml      Lab:     CBC:         Results from last 7 days   Lab Units 24  0805 24  0546 24  0222   WBC 10*3/mm3 9.06 11.35* 11.24*   HEMATOCRIT % 38.2 36.7* 36.5*   PLATELETS 10*3/mm3 232 173 144            BMP:         Results from last 7 days   Lab Units 24  0811 24  0805 24  0546   SODIUM mmol/L 137 136 136   POTASSIUM mmol/L 3.7 4.1 4.3   CHLORIDE mmol/L 99 97* 97*   CO2 mmol/L 29.0 29.0 27.0   GLUCOSE mg/dL 150* 140* 122*   BUN mg/dL 17 15 14   CREATININE mg/dL 0.70* 0.74* 0.69*            COAG:        Results from last 7 days   Lab Units 24  0301 24  1416   INR   1.15* 1.14*   APTT seconds  --  66.0*         IMAGES:       Imaging Results (Last 24 Hours)         Procedure Component Value Units Date/Time     XR Chest PA & Lateral [[id number]] Collected: 2437       Updated: 2442     Narrative:       EXAMINATION: XR CHEST PA AND LATERAL-     7/3/2024 " 3:40 AM     HISTORY: Post CABG; Z74.09-Other reduced mobility;  I25.10-Atherosclerotic heart disease of native coronary artery without  angina pectoris     2 views of the chest are compared with the previous study dated  7/1/2024.     The lungs are poorly expanded, slightly better than the previous study.     There is a persistent small bibasilar pleural effusion. No change.     There is left lower lobar consolidation similar to the previous study.     The left-sided chest tube have been removed in the interval.     There is no pulmonary congestion. There is no pneumothorax.     The heart size is in the normal range. There is evidence of prior  cardiac surgery.     There is no acute bony abnormality.        Impression:       1. The lungs are slightly better expanded since the previous study.  Left-sided chest tube have been removed since the previous study. No  pneumothorax.  2. Persistent small bibasilar pleural effusion and left lower lobar  consolidation.     This report was signed and finalized on 7/3/2024 6:39 AM by Dr. Radha Allen MD.                Physical Exam:  General: Alert, oriented. No apparent distress.   Cardiovascular: Regular rate and rhythm without murmur, rubs, or gallops.    Pulmonary: Clear to auscultation bilaterally without wheezing, rubs, or rales.  Chest: Sternotomy incision clean, dry, and intact. Sternum stable. No clicks.   Abdomen: Soft, non distended, and non tender.  Extremities: Warm, moves all extremities. Saphenectomy site clean, dry, and intact.   Neurologic:  Grossly intact with no focal deficits.        Impression:  Coronary artery disease involving native coronary artery of native heart with unstable angina pectoris   Type 2 diabetes mellitus  Hypertension  Hyperlipidemia     Plan:  Check BMP  Anticipate discharge home later today  Routine postcardiac surgery care  Encourage pulmonary toilet and ambulation  Discussed with patient, wife and nursing     Nicolasa Trujillo,  APRN  07/03/24  10:17 CDT                         talia Temp Pulse Resp BP Patient Position Device (Oxygen Therapy) Flow (L/min) SpO2   07/03/24 0745 -- -- -- -- -- room air -- --   07/03/24 0715 98.1 (36.7) 82 16 122/76 Sitting room air -- 96   07/03/24 0330 98.2 (36.8) 78 16 101/72 Lying room air -- 91   07/02/24 2316 98.1 (36.7) 78 16 108/73 Lying room air -- 92   07/02/24 2000 -- -- -- -- -- room air -- --   07/02/24 1937 98.7 (37.1) 79 16 106/63 Sitting room air -- 93   07/02/24 1642 -- -- -- 109/69  Sitting -- -- --   BP: post pacer wire removal at 07/02/24 1642   07/02/24 1637 -- -- -- 105/59 Sitting -- -- --   07/02/24 1623 98.4 (36.9) 83 -- 105/62 Sitting -- -- 95   07/02                 Current Facility-Administered Medications   Medication Dose Route Frequency Provider Last Rate Last Admin    acetaminophen (TYLENOL) tablet 1,000 mg  1,000 mg Oral Q6H Tim Cancino MD   1,000 mg at 07/03/24 0931    Or    acetaminophen (TYLENOL) 160 MG/5ML oral solution 1,000 mg  1,000 mg Oral Q6H Tim Cancino MD        Or    acetaminophen (TYLENOL) suppository 650 mg  650 mg Rectal Q6H Tim Cancino MD        amiodarone (PACERONE) tablet 400 mg  400 mg Oral Q24H Sixto Diaz MD   400 mg at 07/03/24 0932    aspirin EC tablet 81 mg  81 mg Oral Daily Tim Cancino MD   81 mg at 07/03/24 0932    atorvastatin (LIPITOR) tablet 20 mg  20 mg Oral Nightly Tim Cancino MD   20 mg at 07/02/24 2137    bisacodyl (DULCOLAX) EC tablet 10 mg  10 mg Oral BID Tim Cancino MD   10 mg at 07/02/24 2137    bisacodyl (DULCOLAX) suppository 10 mg  10 mg Rectal Daily PRN Nicolasa Trujillo, APRN        clopidogrel (PLAVIX) tablet 75 mg  75 mg Oral Daily Tim Cancino MD   75 mg at 07/02/24 1720    dextrose (D50W) (25 g/50 mL) IV injection 10-50 mL  10-50 mL Intravenous Q15 Min PRN Tim Cancino MD        dextrose (GLUTOSE) oral gel 15 g  15 g Oral Q15 Min PRN Tim Cancino MD        Enoxaparin  Sodium (LOVENOX) syringe 40 mg  40 mg Subcutaneous Daily Tim Cancino MD   40 mg at 07/03/24 0931    furosemide (LASIX) injection 20 mg  20 mg Intravenous BID AC Diaz, Sixto PORTER MD   20 mg at 07/03/24 0931    glucagon (GLUCAGEN) injection 1 mg  1 mg Intramuscular Q15 Min PRN Tim Cancino MD        Hold All immunizations   Does not apply Continuous PRN Tim Cancino MD        Insulin Lispro (humaLOG) injection 2-9 Units  2-9 Units Subcutaneous 4x Daily AC & at Bedtime Sixto Diaz MD   2 Units at 07/03/24 0930    Lidocaine 4 % 2 patch  2 patch Transdermal Q24H Tim Cancino MD   2 patch at 07/02/24 0804    methocarbamol (ROBAXIN) tablet 500 mg  500 mg Oral Q8H Sixto Diaz MD   500 mg at 07/03/24 0634    metoprolol tartrate (LOPRESSOR) tablet 37.5 mg  37.5 mg Oral Q12H Nicolasa Hartmann APRN   37.5 mg at 07/03/24 0933    ondansetron (ZOFRAN) injection 4 mg  4 mg Intravenous Q6H PRN Tim Cancino MD   4 mg at 06/29/24 0506    oxyCODONE (ROXICODONE) immediate release tablet 10 mg  10 mg Oral Q6H PRN Nicolasa Trujillo APRN        oxyCODONE (ROXICODONE) immediate release tablet 5 mg  5 mg Oral Q4H PRN Nicolasa Trujillo APRN        polyethylene glycol (MIRALAX) packet 17 g  17 g Oral Daily Tim Cancino MD   17 g at 07/02/24 0804    potassium chloride (MICRO-K/KLOR-CON) CR capsule  20 mEq Oral BID With Meals Sixto Diaz MD   20 mEq at 07/03/24 0933    pregabalin (LYRICA) capsule 25 mg  25 mg Oral Q12H Tim Cancino MD   25 mg at 07/03/24 0931

## 2024-07-04 NOTE — THERAPY DISCHARGE NOTE
Acute Care - Physical Therapy Discharge Summary  [facility]       Patient Name: Max Oliveira  : 1960  MRN: [id number]    Today's Date: 2024                 Admit Date: 2024      PT Recommendation and Plan    Visit Dx:    ICD-10-CM ICD-9-CM   1. S/P CABG (coronary artery bypass graft)  Z95.1 V45.81   2. Coronary artery disease involving native coronary artery of native heart, unspecified whether angina present  I25.10 414.01   3. Impaired mobility [Z74.09]  Z74.09 799.89        Outcome Measures       Row Name 24 0853             How much help from another person do you currently need...    Turning from your back to your side while in flat bed without using bedrails? 3  -MARINO      Moving from lying on back to sitting on the side of a flat bed without bedrails? 3  -MARINO      Moving to and from a bed to a chair (including a wheelchair)? 4  -MARINO      Standing up from a chair using your arms (e.g., wheelchair, bedside chair)? 4  -MARINO      Climbing 3-5 steps with a railing? 4  -MARINO      To walk in hospital room? 4  -MARINO      AM-PAC 6 Clicks Score (PT) 22  -MARINO      Highest Level of Mobility Goal 7 --> Walk 25 feet or more  -MARINO         Functional Assessment    Outcome Measure Options AM-PAC 6 Clicks Basic Mobility (PT)  -MARINO                User Key  (r) = Recorded By, (t) = Taken By, (c) = Cosigned By      Initials Name Provider Type    Jorge Rojas, PTA Physical Therapist Assistant                         PT Rehab Goals       Row Name 24 1500             Bed Mobility Goal 1 (PT)    Activity/Assistive Device (Bed Mobility Goal 1, PT) rolling to left;rolling to right;sit to supine;supine to sit  -AB      Josephine Level/Cues Needed (Bed Mobility Goal 1, PT) modified independence  -AB      Time Frame (Bed Mobility Goal 1, PT) long term goal (LTG);10 days  -AB      Strategies/Barriers (Bed Mobility Goal 1, PT) heart pillow and verbal cues  -AB      Progress/Outcomes (Bed Mobility Goal 1, PT) goal  not met  -AB         Transfer Goal 1 (PT)    Activity/Assistive Device (Transfer Goal 1, PT) sit-to-stand/stand-to-sit;bed-to-chair/chair-to-bed;toilet;walk-in shower  -AB      Arverne Level/Cues Needed (Transfer Goal 1, PT) standby assist  -AB      Time Frame (Transfer Goal 1, PT) long term goal (LTG);10 days  -AB      Progress/Outcome (Transfer Goal 1, PT) goal met  -AB         Gait Training Goal 1 (PT)    Activity/Assistive Device (Gait Training Goal 1, PT) gait (walking locomotion);decrease asymmetrical patterns;decrease fall risk;diminish gait deviation;forward stepping;improve balance and speed;increase endurance/gait distance;increase energy conservation  -AB      Arverne Level (Gait Training Goal 1, PT) standby assist  -AB      Distance (Gait Training Goal 1, PT) 500' to return to PLOF  -AB      Time Frame (Gait Training Goal 1, PT) long term goal (LTG);10 days  -AB      Progress/Outcome (Gait Training Goal 1, PT) goal met  -AB         Stairs Goal 1 (PT)    Activity/Assistive Device (Stairs Goal 1, PT) ascending stairs;descending stairs;using handrail, right;step-to-step;decrease fall risk;improve balance and speed  -AB      Arverne Level/Cues Needed (Stairs Goal 1, PT) standby assist  -AB      Number of Stairs (Stairs Goal 1, PT) 3 as needed at home  -AB      Time Frame (Stairs Goal 1, PT) long term goal (LTG);10 days  -AB      Progress/Outcome (Stairs Goal 1, PT) goal met  -AB                User Key  (r) = Recorded By, (t) = Taken By, (c) = Cosigned By      Initials Name Provider Type Discipline    Rose Holt, PTA Physical Therapist Assistant PT                        PT Discharge Summary  Anticipated Discharge Disposition (PT): home with assist  Reason for Discharge: Discharge from facility  Outcomes Achieved: Refer to plan of care for updates on goals achieved  Discharge Destination: Home with assist      Rose Garcia PTA   7/4/2024

## 2024-07-04 NOTE — PAYOR COMM NOTE
"MO HOME 7-3-24    Vladimir Donnelly (64 y.o. Male)       Date of Birth   1960    Social Security Number       Address   [address]  [place] [address]    Home Phone   [phone]    MRN   [id number]       Jainism   Bahai    Marital Status                               Admission Date   6/28/24    Admission Type   Elective    Admitting Provider   Tim Cancino MD    Attending Provider       Department, Room/Bed   [facility] 4B, 431/1       Discharge Date   7/3/2024    Discharge Disposition   Home or Self Care    Discharge Destination                                 Attending Provider: (none)   Allergies: No Known Allergies    Isolation: None   Infection: None   Code Status: Prior    Ht: 180.5 cm (71.06\")   Wt: 92.2 kg (203 lb 3.2 oz)    Admission Cmt: None   Principal Problem: Coronary artery disease involving native coronary artery of native heart [I25.10]                   Active Insurance as of 6/28/2024       Primary Coverage       Payor Plan Insurance Group Employer/Plan Group    [employer] BLUE [employer]OSS [employer] BLUE CROSS BLUE [employer] PPO [id number]       Payor Plan Address Payor Plan Phone Number Payor Plan Fax Number Effective Dates    [address] [phone]  1/1/2021 - None Entered    [facility] [id number]         Subscriber Name Subscriber Birth Date Member ID       VLADIMIR DONNELLY 1960 [id number]               Secondary Coverage       Payor Plan Insurance Group Employer/Plan Group    [facility]        Payor Plan Address Payor Plan Phone Number Payor Plan Fax Number Effective Dates    [address] [phone]  7/30/2020 - None Entered    [facility] [id number]         Subscriber Name Subscriber Birth Date Member ID       VLADIMIR DONNELLY 1960 [id number]                     Emergency Contacts        (Rel.) Home Phone Work Phone Mobile Phone    Lisbeth Donnelly (Spouse) [phone] -- --                 Discharge Summary        Nicolasa Trujillo, APRN " at 07/03/24 1809                [facility] Cardiothoracic Surgery  DISCHARGE SUMMARY        Date of Admission: 6/28/2024  Date of Discharge:  7/3/2024  Primary Care Physician: Aayush Ewing MD    Discharge Diagnoses:  Active Hospital Problems    Diagnosis     **Coronary artery disease involving native coronary artery of native heart     Former smoker     Primary hypertension     Type 2 diabetes mellitus without complication, without long-term current use of insulin     Mixed hyperlipidemia     Abnormal stress test      Procedures Performed: Coronary artery bypass grafting-4 vessel (left internal mammary artery/left anterior descending, right internal mammary artery/posterior descending artery, reverse saphenous vein graft/first obtuse marginal, and reverse saphenous vein graft/second diagonal artery), Right endoscopic vein harvest, Sternal plating with Sternalock XP sternal plating performed on 6/28/2024 by Dr. Cancino.     HPI:  Mr. Max Oliveira is a 64 y.o. male with former tobacco use, high blood pressure, type 2 diabetes mellitus, hyperlipidemia and previous rotator cuff and bicep tendon repair complicated by osteomyelitis, and strong family history of heart disease.  He has experienced dyspnea for the last several weeks which has progressively worsened.  An EKG was performed which showed abnormalities.  A echocardiogram stress test was obtained and was abnormal.  He was referred to a cardiologist and seen by Dr. Snowden who performed coronary angiography which has shown left main and multivessel coronary artery disease.  He has been referred to cardiothoracic surgery services for consideration of coronary artery bypass grafting.  Preoperative testing was obtained and he was deemed an acceptable risk candidate to proceed forward with CABG.    Hospital Course: Mr. Oliveira was admitted on the morning of surgery on June 28, 2024.  Please see a separate op note by Dr. Cancino.  Following surgery, he  was transferred to the intensive care unit stable guarded condition.  He remained hemodynamically stable.  He was extubated overnight demonstrated neurologically intact exam.  He was up to the chair and met criteria to transfer to  for continued recovery on the afternoon of postoperative day 1.  On postop day 2, mediastinal tubes were removed without remark.  He was given diuresis.  On postoperative day 3, pleural tubes were removed without remark.  He reported great improvement in pain control.  The remaining stay of his hospitalization was remarkable for encouraging pulmonary toilet and ambulation and bowel regimen.  He was planned to be discharged home on postoperative day 4 but due to ectopy was kept overnight for close observation.  Beta-blocker was titrated.  He remained in sinus rhythm with frequent PVCs which is baseline for him.  On postop day 5, he is medically cleared to discharge home and is agreeable to do so with his spouse.      Condition on Discharge:  Neurologically intact and has good pain control.  He is eating well and has demonstrable good bowel function.  The sternum is stable without clicks and the saphenectomy incisions are healing nicely.  The heart is in normal sinus rhythm.  He has met all physical therapy criteria and verbalizes understanding of sternotomy precautions.   He verbalizes understanding of a separately handed out cardiac surgery handout.       Discharge Disposition:  Home or Self Care [1]    Discharge Medications:     Discharge Medications        New Medications        Instructions Start Date   clopidogrel 75 MG tablet  Commonly known as: PLAVIX   75 mg, Oral, Daily      Metoprolol Tartrate 37.5 MG tablet   37.5 mg, Oral, Every 12 Hours Scheduled      oxyCODONE-acetaminophen 5-325 MG per tablet  Commonly known as: Percocet   1 tablet, Oral, Every 6 Hours PRN             Changes to Medications        Instructions Start Date   lisinopril 2.5 MG tablet  Commonly known as:  CHICO JIMENEZ  What changed:   medication strength  how much to take   2.5 mg, Oral, Daily             Continue These Medications        Instructions Start Date   ascorbic acid 1000 MG tablet  Commonly known as: VITAMIN C   1,000 mg, Oral, Daily      aspirin 81 MG EC tablet   81 mg, Oral, Daily      atorvastatin 40 MG tablet  Commonly known as: LIPITOR   40 mg, Oral, Daily      coenzyme Q10 100 MG capsule   100 mg, Oral, Daily      Farxiga 10 MG tablet  Generic drug: dapagliflozin Propanediol   10 mg, Oral, Daily      glucosamine-chondroitin 500-400 MG capsule capsule   1 capsule, Oral, Daily      magnesium oxide 400 MG tablet  Commonly known as: MAG-OX   400 mg, Oral, Daily      metFORMIN 500 MG tablet  Commonly known as: GLUCOPHAGE   500 mg, Oral, 2 Times Daily With Meals      multivitamin with minerals tablet tablet   1 tablet, Oral, Daily      pantoprazole 40 MG EC tablet  Commonly known as: PROTONIX   40 mg, Oral, Daily      PROBIOTIC DAILY PO   1 tablet, Oral, Daily      Vitamin D-3 125 MCG (5000 UT) tablet   125 mcg, Oral, Daily             Stop These Medications      isosorbide mononitrate 30 MG 24 hr tablet  Commonly known as: IMDUR     meloxicam 15 MG tablet  Commonly known as: MOBIC     metoprolol succinate XL 50 MG 24 hr tablet  Commonly known as: TOPROL-XL              Discharge Diet: No concentrated sweets diet with an effort to maintain acceptable glycemic control.      Discharge Care Plan / Instructions: Please see the separately handed out cardiac surgery handout. Cardiac rehab deferred at this time due to sternotomy precautions-will reassess at formal office visit.     Activity at Discharge:   No heavy lifting greater than 5 pounds or a gallon of milk while maintaining sternal precautions.  Max Oliveira has been instructed on an exercise  regimen as detailed in a handed out cardiac surgery handout.    Tobacco:The patient does not use tobacco products and therefore does not need tobacco cessation  education.    BMI: BMI is >= 25 and <30. (Overweight) The following options were offered after discussion;: weight loss educational material (shared in after visit summary).    Follow-up Appointments: Max Oliveira  is requested to see Aayush Ewing MD within 1-2 weeks from time of discharge, to see Nicolasa LEE in 1 week, to follow-up with Dr. Cancino in 4 weeks,  and to follow-up with Dr. Snowden of the cardiology service in 6 weeks.         Electronically signed by Nicolasa Trujillo APRN at 07/03/24 3846

## 2024-07-09 ENCOUNTER — OFFICE VISIT (OUTPATIENT)
Dept: CARDIAC SURGERY | Facility: CLINIC | Age: 64
End: 2024-07-09
Payer: COMMERCIAL

## 2024-07-09 VITALS
DIASTOLIC BLOOD PRESSURE: 64 MMHG | BODY MASS INDEX: 28.59 KG/M2 | HEART RATE: 80 BPM | HEIGHT: 71 IN | WEIGHT: 204.2 LBS | SYSTOLIC BLOOD PRESSURE: 116 MMHG

## 2024-07-09 DIAGNOSIS — I10 PRIMARY HYPERTENSION: ICD-10-CM

## 2024-07-09 DIAGNOSIS — I25.110 CORONARY ARTERY DISEASE INVOLVING NATIVE CORONARY ARTERY OF NATIVE HEART WITH UNSTABLE ANGINA PECTORIS: Primary | ICD-10-CM

## 2024-07-09 DIAGNOSIS — E11.9 TYPE 2 DIABETES MELLITUS WITHOUT COMPLICATION, WITHOUT LONG-TERM CURRENT USE OF INSULIN: ICD-10-CM

## 2024-07-09 PROBLEM — T81.49XA SURGICAL WOUND INFECTION: Status: RESOLVED | Noted: 2021-11-12 | Resolved: 2024-07-09

## 2024-07-09 PROCEDURE — 99024 POSTOP FOLLOW-UP VISIT: CPT | Performed by: NURSE PRACTITIONER

## 2024-07-09 NOTE — PROGRESS NOTES
"Subjective   Chief Complaint   Patient presents with    Post-op Follow-up     Patient had CABG x 4 on 6/28     Patient ID: Max Oliveira is a 64 y.o. male who is here for follow-up having had Coronary artery bypass grafting-4 vessel (left internal mammary artery/left anterior descending, right internal mammary artery/posterior descending artery, reverse saphenous vein graft/first obtuse marginal, and reverse saphenous vein graft/second diagonal artery), Right endoscopic vein harvest, Sternal plating with Sternalock XP sternal plating performed on 6/28/2024 by Dr. Cancino.     History of Present Illness  Post operative recovery was uneventful without any major complications.  He returns today for 1 week follow-up.  He is joined by his wife.  Overall he looks well and indicates he has been doing well.  Sleep habits are fair. Pain control has been good-he is taking Tylenol as needed during the daytime and Percocet at bedtime as needed. No fevers.  He does have occasional sweats and reports this finding since surgery. No drainage from incisions.  No sternal clicks. Appetite is good. Glycemic control is good, blood sugars have been running 120s at home. Ambulating 5-10 minutes twice daily at minimum.     Has 4 pain pills left.    The following portions of the patient's history were reviewed and updated as appropriate: allergies, current medications, past family history, past medical history, past social history, past surgical history and problem list.    Review of Systems   Constitutional:  Positive for diaphoresis. Negative for fever.   Respiratory:  Negative for shortness of breath and wheezing.    Cardiovascular:  Positive for chest pain (incisional). Negative for leg swelling.       Objective   Visit Vitals  /64 (BP Location: Right arm, Patient Position: Sitting, Cuff Size: Adult)   Pulse 80   Ht 180.5 cm (71.06\")   Wt 92.6 kg (204 lb 3.2 oz)   BMI 28.43 kg/m²       Physical Exam  Vitals reviewed.   Constitutional: "       General: He is not in acute distress.     Appearance: Normal appearance. He is well-developed. He is not diaphoretic.   HENT:      Head: Normocephalic and atraumatic.   Eyes:      Pupils: Pupils are equal, round, and reactive to light.   Cardiovascular:      Rate and Rhythm: Normal rate and regular rhythm.      Heart sounds: Normal heart sounds. No murmur heard.     No friction rub.   Pulmonary:      Effort: Pulmonary effort is normal. No respiratory distress.      Breath sounds: Normal breath sounds. No wheezing or rales.   Abdominal:      General: There is no distension.      Palpations: Abdomen is soft.      Tenderness: There is no abdominal tenderness. There is no guarding.   Musculoskeletal:      Cervical back: Normal range of motion and neck supple.      Right lower leg: No edema.      Left lower leg: No edema.   Skin:     General: Skin is warm and dry.      Coloration: Skin is not pale.      Findings: No rash.      Comments: Sternotomy site clean, dry, and intact. Sternum stable. No clicks.  Saphenectomy site clean, dry, and intact.    Neurological:      Mental Status: He is alert and oriented to person, place, and time.   Psychiatric:         Behavior: Behavior normal.         Assessment & Plan       Diagnoses and all orders for this visit:    1. Coronary artery disease involving native coronary artery of native heart with unstable angina pectoris (Primary)    2. Type 2 diabetes mellitus without complication, without long-term current use of insulin    3. Primary hypertension           Overall, Max Oliveira is doing well.  Surgical incisions are clean and dry without evidence of infection. We discussed current sternotomy precautions and how these will not be advanced yet. Continue post op surgical wound care: shower daily with antibacterial soap and water, avoid use of lotions, creams, ointments, powders etc, allow steri strips to fall off on their own. Following post op cardiac surgery home  instructions.     We discussed over-the-counter and alternative pain medication strategies.    We discussed the importance of strict glycemic control in the post operative setting and this impacts wound healing, infection risk, and future cardiovascular disease. Continue to follow with primary care for management of diabetes mellitus.     Provided support and encouragement. All questions have been answered to the best of my ability. Will discuss starting cardiac rehabilitation at official 1 month post op visit. Patient has follow up with Dr. Cancino in a few weeks. Patient has follow up with Cardiology in a few weeks. Patient has been instructed to contact our office with any questions or concerns should they arise prior to the next office visit.     BMI is >= 25 and <30. (Overweight) The following options were offered after discussion;: weight loss educational material (shared in after visit summary).        Max Oliveira  reports that he quit smoking about 30 years ago. His smoking use included cigarettes. He started smoking about 40 years ago. He has a 10 pack-year smoking history. He has never used smokeless tobacco.          Advance Care Planning   ACP discussion was declined by the patient.  Not applicable as patient is under 65 years of age.

## 2024-07-11 ENCOUNTER — TELEPHONE (OUTPATIENT)
Dept: CARDIAC SURGERY | Facility: CLINIC | Age: 64
End: 2024-07-11

## 2024-07-11 DIAGNOSIS — Z95.1 S/P CABG (CORONARY ARTERY BYPASS GRAFT): ICD-10-CM

## 2024-07-11 RX ORDER — OXYCODONE HYDROCHLORIDE AND ACETAMINOPHEN 5; 325 MG/1; MG/1
1 TABLET ORAL EVERY 8 HOURS PRN
Qty: 9 TABLET | Refills: 0 | Status: SHIPPED | OUTPATIENT
Start: 2024-07-11

## 2024-07-11 NOTE — TELEPHONE ENCOUNTER
Pt calling re: pain medication refill. He states he was told to call end of this week if he needs a refill. He uses [facility] [address]

## 2024-07-12 ENCOUNTER — READMISSION MANAGEMENT (OUTPATIENT)
Dept: CALL CENTER | Facility: HOSPITAL | Age: 64
End: 2024-07-12
Payer: COMMERCIAL

## 2024-07-12 NOTE — OUTREACH NOTE
CT Surgery Week 1 Survey      Flowsheet Row Responses   [facility] facility patient discharged from? [place]   Does the patient have one of the following disease processes/diagnoses(primary or secondary)? Cardiothoracic surgery   Week 1 attempt successful? No   Unsuccessful attempts Attempt 1              Azalia MYRICK - Licensed Nurse

## 2024-07-15 ENCOUNTER — TELEPHONE (OUTPATIENT)
Dept: CARDIAC SURGERY | Facility: CLINIC | Age: 64
End: 2024-07-15

## 2024-07-15 NOTE — TELEPHONE ENCOUNTER
Caller: iLsbeth Oliveira    Relationship: Emergency Contact    Best call back number: [phone]    What form or medical record are you requesting: FMLA    Who is requesting this form or medical record from you: SHORT TERM DISABILITY    How would you like to receive the form or medical records (pick-up, mail, fax): FAX      Timeframe paperwork needed: DUE BY NEXT MONDAY    Additional notes: PT WIFE CALLED TO CHECK ON STATUS OF FMLA PAPERWORK THEY STATE THEY DROPPED IT OF BEFORE HIS SURGERY. PLEASE GIVE HER A CALL BACK WITH ANY QUESTIONS. THANK YOU

## 2024-07-16 NOTE — TELEPHONE ENCOUNTER
Attempted to call wife at # listed but no answer.  VM message left reporting forms had been completed & faxed in as directed/hina

## 2024-07-19 ENCOUNTER — TELEPHONE (OUTPATIENT)
Dept: CARDIAC SURGERY | Facility: CLINIC | Age: 64
End: 2024-07-19
Payer: COMMERCIAL

## 2024-07-23 ENCOUNTER — READMISSION MANAGEMENT (OUTPATIENT)
Dept: CALL CENTER | Facility: HOSPITAL | Age: 64
End: 2024-07-23
Payer: COMMERCIAL

## 2024-07-23 NOTE — OUTREACH NOTE
CT Surgery Week 3 Survey      Flowsheet Row Responses   [facility] patient discharged from? [place]   Does the patient have one of the following disease processes/diagnoses(primary or secondary)? Cardiothoracic surgery   Week 3 attempt successful? Yes   Call start time 1315   Call end time 1317   Discharge diagnosis CAD, CABG x 4 using rright leg vein harvest   Meds reviewed with patient/caregiver? Yes   Is the patient taking all medications as directed (includes completed medication regime)? Yes   Does the patient have a primary care provider?  Yes   Has the patient kept scheduled appointments due by today? Yes  [7/9/24 post op]   Did the patient receive a copy of their discharge instructions? Yes   Nursing interventions Reviewed instructions with patient   What is the patient's perception of their health status since discharge? Improving   Nursing interventions Reassured on normal signs of recovery   Is the patient/caregiver able to teach back signs and symptoms of incisional infection? Increased redness, swelling or pain at the incisonal site, Increased drainage or bleeding, Incisional warmth, Pus or odor from incision, Fever   If the patient is a current smoker, are they able to teach back resources for cessation? Not a smoker   Week 3 call completed? Yes   Graduated Yes   Graduated/Revoked comments Pt improving-pt has had a FU apt with his surgeon's office since [facility]   Call end time 1317            Maria Isabel ROMAN - Registered Nurse

## 2024-07-31 ENCOUNTER — OFFICE VISIT (OUTPATIENT)
Dept: CARDIAC SURGERY | Facility: CLINIC | Age: 64
End: 2024-07-31
Payer: COMMERCIAL

## 2024-07-31 VITALS
SYSTOLIC BLOOD PRESSURE: 120 MMHG | WEIGHT: 211.4 LBS | OXYGEN SATURATION: 98 % | BODY MASS INDEX: 29.6 KG/M2 | HEART RATE: 72 BPM | HEIGHT: 71 IN | DIASTOLIC BLOOD PRESSURE: 80 MMHG

## 2024-07-31 DIAGNOSIS — Z95.1 S/P CABG (CORONARY ARTERY BYPASS GRAFT): Primary | ICD-10-CM

## 2024-07-31 DIAGNOSIS — E11.9 TYPE 2 DIABETES MELLITUS WITHOUT COMPLICATION, WITHOUT LONG-TERM CURRENT USE OF INSULIN: ICD-10-CM

## 2024-07-31 DIAGNOSIS — I25.10 CORONARY ARTERY DISEASE INVOLVING NATIVE CORONARY ARTERY OF NATIVE HEART WITHOUT ANGINA PECTORIS: ICD-10-CM

## 2024-07-31 NOTE — PROGRESS NOTES
Subjective   Patient ID: Max Oliveira is a 64 y.o. male who is here for follow-up having had Coronary artery bypass grafting-4 vessel (left internal mammary artery/left anterior descending, right internal mammary artery/posterior descending artery, reverse saphenous vein graft/first obtuse marginal, and reverse saphenous vein graft/second diagonal artery), Right endoscopic vein harvest, Sternal plating with Sternalock XP sternal plating performed on 6/28/2024 by Dr. Cancino.         History of Present Illness  The patient is a 64-year-old male who had a fairly uneventful postoperative course of recovery. He did see Nicolasa Trujillo on 1 week on discharge with no major complaints noted. He had good glycemic control. Pain control is somewhat elusive. Discussed alternative over-the-counter nonopioid pain control strategies. With that, he returns today for his initial form of postoperative visit.    The patient reports overall well-being, albeit with residual soreness and shortness of breath. He maintains an active lifestyle, walking for 1 hour daily at the mall. 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 He occasionally experiences a gurgling sensation when lying in bed, which disrupts his sleep, necessitating the use of a pillow for support. His diabetes is well-managed, with blood glucose levels ranging from 109 to 113. He has a scheduled appointment with Dr. Guillaume in approximately 2 weeks. His incision is healing well, albeit with residual hardness. He applies a heat pad for relief. He underwent jaw surgery a few years ago, during which bone marrow was excised from his hip. The scab has since fallen off. He has regained his 's license and is eager to resume driving. He experiences soreness, numbness, and a stinging sensation when his shirt touches his incision. He urinates approximately every hour, which he attributes to increased water  "intake. He also reports a mild cough, which is more noticeable when he is in a recliner for extended periods.      The following portions of the patient's history were reviewed and updated as appropriate: allergies, current medications, past family history, past medical history, past social history, past surgical history and problem list.        Objective   Visit Vitals  /80 (BP Location: Right arm, Patient Position: Sitting, Cuff Size: Adult)   Pulse 72   Ht 180.5 cm (71.06\")   Wt 95.9 kg (211 lb 6.4 oz)   SpO2 98%   BMI 29.43 kg/m²       Physical Exam  Physical Exam  No acute distress, appropriate.  Lungs are clear to auscultation bilaterally without wheezing, rubs, or rales. Chest is stable without clicks.  The incision is healing nicely.  Heart has a regular rate and rhythm without murmurs, rubs, or gallops.  Abdomen is soft, nondistended, nontender.  Extremities show no clubbing, cyanosis, or edema. Right saphenectomy incision is healing nicely. Along the thigh saphenectomy tunnel, there is still some tenderness, but this is within normal limits.      XR Chest PA & Lateral    Result Date: 7/3/2024  Narrative: EXAMINATION: XR CHEST PA AND LATERAL-  7/3/2024 3:40 AM  HISTORY: Post CABG; Z74.09-Other reduced mobility; I25.10-Atherosclerotic heart disease of native coronary artery without angina pectoris  2 views of the chest are compared with the previous study dated 7/1/2024.  The lungs are poorly expanded, slightly better than the previous study.  There is a persistent small bibasilar pleural effusion. No change.  There is left lower lobar consolidation similar to the previous study.  The left-sided chest tube have been removed in the interval.  There is no pulmonary congestion. There is no pneumothorax.  The heart size is in the normal range. There is evidence of prior cardiac surgery.  There is no acute bony abnormality.      Impression: 1. The lungs are slightly better expanded since the previous study. " Left-sided chest tube have been removed since the previous study. No pneumothorax. 2. Persistent small bibasilar pleural effusion and left lower lobar consolidation.  This report was signed and finalized on 7/3/2024 6:39 AM by Dr. Radha Allen MD.     Results  Laboratory Studies  Blood sugar levels range from 109 to 113. Blood sugar this morning was 130.    Assessment & Plan       Diagnoses and all orders for this visit:    1. S/P CABG (coronary artery bypass graft) (Primary)  -     Ambulatory Referral to Cardiac Rehab    2. Coronary artery disease involving native coronary artery of native heart without angina pectoris    3. Type 2 diabetes mellitus without complication, without long-term current use of insulin      Assessment & Plan  1. Multivessel coronary disease.  Mr. Oliveira's diabetes mellitus is well-managed. Mr. Oliveira is demonstrating satisfactory progress following cardiac surgery, with excellent pain control. Sternotomy precautions were discussed, emphasizing the importance of advancing these over the next several months. The patient has been cleared to commence cardiac rehabilitation, including driving, and will commence cardiac rehabilitation at [facility]. The importance of ongoing cardiovascular risk factor modification was emphasized, and the patient has already made progress with improved glycemic control. The value of progressive, durable, and lifelong exercise was discussed. The patient is eager to resume gym activities. A follow-up appointment is scheduled for 6 weeks from now for a final assessment. The patient has an upcoming appointment with Markell in the upcoming month.    He is a non smoker.      Many thanks for the opportunity to care for your patient.    I will continue to keep you apprised of provided care as it ensues.          Transcribed from ambient dictation for Tim Cancino MD by Tim Cancino MD.  07/31/24   09:41 CDT    Patient or patient representative  verbalized consent for the use of Ambient Listening during the visit with  Tmi Cancino MD for chart documentation. 8/18/2024  08:58 CDT

## 2024-08-14 ENCOUNTER — OFFICE VISIT (OUTPATIENT)
Dept: CARDIOLOGY | Facility: CLINIC | Age: 64
End: 2024-08-14
Payer: COMMERCIAL

## 2024-08-14 VITALS
HEIGHT: 71 IN | BODY MASS INDEX: 29.68 KG/M2 | OXYGEN SATURATION: 97 % | RESPIRATION RATE: 18 BRPM | WEIGHT: 212 LBS | SYSTOLIC BLOOD PRESSURE: 140 MMHG | HEART RATE: 75 BPM | DIASTOLIC BLOOD PRESSURE: 90 MMHG

## 2024-08-14 DIAGNOSIS — I25.10 CORONARY ARTERY DISEASE INVOLVING NATIVE CORONARY ARTERY OF NATIVE HEART WITHOUT ANGINA PECTORIS: Primary | ICD-10-CM

## 2024-08-14 DIAGNOSIS — I10 PRIMARY HYPERTENSION: ICD-10-CM

## 2024-08-14 DIAGNOSIS — E78.2 MIXED HYPERLIPIDEMIA: ICD-10-CM

## 2024-08-14 DIAGNOSIS — E11.9 TYPE 2 DIABETES MELLITUS WITHOUT COMPLICATION, WITHOUT LONG-TERM CURRENT USE OF INSULIN: ICD-10-CM

## 2024-08-14 RX ORDER — ATORVASTATIN CALCIUM 80 MG/1
80 TABLET, FILM COATED ORAL DAILY
Qty: 90 TABLET | Refills: 3 | Status: SHIPPED | OUTPATIENT
Start: 2024-08-14

## 2024-08-14 RX ORDER — METOPROLOL SUCCINATE 50 MG/1
50 TABLET, EXTENDED RELEASE ORAL DAILY
Qty: 30 TABLET | Refills: 11 | Status: SHIPPED | OUTPATIENT
Start: 2024-08-14

## 2024-08-14 RX ORDER — ATORVASTATIN CALCIUM 80 MG/1
80 TABLET, FILM COATED ORAL DAILY
Qty: 30 TABLET | Refills: 11 | Status: SHIPPED | OUTPATIENT
Start: 2024-08-14 | End: 2024-08-14 | Stop reason: SDUPTHER

## 2024-08-14 RX ORDER — LISINOPRIL 10 MG/1
10 TABLET ORAL DAILY
Qty: 90 TABLET | Refills: 3 | Status: SHIPPED | OUTPATIENT
Start: 2024-08-14

## 2024-08-14 NOTE — PROGRESS NOTES
Subjective:     Encounter Date:08/14/2024      Patient ID: Max Oliveira is a 64 y.o. male     Chief Complaint:  Coronary Artery Disease  Pertinent negatives include no chest pain, dizziness, leg swelling, palpitations, shortness of breath or weight gain.     Patient presents today for routine cardiology follow up. Patient established with Dr. Snowden in June for shortness of breath, heaviness in his chest and an abnormal stress. He underwent heart cath in June which showed multivessel disease- including Left Main, LAD, OM and RCA/ILIANA. At that time his statin was switched from Zocor to Lipitor. He was referred to CT Surgery. Patient June 28th underwent CABG x 4 with LIMA to LAD, GAMAL to PDA, SVG to First OM and SVG to Second OM. He did have some post op PVCs. His LVEF was estimated to be 41-45% on LUIS. Prior LVEF was normal. He was on Lisinopril and Toprol prior to CABG. His Lisinopril was reduced to 2.5 and Toprol switched to Lopressor. Overall he is stable. He notes he has been walking and doing well. Denies chest pain or palpitations. He had recent labs by PCP. LDL was 89. Former smoker.     The following portions of the patient's history were reviewed and updated as appropriate: allergies, current medications, past medical history, past social history, past and problem list.    No Known Allergies    Current Outpatient Medications:     ascorbic acid (VITAMIN C) 1000 MG tablet, Take 1 tablet by mouth Daily., Disp: , Rfl:     aspirin 81 MG EC tablet, Take 1 tablet by mouth Daily., Disp: , Rfl:     atorvastatin (LIPITOR) 80 MG tablet, Take 1 tablet by mouth Daily., Disp: 90 tablet, Rfl: 3    Cholecalciferol (Vitamin D-3) 125 MCG (5000 UT) tablet, Take 1 tablet by mouth Daily., Disp: , Rfl:     clopidogrel (PLAVIX) 75 MG tablet, Take 1 tablet by mouth Daily., Disp: 30 tablet, Rfl: 2    coenzyme Q10 100 MG capsule, Take 1 capsule by mouth Daily., Disp: , Rfl:     Dapagliflozin Propanediol (Farxiga) 10 MG tablet, Take  10 mg by mouth Daily., Disp: , Rfl:     glucosamine-chondroitin 500-400 MG capsule capsule, Take 1 capsule by mouth Daily., Disp: , Rfl:     lisinopril (PRINIVIL,ZESTRIL) 10 MG tablet, Take 1 tablet by mouth Daily., Disp: 90 tablet, Rfl: 3    magnesium oxide (MAG-OX) 400 MG tablet, Take 1 tablet by mouth Daily., Disp: , Rfl:     metFORMIN (GLUCOPHAGE) 500 MG tablet, Take 1 tablet by mouth 2 (Two) Times a Day With Meals., Disp: , Rfl:     multivitamin with minerals tablet tablet, Take 1 tablet by mouth Daily., Disp: , Rfl:     oxyCODONE-acetaminophen (Percocet) 5-325 MG per tablet, Take 1 tablet by mouth Every 8 (Eight) Hours As Needed for Other (pain)., Disp: 9 tablet, Rfl: 0    pantoprazole (PROTONIX) 40 MG EC tablet, Take 1 tablet by mouth Daily., Disp: , Rfl:     Probiotic Product (PROBIOTIC DAILY PO), Take 1 tablet by mouth Daily., Disp: , Rfl:     metoprolol succinate XL (TOPROL-XL) 50 MG 24 hr tablet, Take 1 tablet by mouth Daily., Disp: 30 tablet, Rfl: 11    Social History     Socioeconomic History    Marital status:    Tobacco Use    Smoking status: Former     Current packs/day: 0.00     Average packs/day: 1 pack/day for 10.0 years (10.0 ttl pk-yrs)     Types: Cigarettes     Start date:      Quit date:      Years since quittin.6    Smokeless tobacco: Never   Vaping Use    Vaping status: Never Used   Substance and Sexual Activity    Alcohol use: Yes     Comment: pt states frequent beer drinker    Drug use: Not Currently    Sexual activity: Defer       Review of Systems   Constitutional: Negative for chills, decreased appetite, fever, malaise/fatigue, weight gain and weight loss.   HENT:  Negative for nosebleeds.    Eyes:  Negative for visual disturbance.   Cardiovascular:  Negative for chest pain, dyspnea on exertion, leg swelling, near-syncope, orthopnea, palpitations, paroxysmal nocturnal dyspnea and syncope.   Respiratory:  Negative for cough, hemoptysis, shortness of breath and  "snoring.    Endocrine: Negative for cold intolerance and heat intolerance.   Hematologic/Lymphatic: Negative for bleeding problem. Does not bruise/bleed easily.   Skin:  Negative for rash.   Musculoskeletal:  Negative for back pain and falls.   Gastrointestinal:  Negative for abdominal pain, constipation, diarrhea, heartburn, melena, nausea and vomiting.   Genitourinary:  Negative for hematuria.   Neurological:  Negative for dizziness, headaches and light-headedness.   Psychiatric/Behavioral:  Negative for altered mental status.    Allergic/Immunologic: Negative for persistent infections.              Objective:     Constitutional:       Appearance: Healthy appearance. Well-developed.   Eyes:      Pupils: Pupils are equal, round, and reactive to light.   HENT:      Head: Normocephalic and atraumatic.   Neck:      Vascular: No carotid bruit or JVD.   Pulmonary:      Effort: Pulmonary effort is normal.      Breath sounds: Normal breath sounds.   Cardiovascular:      Normal rate. Regular rhythm.      Comments: Wounds from CABG are healing  Pulses:     Intact distal pulses.   Edema:     Peripheral edema absent.   Abdominal:      General: Bowel sounds are normal.      Palpations: Abdomen is soft.   Musculoskeletal: Normal range of motion.      Cervical back: Normal range of motion and neck supple. Skin:     General: Skin is warm and dry.   Neurological:      Mental Status: Alert, oriented to person, place, and time and oriented to person, place and time.      Deep Tendon Reflexes: Reflexes are normal and symmetric.   Psychiatric:         Behavior: Behavior normal.         Thought Content: Thought content normal.         Judgment: Judgment normal.           Procedures  /90 (BP Location: Right arm, Patient Position: Sitting, Cuff Size: Adult)   Pulse 75   Resp 18   Ht 180.3 cm (71\")   Wt 96.2 kg (212 lb)   SpO2 97%   BMI 29.57 kg/m²   Lab Review:   I have reviewed 8/6/24        No results found for: \"CHOL\", " "\"CHLPL\", \"TRIG\", \"HDL\", \"LDL\", \"LDLDIRECT\"   Results for orders placed during the hospital encounter of 06/28/24    Intra-Op TAVR Transesophageal Echo    Interpretation Summary    Unremarkable intraoperative exam.    Initial images show  Left ventricular systolic function is mildly decreased. Left ventricular ejection fraction appears to be 41 - 45%, with the following left ventricular wall segments hypokinetic: mid anterior, apical anterior, mid anterolateral, apical lateral, apical septal, basal inferoseptal, mid anteroseptal and basal inferoseptal.    Though there is only view obtained at the end of the case (transgastric short axis), LV systolic function appears significant improved with normal wall motion as assessed by this 1 view.     Assessment:          Diagnosis Plan   1. Coronary artery disease involving native coronary artery of native heart without angina pectoris        2. Mixed hyperlipidemia  Lipid Panel      3. Primary hypertension        4. Type 2 diabetes mellitus without complication, without long-term current use of insulin               Plan:       Coronary Artery Disease- s/p CABG x 4 in June. On Plavix and Aspirin. Continue Aspirin for life. Plavix for 90 days. Increase Lipitor to 80. Goal LDL less than 70. On Lopressor- will switch to Toprol. Resume Lisinopril at previous dose. Cardiac Rehab. No angina.  Mixed Hyperlipidemia- LDL 89. Switched to Lipitor 40 in June. Will increase Lipitor 80. Goal LDL less than 70  Hypertension - borderline high. Resume Lisinopril at previous dose of 10  Type II DM- managed by PCP. On Farxiga.        Follow up in 3 months. Check lipid panel at that time. Follows with Dr. Cancino again in September. Monitor BP.  "

## 2024-08-19 ENCOUNTER — OFFICE VISIT (OUTPATIENT)
Dept: CARDIAC SURGERY | Facility: CLINIC | Age: 64
End: 2024-08-19
Payer: COMMERCIAL

## 2024-08-19 ENCOUNTER — TELEPHONE (OUTPATIENT)
Dept: CARDIAC SURGERY | Facility: CLINIC | Age: 64
End: 2024-08-19
Payer: COMMERCIAL

## 2024-08-19 VITALS
HEART RATE: 82 BPM | BODY MASS INDEX: 29.82 KG/M2 | OXYGEN SATURATION: 99 % | WEIGHT: 213 LBS | SYSTOLIC BLOOD PRESSURE: 116 MMHG | HEIGHT: 71 IN | DIASTOLIC BLOOD PRESSURE: 67 MMHG

## 2024-08-19 DIAGNOSIS — I25.10 CORONARY ARTERY DISEASE INVOLVING NATIVE CORONARY ARTERY OF NATIVE HEART WITHOUT ANGINA PECTORIS: ICD-10-CM

## 2024-08-19 DIAGNOSIS — Z48.89 ENCOUNTER FOR POST SURGICAL WOUND CHECK: Primary | ICD-10-CM

## 2024-08-19 DIAGNOSIS — E11.9 TYPE 2 DIABETES MELLITUS WITHOUT COMPLICATION, WITHOUT LONG-TERM CURRENT USE OF INSULIN: ICD-10-CM

## 2024-08-19 PROCEDURE — 99024 POSTOP FOLLOW-UP VISIT: CPT | Performed by: NURSE PRACTITIONER

## 2024-08-19 NOTE — TELEPHONE ENCOUNTER
"Pt had CABG x 4 with sternal plating on 06/28/2024    Pt calling this morning to report that he had a blood blister-like area on his saphenectomy site. Pt reports he saw Markell LEE last week and stated that she thought it looked normal. Pt states that the blood blister has opened up over the weekend and and seeping clear drainage. Pt states he has not noticed any odor to the drainage. He has been cleaning the site for the last 2-3 days and placing a Band-Aid on it. It is currently erythematic and bleeding with \"an internal stitch protruding.\" He states his leg is starting to look swollen around the ankle. Pt is concerned and states he is susceptible to infection as he acquired infection after his shoulder surgery. Pt denies systemic fever. Pt would like a return call from provider at [phone].  "

## 2024-08-19 NOTE — PROGRESS NOTES
"Subjective   Chief Complaint   Patient presents with    Wound Check     Patient is here for wound check     Patient ID: Max Oliveira is a 64 y.o. male who is here for follow-up having had Coronary artery bypass grafting-4 vessel (left internal mammary artery/left anterior descending, right internal mammary artery/posterior descending artery, reverse saphenous vein graft/first obtuse marginal, and reverse saphenous vein graft/second diagonal artery), Right endoscopic vein harvest, Sternal plating with Sternalock XP sternal plating performed on 6/28/2024 by Dr. Cancino.     Wound Check      Post operative recovery was uneventful without any major complications.  He called the office this morning to report drainage at distal aspect of saphenectomy site. Last week he was seen by cardiology office and reports an exposed suture was clipped.  He reports having a blood blister.  He is concerned with the possibility of infection due to a previous surgical site infection years ago (shoulder).  He presents now with his wife.  No fevers.  Hopeful to start cardiac rehab soon. No drainage from sternal incision.  No sternal clicks. Well controlled BG.     The following portions of the patient's history were reviewed and updated as appropriate: allergies, current medications, past family history, past medical history, past social history, past surgical history and problem list.    Review of Systems   Constitutional:  Negative for chills, diaphoresis and fever.   Respiratory:  Negative for shortness of breath and wheezing.    Cardiovascular:  Positive for leg swelling (trace right ankle). Negative for chest pain.   Skin:  Positive for wound.       Objective   Visit Vitals  /67 (BP Location: Right arm, Patient Position: Sitting, Cuff Size: Adult)   Pulse 82   Ht 180.3 cm (71\")   Wt 96.6 kg (213 lb)   SpO2 99%   BMI 29.71 kg/m²       Physical Exam  Vitals reviewed.   Constitutional:       General: He is not in acute distress.     " Appearance: Normal appearance. He is well-developed. He is not diaphoretic.   HENT:      Head: Normocephalic and atraumatic.   Eyes:      Pupils: Pupils are equal, round, and reactive to light.   Cardiovascular:      Rate and Rhythm: Normal rate and regular rhythm.      Heart sounds: Normal heart sounds. No murmur heard.     No friction rub.   Pulmonary:      Effort: Pulmonary effort is normal. No respiratory distress.      Breath sounds: Normal breath sounds. No wheezing or rales.   Abdominal:      General: There is no distension.      Palpations: Abdomen is soft.      Tenderness: There is no abdominal tenderness. There is no guarding.   Musculoskeletal:      Cervical back: Normal range of motion and neck supple.      Right lower leg: No edema (trace right ankle).      Left lower leg: No edema.   Skin:     General: Skin is warm and dry.      Coloration: Skin is not pale.      Findings: No rash.      Comments: Sternotomy site clean, dry, and intact. Sternum stable. No clicks.  Right LE Saphenectomy site clean, dry, and intact with exception of most distal site with exposed suture and surrounding skin irritation.  Removed suture.  There is approximately 3 to 4 mm skin defect.  No evidence of infection.  Band-Aid applied.   Neurological:      Mental Status: He is alert and oriented to person, place, and time.   Psychiatric:         Behavior: Behavior normal.         Assessment & Plan       Diagnoses and all orders for this visit:    1. Encounter for post surgical wound check (Primary)    2. Coronary artery disease involving native coronary artery of native heart without angina pectoris    3. Type 2 diabetes mellitus without complication, without long-term current use of insulin           Overall, Max Oliveira is doing well.  Sternal incision appears to be healing quite well.  Saphenectomy site suture removed, Band-Aid applied.  Instructed to keep surgical sites clean and dry with antibacterial soap and water.   Discussed there is no evidence of infection today.  Keep lower extremity elevated to help with lower extremity edema.  He is eager to start cardiac rehab soon as he will have to return to work in the next few weeks. Called cardiac rehab who reports they will call him later today for scheduling.  Continue to advance sternal restrictions as previously discussed with Dr. Cancino.  Keep planned follow-up with Dr. Cancino in a few weeks.  Offered sooner appointment for wound check but he elects to call with any changes at which he can be placed on my schedule for exam.     We discussed the importance of strict glycemic control in the post operative setting and this impacts wound healing, infection risk, and future cardiovascular disease. Continue to follow with primary care for management of diabetes mellitus.     Keep planned follow-up with cardiology for CAD.    Patient has been instructed to contact our office with any questions or concerns should they arise prior to the next office visit.     BMI is >= 25 and <30. (Overweight) The following options were offered after discussion;: weight loss educational material (shared in after visit summary).        Max Oliveira  reports that he quit smoking about 30 years ago. His smoking use included cigarettes. He started smoking about 40 years ago. He has a 10 pack-year smoking history. He has never used smokeless tobacco.          Advance Care Planning   ACP discussion was declined by the patient.  Not applicable as patient is under 65 years of age.

## 2024-08-23 ENCOUNTER — OFFICE VISIT (OUTPATIENT)
Dept: CARDIAC SURGERY | Facility: CLINIC | Age: 64
End: 2024-08-23
Payer: COMMERCIAL

## 2024-08-23 ENCOUNTER — TELEPHONE (OUTPATIENT)
Dept: CARDIAC SURGERY | Facility: CLINIC | Age: 64
End: 2024-08-23

## 2024-08-23 VITALS
HEART RATE: 90 BPM | HEIGHT: 71 IN | BODY MASS INDEX: 30.1 KG/M2 | WEIGHT: 215 LBS | DIASTOLIC BLOOD PRESSURE: 82 MMHG | SYSTOLIC BLOOD PRESSURE: 118 MMHG | OXYGEN SATURATION: 97 %

## 2024-08-23 DIAGNOSIS — Z51.89 VISIT FOR WOUND CHECK: Primary | ICD-10-CM

## 2024-08-23 DIAGNOSIS — I25.10 CORONARY ARTERY DISEASE INVOLVING NATIVE CORONARY ARTERY OF NATIVE HEART WITHOUT ANGINA PECTORIS: ICD-10-CM

## 2024-08-23 DIAGNOSIS — E11.9 TYPE 2 DIABETES MELLITUS WITHOUT COMPLICATION, WITHOUT LONG-TERM CURRENT USE OF INSULIN: ICD-10-CM

## 2024-08-23 RX ORDER — AMOXICILLIN AND CLAVULANATE POTASSIUM 875; 125 MG/1; MG/1
1 TABLET, FILM COATED ORAL 2 TIMES DAILY
Qty: 14 TABLET | Refills: 0 | Status: SHIPPED | OUTPATIENT
Start: 2024-08-23 | End: 2024-08-30

## 2024-08-23 NOTE — PROGRESS NOTES
"Subjective   Chief Complaint   Patient presents with    Wound Check     Patient is here for wound check     Patient ID: Max Oliveira is a 64 y.o. male who is here for follow-up having had Coronary artery bypass grafting-4 vessel (left internal mammary artery/left anterior descending, right internal mammary artery/posterior descending artery, reverse saphenous vein graft/first obtuse marginal, and reverse saphenous vein graft/second diagonal artery), Right endoscopic vein harvest, Sternal plating with Sternalock XP sternal plating performed on 6/28/2024 by Dr. Cancino.     Wound Check      Post operative recovery was uneventful without any major complications.  He was seen earlier this week for wound check of saphenectomy site.  He is office this morning to report increasing redness and drainage.  He presents now for evaluation with his wife.  Blood sugars are well-controlled, ranging in the 120s.  He was notified by cardiac rehab after visit earlier this week that he will be ready to start next week.  He has plans to go out of town for a few days this weekend.  Otherwise, no new changes to medical history since last exam on Monday.    The following portions of the patient's history were reviewed and updated as appropriate: allergies, current medications, past family history, past medical history, past social history, past surgical history and problem list.    Review of Systems   Constitutional:  Negative for chills, diaphoresis and fever.   Respiratory:  Negative for shortness of breath and wheezing.    Cardiovascular:  Positive for leg swelling (trace right ankle). Negative for chest pain.   Skin:  Positive for wound (Right leg vein harvesting site).       Objective   Visit Vitals  /82 (BP Location: Right arm, Patient Position: Sitting, Cuff Size: Adult)   Pulse 90   Ht 180.3 cm (71\")   Wt 97.5 kg (215 lb)   SpO2 97%   BMI 29.99 kg/m²       Physical Exam  Vitals reviewed.   Constitutional:       General: He is " not in acute distress.     Appearance: Normal appearance. He is well-developed. He is not diaphoretic.   HENT:      Head: Normocephalic and atraumatic.   Eyes:      Pupils: Pupils are equal, round, and reactive to light.   Cardiovascular:      Rate and Rhythm: Normal rate and regular rhythm.      Heart sounds: Normal heart sounds. No murmur heard.     No friction rub.   Pulmonary:      Effort: Pulmonary effort is normal. No respiratory distress.      Breath sounds: Normal breath sounds. No wheezing or rales.   Abdominal:      General: There is no distension.      Palpations: Abdomen is soft.      Tenderness: There is no abdominal tenderness. There is no guarding.   Musculoskeletal:      Cervical back: Normal range of motion and neck supple.      Right lower leg: Edema (trace right ankle) present.      Left lower leg: No edema.   Skin:     General: Skin is warm and dry.      Coloration: Skin is not pale.      Findings: No rash.      Comments: Sternotomy site clean, dry, and intact. Sternum stable. No clicks.  Right saphenectomy site clean, dry, and intact with exception of most distal site now increasing erythema in a circular pattern, mildly fluctuant incision and able to express small amount of blood with palpation.  No purulent drainage.    Neurological:      Mental Status: He is alert and oriented to person, place, and time.   Psychiatric:         Behavior: Behavior normal.     Wound examined by Dr. Cancino today    Assessment & Plan       Diagnoses and all orders for this visit:    1. Visit for wound check (Primary)    2. Coronary artery disease involving native coronary artery of native heart without angina pectoris    3. Type 2 diabetes mellitus without complication, without long-term current use of insulin    Other orders  -     amoxicillin-clavulanate (AUGMENTIN) 875-125 MG per tablet; Take 1 tablet by mouth 2 (Two) Times a Day for 7 days.  Dispense: 14 tablet; Refill: 0         Overall, Max Oliveira is doing  well.  Sternal incision appears to be healing quite well.  He has plans to start cardiac rehab next week.  Wound care: Continue to keep clean and dry with antibacterial soap and water.  Okay for warm compresses.  Saphenectomy site examined by Dr. Cancino as well.  Given increasing erythema since Monday, recommend course of antibiotics.  I will follow-up with him in 2 weeks or sooner should issues arise or wound worsens. Patient and wife verbalize understanding.     We discussed the importance of strict glycemic control in the post operative setting and this impacts wound healing, infection risk, and future cardiovascular disease. Continue to follow with primary care for management of diabetes mellitus.     Keep planned follow-up with cardiology for CAD.    Patient has been instructed to contact our office with any questions or concerns should they arise prior to the next office visit.     BMI is >= 25 and <30. (Overweight) The following options were offered after discussion;: weight loss educational material (shared in after visit summary).        Max Oliveira  reports that he quit smoking about 30 years ago. His smoking use included cigarettes. He started smoking about 40 years ago. He has a 10 pack-year smoking history. He has never used smokeless tobacco.          Advance Care Planning   ACP discussion was declined by the patient.  Not applicable as patient is under 65 years of age.

## 2024-08-23 NOTE — TELEPHONE ENCOUNTER
Pt had CABG x 4 on 06/28/2024. Last office visit was 08/19/2024.    Pt is calling w/ complaints of saphenectomy site. Erythematic incision - states that redness has spread about an inch on either side of his incision. There is clear, blood-tinged drainage. Pt reports there is no odor to the drainage. He states his incision is also warm and tender. Pt denies systemic fever. Reports he is washing the incision several times per day with antibacterial soap and water and is not using lotions/creams/ointments/oils. He is requesting to be soon as soon as possible. He states he is leaving Monday to go out of town for 3 days. Pt can be reached at [phone].

## 2024-08-30 ENCOUNTER — TREATMENT (OUTPATIENT)
Dept: CARDIAC REHAB | Facility: HOSPITAL | Age: 64
End: 2024-08-30
Payer: COMMERCIAL

## 2024-08-30 DIAGNOSIS — Z95.1 S/P CABG (CORONARY ARTERY BYPASS GRAFT): Primary | ICD-10-CM

## 2024-08-30 PROCEDURE — 93798 PHYS/QHP OP CAR RHAB W/ECG: CPT

## 2024-09-04 ENCOUNTER — TREATMENT (OUTPATIENT)
Dept: CARDIAC REHAB | Facility: HOSPITAL | Age: 64
End: 2024-09-04
Payer: COMMERCIAL

## 2024-09-04 ENCOUNTER — OFFICE VISIT (OUTPATIENT)
Dept: CARDIAC SURGERY | Facility: CLINIC | Age: 64
End: 2024-09-04
Payer: COMMERCIAL

## 2024-09-04 VITALS
SYSTOLIC BLOOD PRESSURE: 107 MMHG | HEIGHT: 71 IN | BODY MASS INDEX: 30.3 KG/M2 | OXYGEN SATURATION: 95 % | HEART RATE: 73 BPM | DIASTOLIC BLOOD PRESSURE: 73 MMHG | WEIGHT: 216.4 LBS

## 2024-09-04 DIAGNOSIS — Z95.1 S/P CABG (CORONARY ARTERY BYPASS GRAFT): Primary | ICD-10-CM

## 2024-09-04 DIAGNOSIS — E11.9 TYPE 2 DIABETES MELLITUS WITHOUT COMPLICATION, WITHOUT LONG-TERM CURRENT USE OF INSULIN: ICD-10-CM

## 2024-09-04 DIAGNOSIS — I25.10 CORONARY ARTERY DISEASE INVOLVING NATIVE CORONARY ARTERY OF NATIVE HEART WITHOUT ANGINA PECTORIS: ICD-10-CM

## 2024-09-04 DIAGNOSIS — Z51.89 VISIT FOR WOUND CHECK: Primary | ICD-10-CM

## 2024-09-04 PROCEDURE — 93798 PHYS/QHP OP CAR RHAB W/ECG: CPT

## 2024-09-04 PROCEDURE — 99024 POSTOP FOLLOW-UP VISIT: CPT | Performed by: NURSE PRACTITIONER

## 2024-09-04 NOTE — PROGRESS NOTES
"Subjective   Chief Complaint   Patient presents with    Wound Check     Patient is here for wound check      Patient ID: Max Oliveira is a 64 y.o. male who is here for follow-up having had Coronary artery bypass grafting-4 vessel (left internal mammary artery/left anterior descending, right internal mammary artery/posterior descending artery, reverse saphenous vein graft/first obtuse marginal, and reverse saphenous vein graft/second diagonal artery), Right endoscopic vein harvest, Sternal plating with Sternalock XP sternal plating performed on 6/28/2024 by Dr. Cancino.     Wound Check    Post operative recovery was uneventful without any major complications.  Returns today for follow-up of saphenectomy site.  At last office visit he was given prescription for Augmentin for concern of cellulitis.  He has since completed this prescription.  He did go out of town for a weekend and kept wound covered.  He noticed a bit of drainage this morning when he took the Band-Aid off.  But since then, no further drainage.  Currently saphenectomy site looks unremarkable without any drainage, evidence of infection, or erythema.  He started cardiac rehab today.  He is hopeful for improvement in upper extremity strength.  Blood sugars are well-controlled.      The following portions of the patient's history were reviewed and updated as appropriate: allergies, current medications, past family history, past medical history, past social history, past surgical history and problem list.    Review of Systems   Constitutional:  Negative for chills, diaphoresis and fever.   Respiratory:  Negative for shortness of breath and wheezing.    Cardiovascular:  Positive for leg swelling (trace right ankle). Negative for chest pain.   Skin:  Positive for wound (Right leg vein harvesting site).       Objective   Visit Vitals  /73 (BP Location: Right arm, Patient Position: Sitting, Cuff Size: Adult)   Pulse 73   Ht 180.3 cm (71\")   Wt 98.2 kg (216 lb " 6.4 oz)   SpO2 95%   BMI 30.18 kg/m²         Physical Exam  Vitals reviewed.   Constitutional:       General: He is not in acute distress.     Appearance: Normal appearance. He is well-developed. He is not diaphoretic.   HENT:      Head: Normocephalic and atraumatic.   Eyes:      Pupils: Pupils are equal, round, and reactive to light.   Cardiovascular:      Rate and Rhythm: Normal rate and regular rhythm.      Heart sounds: Normal heart sounds. No murmur heard.     No friction rub.   Pulmonary:      Effort: Pulmonary effort is normal. No respiratory distress.      Breath sounds: Normal breath sounds. No wheezing or rales.   Abdominal:      General: There is no distension.      Palpations: Abdomen is soft.      Tenderness: There is no abdominal tenderness. There is no guarding.   Musculoskeletal:      Cervical back: Normal range of motion and neck supple.      Right lower leg: Edema (trace right ankle) present.      Left lower leg: No edema.   Skin:     General: Skin is warm and dry.      Coloration: Skin is not pale.      Findings: No rash.      Comments: Sternotomy site clean, dry, and intact. Sternum stable. No clicks.  Right saphenectomy site clean, dry, and intact.  No drainage, no evidence of infection, no erythema.    Neurological:      Mental Status: He is alert and oriented to person, place, and time.   Psychiatric:         Behavior: Behavior normal.         Assessment & Plan       Diagnoses and all orders for this visit:    1. Visit for wound check (Primary)    2. Coronary artery disease involving native coronary artery of native heart without angina pectoris    3. Type 2 diabetes mellitus without complication, without long-term current use of insulin           Overall, Max Oliveira is doing well.  Surgical incisions appear to be healing well.  Continue current wound care: keep clean and dry with antibacterial soap and water.  Continue to advance sternal restrictions as previously discussed.  He will  continue cardiac rehab.  He has planned follow-up with Dr. Cancino in a couple weeks.  He will call sooner should any issues arise.    We discussed the importance of strict glycemic control in the post operative setting and this impacts wound healing, infection risk, and future cardiovascular disease. Continue to follow with primary care for management of diabetes mellitus.     Keep planned follow-up with cardiology for CAD.    Patient has been instructed to contact our office with any questions or concerns should they arise prior to the next office visit.     BMI is >= 25 and <30. (Overweight) The following options were offered after discussion;: weight loss educational material (shared in after visit summary).        Max Oliveira  reports that he quit smoking about 30 years ago. His smoking use included cigarettes. He started smoking about 40 years ago. He has a 10 pack-year smoking history. He has never used smokeless tobacco.        Advance Care Planning   ACP discussion was declined by the patient.  Not applicable as patient is under 65 years of age.

## 2024-09-06 ENCOUNTER — TREATMENT (OUTPATIENT)
Dept: CARDIAC REHAB | Facility: HOSPITAL | Age: 64
End: 2024-09-06
Payer: COMMERCIAL

## 2024-09-06 DIAGNOSIS — Z95.1 S/P CABG (CORONARY ARTERY BYPASS GRAFT): Primary | ICD-10-CM

## 2024-09-06 PROCEDURE — 93798 PHYS/QHP OP CAR RHAB W/ECG: CPT

## 2024-09-09 ENCOUNTER — TREATMENT (OUTPATIENT)
Dept: CARDIAC REHAB | Facility: HOSPITAL | Age: 64
End: 2024-09-09
Payer: COMMERCIAL

## 2024-09-09 DIAGNOSIS — Z95.1 S/P CABG (CORONARY ARTERY BYPASS GRAFT): Primary | ICD-10-CM

## 2024-09-09 PROCEDURE — 93798 PHYS/QHP OP CAR RHAB W/ECG: CPT

## 2024-09-11 ENCOUNTER — TREATMENT (OUTPATIENT)
Dept: CARDIAC REHAB | Facility: HOSPITAL | Age: 64
End: 2024-09-11
Payer: COMMERCIAL

## 2024-09-11 ENCOUNTER — OFFICE VISIT (OUTPATIENT)
Dept: CARDIAC SURGERY | Facility: CLINIC | Age: 64
End: 2024-09-11
Payer: COMMERCIAL

## 2024-09-11 VITALS
WEIGHT: 214 LBS | OXYGEN SATURATION: 99 % | BODY MASS INDEX: 29.96 KG/M2 | HEART RATE: 89 BPM | SYSTOLIC BLOOD PRESSURE: 121 MMHG | DIASTOLIC BLOOD PRESSURE: 80 MMHG | HEIGHT: 71 IN

## 2024-09-11 DIAGNOSIS — Z87.891 FORMER SMOKER: ICD-10-CM

## 2024-09-11 DIAGNOSIS — E11.9 TYPE 2 DIABETES MELLITUS WITHOUT COMPLICATION, WITHOUT LONG-TERM CURRENT USE OF INSULIN: ICD-10-CM

## 2024-09-11 DIAGNOSIS — I25.10 CORONARY ARTERY DISEASE INVOLVING NATIVE CORONARY ARTERY OF NATIVE HEART WITHOUT ANGINA PECTORIS: Primary | ICD-10-CM

## 2024-09-11 DIAGNOSIS — Z95.1 S/P CABG (CORONARY ARTERY BYPASS GRAFT): Primary | ICD-10-CM

## 2024-09-11 PROCEDURE — 93798 PHYS/QHP OP CAR RHAB W/ECG: CPT

## 2024-09-13 ENCOUNTER — TREATMENT (OUTPATIENT)
Dept: CARDIAC REHAB | Facility: HOSPITAL | Age: 64
End: 2024-09-13
Payer: COMMERCIAL

## 2024-09-13 DIAGNOSIS — Z95.1 S/P CABG (CORONARY ARTERY BYPASS GRAFT): Primary | ICD-10-CM

## 2024-09-13 PROCEDURE — 93798 PHYS/QHP OP CAR RHAB W/ECG: CPT

## 2024-09-16 ENCOUNTER — TREATMENT (OUTPATIENT)
Dept: CARDIAC REHAB | Facility: HOSPITAL | Age: 64
End: 2024-09-16
Payer: COMMERCIAL

## 2024-09-16 DIAGNOSIS — Z95.1 S/P CABG (CORONARY ARTERY BYPASS GRAFT): Primary | ICD-10-CM

## 2024-09-16 PROCEDURE — 93798 PHYS/QHP OP CAR RHAB W/ECG: CPT

## 2024-09-18 ENCOUNTER — TREATMENT (OUTPATIENT)
Dept: CARDIAC REHAB | Facility: HOSPITAL | Age: 64
End: 2024-09-18
Payer: COMMERCIAL

## 2024-09-18 DIAGNOSIS — Z95.1 S/P CABG (CORONARY ARTERY BYPASS GRAFT): Primary | ICD-10-CM

## 2024-09-18 PROCEDURE — 93798 PHYS/QHP OP CAR RHAB W/ECG: CPT

## 2024-09-20 ENCOUNTER — TREATMENT (OUTPATIENT)
Dept: CARDIAC REHAB | Facility: HOSPITAL | Age: 64
End: 2024-09-20
Payer: COMMERCIAL

## 2024-09-20 DIAGNOSIS — Z95.1 S/P CABG (CORONARY ARTERY BYPASS GRAFT): Primary | ICD-10-CM

## 2024-09-20 PROCEDURE — 93798 PHYS/QHP OP CAR RHAB W/ECG: CPT

## 2024-09-23 ENCOUNTER — TREATMENT (OUTPATIENT)
Dept: CARDIAC REHAB | Facility: HOSPITAL | Age: 64
End: 2024-09-23
Payer: COMMERCIAL

## 2024-09-23 DIAGNOSIS — Z95.1 S/P CABG (CORONARY ARTERY BYPASS GRAFT): Primary | ICD-10-CM

## 2024-09-23 PROCEDURE — 93798 PHYS/QHP OP CAR RHAB W/ECG: CPT

## 2024-09-25 ENCOUNTER — TREATMENT (OUTPATIENT)
Dept: CARDIAC REHAB | Facility: HOSPITAL | Age: 64
End: 2024-09-25
Payer: COMMERCIAL

## 2024-09-25 DIAGNOSIS — Z95.1 S/P CABG (CORONARY ARTERY BYPASS GRAFT): Primary | ICD-10-CM

## 2024-09-25 PROCEDURE — 93798 PHYS/QHP OP CAR RHAB W/ECG: CPT

## 2024-09-27 ENCOUNTER — TREATMENT (OUTPATIENT)
Dept: CARDIAC REHAB | Facility: HOSPITAL | Age: 64
End: 2024-09-27
Payer: COMMERCIAL

## 2024-09-27 DIAGNOSIS — Z95.1 S/P CABG (CORONARY ARTERY BYPASS GRAFT): Primary | ICD-10-CM

## 2024-09-27 PROCEDURE — 93798 PHYS/QHP OP CAR RHAB W/ECG: CPT

## 2024-10-06 NOTE — PROGRESS NOTES
"      History of Present Illness  The patient is here for a follow-up visit. He is joined by an adult female.    His initial postoperative appointment was on 07/31/2024, during which he was generally in good health and was given standard postoperative advice. He later reported drainage from the distal part of his saphenectomy site and was advised on wound care. He had two more appointments with Nicolasa, the last one being on 09/04/2024, and was prescribed antibiotics.    He reports that a suture appears to be protruding, but there is no further drainage. He is curious about whether he can use scar cream and resume swimming. He is currently undergoing rehabilitation and has provided a report from his rehab center.    He is considering returning to work on 09/30/2024, but is unsure if he is ready. 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 He is requesting a note to return to work.    He reports no pain when he coughs or sneezes. He has been walking for about 30 minutes in his neighborhood 3 to 4 times a week. He occasionally experiences what feels like a hiccup when taking a deep breath, but this is not as frequent as before.    He is on his last prescription of Plavix. He used to take meloxicam for joint pain and is wondering if he can resume it. He experiences difficulty walking after sitting for a while and feels stiff all over in the mornings, but these symptoms improve once he starts moving.    FAMILY HISTORY  His sister has had bypass and she has had several stents put in since.    The following portions of the patient's history were reviewed and updated as appropriate: allergies, current medications, past family history, past medical history, past social history, past surgical history and problem list.        Objective   Visit Vitals  /80 (BP Location: Right arm, Patient Position: Sitting, Cuff Size: Adult)   Pulse 89   Ht 180.3 cm (71\")   Wt 97.1 " kg (214 lb)   SpO2 99%   BMI 29.85 kg/m²       Physical Exam  Physical Exam    Physical Exam:    General: No acute distress, in good spirits  Cardiovascular: RRR, no murmur, rubs, or gallops.    Pulmonary: Clear to auscultation bilaterally, no wheezing, rubs, or rales.  Abdomen: Soft, nondistended, and nontender.  Extremities: Warm, moves all extremities.  Neurologic:  No focal deficits, CN II-XII intact grossly.    Chest: sternotomy incision is well healed. No clicks.    Saphenectomy incisions on the integumentary system are healing nicely. The previously concerning distal saphenectomy site has fully healed. Sternotomy incision is well healed.      No results found.  Results      Assessment & Plan       Diagnoses and all orders for this visit:    1. Coronary artery disease involving native coronary artery of native heart without angina pectoris (Primary)    2. Type 2 diabetes mellitus without complication, without long-term current use of insulin    3. Former smoker      Assessment & Plan  1. Coronary Artery Disease.  He is sufficiently well healed to be released to work on 09/30/2024 with no restrictions. He may need to maneuver up to 100 pounds using a lupe and a pallet. A follow-up visit with Nicolasa Trujillo is scheduled for 10/09/2024 if there are ongoing concerns; otherwise, this visit can be canceled. The importance of ongoing cardiovascular risk factor modification was discussed. Preoperative testing did not identify aneurysmal or lung nodules disease. He is a non-smoker.    2. Diabetes Mellitus.  The importance of weight loss, particularly around the abdominal area, was emphasized due to the associated risks with visceral fat. Exercise and maintaining muscle mass were discussed as crucial for managing insulin resistance. He was encouraged to continue his current exercise regimen, including walking 3-4 times a week for about 30 minutes and potentially increasing the intensity and duration.    3. Postoperative  Wound Care.  The saphenectomy and sternotomy incisions are healing well. He was advised that any remaining sutures might dissolve or be expelled naturally. If there are signs of infection or if the wound appears to be festering, he should call the office.    4. Pain Management.  He can resume using scar cream, including options like vitamin D oils or Moderna cream. For joint pain, he can use meloxicam if necessary, but alternatives like lidocaine patches or Voltaren gel were recommended to minimize cardiovascular risks. He was advised to balance the use of NSAIDs with other pain management strategies and to use them as sparingly as possible.    5. Health Maintenance.  He was advised to continue being physically active, even if he stops cardiac rehab upon returning to work. Regular exercise is crucial for preventing further heart disease and other health issues.          Transcribed from ambient dictation for Tim Cancino MD by Tim Cancino MD.  10/05/24   21:45 CDT    Patient or patient representative verbalized consent for the use of Ambient Listening during the visit with  Tim Cancino MD for chart documentation. 10/5/2024  21:49 CDT

## 2024-11-09 LAB
AMBIG ABBREV LP DEFAULT: NORMAL
CHOLEST SERPL-MCNC: 157 MG/DL (ref 100–199)
HDLC SERPL-MCNC: 46 MG/DL
LDLC SERPL CALC-MCNC: 84 MG/DL (ref 0–99)
TRIGL SERPL-MCNC: 158 MG/DL (ref 0–149)
VLDLC SERPL CALC-MCNC: 27 MG/DL (ref 5–40)

## 2024-11-13 ENCOUNTER — OFFICE VISIT (OUTPATIENT)
Dept: CARDIOLOGY | Facility: CLINIC | Age: 64
End: 2024-11-13
Payer: COMMERCIAL

## 2024-11-13 VITALS
BODY MASS INDEX: 30.8 KG/M2 | WEIGHT: 220 LBS | SYSTOLIC BLOOD PRESSURE: 120 MMHG | HEIGHT: 71 IN | DIASTOLIC BLOOD PRESSURE: 75 MMHG | HEART RATE: 80 BPM | RESPIRATION RATE: 18 BRPM | OXYGEN SATURATION: 97 %

## 2024-11-13 DIAGNOSIS — I25.10 CORONARY ARTERY DISEASE INVOLVING NATIVE CORONARY ARTERY OF NATIVE HEART WITHOUT ANGINA PECTORIS: Primary | ICD-10-CM

## 2024-11-13 DIAGNOSIS — E78.2 MIXED HYPERLIPIDEMIA: ICD-10-CM

## 2024-11-13 DIAGNOSIS — I10 PRIMARY HYPERTENSION: ICD-10-CM

## 2024-11-13 DIAGNOSIS — E11.9 TYPE 2 DIABETES MELLITUS WITHOUT COMPLICATION, WITHOUT LONG-TERM CURRENT USE OF INSULIN: ICD-10-CM

## 2024-11-13 RX ORDER — ROSUVASTATIN CALCIUM 40 MG/1
40 TABLET, COATED ORAL DAILY
Qty: 90 TABLET | Refills: 3 | Status: SHIPPED | OUTPATIENT
Start: 2024-11-13

## 2024-11-13 NOTE — PROGRESS NOTES
Subjective:     Encounter Date:11/13/2024      Patient ID: Max Oliveira is a 64 y.o. male     Chief Complaint:  Coronary Artery Disease  Presents for follow-up visit. Pertinent negatives include no chest pain, dizziness, leg swelling, palpitations, shortness of breath or weight gain. The symptoms have been stable. Compliance with diet is good. Compliance with exercise is good. Compliance with medications is good.     Patient presents today for routine cardiology follow up. Patient notes he has returned to work and is doing well. He notes that he has had some sharp pains in his chest after lifting. Overall he is stable. Patient underwent CABG on June 28th (CABG x 4 with LIMA to LAD, GAMAL to PDA, SVG to First OM and SVG to Second OM). He did have some post op PVCs. His LVEF was estimated to be 41-45% on LUIS. Prior LVEF was normal. His Lipitor was increased and had minimal improvement. Overall he is stable today with no complaints. He was released by Dr. Cancino. He has stopped his Plavix and Remains on Aspirin.     The following portions of the patient's history were reviewed and updated as appropriate: allergies, current medications, past medical history, past social history, past and problem list.    No Known Allergies    Current Outpatient Medications:     ascorbic acid (VITAMIN C) 1000 MG tablet, Take 1 tablet by mouth Daily., Disp: , Rfl:     aspirin 81 MG EC tablet, Take 1 tablet by mouth Daily., Disp: , Rfl:     Cholecalciferol (Vitamin D-3) 125 MCG (5000 UT) tablet, Take 1 tablet by mouth Daily., Disp: , Rfl:     coenzyme Q10 100 MG capsule, Take 1 capsule by mouth Daily., Disp: , Rfl:     Dapagliflozin Propanediol (Farxiga) 10 MG tablet, Take 10 mg by mouth Daily., Disp: , Rfl:     esomeprazole (nexIUM) 20 MG capsule, Take 1 capsule by mouth Every Morning Before Breakfast., Disp: , Rfl:     glucosamine-chondroitin 500-400 MG capsule capsule, Take 1 capsule by mouth Daily., Disp: , Rfl:     lisinopril  (PRINIVIL,ZESTRIL) 10 MG tablet, Take 1 tablet by mouth Daily., Disp: 90 tablet, Rfl: 3    magnesium oxide (MAG-OX) 400 MG tablet, Take 1 tablet by mouth Daily., Disp: , Rfl:     metFORMIN (GLUCOPHAGE) 500 MG tablet, Take 1 tablet by mouth 2 (Two) Times a Day With Meals., Disp: , Rfl:     metoprolol succinate XL (TOPROL-XL) 50 MG 24 hr tablet, Take 1 tablet by mouth Daily., Disp: 30 tablet, Rfl: 11    multivitamin with minerals tablet tablet, Take 1 tablet by mouth Daily., Disp: , Rfl:     Probiotic Product (PROBIOTIC DAILY PO), Take 1 tablet by mouth Daily., Disp: , Rfl:     rosuvastatin (CRESTOR) 40 MG tablet, Take 1 tablet by mouth Daily., Disp: 90 tablet, Rfl: 3    Social History     Socioeconomic History    Marital status:    Tobacco Use    Smoking status: Former     Current packs/day: 0.00     Average packs/day: 1 pack/day for 10.0 years (10.0 ttl pk-yrs)     Types: Cigarettes     Start date:      Quit date:      Years since quittin.8    Smokeless tobacco: Never   Vaping Use    Vaping status: Never Used   Substance and Sexual Activity    Alcohol use: Yes     Comment: pt states frequent beer drinker    Drug use: Not Currently    Sexual activity: Defer       Review of Systems   Constitutional: Negative for chills, decreased appetite, fever, malaise/fatigue, weight gain and weight loss.   HENT:  Negative for nosebleeds.    Eyes:  Negative for visual disturbance.   Cardiovascular:  Negative for chest pain, dyspnea on exertion, leg swelling, near-syncope, orthopnea, palpitations, paroxysmal nocturnal dyspnea and syncope.        Chest wall pain- occasional   Respiratory:  Negative for cough, hemoptysis, shortness of breath and snoring.    Endocrine: Negative for cold intolerance and heat intolerance.   Hematologic/Lymphatic: Negative for bleeding problem. Does not bruise/bleed easily.   Skin:  Negative for rash.   Musculoskeletal:  Negative for back pain and falls.   Gastrointestinal:  Negative  "for abdominal pain, constipation, diarrhea, heartburn, melena, nausea and vomiting.   Genitourinary:  Negative for hematuria.   Neurological:  Negative for dizziness, headaches and light-headedness.   Psychiatric/Behavioral:  Negative for altered mental status.    Allergic/Immunologic: Negative for persistent infections.              Objective:     Constitutional:       Appearance: Healthy appearance. Well-developed.   Eyes:      Pupils: Pupils are equal, round, and reactive to light.   HENT:      Head: Normocephalic and atraumatic.   Neck:      Vascular: No carotid bruit or JVD.   Pulmonary:      Effort: Pulmonary effort is normal.      Breath sounds: Normal breath sounds.   Cardiovascular:      Normal rate. Regular rhythm.   Pulses:     Intact distal pulses.   Edema:     Peripheral edema absent.   Abdominal:      General: Bowel sounds are normal.      Palpations: Abdomen is soft.   Musculoskeletal: Normal range of motion.      Cervical back: Normal range of motion and neck supple. Skin:     General: Skin is warm and dry.   Neurological:      Mental Status: Alert, oriented to person, place, and time and oriented to person, place and time.      Deep Tendon Reflexes: Reflexes are normal and symmetric.   Psychiatric:         Behavior: Behavior normal.         Thought Content: Thought content normal.         Judgment: Judgment normal.           Procedures  /75 (BP Location: Right arm, Patient Position: Sitting, Cuff Size: Adult)   Pulse 80   Resp 18   Ht 180.3 cm (71\")   Wt 99.8 kg (220 lb)   SpO2 97%   BMI 30.68 kg/m²   Lab Review:   I have reviewed 8/6/24        Lab Results   Component Value Date    CHLPL 157 11/08/2024    TRIG 158 (H) 11/08/2024    HDL 46 11/08/2024    LDL 84 11/08/2024      Results for orders placed during the hospital encounter of 06/28/24    Intra-Op TAVR Transesophageal Echo    Interpretation Summary    Unremarkable intraoperative exam.    Initial images show  Left ventricular " systolic function is mildly decreased. Left ventricular ejection fraction appears to be 41 - 45%, with the following left ventricular wall segments hypokinetic: mid anterior, apical anterior, mid anterolateral, apical lateral, apical septal, basal inferoseptal, mid anteroseptal and basal inferoseptal.    Though there is only view obtained at the end of the case (transgastric short axis), LV systolic function appears significant improved with normal wall motion as assessed by this 1 view.     Assessment:          Diagnosis Plan   1. Coronary artery disease involving native coronary artery of native heart without angina pectoris        2. Mixed hyperlipidemia  Lipid Panel      3. Primary hypertension        4. Type 2 diabetes mellitus without complication, without long-term current use of insulin               Plan:       Coronary Artery Disese- CABG x 4 in June. Recovered. Some chest wall pain. Continue Aspirin. Will switch Lipitor to Crestor 40. Recheck Lipid Panel in 3 months. If not at goal consider adding Zetia or Repatha. No angina.   Mixed Hyperlipidemia- LDL 84- goal less than 70. Switch to Crestor. Recheck in 3 months. Consider Zetia or PCSK9 at follow up if not at goal  Hypertension- blood pressure controlled. Stable.   Type II Diabetes - managed by PCP. On Farxiga.     Follow up in 6 months.

## 2024-12-03 ENCOUNTER — TELEPHONE (OUTPATIENT)
Dept: CARDIOLOGY | Facility: CLINIC | Age: 64
End: 2024-12-03
Payer: COMMERCIAL

## 2024-12-03 NOTE — TELEPHONE ENCOUNTER
Caller: Max Oliveira     Relationship: [phone]     Best call back number: [phone]     What is your medical concern? SHORTNESS OF BREATH AND LEG HEAVINESS     How long has this issue been going on? 3 TO 4 WEEKS     Is your provider already aware of this issue? NO    Have you been treated for this issue? NO

## 2024-12-05 NOTE — TELEPHONE ENCOUNTER
Patient states he is experiencing the same feelings he had prior to his CABG.He did not have SOB at last visit 11/2024. He states over the last 3-4 weeks he has exertional and non-exertional SOB. Reports at times his SOB is relieved with rest.He also notes leg heaviness with SOB when ambulating. He can walk 25 feet and feels the need to stop/sit and catch his breath. Cannot carry on long conversation without SOB.Last EKG 06/2024. No LE swelling, PND or orthopnea, weight gain.Denies any CP.He was started on Crestor at last visit, denies any muscle cramps.

## 2024-12-09 ENCOUNTER — OFFICE VISIT (OUTPATIENT)
Dept: CARDIOLOGY | Facility: CLINIC | Age: 64
End: 2024-12-09
Payer: COMMERCIAL

## 2024-12-09 ENCOUNTER — TELEPHONE (OUTPATIENT)
Dept: CARDIOLOGY | Facility: CLINIC | Age: 64
End: 2024-12-09

## 2024-12-09 VITALS
HEIGHT: 71 IN | WEIGHT: 222 LBS | BODY MASS INDEX: 31.08 KG/M2 | DIASTOLIC BLOOD PRESSURE: 78 MMHG | RESPIRATION RATE: 18 BRPM | HEART RATE: 84 BPM | OXYGEN SATURATION: 97 % | SYSTOLIC BLOOD PRESSURE: 120 MMHG

## 2024-12-09 DIAGNOSIS — R06.02 SHORTNESS OF BREATH: ICD-10-CM

## 2024-12-09 DIAGNOSIS — R06.02 SHORTNESS OF BREATH: Primary | ICD-10-CM

## 2024-12-09 DIAGNOSIS — E78.2 MIXED HYPERLIPIDEMIA: ICD-10-CM

## 2024-12-09 DIAGNOSIS — I25.10 CORONARY ARTERY DISEASE INVOLVING NATIVE CORONARY ARTERY OF NATIVE HEART WITHOUT ANGINA PECTORIS: Primary | ICD-10-CM

## 2024-12-09 DIAGNOSIS — I10 PRIMARY HYPERTENSION: ICD-10-CM

## 2024-12-09 PROCEDURE — 93000 ELECTROCARDIOGRAM COMPLETE: CPT | Performed by: NURSE PRACTITIONER

## 2024-12-09 PROCEDURE — 99214 OFFICE O/P EST MOD 30 MIN: CPT | Performed by: NURSE PRACTITIONER

## 2024-12-09 RX ORDER — MELOXICAM 15 MG/1
15 TABLET ORAL AS NEEDED
COMMUNITY
Start: 2024-11-14

## 2024-12-09 RX ORDER — ATORVASTATIN CALCIUM 80 MG/1
80 TABLET, FILM COATED ORAL DAILY
Qty: 30 TABLET | Refills: 11 | Status: SHIPPED | OUTPATIENT
Start: 2024-12-09

## 2024-12-09 RX ORDER — NITROGLYCERIN 0.4 MG/1
TABLET SUBLINGUAL
Qty: 30 TABLET | Refills: 1 | Status: SHIPPED | OUTPATIENT
Start: 2024-12-09

## 2024-12-09 NOTE — TELEPHONE ENCOUNTER
Name: Max Oliveira      Relationship: Self      Best Callback Number: [phone]     PT JUST WANTS A CB TO CONFIRM THAT THE ECHO SCHEDULED BEFORE STRESS TEST IS NECCESARY

## 2024-12-09 NOTE — PROGRESS NOTES
Subjective:     Encounter Date:12/09/2024      Patient ID: Max Oliveira is a 64 y.o. male     Chief Complaint:  Coronary Artery Disease  Symptoms include chest pain and shortness of breath. Pertinent negatives include no dizziness, leg swelling, palpitations or weight gain.   Shortness of Breath  Associated symptoms include chest pain. Pertinent negatives include no abdominal pain, fever, headaches, hemoptysis, leg swelling, orthopnea, PND, rash, syncope or vomiting. His past medical history is significant for CAD.     Patient presents today for complaints of shortness of breath, heaviness in his chest and legs feeling heavy. He notes that the chest pain occurs randomly- sometimes at rest and sometimes on exertion. He notes that he has shortness of breath on exertion- mostly exertional. He notes leg weakness with exertion. He notes occasional heart racing. He had his Lipitor switched to Crestor at last OV- and his wife wonders if it might be this. He was feeling well at his last OV in November- he had reported a few episodes of chest pain. He had CABG in June with LIMA to LAD, GAMAL to PAD, SVG to First OM and SVG to Second OM. He has been noted to have PVCs. He had stopped his Plavix prior to last appointment.  He has hyperlipidemia and LDL had not improved. Patient has Type II Diabetes, GERD, hyperlipidemia and Coronary Artery Disease.     The following portions of the patient's history were reviewed and updated as appropriate: allergies, current medications, past medical history, past social history, past and problem list.    No Known Allergies    Current Outpatient Medications:     ascorbic acid (VITAMIN C) 1000 MG tablet, Take 1 tablet by mouth Daily., Disp: , Rfl:     aspirin 81 MG EC tablet, Take 1 tablet by mouth Daily., Disp: , Rfl:     Cholecalciferol (Vitamin D-3) 125 MCG (5000 UT) tablet, Take 1 tablet by mouth Daily., Disp: , Rfl:     coenzyme Q10 100 MG capsule, Take 1 capsule by mouth Daily., Disp: ,  Rfl:     Dapagliflozin Propanediol (Farxiga) 10 MG tablet, Take 10 mg by mouth Daily., Disp: , Rfl:     esomeprazole (nexIUM) 20 MG capsule, Take 1 capsule by mouth Every Morning Before Breakfast., Disp: , Rfl:     glucosamine-chondroitin 500-400 MG capsule capsule, Take 1 capsule by mouth Daily., Disp: , Rfl:     lisinopril (PRINIVIL,ZESTRIL) 10 MG tablet, Take 1 tablet by mouth Daily., Disp: 90 tablet, Rfl: 3    magnesium oxide (MAG-OX) 400 MG tablet, Take 1 tablet by mouth Daily., Disp: , Rfl:     meloxicam (MOBIC) 15 MG tablet, 1 tablet As Needed., Disp: , Rfl:     metFORMIN (GLUCOPHAGE) 500 MG tablet, Take 1 tablet by mouth 2 (Two) Times a Day With Meals., Disp: , Rfl:     metoprolol succinate XL (TOPROL-XL) 50 MG 24 hr tablet, Take 1 tablet by mouth Daily., Disp: 30 tablet, Rfl: 11    multivitamin with minerals tablet tablet, Take 1 tablet by mouth Daily., Disp: , Rfl:     Probiotic Product (PROBIOTIC DAILY PO), Take 1 tablet by mouth Daily., Disp: , Rfl:     atorvastatin (LIPITOR) 80 MG tablet, Take 1 tablet by mouth Daily., Disp: 30 tablet, Rfl: 11    nitroglycerin (NITROSTAT) 0.4 MG SL tablet, 1 under the tongue as needed for angina, may repeat q5mins for up three doses, Disp: 30 tablet, Rfl: 1    Social History     Socioeconomic History    Marital status:    Tobacco Use    Smoking status: Former     Current packs/day: 0.00     Average packs/day: 1 pack/day for 10.0 years (10.0 ttl pk-yrs)     Types: Cigarettes     Start date:      Quit date:      Years since quittin.9    Smokeless tobacco: Never   Vaping Use    Vaping status: Never Used   Substance and Sexual Activity    Alcohol use: Yes     Comment: pt states frequent beer drinker    Drug use: Not Currently    Sexual activity: Defer       Review of Systems   Constitutional: Positive for malaise/fatigue. Negative for chills, decreased appetite, fever, weight gain and weight loss.   HENT:  Negative for nosebleeds.    Eyes:  Negative for  visual disturbance.   Cardiovascular:  Positive for chest pain. Negative for dyspnea on exertion, leg swelling, near-syncope, orthopnea, palpitations, paroxysmal nocturnal dyspnea and syncope.        Heaviness in legs   Respiratory:  Positive for shortness of breath. Negative for cough, hemoptysis and snoring.    Endocrine: Negative for cold intolerance and heat intolerance.   Hematologic/Lymphatic: Negative for bleeding problem. Does not bruise/bleed easily.   Skin:  Negative for rash.   Musculoskeletal:  Negative for back pain and falls.   Gastrointestinal:  Negative for abdominal pain, constipation, diarrhea, heartburn, melena, nausea and vomiting.   Genitourinary:  Negative for hematuria.   Neurological:  Negative for dizziness, headaches and light-headedness.   Psychiatric/Behavioral:  Negative for altered mental status.    Allergic/Immunologic: Negative for persistent infections.              Objective:     Constitutional:       Appearance: Healthy appearance. Well-developed and not in distress.   Eyes:      Pupils: Pupils are equal, round, and reactive to light.   HENT:      Head: Normocephalic and atraumatic.   Neck:      Vascular: No carotid bruit or JVD.   Pulmonary:      Effort: Pulmonary effort is normal.      Breath sounds: Normal breath sounds.   Cardiovascular:      Normal rate. Regular rhythm.      Murmurs: There is no murmur.   Pulses:     Intact distal pulses.   Edema:     Peripheral edema absent.   Abdominal:      General: Bowel sounds are normal.      Palpations: Abdomen is soft.   Musculoskeletal: Normal range of motion.      Cervical back: Normal range of motion and neck supple. Skin:     General: Skin is warm and dry.   Neurological:      Mental Status: Alert, oriented to person, place, and time and oriented to person, place and time.      Deep Tendon Reflexes: Reflexes are normal and symmetric.   Psychiatric:         Behavior: Behavior normal.         Thought Content: Thought content normal.   "       Judgment: Judgment normal.             ECG 12 Lead    Date/Time: 12/9/2024 9:45 AM  Performed by: Markell Harper APRN    Authorized by: Markell Harper APRN  Comparison: compared with previous ECG from 6/30/2024  Comparison to previous ECG: PVCs have resolved.   Q waves: V1, V2 and V3      Clinical impression: abnormal EKG        /78 (BP Location: Right arm, Patient Position: Sitting, Cuff Size: Adult)   Pulse 84   Resp 18   Ht 180.3 cm (71\")   Wt 101 kg (222 lb)   SpO2 97%   BMI 30.96 kg/m²   Lab Review:   I have reviewed       Lab Results   Component Value Date    CHLPL 157 11/08/2024    TRIG 158 (H) 11/08/2024    HDL 46 11/08/2024    LDL 84 11/08/2024      Results for orders placed during the hospital encounter of 06/28/24    Intra-Op TAVR Transesophageal Echo    Interpretation Summary    Unremarkable intraoperative exam.    Initial images show  Left ventricular systolic function is mildly decreased. Left ventricular ejection fraction appears to be 41 - 45%, with the following left ventricular wall segments hypokinetic: mid anterior, apical anterior, mid anterolateral, apical lateral, apical septal, basal inferoseptal, mid anteroseptal and basal inferoseptal.    Though there is only view obtained at the end of the case (transgastric short axis), LV systolic function appears significant improved with normal wall motion as assessed by this 1 view.     Assessment:          Diagnosis Plan   1. Coronary artery disease involving native coronary artery of native heart without angina pectoris  Adult Stress Echo W/ Cont or Stress Agent if Necessary Per Protocol      2. Shortness of breath  Adult Stress Echo W/ Cont or Stress Agent if Necessary Per Protocol      3. Primary hypertension        4. Mixed hyperlipidemia               Plan:       Coronary Artery Disease- CABG x 4 in June following abnormal stress. Patient was doing well at last follow up but is having shortness of breath and chest pain- " similar to prior to CABG. This started since last OV when Lipitor was switched to Crestor. Switch back to Lipitor for now. Check Stress Echo. NItro PRN prescribed.   Shortness of breath- on exertion and at rest. Similar to previous angina. Check stress ehco.   Hypertension- blood pressure stable.  Hyperlipidemia- switch statin back to Lipitor 80 as he was tolerating this previously.

## 2024-12-13 ENCOUNTER — HOSPITAL ENCOUNTER (OUTPATIENT)
Dept: CARDIOLOGY | Facility: HOSPITAL | Age: 64
Discharge: HOME | End: 2024-12-13
Payer: COMMERCIAL

## 2024-12-13 VITALS — WEIGHT: 222.66 LBS | HEIGHT: 71 IN | BODY MASS INDEX: 31.17 KG/M2

## 2024-12-13 DIAGNOSIS — R06.02 SHORTNESS OF BREATH: ICD-10-CM

## 2024-12-13 PROCEDURE — 93356 MYOCRD STRAIN IMG SPCKL TRCK: CPT

## 2024-12-13 PROCEDURE — 93017 CV STRESS TEST TRACING ONLY: CPT

## 2024-12-13 PROCEDURE — 25510000001 PERFLUTREN 6.52 MG/ML SUSPENSION: Performed by: EMERGENCY MEDICINE

## 2024-12-13 PROCEDURE — 93350 STRESS TTE ONLY: CPT

## 2024-12-13 PROCEDURE — 93306 TTE W/DOPPLER COMPLETE: CPT

## 2024-12-13 RX ADMIN — PERFLUTREN 8.48 MG: 6.52 INJECTION, SUSPENSION INTRAVENOUS at 12:34

## 2024-12-15 LAB
BH CV ECHO LEFT VENTRICLE GLOBAL LONGITUDINAL STRAIN: -14.9 %
BH CV ECHO MEAS - AO MAX PG: 8.6 MMHG
BH CV ECHO MEAS - AO MEAN PG: 4 MMHG
BH CV ECHO MEAS - AO ROOT DIAM: 3.6 CM
BH CV ECHO MEAS - AO V2 MAX: 147 CM/SEC
BH CV ECHO MEAS - AO V2 VTI: 29 CM
BH CV ECHO MEAS - AVA(I,D): 3.5 CM2
BH CV ECHO MEAS - EDV(CUBED): 63.5 ML
BH CV ECHO MEAS - EDV(MOD-SP2): 93.5 ML
BH CV ECHO MEAS - EDV(MOD-SP4): 89.1 ML
BH CV ECHO MEAS - EF(MOD-BP): 61.9 %
BH CV ECHO MEAS - EF(MOD-SP2): 59.5 %
BH CV ECHO MEAS - EF(MOD-SP4): 64.9 %
BH CV ECHO MEAS - ESV(CUBED): 30.7 ML
BH CV ECHO MEAS - ESV(MOD-SP2): 37.9 ML
BH CV ECHO MEAS - ESV(MOD-SP4): 31.3 ML
BH CV ECHO MEAS - FS: 21.6 %
BH CV ECHO MEAS - IVS/LVPW: 0.93 CM
BH CV ECHO MEAS - IVSD: 1.18 CM
BH CV ECHO MEAS - LA DIMENSION: 4.3 CM
BH CV ECHO MEAS - LAT PEAK E' VEL: 11.3 CM/SEC
BH CV ECHO MEAS - LV DIASTOLIC VOL/BSA (35-75): 40.2 CM2
BH CV ECHO MEAS - LV MASS(C)D: 170 GRAMS
BH CV ECHO MEAS - LV MAX PG: 6.6 MMHG
BH CV ECHO MEAS - LV MEAN PG: 3 MMHG
BH CV ECHO MEAS - LV SYSTOLIC VOL/BSA (12-30): 14.1 CM2
BH CV ECHO MEAS - LV V1 MAX: 128.3 CM/SEC
BH CV ECHO MEAS - LV V1 VTI: 24.6 CM
BH CV ECHO MEAS - LVIDD: 4 CM
BH CV ECHO MEAS - LVIDS: 3.1 CM
BH CV ECHO MEAS - LVOT AREA: 4.2 CM2
BH CV ECHO MEAS - LVOT DIAM: 2.3 CM
BH CV ECHO MEAS - LVPWD: 1.27 CM
BH CV ECHO MEAS - MED PEAK E' VEL: 5.2 CM/SEC
BH CV ECHO MEAS - MR MAX PG: 34.3 MMHG
BH CV ECHO MEAS - MR MAX VEL: 293 CM/SEC
BH CV ECHO MEAS - MV A MAX VEL: 72.9 CM/SEC
BH CV ECHO MEAS - MV DEC SLOPE: 457 CM/SEC2
BH CV ECHO MEAS - MV DEC TIME: 0.21 SEC
BH CV ECHO MEAS - MV E MAX VEL: 82.9 CM/SEC
BH CV ECHO MEAS - MV E/A: 1.14
BH CV ECHO MEAS - MV MAX PG: 3.3 MMHG
BH CV ECHO MEAS - MV MEAN PG: 2 MMHG
BH CV ECHO MEAS - MV P1/2T: 60.9 MSEC
BH CV ECHO MEAS - MV V2 VTI: 30.1 CM
BH CV ECHO MEAS - MVA(P1/2T): 3.6 CM2
BH CV ECHO MEAS - MVA(VTI): 3.4 CM2
BH CV ECHO MEAS - PA V2 MAX: 99.6 CM/SEC
BH CV ECHO MEAS - RAP SYSTOLE: 8 MMHG
BH CV ECHO MEAS - RVSP: 37.2 MMHG
BH CV ECHO MEAS - SV(LVOT): 102.2 ML
BH CV ECHO MEAS - SV(MOD-SP2): 55.6 ML
BH CV ECHO MEAS - SV(MOD-SP4): 57.8 ML
BH CV ECHO MEAS - SVI(LVOT): 46.1 ML/M2
BH CV ECHO MEAS - SVI(MOD-SP2): 25.1 ML/M2
BH CV ECHO MEAS - SVI(MOD-SP4): 26.1 ML/M2
BH CV ECHO MEAS - TAPSE (>1.6): 0.84 CM
BH CV ECHO MEAS - TR MAX PG: 29.2 MMHG
BH CV ECHO MEAS - TR MAX VEL: 270 CM/SEC
BH CV ECHO MEASUREMENTS AVERAGE E/E' RATIO: 10.05
BH CV STRESS BP STAGE 1: NORMAL
BH CV STRESS BP STAGE 2: NORMAL
BH CV STRESS BP STAGE 3: NORMAL
BH CV STRESS BP STAGE 4: NORMAL
BH CV STRESS DURATION MIN STAGE 1: 3
BH CV STRESS DURATION MIN STAGE 2: 3
BH CV STRESS DURATION MIN STAGE 3: 3
BH CV STRESS DURATION MIN STAGE 4: 1
BH CV STRESS DURATION SEC STAGE 1: 0
BH CV STRESS DURATION SEC STAGE 2: 0
BH CV STRESS DURATION SEC STAGE 3: 0
BH CV STRESS DURATION SEC STAGE 4: 20
BH CV STRESS GRADE STAGE 1: 10
BH CV STRESS GRADE STAGE 2: 12
BH CV STRESS GRADE STAGE 3: 14
BH CV STRESS GRADE STAGE 4: 16
BH CV STRESS HR STAGE 1: 92
BH CV STRESS HR STAGE 2: 109
BH CV STRESS HR STAGE 3: 117
BH CV STRESS HR STAGE 4: 130
BH CV STRESS METS STAGE 1: 5
BH CV STRESS METS STAGE 2: 7.5
BH CV STRESS METS STAGE 3: 10
BH CV STRESS METS STAGE 4: 13.5
BH CV STRESS PROTOCOL 1: NORMAL
BH CV STRESS SPEED STAGE 1: 1.7
BH CV STRESS SPEED STAGE 2: 2.5
BH CV STRESS SPEED STAGE 3: 3.4
BH CV STRESS SPEED STAGE 4: 4.2
BH CV STRESS STAGE 1: 1
BH CV STRESS STAGE 2: 2
BH CV STRESS STAGE 3: 3
BH CV STRESS STAGE 4: 4
BH CV XLRA - TDI S': 7.5 CM/SEC
MAXIMAL PREDICTED HEART RATE: 158 BPM
STRESS BASELINE BP: NORMAL MMHG
STRESS BASELINE HR: 79 BPM
STRESS TARGET HR: 134 BPM

## 2025-01-20 ENCOUNTER — OFFICE VISIT (OUTPATIENT)
Dept: CARDIOLOGY | Facility: CLINIC | Age: 65
End: 2025-01-20
Payer: COMMERCIAL

## 2025-01-20 ENCOUNTER — TRANSCRIBE ORDERS (OUTPATIENT)
Dept: GENERAL RADIOLOGY | Facility: HOSPITAL | Age: 65
End: 2025-01-20
Payer: OTHER GOVERNMENT

## 2025-01-20 ENCOUNTER — HOSPITAL ENCOUNTER (OUTPATIENT)
Dept: GENERAL RADIOLOGY | Facility: HOSPITAL | Age: 65
Discharge: HOME | End: 2025-01-20
Admitting: NURSE PRACTITIONER
Payer: COMMERCIAL

## 2025-01-20 VITALS
HEIGHT: 70 IN | BODY MASS INDEX: 31.21 KG/M2 | WEIGHT: 218 LBS | RESPIRATION RATE: 18 BRPM | DIASTOLIC BLOOD PRESSURE: 78 MMHG | HEART RATE: 78 BPM | OXYGEN SATURATION: 98 % | SYSTOLIC BLOOD PRESSURE: 122 MMHG

## 2025-01-20 DIAGNOSIS — R06.02 SHORTNESS OF BREATH: Primary | ICD-10-CM

## 2025-01-20 DIAGNOSIS — E78.2 MIXED HYPERLIPIDEMIA: ICD-10-CM

## 2025-01-20 DIAGNOSIS — I25.10 CORONARY ARTERY DISEASE INVOLVING NATIVE CORONARY ARTERY OF NATIVE HEART WITHOUT ANGINA PECTORIS: ICD-10-CM

## 2025-01-20 DIAGNOSIS — I38 VALVULAR HEART DISEASE: ICD-10-CM

## 2025-01-20 DIAGNOSIS — I10 PRIMARY HYPERTENSION: ICD-10-CM

## 2025-01-20 PROCEDURE — 99214 OFFICE O/P EST MOD 30 MIN: CPT | Performed by: NURSE PRACTITIONER

## 2025-01-20 PROCEDURE — 71046 X-RAY EXAM CHEST 2 VIEWS: CPT

## 2025-01-20 NOTE — PROGRESS NOTES
Subjective:     Encounter Date:01/20/2025      Patient ID: Max Oliveira is a 64 y.o. male     Chief Complaint:  Coronary Artery Disease  Symptoms include shortness of breath. Pertinent negatives include no chest pain, dizziness, leg swelling, palpitations or weight gain.   Shortness of Breath  This is a recurrent problem. The current episode started more than 1 month ago. The problem occurs intermittently. The problem has been improved. Pertinent negatives include no abdominal pain, chest pain, fever, headaches, hemoptysis, leg swelling, orthopnea, PND, rash, syncope or vomiting. His past medical history is significant for CAD.     Patient presents today for follow up for shortness of breath and fatigue. He notes he has not had any more chest pain. He had CABG in June with LIMA to LAD, GAMAL to PDA, SVG to First OM and SVG to second OM. Overall he has recovered well. He notes once he returned to work he has been having more trouble. However it appears he is tolerating this better. He notes he does get short winded but notes if he pushes through it it gets better and he is able to continue. He had a low risk stress test and an echo which showed no acute findings compared with previous echo. He notes his shortness of breath is worse when he bends over.  He has hyperlipidemia and LDL had not improved- will switch back to Crestor- to see if improved LDL. Patient has Type II Diabetes, GERD, hyperlipidemia and Coronary Artery Disease.     The following portions of the patient's history were reviewed and updated as appropriate: allergies, current medications, past medical history, past social history, past and problem list.    No Known Allergies    Current Outpatient Medications:     ascorbic acid (VITAMIN C) 1000 MG tablet, Take 1 tablet by mouth Daily., Disp: , Rfl:     aspirin 81 MG EC tablet, Take 1 tablet by mouth Daily., Disp: , Rfl:     atorvastatin (LIPITOR) 80 MG tablet, Take 1 tablet by mouth Daily., Disp: 30  tablet, Rfl: 11    Cholecalciferol (Vitamin D-3) 125 MCG (5000 UT) tablet, Take 1 tablet by mouth Daily., Disp: , Rfl:     coenzyme Q10 100 MG capsule, Take 1 capsule by mouth Daily., Disp: , Rfl:     Dapagliflozin Propanediol (Farxiga) 10 MG tablet, Take 10 mg by mouth Daily., Disp: , Rfl:     esomeprazole (nexIUM) 20 MG capsule, Take 1 capsule by mouth Every Morning Before Breakfast., Disp: , Rfl:     glucosamine-chondroitin 500-400 MG capsule capsule, Take 1 capsule by mouth Daily., Disp: , Rfl:     lisinopril (PRINIVIL,ZESTRIL) 10 MG tablet, Take 1 tablet by mouth Daily., Disp: 90 tablet, Rfl: 3    magnesium oxide (MAG-OX) 400 MG tablet, Take 1 tablet by mouth Daily., Disp: , Rfl:     meloxicam (MOBIC) 15 MG tablet, 1 tablet As Needed., Disp: , Rfl:     metFORMIN (GLUCOPHAGE) 500 MG tablet, Take 1 tablet by mouth 2 (Two) Times a Day With Meals., Disp: , Rfl:     metoprolol succinate XL (TOPROL-XL) 50 MG 24 hr tablet, Take 1 tablet by mouth Daily., Disp: 30 tablet, Rfl: 11    multivitamin with minerals tablet tablet, Take 1 tablet by mouth Daily., Disp: , Rfl:     nitroglycerin (NITROSTAT) 0.4 MG SL tablet, 1 under the tongue as needed for angina, may repeat q5mins for up three doses, Disp: 30 tablet, Rfl: 1    Probiotic Product (PROBIOTIC DAILY PO), Take 1 tablet by mouth Daily., Disp: , Rfl:     Social History     Socioeconomic History    Marital status:    Tobacco Use    Smoking status: Former     Current packs/day: 0.00     Average packs/day: 1 pack/day for 10.0 years (10.0 ttl pk-yrs)     Types: Cigarettes     Start date:      Quit date:      Years since quittin.0    Smokeless tobacco: Never   Vaping Use    Vaping status: Never Used   Substance and Sexual Activity    Alcohol use: Yes     Comment: pt states frequent beer drinker    Drug use: Not Currently    Sexual activity: Defer       Review of Systems   Constitutional: Positive for malaise/fatigue. Negative for chills, decreased  appetite, fever, weight gain and weight loss.   HENT:  Negative for nosebleeds.    Eyes:  Negative for visual disturbance.   Cardiovascular:  Positive for dyspnea on exertion. Negative for chest pain, leg swelling, near-syncope, orthopnea, palpitations, paroxysmal nocturnal dyspnea and syncope.        Bendopnea   Respiratory:  Positive for shortness of breath. Negative for cough, hemoptysis and snoring.    Endocrine: Negative for cold intolerance and heat intolerance.   Hematologic/Lymphatic: Negative for bleeding problem. Does not bruise/bleed easily.   Skin:  Negative for rash.   Musculoskeletal:  Negative for back pain and falls.   Gastrointestinal:  Negative for abdominal pain, constipation, diarrhea, heartburn, melena, nausea and vomiting.   Genitourinary:  Negative for hematuria.   Neurological:  Negative for dizziness, headaches and light-headedness.   Psychiatric/Behavioral:  Negative for altered mental status.    Allergic/Immunologic: Negative for persistent infections.              Objective:     Constitutional:       Appearance: Healthy appearance. Well-developed and not in distress.   Eyes:      Pupils: Pupils are equal, round, and reactive to light.   HENT:      Head: Normocephalic and atraumatic.   Neck:      Vascular: No carotid bruit or JVD.   Pulmonary:      Effort: Pulmonary effort is normal.      Breath sounds: Normal breath sounds.   Cardiovascular:      Normal rate. Regular rhythm.      Murmurs: There is no murmur.   Pulses:     Intact distal pulses.   Edema:     Peripheral edema absent.   Abdominal:      General: Bowel sounds are normal.      Palpations: Abdomen is soft.   Musculoskeletal: Normal range of motion.      Cervical back: Normal range of motion and neck supple. Skin:     General: Skin is warm and dry.   Neurological:      Mental Status: Alert, oriented to person, place, and time and oriented to person, place and time.      Deep Tendon Reflexes: Reflexes are normal and symmetric.  "  Psychiatric:         Behavior: Behavior normal.         Thought Content: Thought content normal.         Judgment: Judgment normal.           Procedures  /78 (BP Location: Right arm, Patient Position: Sitting, Cuff Size: Adult)   Pulse 78   Resp 18   Ht 177.8 cm (70\")   Wt 98.9 kg (218 lb)   SpO2 98%   BMI 31.28 kg/m²   Lab Review:   I have reviewed       Lab Results   Component Value Date    CHLPL 157 11/08/2024    TRIG 158 (H) 11/08/2024    HDL 46 11/08/2024    LDL 84 11/08/2024      Results for orders placed during the hospital encounter of 12/13/24    Adult Transthoracic Echo Complete W/ Cont if Necessary Per Protocol    Interpretation Summary    Left ventricular systolic function is normal. Left ventricular ejection fraction appears to be 61 - 65%    Left ventricular wall thickness is consistent with mild concentric hypertrophy    Left ventricular diastolic function is consistent with (grade I) impaired relaxation.    Moderately reduced right ventricular systolic function noted.    Mild to moderate aortic valve regurgitation is present.    Mild mitral valve regurgitation is present    Mild pulmonary hypertension is present.     Assessment:          Diagnosis Plan   1. Shortness of breath  XR Chest 2 View      2. Coronary artery disease involving native coronary artery of native heart without angina pectoris        3. Primary hypertension        4. Mixed hyperlipidemia        5. Valvular heart disease               Plan:       Shortness of Breath- overall improving. Still occasionally at work. Notes with bending over. Check chest x-ray  Coronary Artery Disease- CABG x 4 in June. Low risk stress. Continue Aspirin. No further chest pain. Some shortness of breath. Nitro PRN. Switch to Crestor 40. May need additional agent to get LDL to goal.   Hypertension- blood pressure stable.   Hyperlipidemia- Switch back to Crestor to see if LDL improves. May need additional agent to get to goal.   Valvular Heart " Disease- mild MR, mild to moderate AI. Will monitor.     Follow up in May- will call with results of chest X-ray. Will call if symptoms worsen.

## 2025-05-09 LAB
CHOLEST SERPL-MCNC: 153 MG/DL (ref 100–199)
HDLC SERPL-MCNC: 45 MG/DL
LDLC SERPL CALC-MCNC: 80 MG/DL (ref 0–99)
TRIGL SERPL-MCNC: 162 MG/DL (ref 0–149)
VLDLC SERPL CALC-MCNC: 28 MG/DL (ref 5–40)

## 2025-05-12 RX ORDER — EZETIMIBE 10 MG/1
10 TABLET ORAL DAILY
Qty: 30 TABLET | Refills: 11 | Status: SHIPPED | OUTPATIENT
Start: 2025-05-12 | End: 2025-05-14 | Stop reason: SDUPTHER

## 2025-05-14 ENCOUNTER — OFFICE VISIT (OUTPATIENT)
Dept: CARDIOLOGY | Facility: CLINIC | Age: 65
End: 2025-05-14
Payer: COMMERCIAL

## 2025-05-14 VITALS
OXYGEN SATURATION: 98 % | SYSTOLIC BLOOD PRESSURE: 135 MMHG | WEIGHT: 224 LBS | BODY MASS INDEX: 32.07 KG/M2 | DIASTOLIC BLOOD PRESSURE: 78 MMHG | RESPIRATION RATE: 18 BRPM | HEIGHT: 70 IN | HEART RATE: 75 BPM

## 2025-05-14 DIAGNOSIS — I10 PRIMARY HYPERTENSION: ICD-10-CM

## 2025-05-14 DIAGNOSIS — E78.2 MIXED HYPERLIPIDEMIA: ICD-10-CM

## 2025-05-14 DIAGNOSIS — I25.10 CORONARY ARTERY DISEASE INVOLVING NATIVE CORONARY ARTERY OF NATIVE HEART WITHOUT ANGINA PECTORIS: Primary | ICD-10-CM

## 2025-05-14 DIAGNOSIS — I38 VALVULAR HEART DISEASE: ICD-10-CM

## 2025-05-14 DIAGNOSIS — R06.02 SHORTNESS OF BREATH: ICD-10-CM

## 2025-05-14 PROCEDURE — 99214 OFFICE O/P EST MOD 30 MIN: CPT | Performed by: NURSE PRACTITIONER

## 2025-05-14 RX ORDER — EZETIMIBE 10 MG/1
10 TABLET ORAL DAILY
Qty: 90 TABLET | Refills: 3 | Status: SHIPPED | OUTPATIENT
Start: 2025-05-14

## 2025-05-14 RX ORDER — ROSUVASTATIN CALCIUM 40 MG/1
1 TABLET, COATED ORAL DAILY
COMMUNITY
Start: 2025-04-26

## 2025-05-14 NOTE — PROGRESS NOTES
Subjective:     Encounter Date:05/14/2025      Patient ID: Max Oliveira is a 65 y.o. male     Chief Complaint:  Coronary Artery Disease  Presents for follow-up visit. Symptoms include shortness of breath. Pertinent negatives include no chest pain, dizziness, leg swelling, palpitations or weight gain. The symptoms have been stable. Compliance with diet is variable. Compliance with exercise is good. Compliance with medications is good.   Shortness of Breath  This is a recurrent problem. The current episode started more than 1 month ago. The problem occurs intermittently. The problem has been improved. Pertinent negatives include no abdominal pain, chest pain, fever, headaches, hemoptysis, leg swelling, orthopnea, PND, rash, syncope or vomiting. His past medical history is significant for CAD.     Patient presents today for routine cardiology follow up. Overall patient is feeling much better. He is very active without limitation. He does continue to have brief episodes of shortness of breath. These are not on exertion. They last a few seconds. Does not keep him from activities. Denies chest pain. Getting more energy back. Patient had CABG in June with LIMA to LAD, GAMAL to PDA, SVG to First OM and SVG to second OM. He had a low risk stress test and an echo which showed no acute findings compared with previous echo In December for this shortness of breath.  Patient has Type II Diabetes, GERD, hyperlipidemia and Coronary Artery Disease. LDL remains elevated. Zetia was added last week. He notes he does not eat a low cholesterol diet.      The following portions of the patient's history were reviewed and updated as appropriate: allergies, current medications, past medical history, past social history, past and problem list.    No Known Allergies    Current Outpatient Medications:     ascorbic acid (VITAMIN C) 1000 MG tablet, Take 1 tablet by mouth Daily., Disp: , Rfl:     aspirin 81 MG EC tablet, Take 1 tablet by mouth  Daily., Disp: , Rfl:     Cholecalciferol (Vitamin D-3) 125 MCG (5000 UT) tablet, Take 1 tablet by mouth Daily., Disp: , Rfl:     coenzyme Q10 100 MG capsule, Take 1 capsule by mouth Daily., Disp: , Rfl:     Dapagliflozin Propanediol (Farxiga) 10 MG tablet, Take 10 mg by mouth Daily., Disp: , Rfl:     esomeprazole (nexIUM) 20 MG capsule, Take 1 capsule by mouth Every Morning Before Breakfast., Disp: , Rfl:     ezetimibe (ZETIA) 10 MG tablet, Take 1 tablet by mouth Daily., Disp: 90 tablet, Rfl: 3    glucosamine-chondroitin 500-400 MG capsule capsule, Take 1 capsule by mouth Daily., Disp: , Rfl:     lisinopril (PRINIVIL,ZESTRIL) 10 MG tablet, Take 1 tablet by mouth Daily., Disp: 90 tablet, Rfl: 3    magnesium oxide (MAG-OX) 400 MG tablet, Take 1 tablet by mouth Daily., Disp: , Rfl:     meloxicam (MOBIC) 15 MG tablet, 1 tablet As Needed., Disp: , Rfl:     metFORMIN (GLUCOPHAGE) 500 MG tablet, Take 1 tablet by mouth 2 (Two) Times a Day With Meals., Disp: , Rfl:     metoprolol succinate XL (TOPROL-XL) 50 MG 24 hr tablet, Take 1 tablet by mouth Daily., Disp: 30 tablet, Rfl: 11    multivitamin with minerals tablet tablet, Take 1 tablet by mouth Daily., Disp: , Rfl:     nitroglycerin (NITROSTAT) 0.4 MG SL tablet, 1 under the tongue as needed for angina, may repeat q5mins for up three doses, Disp: 30 tablet, Rfl: 1    Probiotic Product (PROBIOTIC DAILY PO), Take 1 tablet by mouth Daily., Disp: , Rfl:     rosuvastatin (CRESTOR) 40 MG tablet, Take 1 tablet by mouth Daily., Disp: , Rfl:     Social History     Socioeconomic History    Marital status:    Tobacco Use    Smoking status: Former     Current packs/day: 0.00     Average packs/day: 1 pack/day for 10.0 years (10.0 ttl pk-yrs)     Types: Cigarettes     Start date:      Quit date:      Years since quittin.3    Smokeless tobacco: Never   Vaping Use    Vaping status: Never Used   Substance and Sexual Activity    Alcohol use: Yes     Comment: pt states  frequent beer drinker    Drug use: Not Currently    Sexual activity: Defer       Review of Systems   Constitutional: Negative for chills, decreased appetite, fever, malaise/fatigue, weight gain and weight loss.   HENT:  Negative for nosebleeds.    Eyes:  Negative for visual disturbance.   Cardiovascular:  Negative for chest pain, dyspnea on exertion, leg swelling, near-syncope, orthopnea, palpitations, paroxysmal nocturnal dyspnea and syncope.        Bendopnea   Respiratory:  Positive for shortness of breath. Negative for cough, hemoptysis and snoring.    Endocrine: Negative for cold intolerance and heat intolerance.   Hematologic/Lymphatic: Negative for bleeding problem. Does not bruise/bleed easily.   Skin:  Negative for rash.   Musculoskeletal:  Negative for back pain and falls.   Gastrointestinal:  Negative for abdominal pain, constipation, diarrhea, heartburn, melena, nausea and vomiting.   Genitourinary:  Negative for hematuria.   Neurological:  Negative for dizziness, headaches and light-headedness.   Psychiatric/Behavioral:  Negative for altered mental status.    Allergic/Immunologic: Negative for persistent infections.              Objective:     Constitutional:       Appearance: Healthy appearance. Well-developed and not in distress.   Eyes:      Pupils: Pupils are equal, round, and reactive to light.   HENT:      Head: Normocephalic and atraumatic.   Neck:      Vascular: No carotid bruit or JVD.   Pulmonary:      Effort: Pulmonary effort is normal.      Breath sounds: Normal breath sounds.   Cardiovascular:      Normal rate. Regular rhythm.      Murmurs: There is a systolic murmur.   Pulses:     Intact distal pulses.   Edema:     Peripheral edema absent.   Abdominal:      General: Bowel sounds are normal.      Palpations: Abdomen is soft.   Musculoskeletal: Normal range of motion.      Cervical back: Normal range of motion and neck supple. Skin:     General: Skin is warm and dry.   Neurological:       "Mental Status: Alert, oriented to person, place, and time and oriented to person, place and time.      Deep Tendon Reflexes: Reflexes are normal and symmetric.   Psychiatric:         Behavior: Behavior normal.         Thought Content: Thought content normal.         Judgment: Judgment normal.           Procedures  /78 (BP Location: Left arm, Patient Position: Sitting, Cuff Size: Adult)   Pulse 75   Resp 18   Ht 177.8 cm (70\")   Wt 102 kg (224 lb)   SpO2 98%   BMI 32.14 kg/m²   Lab Review:   I have reviewed       Lab Results   Component Value Date    CHLPL 153 05/08/2025    TRIG 162 (H) 05/08/2025    HDL 45 05/08/2025    LDL 80 05/08/2025      Results for orders placed during the hospital encounter of 12/13/24    Adult Transthoracic Echo Complete W/ Cont if Necessary Per Protocol    Interpretation Summary    Left ventricular systolic function is normal. Left ventricular ejection fraction appears to be 61 - 65%    Left ventricular wall thickness is consistent with mild concentric hypertrophy    Left ventricular diastolic function is consistent with (grade I) impaired relaxation.    Moderately reduced right ventricular systolic function noted.    Mild to moderate aortic valve regurgitation is present.    Mild mitral valve regurgitation is present    Mild pulmonary hypertension is present.     Assessment:          Diagnosis Plan   1. Coronary artery disease involving native coronary artery of native heart without angina pectoris        2. Mixed hyperlipidemia  Lipid Panel      3. Primary hypertension        4. Valvular heart disease        5. Shortness of breath               Plan:       Coronary Artery Disease - CABG x 4 in June. Low Risk stress. Continue Aspirin and Crestor. Rare shortness of breath. Denies angina. Very active without limitation.   Mixed Hyperlipidemia - continue Crestor. Zetia added. Lipid Panel in 6 months. Discussed low cholesterol diet.   Hypertension - blood pressure stable. "   Valvular heart disease - mild to moderate AI, mild MR.  Shortness of Breath- reviewed CXR, echo, stress and PFTs. Stable. Continue to monitor.     Follow up in 6 months

## 2025-05-20 RX ORDER — ROSUVASTATIN CALCIUM 40 MG/1
40 TABLET, COATED ORAL DAILY
Qty: 90 TABLET | Refills: 3 | Status: SHIPPED | OUTPATIENT
Start: 2025-05-20

## 2025-08-18 RX ORDER — LISINOPRIL 10 MG/1
10 TABLET ORAL DAILY
Qty: 90 TABLET | Refills: 3 | Status: SHIPPED | OUTPATIENT
Start: 2025-08-18

## 2025-08-18 RX ORDER — METOPROLOL SUCCINATE 50 MG/1
50 TABLET, EXTENDED RELEASE ORAL DAILY
Qty: 90 TABLET | Refills: 3 | Status: SHIPPED | OUTPATIENT
Start: 2025-08-18

## (undated) DEVICE — TP NDL SCORPION MULTIFIRE

## (undated) DEVICE — GLV SURG BIOGEL LTX PF 8

## (undated) DEVICE — SUT MONOCRYL PLS ANTIB UND 3/0  PS1 27IN

## (undated) DEVICE — BNDG GZ SOF-FORM CONFRM 2X75IN LF STRL

## (undated) DEVICE — GLV SURG DERMASSURE GRN LF PF 8.0

## (undated) DEVICE — DISPOSABLE TOURNIQUET CUFF SINGLE BLADDER, SINGLE PORT AND QUICK CONNECT CONNECTOR: Brand: COLOR CUFF

## (undated) DEVICE — TOWEL,OR,DSP,ST,BLUE,STD,4/PK,20PK/CS: Brand: MEDLINE

## (undated) DEVICE — DRSNG WND GZ CURAD OIL EMULSION 3X3IN STRL

## (undated) DEVICE — RESERVOIR,SUCTION,100CC,SILICONE: Brand: MEDLINE

## (undated) DEVICE — BLAKE SILICONE DRAINS CARDIO CONNECTOR 2:1: Brand: BLAKE

## (undated) DEVICE — TRAP FLD MINIVAC MEGADYNE 100ML

## (undated) DEVICE — ADHS SKIN PREMIERPRO EXOFIN TOPICAL HI/VISC .5ML

## (undated) DEVICE — 3M™ STERI-STRIP™ REINFORCED ADHESIVE SKIN CLOSURES, R1547, 1/2 IN X 4 IN (12 MM X 100 MM), 6 STRIPS/ENVELOPE: Brand: 3M™ STERI-STRIP™

## (undated) DEVICE — DRSNG SURESITE123 6X8IN

## (undated) DEVICE — MODEL BT2000 P/N 700287-012KIT CONTENTS: MANIFOLD WITH SALINE AND CONTRAST PORTS, SALINE TUBING WITH SPIKE AND HAND SYRINGE, TRANSDUCER: Brand: BT2000 AUTOMATED MANIFOLD KIT

## (undated) DEVICE — SOLIDIFIER LIQUI LOC PLUS 2000CC

## (undated) DEVICE — Device: Brand: ZDRIVE™

## (undated) DEVICE — INTENDED FOR TISSUE SEPARATION, AND OTHER PROCEDURES THAT REQUIRE A SHARP SURGICAL BLADE TO PUNCTURE OR CUT.: Brand: BARD-PARKER ® STAINLESS STEEL BLADES

## (undated) DEVICE — DRSNG SURESITE WNDW 4X4.5

## (undated) DEVICE — SCANLAN® SURG-I-PAW® INSTRUMENT COVERS - RED, 1/10" X 5"/ 3 MMX13 CM (2 - 5" PCS /PKG): Brand: SCANLAN® SURG-I-PAW® INSTRUMENT COVERS

## (undated) DEVICE — SUT PROLN 1 CTX 30IN 8455H

## (undated) DEVICE — BLAKE SILICONE DRAIN, 19 FR ROUND, HUBLESS WITH 1/4" BENDABLE TROCAR: Brand: BLAKE

## (undated) DEVICE — PK OPN HEART 30

## (undated) DEVICE — UNDERCAST PADDING: Brand: DEROYAL

## (undated) DEVICE — BNDG ELAS W/CLIP 6IN 10YD LF STRL

## (undated) DEVICE — PK SHLDR 30

## (undated) DEVICE — 3M™ IOBAN™ 2 ANTIMICROBIAL INCISE DRAPE 6651EZ: Brand: IOBAN™ 2

## (undated) DEVICE — ANTIBACTERIAL UNDYED BRAIDED (POLYGLACTIN 910), SYNTHETIC ABSORBABLE SUTURE: Brand: COATED VICRYL

## (undated) DEVICE — INTEGRATED CASSETTE TUBING, DO NOT USE IF PACKAGE IS DAMAGED: Brand: CROSSFLOW

## (undated) DEVICE — GLV SURG SENSICARE PI ORTHO SZ8 LF STRL

## (undated) DEVICE — 3M™ STERI-DRAPE™ U-DRAPE 1015: Brand: STERI-DRAPE™

## (undated) DEVICE — MASK,OXYGEN,MED CONC,ADLT,7' TUB, UC: Brand: PENDING

## (undated) DEVICE — CVR BRD ARM 13X30

## (undated) DEVICE — THE SINGLE USE ETRAP – POLYP TRAP IS USED FOR SUCTION RETRIEVAL OF ENDOSCOPICALLY REMOVED POLYPS.: Brand: ETRAP

## (undated) DEVICE — 4-PORT MANIFOLD: Brand: NEPTUNE 2

## (undated) DEVICE — THE CHANNEL CLEANING BRUSH IS A NYLON FLEXI BRUSH ATTACHED TO A FLEXIBLE PLASTIC SHEATH DESIGNED TO SAFELY REMOVE DEBRIS FROM FLEXIBLE ENDOSCOPES.

## (undated) DEVICE — GLV SURG BIOGEL LTX PF 7 1/2

## (undated) DEVICE — MODEL AT P65, P/N 701554-001KIT CONTENTS: HAND CONTROLLER, 3-WAY HIGH-PRESSURE STOPCOCK WITH ROTATING END AND PREMIUM HIGH-PRESSURE TUBING: Brand: ANGIOTOUCH® KIT

## (undated) DEVICE — RADIFOCUS OPTITORQUE ANGIOGRAPHIC CATHETER: Brand: OPTITORQUE

## (undated) DEVICE — E-PACK PROCEDURE KIT: Brand: E-PACK

## (undated) DEVICE — 1010 S-DRAPE TOWEL DRAPE 10/BX: Brand: STERI-DRAPE™

## (undated) DEVICE — BG OR ZSUT SADDLE 20IN CLR STRL

## (undated) DEVICE — SYS DISTNTION VEIN BONCHEK 300MMHG

## (undated) DEVICE — PK TURNOVER RM ADV

## (undated) DEVICE — CLTH CLENS READYCLEANSE PERI CARE PK/5

## (undated) DEVICE — GLIDESHEATH SLENDER STAINLESS STEEL KIT: Brand: GLIDESHEATH SLENDER

## (undated) DEVICE — ADHS LIQ MASTISOL 2/3ML

## (undated) DEVICE — [TOMCAT CUTTER, ARTHROSCOPIC SHAVER BLADE,  DO NOT RESTERILIZE,  DO NOT USE IF PACKAGE IS DAMAGED,  KEEP DRY,  KEEP AWAY FROM SUNLIGHT]: Brand: FORMULA

## (undated) DEVICE — SUT ETHLN 3/0 FS1 30IN 669H

## (undated) DEVICE — TOWEL,OR,DSP,ST,BLUE,DLX,10/PK,8PK/CS: Brand: MEDLINE

## (undated) DEVICE — DRSNG BRDR MEPILEXLITE SLFADHR SIL 2X5

## (undated) DEVICE — GLV SURG BIOGEL LTX PF 6 1/2

## (undated) DEVICE — SNAR POLYP SENSATION MICRO OVL 13 240X40

## (undated) DEVICE — PENCL ES MEGADINE EZ/CLEAN BUTN W/HOLSTR 10FT

## (undated) DEVICE — TBG SMPL FLTR LINE NASL 02/C02 A/ BX/100

## (undated) DEVICE — PATIENT RETURN ELECTRODE, SINGLE-USE, CONTACT QUALITY MONITORING, ADULT, WITH 9FT CORD, FOR PATIENTS WEIGING OVER 33LBS. (15KG): Brand: MEGADYNE

## (undated) DEVICE — PK EXTRM 30

## (undated) DEVICE — STRIP,CLOSURE,WOUND,MEDI-STRIP,1/2X4: Brand: MEDLINE

## (undated) DEVICE — SYS VASOVIEW HEMOPRO ENDOSCOPIC HARVST VESL

## (undated) DEVICE — SYR LUERLOK 50ML

## (undated) DEVICE — TBG PENCL TELESCP MEGADYNE SMOKE EVAC 10FT

## (undated) DEVICE — CATH F6INF PIG 145 110CM 6SH: Brand: INFINITI

## (undated) DEVICE — OASIS DRAIN, SINGLE, INLINE & ATS COMPATIBLE: Brand: OASIS

## (undated) DEVICE — YANKAUER,BULB TIP WITH VENT: Brand: ARGYLE

## (undated) DEVICE — SUCTION MAT (LOW PROFILE), 50X34: Brand: NEPTUNE

## (undated) DEVICE — PK CATH CARD 30 CA/4

## (undated) DEVICE — SUT SILK 2/0 FS BLK 18IN 685G

## (undated) DEVICE — PAD, DEFIB, ADULT, RADIOTRANS, PHYSIO: Brand: MEDLINE

## (undated) DEVICE — STERNUM BLADE, OFFSET (32.0 X 0.8 X 6.3MM)

## (undated) DEVICE — TB SXN SURG DLP MALL/CARD SFT/TP 6F 6IN

## (undated) DEVICE — BUR BRL FORMLA 6FLUT 5MM

## (undated) DEVICE — ROTATING SURGICAL PUNCHES, 1 PER POUCH: Brand: A&E MEDICAL / ROTATING SURGICAL PUNCHES

## (undated) DEVICE — CANN NASL ETCO2 LO/FLO A/

## (undated) DEVICE — PROB ABLAT SERFAS ENERGY MAX 90S 4

## (undated) DEVICE — CANN TWST IN W/NOSQUIRT CAP 7IDX7CM

## (undated) DEVICE — 1/2 FORCE SURGICAL SPRING CLIP: Brand: STEALTH® SPRING CLIP

## (undated) DEVICE — 3M™ IOBAN™ 2 ANTIMICROBIAL INCISE DRAPE 6650EZ: Brand: IOBAN™ 2

## (undated) DEVICE — BLD SCLPL BEAVR MINI STR 2BVL 180D LF

## (undated) DEVICE — SPNG GZ WOVN 4X4IN 12PLY 10/BX STRL

## (undated) DEVICE — BNDG ELAS ECON W/CLIP 4IN 5YD LF STRL

## (undated) DEVICE — PAD MINOR UNIVERSAL: Brand: MEDLINE INDUSTRIES, INC.

## (undated) DEVICE — TR BAND RADIAL ARTERY COMPRESSION DEVICE: Brand: TR BAND

## (undated) DEVICE — Device: Brand: DEFENDO AIR/WATER/SUCTION AND BIOPSY VALVE

## (undated) DEVICE — SENSR O2 OXIMAX FNGR A/ 18IN NONSTR

## (undated) DEVICE — KIT,ANTI FOG,W/SPONGE & FLUID,SOFT PACK: Brand: MEDLINE

## (undated) DEVICE — PK SET UP ANES OPN HEART 30

## (undated) DEVICE — BLD STERNALOCK XP ZDRIVE

## (undated) DEVICE — SUP ARMBRD ART/LINE BLU

## (undated) DEVICE — DRAPE,UTILITY,TAPE,15X26,STERILE: Brand: MEDLINE

## (undated) DEVICE — ARM SLING II: Brand: DEROYAL

## (undated) DEVICE — DRP SLUSH MACH OM-ORS-320

## (undated) DEVICE — SOL IRR NACL 0.9PCT BT 1000ML

## (undated) DEVICE — SPONGE,LAP,12"X12",XR,ST,5/PK,40PK/CS: Brand: MEDLINE

## (undated) DEVICE — CVR UNIV C/ARM

## (undated) DEVICE — CUFF,BP,DISP,1 TUBE,ADULT,HP: Brand: MEDLINE

## (undated) DEVICE — GW STARTER FXD CORE J .035 3X260CM 3MM

## (undated) DEVICE — PAD,EYE,1-5/8X2 5/8,STERILE,LF,1/PK: Brand: MEDLINE